# Patient Record
Sex: FEMALE | Race: BLACK OR AFRICAN AMERICAN | NOT HISPANIC OR LATINO | Employment: PART TIME | URBAN - METROPOLITAN AREA
[De-identification: names, ages, dates, MRNs, and addresses within clinical notes are randomized per-mention and may not be internally consistent; named-entity substitution may affect disease eponyms.]

---

## 2017-01-25 ENCOUNTER — HOSPITAL ENCOUNTER (OUTPATIENT)
Dept: CARDIOLOGY | Facility: CLINIC | Age: 77
Discharge: HOME | End: 2017-01-25
Payer: MEDICARE

## 2017-01-25 DIAGNOSIS — R00.0 TACHYCARDIA: ICD-10-CM

## 2017-01-25 DIAGNOSIS — I10 ESSENTIAL HYPERTENSION: ICD-10-CM

## 2017-01-25 LAB
DIASTOLIC DYSFUNCTION: NO
ESTIMATED PA SYSTOLIC PRESSURE: 25
MITRAL VALVE REGURGITATION: NORMAL
RETIRED EF AND QEF - SEE NOTES: 60 (ref 55–65)
TRICUSPID VALVE REGURGITATION: NORMAL

## 2017-01-25 PROCEDURE — 93306 TTE W/DOPPLER COMPLETE: CPT | Mod: S$GLB,,, | Performed by: INTERNAL MEDICINE

## 2017-01-26 RX ORDER — ALLOPURINOL 300 MG/1
TABLET ORAL
Qty: 90 TABLET | Refills: 0 | Status: SHIPPED | OUTPATIENT
Start: 2017-01-26 | End: 2017-02-01 | Stop reason: SDUPTHER

## 2017-01-26 RX ORDER — AMLODIPINE AND BENAZEPRIL HYDROCHLORIDE 10; 40 MG/1; MG/1
CAPSULE ORAL
Qty: 90 CAPSULE | Refills: 0 | Status: SHIPPED | OUTPATIENT
Start: 2017-01-26 | End: 2017-02-01 | Stop reason: SDUPTHER

## 2017-02-01 ENCOUNTER — OFFICE VISIT (OUTPATIENT)
Dept: INTERNAL MEDICINE | Facility: CLINIC | Age: 77
End: 2017-02-01
Payer: MEDICARE

## 2017-02-01 VITALS
DIASTOLIC BLOOD PRESSURE: 68 MMHG | WEIGHT: 209.69 LBS | HEART RATE: 60 BPM | SYSTOLIC BLOOD PRESSURE: 129 MMHG | BODY MASS INDEX: 33.7 KG/M2 | HEIGHT: 66 IN

## 2017-02-01 DIAGNOSIS — Z23 NEEDS FLU SHOT: ICD-10-CM

## 2017-02-01 DIAGNOSIS — Z86.73 H/O: STROKE: ICD-10-CM

## 2017-02-01 DIAGNOSIS — E66.01 MORBID OBESITY, UNSPECIFIED OBESITY TYPE: ICD-10-CM

## 2017-02-01 DIAGNOSIS — I10 ESSENTIAL HYPERTENSION: Primary | ICD-10-CM

## 2017-02-01 DIAGNOSIS — M10.9 INTERVAL GOUT: ICD-10-CM

## 2017-02-01 DIAGNOSIS — Z96.652 STATUS POST TOTAL LEFT KNEE REPLACEMENT: ICD-10-CM

## 2017-02-01 PROCEDURE — 1159F MED LIST DOCD IN RCRD: CPT | Mod: S$GLB,,, | Performed by: INTERNAL MEDICINE

## 2017-02-01 PROCEDURE — 99499 UNLISTED E&M SERVICE: CPT | Mod: S$GLB,,, | Performed by: INTERNAL MEDICINE

## 2017-02-01 PROCEDURE — 3078F DIAST BP <80 MM HG: CPT | Mod: S$GLB,,, | Performed by: INTERNAL MEDICINE

## 2017-02-01 PROCEDURE — 3074F SYST BP LT 130 MM HG: CPT | Mod: S$GLB,,, | Performed by: INTERNAL MEDICINE

## 2017-02-01 PROCEDURE — 99999 PR PBB SHADOW E&M-EST. PATIENT-LVL III: CPT | Mod: PBBFAC,,, | Performed by: INTERNAL MEDICINE

## 2017-02-01 PROCEDURE — 1160F RVW MEDS BY RX/DR IN RCRD: CPT | Mod: S$GLB,,, | Performed by: INTERNAL MEDICINE

## 2017-02-01 PROCEDURE — 99214 OFFICE O/P EST MOD 30 MIN: CPT | Mod: S$GLB,,, | Performed by: INTERNAL MEDICINE

## 2017-02-01 PROCEDURE — 1157F ADVNC CARE PLAN IN RCRD: CPT | Mod: S$GLB,,, | Performed by: INTERNAL MEDICINE

## 2017-02-01 PROCEDURE — 1126F AMNT PAIN NOTED NONE PRSNT: CPT | Mod: S$GLB,,, | Performed by: INTERNAL MEDICINE

## 2017-02-01 RX ORDER — ATORVASTATIN CALCIUM 80 MG/1
80 TABLET, FILM COATED ORAL DAILY
Qty: 90 TABLET | Refills: 3 | Status: SHIPPED | OUTPATIENT
Start: 2017-02-01 | End: 2017-10-26 | Stop reason: SDUPTHER

## 2017-02-01 RX ORDER — METOPROLOL TARTRATE 50 MG/1
50 TABLET ORAL 2 TIMES DAILY
Qty: 180 TABLET | Refills: 3 | Status: SHIPPED | OUTPATIENT
Start: 2017-02-01 | End: 2017-10-26 | Stop reason: SDUPTHER

## 2017-02-01 RX ORDER — ALLOPURINOL 300 MG/1
300 TABLET ORAL DAILY
Qty: 90 TABLET | Refills: 3 | Status: SHIPPED | OUTPATIENT
Start: 2017-02-01 | End: 2017-10-26 | Stop reason: SDUPTHER

## 2017-02-01 RX ORDER — AMLODIPINE AND BENAZEPRIL HYDROCHLORIDE 10; 40 MG/1; MG/1
1 CAPSULE ORAL DAILY
Qty: 90 CAPSULE | Refills: 3 | Status: SHIPPED | OUTPATIENT
Start: 2017-02-01 | End: 2017-10-26 | Stop reason: SDUPTHER

## 2017-02-01 RX ORDER — SPIRONOLACTONE 25 MG/1
25 TABLET ORAL DAILY
Qty: 90 TABLET | Refills: 3 | Status: SHIPPED | OUTPATIENT
Start: 2017-02-01 | End: 2017-10-26 | Stop reason: SDUPTHER

## 2017-02-01 NOTE — MR AVS SNAPSHOT
[facility]  [address]  [place] [address]  Phone: [phone]  Fax: [phone]                  Nelia Littlejohn   2017 3:00 PM   Office Visit    Description:  Female : 1940   Provider:  Arminda Gutiérrez MD   Department:  [facility]           Reason for Visit     Follow-up           Diagnoses this Visit        Comments    Essential hypertension    -  Primary     H/O: stroke                To Do List           Future Appointments        Provider Department Dept Phone    2017 8:00 AM Arminda Gutéirrez MD [facility] Internal [facility] [phone]      Goals (5 Years of Data)     None      Follow-Up and Disposition     Return in about 3 months (around 2017) for for PE in next 3-6 months.       These Medications        Disp Refills Start End    amlodipine-benazepril (LOTREL) 10-40 mg per capsule 90 capsule 3 2017     Take 1 capsule by mouth once daily. - Oral    Pharmacy: [address] [facility] Ph #: [phone]       allopurinol (ZYLOPRIM) 300 MG tablet 90 tablet 3 2017     Take 1 tablet (300 mg total) by mouth once daily. - Oral    Pharmacy: [address] AT SEC of [facility] Ph #: [phone]       atorvastatin (LIPITOR) 80 MG tablet 90 tablet 3 2017     Take 1 tablet (80 mg total) by mouth once daily. - Oral    Pharmacy: [address] AT SEC of [employer] [facility] Ph #: [phone]       spironolactone (ALDACTONE) 25 MG tablet 90 tablet 3 2017     Take 1 tablet (25 mg total) by mouth once daily. - Oral    Pharmacy: [address] AT SEC of [facility] Ph #: [phone]       metoprolol tartrate (LOPRESSOR) 50 MG tablet 180 tablet 3 2017  2/1/2018    Take 1 tablet (50 mg total) by mouth 2 (two) times daily. - Oral    Pharmacy: [facility] [address] [address] [address] [facility] AT [facility] Emely Levine  #: [phone]         [facility] On Call     [facility] On Call Nurse Care Line - 24/7 Assistance  Registered nurses in the [facility] On Call Center provide clinical advisement, health education, appointment booking, and other advisory services.  Call for this free service at [phone].             Medications           Message regarding Medications     Verify the changes and/or additions to your medication regime listed below are the same as discussed with your clinician today.  If any of these changes or additions are incorrect, please notify your healthcare provider.        START taking these NEW medications        Refills    metoprolol tartrate (LOPRESSOR) 50 MG tablet 3    Sig: Take 1 tablet (50 mg total) by mouth 2 (two) times daily.    Class: Normal    Route: Oral      CHANGE how you are taking these medications     Start Taking Instead of    amlodipine-benazepril (LOTREL) 10-40 mg per capsule amlodipine-benazepril (LOTREL) 10-40 mg per capsule    Dosage:  Take 1 capsule by mouth once daily. Dosage:  TAKE 1 CAPSULE BY MOUTH EVERY DAY    Reason for Change:  Reorder     allopurinol (ZYLOPRIM) 300 MG tablet allopurinol (ZYLOPRIM) 300 MG tablet    Dosage:  Take 1 tablet (300 mg total) by mouth once daily. Dosage:  TAKE 1 TABLET BY MOUTH EVERY DAY    Reason for Change:  Reorder       STOP taking these medications     oxycodone-acetaminophen (PERCOCET) 7.5-325 mg per tablet     promethazine (PHENERGAN) 25 MG tablet TK ONE T PO TID PRF NAUSEA    metoprolol succinate (TOPROL-XL) 100 MG 24 hr tablet Take 2 tablets (200 mg total) by mouth once daily.           Verify that the below list of medications is an accurate representation of the medications you are currently taking.  If none reported, the list may be blank. If  "incorrect, please contact your healthcare provider. Carry this list with you in case of emergency.           Current Medications     allopurinol (ZYLOPRIM) 300 MG tablet Take 1 tablet (300 mg total) by mouth once daily.    amlodipine-benazepril (LOTREL) 10-40 mg per capsule Take 1 capsule by mouth once daily.    aspirin (ECOTRIN) 325 MG EC tablet TAKE 1 TABLET BY MOUTH DAILY    atorvastatin (LIPITOR) 80 MG tablet Take 1 tablet (80 mg total) by mouth once daily.    metoprolol tartrate (LOPRESSOR) 50 MG tablet Take 1 tablet (50 mg total) by mouth 2 (two) times daily.    multivit-iron-min-folic acid (MULTIVITAMIN-IRON-MINERALS-FOLIC ACID) 3,500-18-0.4 unit-mg-mg Chew Take by mouth once daily.     peg-electrolyte soln 420 gram SolR     spironolactone (ALDACTONE) 25 MG tablet Take 1 tablet (25 mg total) by mouth once daily.           Clinical Reference Information           Vital Signs - Last Recorded  Most recent update: 2/1/2017  3:37 PM by Arminda Gutiérrez MD    BP Pulse Ht Wt BMI    129/68 60 5' 6" (1.676 m) 95.1 kg (209 lb 10.5 oz) 33.84 kg/m2      Blood Pressure          Most Recent Value    BP  129/68      Allergies as of 2/1/2017     No Known Allergies      Immunizations Administered on Date of Encounter - 2/1/2017     None      [facility] Sign-Up     Activating your [facility] account is as easy as 1-2-3!     1) Visit [email or web address], select Sign Up Now, enter this activation code and your date of birth, then select Next.  [email or web address]  Expires: 3/18/2017  3:51 PM      2) Create a username and password to use when you visit [facility] in the future and select a security question in case you lose your password and select Next.    3) Enter your e-mail address and click Sign Up!    Additional Information  If you have questions, please e-mail [email or web address] or call [phone] to talk to our [facility] staff. Remember, [facility] is NOT to be used for urgent needs. For medical emergencies, dial 911.       "

## 2017-02-02 NOTE — PROGRESS NOTES
Subjective:       Patient ID: Nelia Littlejohn is a 77 y.o. female.    Chief Complaint: Follow-up  She is recovering from her left total knee replacement October 11, 2017  She is doing her physical therapy at home.  She's ready to return to work on February 13.  She is very happy that she went through with this surgery.  The recovery was more difficult than she anticipated.  HPI  Review of Systems   Constitutional: Negative for activity change, appetite change, chills, fatigue, fever and unexpected weight change.   HENT: Negative for hearing loss.    Eyes: Negative for visual disturbance.   Respiratory: Negative for cough, chest tightness, shortness of breath and wheezing.    Cardiovascular: Negative for chest pain, palpitations and leg swelling.   Gastrointestinal: Negative for abdominal pain, constipation, nausea and vomiting.   Genitourinary: Negative for dysuria, frequency and urgency.   Musculoskeletal: Negative for arthralgias, back pain, gait problem, joint swelling and myalgias.   Skin: Negative for rash.   Neurological: Negative for light-headedness and headaches.   Psychiatric/Behavioral: Negative for dysphoric mood and sleep disturbance. The patient is not nervous/anxious.        Objective:      Physical Exam   Constitutional: She appears well-nourished.   HENT:   Head: Atraumatic.   Eyes: Conjunctivae are normal. No scleral icterus.   Neck: Neck supple.   Cardiovascular: Normal rate and regular rhythm.    Pulmonary/Chest: Effort normal and breath sounds normal.   Abdominal: Soft. There is no tenderness.   Musculoskeletal: She exhibits no edema.   Lymphadenopathy:     She has no cervical adenopathy.   Neurological: She is alert.   Skin: Skin is warm and dry.   Psychiatric: She has a normal mood and affect. Her behavior is normal.       Assessment:       1. Essential hypertension    2. 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 Status post total left knee replacement    4.  Interval gout    5. Needs flu shot    6. Morbid obesity, unspecified obesity type        Plan:   Nelia was seen today for follow-up.    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  -     allopurinol (ZYLOPRIM) 300 MG tablet; Take 1 tablet (300 mg total) by mouth once daily.  -     atorvastatin (LIPITOR) 80 MG tablet; Take 1 tablet (80 mg total) by mouth once daily.  -     spironolactone (ALDACTONE) 25 MG tablet; Take 1 tablet (25 mg total) by mouth once daily.  -     metoprolol tartrate (LOPRESSOR) 50 MG tablet; Take 1 tablet (50 mg total) by mouth 2 (two) times daily.      Health Maintenance       Date Due Completion Date    TETANUS VACCINE 1/2/1958 ---    Pneumococcal (65+) (2 of 2 - PCV13) 10/16/2015 10/16/2014    Influenza Vaccine 8/1/2016 10/16/2014    DEXA SCAN 9/7/2019 9/7/2016 (Not Clinical)    Override on 9/7/2016: Not Clinically Appropriate    Lipid Panel 1/25/2022 1/25/2017

## 2017-03-16 RX ORDER — ASPIRIN 325 MG
TABLET, DELAYED RELEASE (ENTERIC COATED) ORAL
Qty: 90 TABLET | Refills: 0 | Status: SHIPPED | OUTPATIENT
Start: 2017-03-16 | End: 2017-11-14 | Stop reason: SDUPTHER

## 2017-03-16 RX ORDER — SPIRONOLACTONE 25 MG/1
TABLET ORAL
Qty: 90 TABLET | Refills: 0 | Status: SHIPPED | OUTPATIENT
Start: 2017-03-16 | End: 2017-05-24 | Stop reason: SDUPTHER

## 2017-04-24 RX ORDER — METOPROLOL SUCCINATE 200 MG/1
TABLET, EXTENDED RELEASE ORAL
Qty: 90 TABLET | Refills: 3 | Status: SHIPPED | OUTPATIENT
Start: 2017-04-24 | End: 2017-05-24

## 2017-05-18 ENCOUNTER — TELEPHONE (OUTPATIENT)
Dept: FAMILY MEDICINE | Facility: CLINIC | Age: 77
End: 2017-05-18

## 2017-05-18 NOTE — TELEPHONE ENCOUNTER
----- Message from Alexander Bustos LPN sent at 5/18/2017 12:55 PM CDT -----  Contact: daughter/gay/[phone]      ----- Message -----     From: Dhara Glaser     Sent: 5/18/2017  12:47 PM       To: Brock ARROYO Staff    Pt daughter called in regards to pt going on a cruise and would like to get the sea sick patches.      [facility]  Please advise

## 2017-05-18 NOTE — TELEPHONE ENCOUNTER
----- Message from Rosemarie Herr sent at 5/18/2017  1:04 PM CDT -----  Contact: Svitlana Gutierrez/[phone]  Svitlana said she is returning your call. Please advise

## 2017-05-21 RX ORDER — MECLIZINE HYDROCHLORIDE 25 MG/1
25 TABLET ORAL 3 TIMES DAILY PRN
Qty: 20 TABLET | Refills: 0 | Status: SHIPPED | OUTPATIENT
Start: 2017-05-21 | End: 2019-01-09 | Stop reason: ALTCHOICE

## 2017-05-24 ENCOUNTER — HOSPITAL ENCOUNTER (OUTPATIENT)
Dept: RADIOLOGY | Facility: HOSPITAL | Age: 77
Discharge: HOME | End: 2017-05-24
Attending: INTERNAL MEDICINE
Payer: MEDICARE

## 2017-05-24 ENCOUNTER — OFFICE VISIT (OUTPATIENT)
Dept: INTERNAL MEDICINE | Facility: CLINIC | Age: 77
End: 2017-05-24
Payer: MEDICARE

## 2017-05-24 VITALS
OXYGEN SATURATION: 97 % | BODY MASS INDEX: 34.82 KG/M2 | TEMPERATURE: 101 F | DIASTOLIC BLOOD PRESSURE: 78 MMHG | SYSTOLIC BLOOD PRESSURE: 178 MMHG | HEART RATE: 67 BPM | HEIGHT: 66 IN | WEIGHT: 216.69 LBS

## 2017-05-24 DIAGNOSIS — Z96.652 STATUS POST TOTAL LEFT KNEE REPLACEMENT: ICD-10-CM

## 2017-05-24 DIAGNOSIS — Z86.73 H/O: STROKE: ICD-10-CM

## 2017-05-24 DIAGNOSIS — I10 ESSENTIAL HYPERTENSION: ICD-10-CM

## 2017-05-24 DIAGNOSIS — E78.5 HYPERLIPIDEMIA, UNSPECIFIED HYPERLIPIDEMIA TYPE: ICD-10-CM

## 2017-05-24 DIAGNOSIS — M17.0 PRIMARY OSTEOARTHRITIS OF BOTH KNEES: ICD-10-CM

## 2017-05-24 DIAGNOSIS — M25.561 ACUTE PAIN OF RIGHT KNEE: ICD-10-CM

## 2017-05-24 DIAGNOSIS — M25.561 ACUTE PAIN OF RIGHT KNEE: Primary | ICD-10-CM

## 2017-05-24 PROCEDURE — 99214 OFFICE O/P EST MOD 30 MIN: CPT | Mod: S$GLB,,, | Performed by: INTERNAL MEDICINE

## 2017-05-24 PROCEDURE — 73560 X-RAY EXAM OF KNEE 1 OR 2: CPT | Mod: 26,59,LT, | Performed by: RADIOLOGY

## 2017-05-24 PROCEDURE — 73562 X-RAY EXAM OF KNEE 3: CPT | Mod: 26,RT,, | Performed by: RADIOLOGY

## 2017-05-24 PROCEDURE — 73560 X-RAY EXAM OF KNEE 1 OR 2: CPT | Mod: TC,LT

## 2017-05-24 PROCEDURE — 99499 UNLISTED E&M SERVICE: CPT | Mod: S$GLB,,, | Performed by: INTERNAL MEDICINE

## 2017-05-24 PROCEDURE — 99999 PR PBB SHADOW E&M-EST. PATIENT-LVL IV: CPT | Mod: PBBFAC,,, | Performed by: INTERNAL MEDICINE

## 2017-05-24 PROCEDURE — 1125F AMNT PAIN NOTED PAIN PRSNT: CPT | Mod: S$GLB,,, | Performed by: INTERNAL MEDICINE

## 2017-05-24 PROCEDURE — 1159F MED LIST DOCD IN RCRD: CPT | Mod: S$GLB,,, | Performed by: INTERNAL MEDICINE

## 2017-05-24 RX ORDER — DICLOFENAC SODIUM 10 MG/G
2 GEL TOPICAL 4 TIMES DAILY PRN
Qty: 100 G | Refills: 3 | Status: SHIPPED | OUTPATIENT
Start: 2017-05-24 | End: 2020-12-07 | Stop reason: SDUPTHER

## 2017-05-24 RX ORDER — HYDRALAZINE HYDROCHLORIDE 50 MG/1
50 TABLET, FILM COATED ORAL 2 TIMES DAILY
Qty: 180 TABLET | Refills: 3 | Status: SHIPPED | OUTPATIENT
Start: 2017-05-24 | End: 2017-10-26 | Stop reason: SDUPTHER

## 2017-05-29 NOTE — PROGRESS NOTES
Subjective:       Patient ID: Nelia Littlejohn is a 77 y.o. female.    Chief Complaint: Knee Pain; Cough; and Hypertension   is always a pleasure to see this very bashir woman.  Today her knee is hurting very badly and her blood pressure is elevated.  She has nocturnal awakening because of pain in her right knee.  She has to use a cane to walk.  She has pain in her knee all the time.  Her right knee feels unstable specially walking upstairs.  She has not experienced any giving out.  HPI  Review of Systems   Constitutional: Negative for activity change, appetite change, chills, fatigue, fever and unexpected weight change.   HENT: Negative for hearing loss.    Eyes: Negative for visual disturbance.   Respiratory: Negative for cough, chest tightness, shortness of breath and wheezing.    Cardiovascular: Negative for chest pain, palpitations and leg swelling.   Gastrointestinal: Negative for abdominal pain, constipation, nausea and vomiting.   Genitourinary: Negative for dysuria, frequency and urgency.   Musculoskeletal: Positive for arthralgias, gait problem and joint swelling. Negative for back pain and myalgias.   Skin: Negative for rash.   Neurological: Negative for light-headedness and headaches.   Psychiatric/Behavioral: Negative for dysphoric mood and sleep disturbance. The patient is not nervous/anxious.        Objective:      Physical Exam   Constitutional: She is oriented to person, place, and time. She appears well-developed and well-nourished. No distress.   HENT:   Head: Normocephalic and atraumatic.   Right Ear: External ear normal.   Left Ear: External ear normal.   Nose: Nose normal.   Mouth/Throat: Oropharynx is clear and moist. No oropharyngeal exudate.   Eyes: Conjunctivae and EOM are normal. Pupils are equal, round, and reactive to light. Right eye exhibits no discharge. No scleral icterus.   Neck: Normal range of motion and full passive range of motion without pain. Neck supple. No spinous process  tenderness and no muscular tenderness present. Carotid bruit is not present. No thyromegaly present.   Cardiovascular: Normal rate, regular rhythm, S1 normal, S2 normal, normal heart sounds and intact distal pulses.  Exam reveals no gallop and no friction rub.    No murmur heard.  Pulmonary/Chest: Effort normal and breath sounds normal. No respiratory distress. She has no wheezes. She has no rales. She exhibits no tenderness.   Abdominal: Soft. Bowel sounds are normal. She exhibits no distension and no mass. There is no tenderness. There is no rebound and no guarding.   Genitourinary: Pelvic exam was performed with patient supine. Uterus is not deviated, not enlarged, not fixed and not tender. Cervix exhibits no motion tenderness, no discharge and no friability. Right adnexum displays no mass, no tenderness and no fullness. Left adnexum displays no mass, no tenderness and no fullness.   Musculoskeletal: Normal range of motion. She exhibits no edema or tenderness.   Lymphadenopathy:        Head (right side): No submental and no submandibular adenopathy present.        Head (left side): No submental and no submandibular adenopathy present.     She has no cervical adenopathy.        Right cervical: No superficial cervical, no deep cervical and no posterior cervical adenopathy present.       Left cervical: No superficial cervical, no deep cervical and no posterior cervical adenopathy present.        Right axillary: No pectoral and no lateral adenopathy present.        Left axillary: No pectoral and no lateral adenopathy present.       Right: No supraclavicular adenopathy present.        Left: No supraclavicular adenopathy present.   Neurological: She is alert and oriented to person, place, and time. She has normal reflexes. No cranial nerve deficit. She exhibits normal muscle tone. Coordination normal.   Skin: Skin is warm and dry. No rash noted.   Psychiatric: She has a normal mood and affect. Her behavior is normal.  Her mood appears not anxious. Her speech is not rapid and/or pressured. She is not agitated. She does not exhibit a depressed mood.   Normal behavior, thought content, insight and judgement.       Assessment:       1. Acute pain of right knee    2. Status post total left knee replacement, 10-.    3. Primary osteoarthritis of both knees    4. Hyperlipidemia, unspecified hyperlipidemia type    5. Essential hypertension    6. 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    Diagnoses and all orders for this visit:    Acute pain of right knee  -     X-ray Knee Ortho Right; Future  -     Ambulatory Referral to Orthopedics    Status post total left knee replacement, 10-.  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 Right knee  -     hydrALAZINE (APRESOLINE) 50 MG tablet; Take 1 tablet (50 mg total) by mouth 2 (two) times daily.      Medication List with Changes/Refills   New Medications    DICLOFENAC SODIUM 1 % GEL    Apply 2 g topically 4 (four) times daily as needed. Right knee    HYDRALAZINE (APRESOLINE) 50 MG TABLET    Take 1 tablet (50 mg total) by mouth 2 (two) times daily.   Current Medications    ALLOPURINOL (ZYLOPRIM) 300 MG TABLET    Take 1 tablet (300 mg total) by mouth once daily.    AMLODIPINE-BENAZEPRIL (LOTREL) 10-40 MG PER CAPSULE    Take 1 capsule by mouth once daily.    ASPIRIN (ECOTRIN) 325 MG EC TABLET    TAKE 1 TABLET BY MOUTH DAILY    ATORVASTATIN (LIPITOR) 80 MG TABLET    Take 1 tablet (80 mg  total) by mouth once daily.    MECLIZINE (ANTIVERT) 25 MG TABLET    Take 1 tablet (25 mg total) by mouth 3 (three) times daily as needed (motion sickness on cruise).    METOPROLOL TARTRATE (LOPRESSOR) 50 MG TABLET    Take 1 tablet (50 mg total) by mouth 2 (two) times daily.    MULTIVIT-IRON-MIN-FOLIC ACID (MULTIVITAMIN-IRON-MINERALS-FOLIC ACID) 3,500-18-0.4 UNIT-MG-MG CHEW    Take by mouth once daily.     PEG-ELECTROLYTE SOLN 420 GRAM SOLR        SPIRONOLACTONE (ALDACTONE) 25 MG TABLET    Take 1 tablet (25 mg total) by mouth once daily.   Discontinued Medications    METOPROLOL SUCCINATE (TOPROL-XL) 200 MG 24 HR TABLET    TAKE 1 TABLET BY MOUTH DAILY    SPIRONOLACTONE (ALDACTONE) 25 MG TABLET    TAKE 1 TABLET BY MOUTH EVERY DAY.     Return in about 4 weeks (around 6/21/2017) for for me for Mag[facility] Teran.

## 2017-07-27 ENCOUNTER — TELEPHONE (OUTPATIENT)
Dept: INTERNAL MEDICINE | Facility: CLINIC | Age: 77
End: 2017-07-27

## 2017-07-27 NOTE — TELEPHONE ENCOUNTER
----- Message from Ronald Soliz sent at 7/27/2017 12:22 PM CDT -----  Contact: self [phone]  Type: Sooner appointment than  is able to schedule    When is the first available appointment? 09/21/17    What is the nature of the appointment? Ep     What appointment type: follow up      Comments: please advise , Thanks !

## 2017-08-04 RX ORDER — ASPIRIN 325 MG
TABLET, DELAYED RELEASE (ENTERIC COATED) ORAL
Qty: 90 TABLET | Refills: 0 | OUTPATIENT
Start: 2017-08-04

## 2017-10-17 ENCOUNTER — TELEPHONE (OUTPATIENT)
Dept: INTERNAL MEDICINE | Facility: CLINIC | Age: 77
End: 2017-10-17

## 2017-10-17 NOTE — TELEPHONE ENCOUNTER
----- Message from Sagrario Nowak sent at 10/17/2017  9:43 AM CDT -----  Contact: Leydi/Daughter [phone]  Requesting an earlier appt date or time    Next available appt: 01/23/2018    Nature of the appt: Annual Physical    Does patient have appt scheduled?: No    Please contact patient to schedule earlier appt if available but notify patient either way. Please call [phone]    Thank You

## 2017-10-17 NOTE — TELEPHONE ENCOUNTER
Pt is requesting earlier appt time for epp. Is there a spot that she could be fit in or should she wait for January?  Please advise.

## 2017-10-18 NOTE — TELEPHONE ENCOUNTER
----- Message from Silvina Anne sent at 10/18/2017  8:20 AM CDT -----  Contact: Leydi/ Daughter/ [phone]   Type: Sooner appointment than  is able to schedule    When is the first available appointment? 01/23/2018     What is the nature of the appointment? Annual     What appointment type:      Comments: pt daughter is calling to have a sooner appt. Please call and advise     Thank you

## 2017-10-26 ENCOUNTER — LAB VISIT (OUTPATIENT)
Dept: LAB | Facility: HOSPITAL | Age: 77
End: 2017-10-26
Attending: INTERNAL MEDICINE
Payer: MEDICARE

## 2017-10-26 ENCOUNTER — OFFICE VISIT (OUTPATIENT)
Dept: INTERNAL MEDICINE | Facility: CLINIC | Age: 77
End: 2017-10-26
Payer: MEDICARE

## 2017-10-26 VITALS
BODY MASS INDEX: 35.52 KG/M2 | HEART RATE: 68 BPM | WEIGHT: 221 LBS | SYSTOLIC BLOOD PRESSURE: 142 MMHG | HEIGHT: 66 IN | DIASTOLIC BLOOD PRESSURE: 66 MMHG

## 2017-10-26 DIAGNOSIS — I10 ESSENTIAL HYPERTENSION: ICD-10-CM

## 2017-10-26 DIAGNOSIS — M10.9 INTERVAL GOUT: ICD-10-CM

## 2017-10-26 DIAGNOSIS — Z87.898 HISTORY OF PREDIABETES: ICD-10-CM

## 2017-10-26 DIAGNOSIS — E03.8 SUBCLINICAL HYPOTHYROIDISM: ICD-10-CM

## 2017-10-26 DIAGNOSIS — E78.2 MIXED HYPERLIPIDEMIA: ICD-10-CM

## 2017-10-26 DIAGNOSIS — Z86.73 H/O: STROKE: ICD-10-CM

## 2017-10-26 DIAGNOSIS — E55.9 VITAMIN D DEFICIENCY: ICD-10-CM

## 2017-10-26 DIAGNOSIS — Z23 NEEDS FLU SHOT: ICD-10-CM

## 2017-10-26 DIAGNOSIS — Z00.00 ANNUAL PHYSICAL EXAM: Primary | ICD-10-CM

## 2017-10-26 DIAGNOSIS — R73.03 PREDIABETES: ICD-10-CM

## 2017-10-26 DIAGNOSIS — Z23 NEED FOR VACCINATION WITH 13-POLYVALENT PNEUMOCOCCAL CONJUGATE VACCINE: ICD-10-CM

## 2017-10-26 DIAGNOSIS — M25.551 RIGHT HIP PAIN: ICD-10-CM

## 2017-10-26 DIAGNOSIS — E66.01 MORBID OBESITY: ICD-10-CM

## 2017-10-26 LAB
25(OH)D3+25(OH)D2 SERPL-MCNC: 40 NG/ML
ALBUMIN SERPL BCP-MCNC: 3.8 G/DL
ALP SERPL-CCNC: 104 U/L
ALT SERPL W/O P-5'-P-CCNC: 19 U/L
ANION GAP SERPL CALC-SCNC: 12 MMOL/L
AST SERPL-CCNC: 24 U/L
BASOPHILS # BLD AUTO: 0.03 K/UL
BASOPHILS NFR BLD: 0.4 %
BILIRUB SERPL-MCNC: 1.3 MG/DL
BUN SERPL-MCNC: 13 MG/DL
CALCIUM SERPL-MCNC: 9.8 MG/DL
CHLORIDE SERPL-SCNC: 103 MMOL/L
CHOLEST SERPL-MCNC: 162 MG/DL
CHOLEST/HDLC SERPL: 2.9 {RATIO}
CO2 SERPL-SCNC: 24 MMOL/L
CREAT SERPL-MCNC: 0.9 MG/DL
DIFFERENTIAL METHOD: ABNORMAL
EOSINOPHIL # BLD AUTO: 0.1 K/UL
EOSINOPHIL NFR BLD: 1.7 %
ERYTHROCYTE [DISTWIDTH] IN BLOOD BY AUTOMATED COUNT: 14.6 %
EST. GFR  (AFRICAN AMERICAN): >60 ML/MIN/1.73 M^2
EST. GFR  (NON AFRICAN AMERICAN): >60 ML/MIN/1.73 M^2
ESTIMATED AVG GLUCOSE: 105 MG/DL
GLUCOSE SERPL-MCNC: 101 MG/DL
HBA1C MFR BLD HPLC: 5.3 %
HCT VFR BLD AUTO: 38.8 %
HDLC SERPL-MCNC: 56 MG/DL
HDLC SERPL: 34.6 %
HGB BLD-MCNC: 12.6 G/DL
LDLC SERPL CALC-MCNC: 82.2 MG/DL
LYMPHOCYTES # BLD AUTO: 1.8 K/UL
LYMPHOCYTES NFR BLD: 26.1 %
MCH RBC QN AUTO: 28.4 PG
MCHC RBC AUTO-ENTMCNC: 32.5 G/DL
MCV RBC AUTO: 88 FL
MONOCYTES # BLD AUTO: 0.4 K/UL
MONOCYTES NFR BLD: 5.9 %
NEUTROPHILS # BLD AUTO: 4.6 K/UL
NEUTROPHILS NFR BLD: 65.8 %
NONHDLC SERPL-MCNC: 106 MG/DL
NRBC BLD-RTO: 0 /100 WBC
PLATELET # BLD AUTO: 283 K/UL
PMV BLD AUTO: 10.6 FL
POTASSIUM SERPL-SCNC: 4 MMOL/L
PROT SERPL-MCNC: 8 G/DL
RBC # BLD AUTO: 4.43 M/UL
SODIUM SERPL-SCNC: 139 MMOL/L
TRIGL SERPL-MCNC: 119 MG/DL
TSH SERPL DL<=0.005 MIU/L-ACNC: 0.71 UIU/ML
URATE SERPL-MCNC: 4.8 MG/DL
WBC # BLD AUTO: 6.93 K/UL

## 2017-10-26 PROCEDURE — 84550 ASSAY OF BLOOD/URIC ACID: CPT

## 2017-10-26 PROCEDURE — 99999 PR PBB SHADOW E&M-EST. PATIENT-LVL III: CPT | Mod: PBBFAC,,, | Performed by: INTERNAL MEDICINE

## 2017-10-26 PROCEDURE — 36415 COLL VENOUS BLD VENIPUNCTURE: CPT

## 2017-10-26 PROCEDURE — 99499 UNLISTED E&M SERVICE: CPT | Mod: S$GLB,,, | Performed by: INTERNAL MEDICINE

## 2017-10-26 PROCEDURE — 80053 COMPREHEN METABOLIC PANEL: CPT

## 2017-10-26 PROCEDURE — 99397 PER PM REEVAL EST PAT 65+ YR: CPT | Mod: S$GLB,,, | Performed by: INTERNAL MEDICINE

## 2017-10-26 PROCEDURE — 80061 LIPID PANEL: CPT

## 2017-10-26 PROCEDURE — 82306 VITAMIN D 25 HYDROXY: CPT

## 2017-10-26 PROCEDURE — 83036 HEMOGLOBIN GLYCOSYLATED A1C: CPT

## 2017-10-26 PROCEDURE — 84443 ASSAY THYROID STIM HORMONE: CPT

## 2017-10-26 PROCEDURE — 85025 COMPLETE CBC W/AUTO DIFF WBC: CPT

## 2017-10-26 RX ORDER — SPIRONOLACTONE 25 MG/1
25 TABLET ORAL DAILY
Qty: 90 TABLET | Refills: 3 | Status: SHIPPED | OUTPATIENT
Start: 2017-10-26 | End: 2018-11-18 | Stop reason: SDUPTHER

## 2017-10-26 RX ORDER — ALLOPURINOL 300 MG/1
300 TABLET ORAL DAILY
Qty: 90 TABLET | Refills: 3 | Status: SHIPPED | OUTPATIENT
Start: 2017-10-26 | End: 2018-11-18 | Stop reason: SDUPTHER

## 2017-10-26 RX ORDER — ATORVASTATIN CALCIUM 80 MG/1
80 TABLET, FILM COATED ORAL DAILY
Qty: 90 TABLET | Refills: 3 | Status: SHIPPED | OUTPATIENT
Start: 2017-10-26 | End: 2018-11-18 | Stop reason: SDUPTHER

## 2017-10-26 RX ORDER — AMLODIPINE AND BENAZEPRIL HYDROCHLORIDE 10; 40 MG/1; MG/1
1 CAPSULE ORAL DAILY
Qty: 90 CAPSULE | Refills: 3 | Status: SHIPPED | OUTPATIENT
Start: 2017-10-26 | End: 2018-11-18 | Stop reason: SDUPTHER

## 2017-10-26 RX ORDER — HYDRALAZINE HYDROCHLORIDE 50 MG/1
50 TABLET, FILM COATED ORAL 2 TIMES DAILY
Qty: 180 TABLET | Refills: 3 | Status: SHIPPED | OUTPATIENT
Start: 2017-10-26 | End: 2019-01-09 | Stop reason: ALTCHOICE

## 2017-10-26 RX ORDER — METOPROLOL TARTRATE 50 MG/1
50 TABLET ORAL 2 TIMES DAILY
Qty: 180 TABLET | Refills: 3 | Status: SHIPPED | OUTPATIENT
Start: 2017-10-26 | End: 2018-11-18 | Stop reason: SDUPTHER

## 2017-10-26 RX ORDER — ASPIRIN 325 MG
TABLET, DELAYED RELEASE (ENTERIC COATED) ORAL
Qty: 90 TABLET | Refills: 0 | OUTPATIENT
Start: 2017-10-26

## 2017-10-26 NOTE — PROGRESS NOTES
Subjective:       Patient ID: Nelia Littlejohn is a 77 y.o. female.    Chief Complaint: Annual Exam  wellness  Entire chart reviewed, PMx, FHx, and Social History updated and reviewed.  This note was created with the use of Dragon dictation    HPI  Review of Systems   Constitutional: Negative for activity change, appetite change, chills, fatigue, fever and unexpected weight change.   HENT: Negative for dental problem, hearing loss, tinnitus, trouble swallowing and voice change.    Eyes: Negative for visual disturbance.   Respiratory: Negative for cough, chest tightness, shortness of breath and wheezing.    Cardiovascular: Negative for chest pain, palpitations and leg swelling.   Gastrointestinal: Negative for abdominal pain, blood in stool, constipation, diarrhea and nausea.   Genitourinary: Negative for difficulty urinating, dysuria, frequency and urgency.   Musculoskeletal: Negative for arthralgias, back pain, gait problem, joint swelling, myalgias, neck pain and neck stiffness.   Skin: Negative for rash.   Neurological: Negative for dizziness, tremors, speech difficulty, weakness, light-headedness and headaches.   Psychiatric/Behavioral: Negative for dysphoric mood and sleep disturbance. The patient is not nervous/anxious.        Objective:      Physical Exam   Constitutional: She is oriented to person, place, and time. She appears well-developed and well-nourished. No distress.   HENT:   Head: Normocephalic and atraumatic.   Right Ear: External ear normal.   Left Ear: External ear normal.   Nose: Nose normal.   Mouth/Throat: Oropharynx is clear and moist. No oropharyngeal exudate.   Eyes: Conjunctivae and EOM are normal. Pupils are equal, round, and reactive to light. Right eye exhibits no discharge. No scleral icterus.   Neck: Normal range of motion and full passive range of motion without pain. Neck supple. No spinous process tenderness and no muscular tenderness present. Carotid bruit is not present. No  thyromegaly present.   Cardiovascular: Normal rate, regular rhythm, S1 normal, S2 normal, normal heart sounds and intact distal pulses.  Exam reveals no gallop and no friction rub.    No murmur heard.  Pulmonary/Chest: Effort normal and breath sounds normal. No respiratory distress. She has no wheezes. She has no rales. She exhibits no tenderness.   Abdominal: Soft. Bowel sounds are normal. She exhibits no distension and no mass. There is no tenderness. There is no rebound and no guarding.   Genitourinary: Pelvic exam was performed with patient supine. Uterus is not deviated, not enlarged, not fixed and not tender. Cervix exhibits no motion tenderness, no discharge and no friability. Right adnexum displays no mass, no tenderness and no fullness. Left adnexum displays no mass, no tenderness and no fullness.   Musculoskeletal: Normal range of motion. She exhibits no edema or tenderness.   Lymphadenopathy:        Head (right side): No submental and no submandibular adenopathy present.        Head (left side): No submental and no submandibular adenopathy present.     She has no cervical adenopathy.        Right cervical: No superficial cervical, no deep cervical and no posterior cervical adenopathy present.       Left cervical: No superficial cervical, no deep cervical and no posterior cervical adenopathy present.        Right axillary: No pectoral and no lateral adenopathy present.        Left axillary: No pectoral and no lateral adenopathy present.       Right: No supraclavicular adenopathy present.        Left: No supraclavicular adenopathy present.   Neurological: She is alert and oriented to person, place, and time. She has normal reflexes. No cranial nerve deficit. She exhibits normal muscle tone. Coordination normal.   Skin: Skin is warm and dry. No rash noted.   Psychiatric: She has a normal mood and affect. Her behavior is normal. Her mood appears not anxious. Her speech is not rapid and/or pressured. She is  not agitated. She does not exhibit a depressed mood.   Normal behavior, thought content, insight and judgement.       Assessment:       1. Annual physical exam    2. History of prediabetes    3. Prediabetes,     4. Interval gout    5. Essential hypertension    6. 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 Right hip pain    8. Needs flu shot    9. Need for vaccination with 13-polyvalent pneumococcal conjugate vaccine    10. Morbid obesity    11. Mixed hyperlipidemia    12. Subclinical hypothyroidism    13. Vitamin D deficiency        Plan:   Nelia was seen today for annual exam.    Diagnoses and all orders for this visit:    Annual physical exam.  She enjoys good health, good energy, practices good health habitsand enjoys working. 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    History of prediabetes.  Asymptomatic.  Monitor.  Prediabetes,   -     Comprehensive metabolic panel; Future  -     Hemoglobin A1c; Future    Interval gout.  On allopurinol and has not had a recent attack.  -     CBC auto differential; Future  -     Comprehensive metabolic panel; Future  -     Uric acid; Future    Essential hypertension.  She takes a lot of medication but is under good control.  She has a horrible family history of essential hypertension and all of her children also have hypertension.  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  She has no residual stroke symptoms.  -     CBC auto differential; Future  -     Hemoglobin A1c; Future    Right hip pain.  This is only intermittent    Needs flu shot, Today.    Need for vaccination with 13-polyvalent pneumococcal conjugate vaccine, Today.    Morbid obesity.  She is not terribly motivated to lose weight but she does watch her diet.  She particularly is restricting processed foods  and salt.    Mixed hyperlipidemia.  Statin compliant.  -     Lipid panel; Future  -     TSH; Future    Subclinical hypothyroidism.  Monitor.  -     TSH; Future    Vitamin D deficiency.  She is taking a daily over-the-counter supplement and we will monitor.  -     Vitamin D; Future    Other orders  -     spironolactone (ALDACTONE) 25 MG tablet; Take 1 tablet (25 mg total) by mouth once daily.  -     amlodipine-benazepril (LOTREL) 10-40 mg per capsule; Take 1 capsule by mouth once daily.  -     atorvastatin (LIPITOR) 80 MG tablet; Take 1 tablet (80 mg total) by mouth once daily.  -     metoprolol tartrate (LOPRESSOR) 50 MG tablet; Take 1 tablet (50 mg total) by mouth 2 (two) times daily.  -     hydrALAZINE (APRESOLINE) 50 MG tablet; Take 1 tablet (50 mg total) by mouth 2 (two) times daily.  -     allopurinol (ZYLOPRIM) 300 MG tablet; Take 1 tablet (300 mg total) by mouth once daily.    Return in about 6 months (around 4/26/2018) for 6 mon me or NP and one year PE.

## 2017-10-26 NOTE — LETTER
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    If you have any questions or concerns, please feel free to call my office.    Sincerely,        Arminda Gutiérrez MD

## 2017-11-14 DIAGNOSIS — Z12.39 SCREENING BREAST EXAMINATION: ICD-10-CM

## 2017-11-14 RX ORDER — ASPIRIN 325 MG
325 TABLET, DELAYED RELEASE (ENTERIC COATED) ORAL DAILY
Qty: 100 TABLET | Refills: 3 | Status: SHIPPED | OUTPATIENT
Start: 2017-11-14 | End: 2018-12-03 | Stop reason: SDUPTHER

## 2017-11-14 NOTE — TELEPHONE ENCOUNTER
Please call patient. Every one of her blood tests 10-26 -2017 were normal.  If she would like a copy of her blood tests, please print and mail.  Her last mammogram was in 2015. Many women choose to stop having routine screening mammograms at age 75. I do not remember discussing her wishes at her last visit. I have ordered a routine screening mammogram if she would like to schedule this.

## 2017-11-14 NOTE — TELEPHONE ENCOUNTER
Spoke with pt and she understood her results. Pt also feels like she should wait until another time to schedule her mammogram after hearing at the age of 75yrs old woman don't usually need them. Pt would like a refill on her Aspirin sent to there pharmacy

## 2017-11-14 NOTE — TELEPHONE ENCOUNTER
----- Message from Kaci Mooney sent at 11/14/2017  2:38 PM CST -----  Contact: Ebony rodriguez- [phone]  Aspirin Rx was not put in at pharmacy.    Patient would like to get test results.  Name of test (lab, mammo, etc.):  lab  Date of test:  10/26  Ordering provider:   Where was the test performed[facility]  [facility]  Comments:

## 2018-01-15 ENCOUNTER — OFFICE VISIT (OUTPATIENT)
Dept: INTERNAL MEDICINE | Facility: CLINIC | Age: 78
End: 2018-01-15
Payer: MEDICARE

## 2018-01-15 VITALS
WEIGHT: 221 LBS | HEIGHT: 66 IN | SYSTOLIC BLOOD PRESSURE: 138 MMHG | BODY MASS INDEX: 35.52 KG/M2 | HEART RATE: 54 BPM | OXYGEN SATURATION: 97 % | TEMPERATURE: 98 F | DIASTOLIC BLOOD PRESSURE: 82 MMHG

## 2018-01-15 DIAGNOSIS — J11.1 INFLUENZA: Primary | ICD-10-CM

## 2018-01-15 PROCEDURE — 99999 PR PBB SHADOW E&M-EST. PATIENT-LVL III: CPT | Mod: PBBFAC,,, | Performed by: INTERNAL MEDICINE

## 2018-01-15 PROCEDURE — 99213 OFFICE O/P EST LOW 20 MIN: CPT | Mod: S$GLB,,, | Performed by: INTERNAL MEDICINE

## 2018-01-15 RX ORDER — BENZONATATE 200 MG/1
200 CAPSULE ORAL 3 TIMES DAILY PRN
Qty: 30 CAPSULE | Refills: 0 | Status: SHIPPED | OUTPATIENT
Start: 2018-01-15 | End: 2018-01-22

## 2018-01-15 RX ORDER — OSELTAMIVIR PHOSPHATE 75 MG/1
75 CAPSULE ORAL 2 TIMES DAILY
Qty: 10 CAPSULE | Refills: 0 | Status: SHIPPED | OUTPATIENT
Start: 2018-01-15 | End: 2018-01-20

## 2018-01-15 RX ORDER — BENZONATATE 200 MG/1
200 CAPSULE ORAL 3 TIMES DAILY PRN
Qty: 30 CAPSULE | Refills: 0 | Status: SHIPPED | OUTPATIENT
Start: 2018-01-15 | End: 2018-01-15 | Stop reason: SDUPTHER

## 2018-01-15 NOTE — PROGRESS NOTES
Subjective:       Patient ID: Nelia Littlejohn is a 78 y.o. female.    Chief Complaint: Cough (possible flu); Fatigue (body aches); Fever; and Sore Throat    HPI   Began 4 days ago with minor cough for several days  Body aches began began about 3 days ago.  She hasn't checked temperature.  No runny nose, wheezing, sob.  Aleve has helped a bit.    Cough keeps her up at night.   Robitussin not helpful.  She works at a nursery.        Review of Systems   Constitutional: Negative for fever and unexpected weight change.   HENT: Negative for congestion and postnasal drip.    Eyes: Negative for pain, discharge and visual disturbance.   Respiratory: Negative for cough, chest tightness, shortness of breath and wheezing.    Cardiovascular: Negative for chest pain and leg swelling.   Gastrointestinal: Negative for abdominal pain, constipation, diarrhea and nausea.   Genitourinary: Negative for difficulty urinating, dysuria and hematuria.   Skin: Negative for rash.   Neurological: Negative for headaches.   Psychiatric/Behavioral: Negative for dysphoric mood and sleep disturbance. The patient is not nervous/anxious.        Objective:      Physical Exam   Constitutional: She is oriented to person, place, and time. She appears well-developed and well-nourished. No distress.   HENT:   Head: Normocephalic and atraumatic.   Mouth/Throat: Oropharynx is clear and moist. No oropharyngeal exudate.   Tm's clear   Neck: Neck supple.   Cardiovascular: Normal rate and regular rhythm.    Pulmonary/Chest: Effort normal and breath sounds normal. No respiratory distress. She has no wheezes. She has no rales.   Lymphadenopathy:     She has no cervical adenopathy.   Neurological: She is alert and oriented to person, place, and time.   Psychiatric: She has a normal mood and affect. Her behavior is normal.       Assessment:       1. Influenza        Plan:       Nelia was seen today for cough, fatigue, fever and sore throat.    Diagnoses and all  orders for this visit:    Influenza    Other orders  -     oseltamivir (TAMIFLU) 75 MG capsule; Take 1 capsule (75 mg total) by mouth 2 (two) times daily.  -     benzonatate (TESSALON) 200 MG capsule; Take 1 capsule (200 mg total) by mouth 3 (three) times daily as needed for Cough.

## 2018-03-16 ENCOUNTER — HOSPITAL ENCOUNTER (OUTPATIENT)
Dept: RADIOLOGY | Facility: HOSPITAL | Age: 78
Discharge: HOME | End: 2018-03-16
Attending: INTERNAL MEDICINE
Payer: MEDICARE

## 2018-03-16 ENCOUNTER — OFFICE VISIT (OUTPATIENT)
Dept: INTERNAL MEDICINE | Facility: CLINIC | Age: 78
End: 2018-03-16
Payer: MEDICARE

## 2018-03-16 VITALS
DIASTOLIC BLOOD PRESSURE: 62 MMHG | BODY MASS INDEX: 35.83 KG/M2 | SYSTOLIC BLOOD PRESSURE: 153 MMHG | WEIGHT: 222 LBS | HEART RATE: 61 BPM | OXYGEN SATURATION: 95 %

## 2018-03-16 DIAGNOSIS — R05.9 COUGH: Primary | ICD-10-CM

## 2018-03-16 DIAGNOSIS — R06.2 WHEEZES: ICD-10-CM

## 2018-03-16 DIAGNOSIS — I10 ESSENTIAL HYPERTENSION: ICD-10-CM

## 2018-03-16 DIAGNOSIS — R05.9 COUGH: ICD-10-CM

## 2018-03-16 PROBLEM — E66.01 MORBID OBESITY: Status: RESOLVED | Noted: 2017-02-01 | Resolved: 2018-03-16

## 2018-03-16 PROCEDURE — 99999 PR PBB SHADOW E&M-EST. PATIENT-LVL IV: CPT | Mod: PBBFAC,,, | Performed by: INTERNAL MEDICINE

## 2018-03-16 PROCEDURE — 71046 X-RAY EXAM CHEST 2 VIEWS: CPT | Mod: 26,,, | Performed by: RADIOLOGY

## 2018-03-16 PROCEDURE — 3078F DIAST BP <80 MM HG: CPT | Mod: CPTII,S$GLB,, | Performed by: INTERNAL MEDICINE

## 2018-03-16 PROCEDURE — 71046 X-RAY EXAM CHEST 2 VIEWS: CPT | Mod: TC

## 2018-03-16 PROCEDURE — 3077F SYST BP >= 140 MM HG: CPT | Mod: CPTII,S$GLB,, | Performed by: INTERNAL MEDICINE

## 2018-03-16 PROCEDURE — 99214 OFFICE O/P EST MOD 30 MIN: CPT | Mod: S$GLB,,, | Performed by: INTERNAL MEDICINE

## 2018-03-16 RX ORDER — PREDNISONE 20 MG/1
20 TABLET ORAL SEE ADMIN INSTRUCTIONS
Qty: 12 TABLET | Refills: 0 | Status: SHIPPED | OUTPATIENT
Start: 2018-03-16 | End: 2018-03-16 | Stop reason: SDUPTHER

## 2018-03-16 RX ORDER — PREDNISONE 20 MG/1
20 TABLET ORAL SEE ADMIN INSTRUCTIONS
Qty: 12 TABLET | Refills: 0 | Status: SHIPPED | OUTPATIENT
Start: 2018-03-16 | End: 2018-03-22

## 2018-03-16 RX ORDER — DOXYCYCLINE 100 MG/1
100 CAPSULE ORAL 2 TIMES DAILY
Qty: 20 CAPSULE | Refills: 0 | Status: SHIPPED | OUTPATIENT
Start: 2018-03-16 | End: 2018-03-26

## 2018-03-16 RX ORDER — ALBUTEROL SULFATE 90 UG/1
2 AEROSOL, METERED RESPIRATORY (INHALATION) EVERY 6 HOURS PRN
Qty: 18 G | Refills: 1 | Status: SHIPPED | OUTPATIENT
Start: 2018-03-16 | End: 2018-03-16 | Stop reason: SDUPTHER

## 2018-03-16 RX ORDER — DOXYCYCLINE 100 MG/1
100 CAPSULE ORAL 2 TIMES DAILY
Qty: 20 CAPSULE | Refills: 0 | Status: SHIPPED | OUTPATIENT
Start: 2018-03-16 | End: 2018-03-16 | Stop reason: SDUPTHER

## 2018-03-16 RX ORDER — ALBUTEROL SULFATE 90 UG/1
2 AEROSOL, METERED RESPIRATORY (INHALATION) EVERY 6 HOURS PRN
Qty: 18 G | Refills: 1 | Status: SHIPPED | OUTPATIENT
Start: 2018-03-16 | End: 2019-01-09 | Stop reason: ALTCHOICE

## 2018-03-16 NOTE — PROGRESS NOTES
Subjective:       Patient ID: Nelia Littlejohn is a 78 y.o. female.    Chief Complaint: Cough and Nasal Congestion      HPI:  Patient here urgent care, usually sees [facility].  Complains of about a week cough and congestion with some wheezing has tried Tylenol cold and flu no known temperature.  No purulent mucus daughter says no one else has been ill.  She also tried some Mucinex sometimes she coughs up some purulent sputum.  No blood  In sputum or night sweats.       Cough   Associated symptoms include wheezing. Pertinent negatives include no chest pain, chills, fever, headaches, rash, sore throat or shortness of breath.     Review of Systems   Constitutional: Negative for appetite change, chills and fever.   HENT: Positive for congestion. Negative for nosebleeds, sinus pain and sore throat.    Eyes: Negative for visual disturbance.   Respiratory: Positive for cough and wheezing. Negative for shortness of breath.    Cardiovascular: Negative for chest pain and leg swelling.   Gastrointestinal: Negative for abdominal pain, constipation and diarrhea.   Genitourinary: Negative for difficulty urinating and hematuria.   Musculoskeletal: Negative for neck pain and neck stiffness.   Skin: Negative for pallor and rash.   Neurological: Negative for headaches.   Psychiatric/Behavioral: Negative for dysphoric mood and suicidal ideas. The patient is not nervous/anxious.        Objective:      Physical Exam   Constitutional: She is oriented to person, place, and time. She appears well-developed and well-nourished. No distress.   HENT:   Head: Normocephalic and atraumatic.   Right Ear: External ear normal.   Left Ear: External ear normal.   Mouth/Throat: Oropharynx is clear and moist. No oropharyngeal exudate.   Eyes: Conjunctivae are normal. Pupils are equal, round, and reactive to light. No scleral icterus.   Neck: Normal range of motion. Neck supple. No thyromegaly present.   Cardiovascular: Normal rate and regular rhythm.     No murmur heard.  Pulmonary/Chest: Effort normal. She has wheezes. She has no rales. She exhibits no tenderness.   Coughing occasionally   Abdominal: Soft. Bowel sounds are normal. She exhibits no distension. There is no tenderness.   Musculoskeletal: She exhibits no tenderness.   Lymphadenopathy:     She has no cervical adenopathy.   Neurological: She is alert and oriented to person, place, and time.   Skin: No rash noted.   Psychiatric: She has a normal mood and affect.       Assessment:       1. Cough    2. Wheezes    3. Essential hypertension        Plan:       Nelia was seen today for cough and nasal congestion.    Diagnoses and all orders for this visit:    Cough  -     X-Ray Chest PA And Lateral; Future    Wheezes  -     X-Ray Chest PA And Lateral; Future    Essential hypertension    Other orders  -     doxycycline (VIBRAMYCIN) 100 MG Cap; Take 1 capsule (100 mg total) by mouth 2 (two) times daily.  -     predniSONE (DELTASONE) 20 MG tablet; Take 1 tablet (20 mg total) by mouth As instructed.  -     albuterol 90 mcg/actuation inhaler; Inhale 2 puffs into the lungs every 6 (six) hours as needed for Wheezing.         Return for recheck after chest x-ray    CXR is clear.   Meds as above. Side effects discussed. Pt and daughter understand.

## 2018-03-16 NOTE — LETTER
March 16, 2018      [facility]  [address]  [facility] [address]  Phone: [phone]  Fax: [phone]       Patient: Nelia Littlejohn   YOB: 1940  Date of Visit: 03/16/2018    To Whom It May Concern:    Estela Littlejohn  was at [facility] on 03/16/2018. She may return to work/school on 3/20/18 with no restrictions. If you have any questions or concerns, or if I can be of further assistance, please do not hesitate to contact me.    Sincerely,       Thee Sawant MD

## 2018-08-31 ENCOUNTER — TELEPHONE (OUTPATIENT)
Dept: INTERNAL MEDICINE | Facility: CLINIC | Age: 78
End: 2018-08-31

## 2018-08-31 NOTE — TELEPHONE ENCOUNTER
----- Message from Lyla Houser sent at 8/31/2018  3:23 PM CDT -----  Contact: pts cosme Martinez would like to be called back regarding scheduling an appt    can be reached at [phone]

## 2018-11-18 RX ORDER — METOPROLOL TARTRATE 50 MG/1
TABLET ORAL
Qty: 180 TABLET | Refills: 0 | Status: SHIPPED | OUTPATIENT
Start: 2018-11-18 | End: 2019-01-09 | Stop reason: ALTCHOICE

## 2018-11-18 RX ORDER — SPIRONOLACTONE 25 MG/1
TABLET ORAL
Qty: 90 TABLET | Refills: 0 | Status: SHIPPED | OUTPATIENT
Start: 2018-11-18 | End: 2019-01-09 | Stop reason: SDUPTHER

## 2018-11-18 RX ORDER — ATORVASTATIN CALCIUM 80 MG/1
TABLET, FILM COATED ORAL
Qty: 90 TABLET | Refills: 0 | Status: SHIPPED | OUTPATIENT
Start: 2018-11-18 | End: 2019-01-09 | Stop reason: SDUPTHER

## 2018-11-18 RX ORDER — AMLODIPINE AND BENAZEPRIL HYDROCHLORIDE 10; 40 MG/1; MG/1
CAPSULE ORAL
Qty: 90 CAPSULE | Refills: 0 | Status: SHIPPED | OUTPATIENT
Start: 2018-11-18 | End: 2019-01-09 | Stop reason: ALTCHOICE

## 2018-11-18 RX ORDER — ALLOPURINOL 300 MG/1
TABLET ORAL
Qty: 90 TABLET | Refills: 0 | Status: SHIPPED | OUTPATIENT
Start: 2018-11-18 | End: 2019-01-09 | Stop reason: SDUPTHER

## 2018-12-03 ENCOUNTER — TELEPHONE (OUTPATIENT)
Dept: INTERNAL MEDICINE | Facility: CLINIC | Age: 78
End: 2018-12-03

## 2018-12-04 RX ORDER — ASPIRIN 325 MG
TABLET, DELAYED RELEASE (ENTERIC COATED) ORAL
Qty: 100 TABLET | Refills: 0 | Status: SHIPPED | OUTPATIENT
Start: 2018-12-04 | End: 2019-07-29 | Stop reason: SDUPTHER

## 2019-01-09 ENCOUNTER — TELEPHONE (OUTPATIENT)
Dept: INTERNAL MEDICINE | Facility: CLINIC | Age: 79
End: 2019-01-09

## 2019-01-09 ENCOUNTER — OFFICE VISIT (OUTPATIENT)
Dept: INTERNAL MEDICINE | Facility: CLINIC | Age: 79
End: 2019-01-09
Payer: MEDICARE

## 2019-01-09 ENCOUNTER — CLINICAL SUPPORT (OUTPATIENT)
Dept: INTERNAL MEDICINE | Facility: CLINIC | Age: 79
End: 2019-01-09
Payer: MEDICARE

## 2019-01-09 ENCOUNTER — LAB VISIT (OUTPATIENT)
Dept: LAB | Facility: HOSPITAL | Age: 79
End: 2019-01-09
Attending: INTERNAL MEDICINE
Payer: MEDICARE

## 2019-01-09 ENCOUNTER — IMMUNIZATION (OUTPATIENT)
Dept: INTERNAL MEDICINE | Facility: CLINIC | Age: 79
End: 2019-01-09
Payer: MEDICARE

## 2019-01-09 VITALS
HEIGHT: 66 IN | SYSTOLIC BLOOD PRESSURE: 140 MMHG | OXYGEN SATURATION: 98 % | HEART RATE: 64 BPM | WEIGHT: 220.25 LBS | BODY MASS INDEX: 35.4 KG/M2 | DIASTOLIC BLOOD PRESSURE: 64 MMHG

## 2019-01-09 DIAGNOSIS — Z00.00 ANNUAL PHYSICAL EXAM: Primary | ICD-10-CM

## 2019-01-09 DIAGNOSIS — G47.30 SLEEP APNEA, UNSPECIFIED TYPE: ICD-10-CM

## 2019-01-09 DIAGNOSIS — R89.9 ABNORMAL LABORATORY TEST RESULT: Primary | ICD-10-CM

## 2019-01-09 DIAGNOSIS — Z86.73 H/O: STROKE: ICD-10-CM

## 2019-01-09 DIAGNOSIS — E78.5 HYPERLIPIDEMIA, UNSPECIFIED HYPERLIPIDEMIA TYPE: ICD-10-CM

## 2019-01-09 DIAGNOSIS — I10 ESSENTIAL HYPERTENSION: ICD-10-CM

## 2019-01-09 DIAGNOSIS — R74.8 ELEVATED ALKALINE PHOSPHATASE LEVEL: ICD-10-CM

## 2019-01-09 DIAGNOSIS — E04.2 MULTINODULAR GOITER: ICD-10-CM

## 2019-01-09 DIAGNOSIS — Z23 NEED FOR VACCINATION WITH 13-POLYVALENT PNEUMOCOCCAL CONJUGATE VACCINE: ICD-10-CM

## 2019-01-09 DIAGNOSIS — M10.9 INTERVAL GOUT: ICD-10-CM

## 2019-01-09 DIAGNOSIS — M17.0 PRIMARY OSTEOARTHRITIS OF BOTH KNEES: ICD-10-CM

## 2019-01-09 DIAGNOSIS — Z23 NEEDS FLU SHOT: ICD-10-CM

## 2019-01-09 DIAGNOSIS — N18.30 STAGE 3 CHRONIC KIDNEY DISEASE: ICD-10-CM

## 2019-01-09 DIAGNOSIS — Z87.898 HISTORY OF PREDIABETES: ICD-10-CM

## 2019-01-09 DIAGNOSIS — Z96.652 STATUS POST TOTAL LEFT KNEE REPLACEMENT: ICD-10-CM

## 2019-01-09 DIAGNOSIS — R73.03 PREDIABETES: ICD-10-CM

## 2019-01-09 PROBLEM — Z12.39 SCREENING BREAST EXAMINATION: Status: RESOLVED | Noted: 2017-11-14 | Resolved: 2019-01-09

## 2019-01-09 LAB
ALBUMIN SERPL BCP-MCNC: 4.3 G/DL
ALP SERPL-CCNC: 106 U/L
ALT SERPL W/O P-5'-P-CCNC: 27 U/L
ANION GAP SERPL CALC-SCNC: 9 MMOL/L
AST SERPL-CCNC: 31 U/L
BASOPHILS # BLD AUTO: 0.03 K/UL
BASOPHILS NFR BLD: 0.5 %
BILIRUB SERPL-MCNC: 0.8 MG/DL
BUN SERPL-MCNC: 19 MG/DL
CALCIUM SERPL-MCNC: 10.6 MG/DL
CHLORIDE SERPL-SCNC: 106 MMOL/L
CHOLEST SERPL-MCNC: 175 MG/DL
CHOLEST/HDLC SERPL: 4 {RATIO}
CO2 SERPL-SCNC: 26 MMOL/L
CREAT SERPL-MCNC: 1.2 MG/DL
DIFFERENTIAL METHOD: ABNORMAL
EOSINOPHIL # BLD AUTO: 0.1 K/UL
EOSINOPHIL NFR BLD: 1.4 %
ERYTHROCYTE [DISTWIDTH] IN BLOOD BY AUTOMATED COUNT: 14.7 %
EST. GFR  (AFRICAN AMERICAN): 50 ML/MIN/1.73 M^2
EST. GFR  (NON AFRICAN AMERICAN): 43 ML/MIN/1.73 M^2
ESTIMATED AVG GLUCOSE: 117 MG/DL
GLUCOSE SERPL-MCNC: 104 MG/DL
HBA1C MFR BLD HPLC: 5.7 %
HCT VFR BLD AUTO: 40.7 %
HDLC SERPL-MCNC: 44 MG/DL
HDLC SERPL: 25.1 %
HGB BLD-MCNC: 12.8 G/DL
LDLC SERPL CALC-MCNC: 108.6 MG/DL
LYMPHOCYTES # BLD AUTO: 2.2 K/UL
LYMPHOCYTES NFR BLD: 39.9 %
MCH RBC QN AUTO: 27.6 PG
MCHC RBC AUTO-ENTMCNC: 31.4 G/DL
MCV RBC AUTO: 88 FL
MONOCYTES # BLD AUTO: 0.6 K/UL
MONOCYTES NFR BLD: 10.1 %
NEUTROPHILS # BLD AUTO: 2.7 K/UL
NEUTROPHILS NFR BLD: 47.9 %
NONHDLC SERPL-MCNC: 131 MG/DL
PLATELET # BLD AUTO: 241 K/UL
PMV BLD AUTO: 10.2 FL
POTASSIUM SERPL-SCNC: 6 MMOL/L
PROT SERPL-MCNC: 8.1 G/DL
RBC # BLD AUTO: 4.64 M/UL
SODIUM SERPL-SCNC: 141 MMOL/L
T4 FREE SERPL-MCNC: 1.18 NG/DL
TRIGL SERPL-MCNC: 112 MG/DL
TSH SERPL DL<=0.005 MIU/L-ACNC: 0.33 UIU/ML
URATE SERPL-MCNC: 5.4 MG/DL
WBC # BLD AUTO: 5.62 K/UL

## 2019-01-09 PROCEDURE — 3078F DIAST BP <80 MM HG: CPT | Mod: CPTII,S$GLB,, | Performed by: INTERNAL MEDICINE

## 2019-01-09 PROCEDURE — 99499 RISK ADDL DX/OHS AUDIT: ICD-10-PCS | Mod: S$GLB,,, | Performed by: INTERNAL MEDICINE

## 2019-01-09 PROCEDURE — 93010 ELECTROCARDIOGRAM REPORT: CPT | Mod: S$GLB,,, | Performed by: INTERNAL MEDICINE

## 2019-01-09 PROCEDURE — 84550 ASSAY OF BLOOD/URIC ACID: CPT

## 2019-01-09 PROCEDURE — 90670 PNEUMOCOCCAL CONJUGATE VACCINE 13-VALENT LESS THAN 5YO & GREATER THAN: ICD-10-PCS | Mod: S$GLB,,, | Performed by: INTERNAL MEDICINE

## 2019-01-09 PROCEDURE — 85025 COMPLETE CBC W/AUTO DIFF WBC: CPT

## 2019-01-09 PROCEDURE — 3077F SYST BP >= 140 MM HG: CPT | Mod: CPTII,S$GLB,, | Performed by: INTERNAL MEDICINE

## 2019-01-09 PROCEDURE — 80061 LIPID PANEL: CPT

## 2019-01-09 PROCEDURE — 93005 ELECTROCARDIOGRAM TRACING: CPT | Mod: S$GLB,,, | Performed by: INTERNAL MEDICINE

## 2019-01-09 PROCEDURE — G0009 PNEUMOCOCCAL CONJUGATE VACCINE 13-VALENT LESS THAN 5YO & GREATER THAN: ICD-10-PCS | Mod: S$GLB,,, | Performed by: INTERNAL MEDICINE

## 2019-01-09 PROCEDURE — G0008 FLU VACCINE - HIGH DOSE (65+) PRESERVATIVE FREE IM: ICD-10-PCS | Mod: S$GLB,,, | Performed by: INTERNAL MEDICINE

## 2019-01-09 PROCEDURE — 84439 ASSAY OF FREE THYROXINE: CPT

## 2019-01-09 PROCEDURE — 99999 PR PBB SHADOW E&M-EST. PATIENT-LVL III: ICD-10-PCS | Mod: PBBFAC,,, | Performed by: INTERNAL MEDICINE

## 2019-01-09 PROCEDURE — 93010 EKG 12-LEAD: ICD-10-PCS | Mod: S$GLB,,, | Performed by: INTERNAL MEDICINE

## 2019-01-09 PROCEDURE — 99397 PR PREVENTIVE VISIT,EST,65 & OVER: ICD-10-PCS | Mod: 25,S$GLB,, | Performed by: INTERNAL MEDICINE

## 2019-01-09 PROCEDURE — 99999 PR PBB SHADOW E&M-EST. PATIENT-LVL III: CPT | Mod: PBBFAC,,, | Performed by: INTERNAL MEDICINE

## 2019-01-09 PROCEDURE — 80053 COMPREHEN METABOLIC PANEL: CPT

## 2019-01-09 PROCEDURE — 36415 COLL VENOUS BLD VENIPUNCTURE: CPT

## 2019-01-09 PROCEDURE — 93005 EKG 12-LEAD: ICD-10-PCS | Mod: S$GLB,,, | Performed by: INTERNAL MEDICINE

## 2019-01-09 PROCEDURE — G0008 ADMIN INFLUENZA VIRUS VAC: HCPCS | Mod: S$GLB,,, | Performed by: INTERNAL MEDICINE

## 2019-01-09 PROCEDURE — 3078F PR MOST RECENT DIASTOLIC BLOOD PRESSURE < 80 MM HG: ICD-10-PCS | Mod: CPTII,S$GLB,, | Performed by: INTERNAL MEDICINE

## 2019-01-09 PROCEDURE — 90670 PCV13 VACCINE IM: CPT | Mod: S$GLB,,, | Performed by: INTERNAL MEDICINE

## 2019-01-09 PROCEDURE — 99397 PER PM REEVAL EST PAT 65+ YR: CPT | Mod: 25,S$GLB,, | Performed by: INTERNAL MEDICINE

## 2019-01-09 PROCEDURE — 3077F PR MOST RECENT SYSTOLIC BLOOD PRESSURE >= 140 MM HG: ICD-10-PCS | Mod: CPTII,S$GLB,, | Performed by: INTERNAL MEDICINE

## 2019-01-09 PROCEDURE — 90662 FLU VACCINE - HIGH DOSE (65+) PRESERVATIVE FREE IM: ICD-10-PCS | Mod: S$GLB,,, | Performed by: INTERNAL MEDICINE

## 2019-01-09 PROCEDURE — 84443 ASSAY THYROID STIM HORMONE: CPT

## 2019-01-09 PROCEDURE — 83036 HEMOGLOBIN GLYCOSYLATED A1C: CPT

## 2019-01-09 PROCEDURE — 90662 IIV NO PRSV INCREASED AG IM: CPT | Mod: S$GLB,,, | Performed by: INTERNAL MEDICINE

## 2019-01-09 PROCEDURE — 99499 UNLISTED E&M SERVICE: CPT | Mod: S$GLB,,, | Performed by: INTERNAL MEDICINE

## 2019-01-09 PROCEDURE — G0009 ADMIN PNEUMOCOCCAL VACCINE: HCPCS | Mod: S$GLB,,, | Performed by: INTERNAL MEDICINE

## 2019-01-09 RX ORDER — CANDESARTAN 32 MG/1
32 TABLET ORAL DAILY
Qty: 90 TABLET | Refills: 3 | Status: SHIPPED | OUTPATIENT
Start: 2019-01-09 | End: 2019-02-25 | Stop reason: SDUPTHER

## 2019-01-09 RX ORDER — ATORVASTATIN CALCIUM 80 MG/1
TABLET, FILM COATED ORAL
Qty: 90 TABLET | Refills: 3 | Status: SHIPPED | OUTPATIENT
Start: 2019-01-09 | End: 2020-03-05

## 2019-01-09 RX ORDER — AMLODIPINE BESYLATE 10 MG/1
10 TABLET ORAL DAILY
Qty: 90 TABLET | Refills: 3 | Status: SHIPPED | OUTPATIENT
Start: 2019-01-09 | End: 2019-02-25 | Stop reason: SDUPTHER

## 2019-01-09 RX ORDER — ALLOPURINOL 300 MG/1
TABLET ORAL
Qty: 90 TABLET | Refills: 3 | Status: SHIPPED | OUTPATIENT
Start: 2019-01-09 | End: 2020-03-05

## 2019-01-09 RX ORDER — METOPROLOL TARTRATE 50 MG/1
TABLET ORAL
Qty: 180 TABLET | Refills: 3 | Status: SHIPPED | OUTPATIENT
Start: 2019-01-09 | End: 2019-12-12 | Stop reason: SDUPTHER

## 2019-01-09 RX ORDER — SPIRONOLACTONE 25 MG/1
TABLET ORAL
Qty: 90 TABLET | Refills: 3 | Status: SHIPPED | OUTPATIENT
Start: 2019-01-09 | End: 2019-01-11 | Stop reason: ALTCHOICE

## 2019-01-09 NOTE — TELEPHONE ENCOUNTER
Dear Carolin,   Please call patient.  She had abnormal lab 01- and needs to come in as soon as possible to have a blood test repeated.   BMP  She can have it where ever it is most convenient for her.  It is probably lab error, but must be rechecked.  Sincerely, Dr. Arminda Gutiérrez

## 2019-01-11 ENCOUNTER — LAB VISIT (OUTPATIENT)
Dept: LAB | Facility: HOSPITAL | Age: 79
End: 2019-01-11
Attending: INTERNAL MEDICINE
Payer: MEDICARE

## 2019-01-11 ENCOUNTER — TELEPHONE (OUTPATIENT)
Dept: INTERNAL MEDICINE | Facility: CLINIC | Age: 79
End: 2019-01-11

## 2019-01-11 DIAGNOSIS — R89.9 ABNORMAL LABORATORY TEST RESULT: ICD-10-CM

## 2019-01-11 DIAGNOSIS — E87.5 HYPERKALEMIA: ICD-10-CM

## 2019-01-11 LAB
ANION GAP SERPL CALC-SCNC: 11 MMOL/L
BUN SERPL-MCNC: 15 MG/DL
CALCIUM SERPL-MCNC: 10.2 MG/DL
CHLORIDE SERPL-SCNC: 106 MMOL/L
CO2 SERPL-SCNC: 25 MMOL/L
CREAT SERPL-MCNC: 1.2 MG/DL
EST. GFR  (AFRICAN AMERICAN): 50 ML/MIN/1.73 M^2
EST. GFR  (NON AFRICAN AMERICAN): 43 ML/MIN/1.73 M^2
GLUCOSE SERPL-MCNC: 109 MG/DL
POTASSIUM SERPL-SCNC: 5.4 MMOL/L
SODIUM SERPL-SCNC: 142 MMOL/L

## 2019-01-11 PROCEDURE — 36415 COLL VENOUS BLD VENIPUNCTURE: CPT

## 2019-01-11 PROCEDURE — 80048 BASIC METABOLIC PNL TOTAL CA: CPT

## 2019-01-11 NOTE — TELEPHONE ENCOUNTER
telephone call  I called her pharmacist to discontinue spironolactone and she removed it from her pharmacy profile.  Called the patient but got her son.   Then I got the patient at her home phone number to ask her to stop taking spironolactone 25 mg tablet. It is also called ALDACTONE.

## 2019-01-12 PROBLEM — N18.30 STAGE 3 CHRONIC KIDNEY DISEASE: Status: ACTIVE | Noted: 2019-01-12

## 2019-01-12 NOTE — PROGRESS NOTES
Subjective:       Patient ID: Nelia Littlejohn is a 79 y.o. female.    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  HPI  Review of Systems   Constitutional: Negative for activity change, appetite change, chills, fatigue, fever and unexpected weight change.   HENT: Negative for dental problem, hearing loss, tinnitus, trouble swallowing and voice change.    Eyes: Negative for visual disturbance.   Respiratory: Negative for cough, chest tightness, shortness of breath and wheezing.    Cardiovascular: Negative for chest pain, palpitations and leg swelling.   Gastrointestinal: Negative for abdominal pain, blood in stool, constipation, diarrhea and nausea.   Genitourinary: Negative for difficulty urinating, dysuria, frequency and urgency.   Musculoskeletal: Negative for arthralgias, back pain, gait problem, joint swelling, myalgias, neck pain and neck stiffness.   Skin: Negative for rash.   Neurological: Negative for dizziness, tremors, speech difficulty, weakness, light-headedness and headaches.   Psychiatric/Behavioral: Negative for dysphoric mood and sleep disturbance. The patient is not nervous/anxious.        Objective:      Physical Exam   Constitutional: She is oriented to person, place, and time. She appears well-developed and well-nourished. No distress.   HENT:   Head: Normocephalic and atraumatic.   Right Ear: External ear normal.   Left Ear: External ear normal.   Nose: Nose normal.    Mouth/Throat: Oropharynx is clear and moist. No oropharyngeal exudate.   Eyes: Conjunctivae and EOM are normal. Pupils are equal, round, and reactive to light. Right eye exhibits no discharge. No scleral icterus.   Neck: Normal range of motion and full passive range of motion without pain. Neck supple. No spinous process tenderness and no muscular tenderness present. Carotid bruit is not present. No thyromegaly present.   Cardiovascular: Normal rate, regular rhythm, S1 normal, S2 normal, normal heart sounds and intact distal pulses. Exam reveals no gallop and no friction rub.   No murmur heard.  Pulmonary/Chest: Effort normal and breath sounds normal. No respiratory distress. She has no wheezes. She has no rales. She exhibits no tenderness.   Abdominal: Soft. Bowel sounds are normal. She exhibits no distension and no mass. There is no tenderness. There is no rebound and no guarding.   Genitourinary: Pelvic exam was performed with patient supine. Uterus is not deviated, not enlarged, not fixed and not tender. Cervix exhibits no motion tenderness, no discharge and no friability. Right adnexum displays no mass, no tenderness and no fullness. Left adnexum displays no mass, no tenderness and no fullness.   Musculoskeletal: Normal range of motion. She exhibits no edema or tenderness.   Lymphadenopathy:        Head (right side): No submental and no submandibular adenopathy present.        Head (left side): No submental and no submandibular adenopathy present.     She has no cervical adenopathy.        Right cervical: No superficial cervical, no deep cervical and no posterior cervical adenopathy present.       Left cervical: No superficial cervical, no deep cervical and no posterior cervical adenopathy present.        Right axillary: No pectoral and no lateral adenopathy present.        Left axillary: No pectoral and no lateral adenopathy present.       Right: No supraclavicular adenopathy present.        Left: No  supraclavicular adenopathy present.   Neurological: She is alert and oriented to person, place, and time. She has normal reflexes. No cranial nerve deficit. She exhibits normal muscle tone. Coordination normal.   Skin: Skin is warm and dry. No rash noted.   Psychiatric: She has a normal mood and affect. Her behavior is normal. Her mood appears not anxious. Her speech is not rapid and/or pressured. She is not agitated. She does not exhibit a depressed mood.   Normal behavior, thought content, insight and judgement.   Nursing note and vitals reviewed.      Assessment:       1. Annual physical exam    2. Prediabetes,     3. Primary osteoarthritis of both knees    4. Sleep apnea, unspecified type    5. Multinodular goiter    6. Elevated alkaline phosphatase level    7. 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 History of prediabetes    9. Essential hypertension    10. Hyperlipidemia, unspecified hyperlipidemia type    11. Interval gout    12. Needs flu shot    13. Status post total left knee replacement, 10-.    14. Need for vaccination with 13-polyvalent pneumococcal conjugate vaccine    15. Stage 3 chronic kidney disease        Plan:   Nelia was seen today for follow-up.    Diagnoses and all orders for this visit:    Annual physical exam    Prediabetes,   -     Comprehensive metabolic panel; Future  -     Hemoglobin A1c; Future    Primary osteoarthritis of both knees    Sleep apnea, unspecified type  -     CBC auto differential; Future    Multinodular goiter  -     TSH; Future  -     US Soft Tissue Head Neck Thyroid; Future    Elevated alkaline phosphatase level    H/O: stroke, left eye, transient blindness, no residual,   -     EKG 12-lead; Future  -     CBC auto differential; Future  -     Hemoglobin A1c; Future  -     EKG 12-lead    History of prediabetes    Essential hypertension  -     EKG 12-lead; Future  -     Hemoglobin A1c; Future  -     EKG  12-lead    Hyperlipidemia, unspecified hyperlipidemia type  -     Lipid panel; Future  -     Hemoglobin A1c; Future    Interval gout  -     Comprehensive metabolic panel; Future  -     Uric acid; Future  -     Hemoglobin A1c; Future    Needs flu shot    Status post total left knee replacement, 10-.    Need for vaccination with 13-polyvalent pneumococcal conjugate vaccine  -     (In Office Administered) Pneumococcal Conjugate Vaccine (13 Valent) (IM)    Stage 3 chronic kidney disease    Other orders  -     amLODIPine (NORVASC) 10 MG tablet; Take 1 tablet (10 mg total) by mouth once daily. Blood pressure control  -     candesartan (ATACAND) 32 MG tablet; Take 1 tablet (32 mg total) by mouth once daily. Replaces benazepril which is discontinued  -     Discontinue: spironolactone (ALDACTONE) 25 MG tablet; TAKE 1 TABLET(25 MG) BY MOUTH EVERY DAY blood pressure control  -     atorvastatin (LIPITOR) 80 MG tablet; TAKE 1 TABLET(80 MG) BY MOUTH EVERY DAY stroke prevention  -     metoprolol tartrate (LOPRESSOR) 50 MG tablet; TAKE 1 TABLET(50 MG) BY MOUTH TWICE DAILY  -     allopurinol (ZYLOPRIM) 300 MG tablet; TAKE 1 TABLET(300 MG) BY MOUTH EVERY DAY to prevent gout         Medication List           Accurate as of 1/9/19 11:59 PM. If you have any questions, ask your nurse or doctor.               START taking these medications    amLODIPine 10 MG tablet  Commonly known as:  NORVASC  Take 1 tablet (10 mg total) by mouth once daily. Blood pressure control  Started by:  Arminda Anderson MD     candesartan 32 MG tablet  Commonly known as:  ATACAND  Take 1 tablet (32 mg total) by mouth once daily. Replaces benazepril which is discontinued  Started by:  Arminda Anderson MD        CHANGE how you take these medications    allopurinol 300 MG tablet  Commonly known as:  ZYLOPRIM  TAKE 1 TABLET(300 MG) BY MOUTH EVERY DAY to prevent gout  What changed:  See the new instructions.  Changed by:  Arminda Anderson MD      atorvastatin 80 MG tablet  Commonly known as:  LIPITOR  TAKE 1 TABLET(80 MG) BY MOUTH EVERY DAY stroke prevention  What changed:  See the new instructions.  Changed by:  Arminda Anderson MD     spironolactone 25 MG tablet  Commonly known as:  ALDACTONE  TAKE 1 TABLET(25 MG) BY MOUTH EVERY DAY blood pressure control  What changed:  See the new instructions.  Changed by:  Arminda Anderson MD        CONTINUE taking these medications    aspirin 325 MG EC tablet  Commonly known as:  ECOTRIN  TAKE 1 TABLET(325 MG) BY MOUTH EVERY DAY     CENTRUM 3,500-18-0.4 unit-mg-mg Chew  Generic drug:  multivit-iron-min-folic acid     diclofenac sodium 1 % Gel  Commonly known as:  VOLTAREN  Apply 2 g topically 4 (four) times daily as needed. Right knee     metoprolol tartrate 50 MG tablet  Commonly known as:  LOPRESSOR  TAKE 1 TABLET(50 MG) BY MOUTH TWICE DAILY        STOP taking these medications    albuterol 90 mcg/actuation inhaler  Commonly known as:  PROVENTIL/VENTOLIN HFA  Stopped by:  Arminda Anderson MD     amlodipine-benazepril 10-40 mg per capsule  Commonly known as:  LOTREL  Stopped by:  Arminda Anderson MD     hydrALAZINE 50 MG tablet  Commonly known as:  APRESOLINE  Stopped by:  Arminda Anderson MD     meclizine 25 mg tablet  Commonly known as:  ANTIVERT  Stopped by:  Arminda Anderson MD           Where to Get Your Medications      [facility] [address] [place] [address] AT [facility] [address] [address]    Phone:  [phone]   · allopurinol 300 MG tablet  · amLODIPine 10 MG tablet  · atorvastatin 80 MG tablet  · candesartan 32 MG tablet  · metoprolol tartrate 50 MG tablet  · spironolactone 25 MG tablet         Follow-up in about 6 months (around 7/9/2019) for also one year physical.

## 2019-01-21 ENCOUNTER — HOSPITAL ENCOUNTER (OUTPATIENT)
Dept: RADIOLOGY | Facility: HOSPITAL | Age: 79
Discharge: HOME | End: 2019-01-21
Attending: INTERNAL MEDICINE
Payer: MEDICARE

## 2019-01-21 DIAGNOSIS — E04.2 MULTINODULAR GOITER: ICD-10-CM

## 2019-01-21 PROCEDURE — 76536 US EXAM OF HEAD AND NECK: CPT | Mod: TC

## 2019-01-21 PROCEDURE — 76536 US SOFT TISSUE HEAD NECK THYROID: ICD-10-PCS | Mod: 26,,, | Performed by: RADIOLOGY

## 2019-01-21 PROCEDURE — 76536 US EXAM OF HEAD AND NECK: CPT | Mod: 26,,, | Performed by: RADIOLOGY

## 2019-02-25 ENCOUNTER — TELEPHONE (OUTPATIENT)
Dept: INTERNAL MEDICINE | Facility: CLINIC | Age: 79
End: 2019-02-25

## 2019-02-25 RX ORDER — AMLODIPINE BESYLATE 10 MG/1
10 TABLET ORAL DAILY
Qty: 90 TABLET | Refills: 3 | Status: SHIPPED | OUTPATIENT
Start: 2019-02-25 | End: 2019-10-08 | Stop reason: SDUPTHER

## 2019-02-25 RX ORDER — CANDESARTAN 32 MG/1
32 TABLET ORAL DAILY
Qty: 90 TABLET | Refills: 3 | Status: SHIPPED | OUTPATIENT
Start: 2019-02-25 | End: 2019-10-08 | Stop reason: SDUPTHER

## 2019-02-25 RX ORDER — AMLODIPINE AND BENAZEPRIL HYDROCHLORIDE 10; 40 MG/1; MG/1
CAPSULE ORAL
Qty: 1 CAPSULE | Refills: 0 | Status: SHIPPED | OUTPATIENT
Start: 2019-02-25 | End: 2019-02-25 | Stop reason: ALTCHOICE

## 2019-02-25 NOTE — TELEPHONE ENCOUNTER
Please call her pharmacy  Amlodipine benazepril 10-40 mg tablet was discontinued on January 9, 2019.  Her current her blood pressure medications are  Candesartan 32 mg tablet, amlodipine 10 mg tab once daily and metoprolol 50 mg twice daily

## 2019-02-26 RX ORDER — AMLODIPINE AND BENAZEPRIL HYDROCHLORIDE 10; 40 MG/1; MG/1
CAPSULE ORAL
Qty: 90 CAPSULE | Refills: 0 | OUTPATIENT
Start: 2019-02-26

## 2019-03-13 ENCOUNTER — LAB VISIT (OUTPATIENT)
Dept: LAB | Facility: HOSPITAL | Age: 79
End: 2019-03-13
Attending: INTERNAL MEDICINE
Payer: MEDICARE

## 2019-03-13 ENCOUNTER — TELEPHONE (OUTPATIENT)
Dept: INTERNAL MEDICINE | Facility: CLINIC | Age: 79
End: 2019-03-13

## 2019-03-13 DIAGNOSIS — E87.5 HYPERKALEMIA: ICD-10-CM

## 2019-03-13 LAB
ANION GAP SERPL CALC-SCNC: 10 MMOL/L
BUN SERPL-MCNC: 10 MG/DL
CALCIUM SERPL-MCNC: 9.7 MG/DL
CHLORIDE SERPL-SCNC: 108 MMOL/L
CO2 SERPL-SCNC: 26 MMOL/L
CREAT SERPL-MCNC: 0.9 MG/DL
EST. GFR  (AFRICAN AMERICAN): >60 ML/MIN/1.73 M^2
EST. GFR  (NON AFRICAN AMERICAN): >60 ML/MIN/1.73 M^2
GLUCOSE SERPL-MCNC: 97 MG/DL
POTASSIUM SERPL-SCNC: 5.1 MMOL/L
SODIUM SERPL-SCNC: 144 MMOL/L

## 2019-03-13 PROCEDURE — 80048 BASIC METABOLIC PNL TOTAL CA: CPT

## 2019-03-13 PROCEDURE — 36415 COLL VENOUS BLD VENIPUNCTURE: CPT

## 2019-03-13 NOTE — TELEPHONE ENCOUNTER
Please call patient to let her know   All of her labs are excellent.  Normal kidney function and normal potassium.  I do not recommend any medication changes.

## 2019-04-03 ENCOUNTER — PES CALL (OUTPATIENT)
Dept: ADMINISTRATIVE | Facility: CLINIC | Age: 79
End: 2019-04-03

## 2019-04-09 ENCOUNTER — OFFICE VISIT (OUTPATIENT)
Dept: INTERNAL MEDICINE | Facility: CLINIC | Age: 79
End: 2019-04-09
Payer: MEDICARE

## 2019-04-09 VITALS
OXYGEN SATURATION: 97 % | HEIGHT: 65 IN | BODY MASS INDEX: 37.18 KG/M2 | DIASTOLIC BLOOD PRESSURE: 78 MMHG | SYSTOLIC BLOOD PRESSURE: 150 MMHG | WEIGHT: 223.13 LBS | HEART RATE: 62 BPM

## 2019-04-09 DIAGNOSIS — G47.30 SLEEP APNEA, UNSPECIFIED TYPE: ICD-10-CM

## 2019-04-09 DIAGNOSIS — Z86.73 H/O: STROKE: ICD-10-CM

## 2019-04-09 DIAGNOSIS — Z87.898 HISTORY OF PREDIABETES: ICD-10-CM

## 2019-04-09 DIAGNOSIS — E78.5 HYPERLIPIDEMIA, UNSPECIFIED HYPERLIPIDEMIA TYPE: ICD-10-CM

## 2019-04-09 DIAGNOSIS — R73.03 PREDIABETES: ICD-10-CM

## 2019-04-09 DIAGNOSIS — M17.0 PRIMARY OSTEOARTHRITIS OF BOTH KNEES: ICD-10-CM

## 2019-04-09 DIAGNOSIS — Z96.652 STATUS POST TOTAL LEFT KNEE REPLACEMENT: ICD-10-CM

## 2019-04-09 DIAGNOSIS — R74.8 ELEVATED ALKALINE PHOSPHATASE LEVEL: ICD-10-CM

## 2019-04-09 DIAGNOSIS — Z96.1 STATUS POST CATARACT EXTRACTION AND INSERTION OF INTRAOCULAR LENS, UNSPECIFIED LATERALITY: ICD-10-CM

## 2019-04-09 DIAGNOSIS — M10.9 INTERVAL GOUT: ICD-10-CM

## 2019-04-09 DIAGNOSIS — H53.9 TRANSIENT VISION DISTURBANCE: ICD-10-CM

## 2019-04-09 DIAGNOSIS — E87.5 HYPERKALEMIA: ICD-10-CM

## 2019-04-09 DIAGNOSIS — I10 ESSENTIAL HYPERTENSION: ICD-10-CM

## 2019-04-09 DIAGNOSIS — H43.819 POSTERIOR VITREOUS DETACHMENT, UNSPECIFIED LATERALITY: ICD-10-CM

## 2019-04-09 DIAGNOSIS — Z98.49 STATUS POST CATARACT EXTRACTION AND INSERTION OF INTRAOCULAR LENS, UNSPECIFIED LATERALITY: ICD-10-CM

## 2019-04-09 DIAGNOSIS — N18.30 STAGE 3 CHRONIC KIDNEY DISEASE: ICD-10-CM

## 2019-04-09 DIAGNOSIS — M17.12 PRIMARY OSTEOARTHRITIS OF LEFT KNEE: ICD-10-CM

## 2019-04-09 DIAGNOSIS — E04.2 MULTINODULAR GOITER: ICD-10-CM

## 2019-04-09 DIAGNOSIS — Z00.00 ENCOUNTER FOR PREVENTIVE HEALTH EXAMINATION: Primary | ICD-10-CM

## 2019-04-09 PROCEDURE — 3078F PR MOST RECENT DIASTOLIC BLOOD PRESSURE < 80 MM HG: ICD-10-PCS | Mod: CPTII,S$GLB,, | Performed by: NURSE PRACTITIONER

## 2019-04-09 PROCEDURE — 3077F SYST BP >= 140 MM HG: CPT | Mod: CPTII,S$GLB,, | Performed by: NURSE PRACTITIONER

## 2019-04-09 PROCEDURE — 3077F PR MOST RECENT SYSTOLIC BLOOD PRESSURE >= 140 MM HG: ICD-10-PCS | Mod: CPTII,S$GLB,, | Performed by: NURSE PRACTITIONER

## 2019-04-09 PROCEDURE — 3078F DIAST BP <80 MM HG: CPT | Mod: CPTII,S$GLB,, | Performed by: NURSE PRACTITIONER

## 2019-04-09 PROCEDURE — 99999 PR PBB SHADOW E&M-EST. PATIENT-LVL III: ICD-10-PCS | Mod: PBBFAC,,, | Performed by: NURSE PRACTITIONER

## 2019-04-09 PROCEDURE — 99499 UNLISTED E&M SERVICE: CPT | Mod: S$GLB,,, | Performed by: NURSE PRACTITIONER

## 2019-04-09 PROCEDURE — G0439 PPPS, SUBSEQ VISIT: HCPCS | Mod: S$GLB,,, | Performed by: NURSE PRACTITIONER

## 2019-04-09 PROCEDURE — 99999 PR PBB SHADOW E&M-EST. PATIENT-LVL III: CPT | Mod: PBBFAC,,, | Performed by: NURSE PRACTITIONER

## 2019-04-09 PROCEDURE — 99499 RISK ADDL DX/OHS AUDIT: ICD-10-PCS | Mod: S$GLB,,, | Performed by: NURSE PRACTITIONER

## 2019-04-09 PROCEDURE — G0439 PR MEDICARE ANNUAL WELLNESS SUBSEQUENT VISIT: ICD-10-PCS | Mod: S$GLB,,, | Performed by: NURSE PRACTITIONER

## 2019-04-09 NOTE — PROGRESS NOTES
"Nelia Littlejohn presented for a  Medicare AWV and comprehensive Health Risk Assessment today. The following components were reviewed and updated:    · Medical history  · Family History  · Social history  · Allergies and Current Medications  · Health Risk Assessment  · Health Maintenance  · Care Team     ** See Completed Assessments for Annual Wellness Visit within the encounter summary.**       The following assessments were completed:  · Living Situation  · CAGE  · Depression Screening  · Timed Get Up and Go  · Whisper Test  · Cognitive Function Screening  · Nutrition Screening  · ADL Screening  · PAQ Screening          Vitals:    04/09/19 1416   BP: (!) 150/78   BP Location: Left arm   Patient Position: Sitting   Pulse: 62   SpO2: 97%   Weight: 101.2 kg (223 lb 1.7 oz)   Height: 5' 5" (1.651 m)     Body mass index is 37.13 kg/m².     Physical Exam   Constitutional: She is oriented to person, place, and time. She appears well-developed and well-nourished.   HENT:   Head: Normocephalic and atraumatic. Not macrocephalic and not microcephalic. Head is without raccoon's eyes, without Polk's sign, without abrasion, without contusion, without laceration, without right periorbital erythema and without left periorbital erythema. Hair is normal.   Right Ear: No lacerations. No drainage, swelling or tenderness. No foreign bodies. No mastoid tenderness. Tympanic membrane is not injected, not scarred, not perforated, not erythematous, not retracted and not bulging. Tympanic membrane mobility is normal. No middle ear effusion. No hemotympanum. No decreased hearing is noted.   Left Ear: No lacerations. No drainage, swelling or tenderness. No foreign bodies. No mastoid tenderness. Tympanic membrane is not injected, not scarred, not perforated, not erythematous, not retracted and not bulging. Tympanic membrane mobility is normal.  No middle ear effusion. No hemotympanum. No decreased hearing is noted.   Nose: Nose normal. No " mucosal edema, rhinorrhea, nose lacerations, sinus tenderness or nasal deformity.   Mouth/Throat: Uvula is midline.   Eyes: Conjunctivae and lids are normal. No scleral icterus.   Neck: Trachea normal. Neck supple. No spinous process tenderness and no muscular tenderness present. No neck rigidity. No edema, no erythema and normal range of motion present. No thyroid mass and no thyromegaly present.   Cardiovascular: Normal rate, regular rhythm, normal heart sounds and intact distal pulses. Exam reveals no gallop and no friction rub.   No murmur heard.  Pulmonary/Chest: Effort normal and breath sounds normal. No stridor. No respiratory distress. She has no wheezes. She has no rales. She exhibits no tenderness.   Abdominal: Soft. Bowel sounds are normal. She exhibits no distension.   Musculoskeletal: Normal range of motion.   Lymphadenopathy:        Head (right side): No submental, no submandibular, no tonsillar, no preauricular and no posterior auricular adenopathy present.        Head (left side): No submental, no submandibular, no tonsillar, no preauricular, no posterior auricular and no occipital adenopathy present.   Neurological: She is alert and oriented to person, place, and time.   Skin: Skin is warm and dry.   Psychiatric: She has a normal mood and affect. Her behavior is normal. Judgment and thought content normal.   Vitals reviewed.      Diagnoses and health risks identified today and associated recommendations/orders:    1. Encounter for preventive health examination  Annual Health Risk Assessment (HRA) visit today.  Counseling and referral of health maintenance and preventative health measures performed.  Patient given annual wellness paperwork to take home.  Encouraged to return in 1 year for subsequent HRA visit.     2. Stage 3 chronic kidney disease  Chronic. Stable. Continue current treatment plan as previously prescribed by PCP.    3. Prediabetes,   Chronic. Stable. Continue current treatment  plan as previously prescribed by PCP.    4. Primary osteoarthritis of both knees  Chronic. Stable. Continue current treatment plan as previously prescribed by PCP.    5. Primary osteoarthritis of left knee, end stage  Chronic. Stable. Continue current treatment plan as previously prescribed by PCP.    6. Status post total left knee replacement, 10-.  Chronic. Stable. Continue current treatment plan as previously prescribed by PCP.    7. Interval gout  Chronic. Stable. Continue current treatment plan as previously prescribed by PCP.    8. Hyperkalemia  Chronic. Stable. Continue current treatment plan as previously prescribed by PCP.    9. Essential hypertension  Chronic. Stable. Uncontrolled. Encouraged to increase exercise as tolerated (moderate-intensity aerobic activity and muscle-strengthening activities) improve diet to heart healthy, low sodium diet. Continue current treatment plan as previously prescribed by PCP.    10. Hyperlipidemia, unspecified hyperlipidemia type  Chronic. Stable. Continue current treatment plan as previously prescribed by PCP.    11. Multinodular goiter  Chronic. Stable. Continue current treatment plan as previously prescribed by PCP.    12. History of prediabetes  Chronic. Stable. Last Hgb A1c=5.7 from 1/9/19. Continue current treatment plan as previously prescribed by PCP.    13. Posterior vitreous detachment, unspecified laterality  Chronic. Stable. Continue current treatment plan as previously prescribed by Ophthalomology.    14. Transient vision disturbance  Chronic. Stable. Continue current treatment plan as previously prescribed by PCP.    15. Status post cataract extraction and insertion of intraocular lens, unspecified laterality  Chronic. Stable. Continue current treatment plan as previously prescribed by PCP.    16. 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 Stable. Continue current treatment plan as previously prescribed by PCP.    17.  Sleep apnea, unspecified type  Chronic. Stable. Continue current treatment plan as previously prescribed by Sleep Medicine.    18. Elevated alkaline phosphatase level  Chronic. Stable. Continue current treatment plan as previously prescribed by PCP.        Provided Nelia with a 5-10 year written screening schedule and personal prevention plan. Recommendations were developed using the USPSTF age appropriate recommendations. Education, counseling, and referrals were provided as needed. After Visit Summary printed and given to patient which includes a list of additional screenings\tests needed.    Follow up in about 1 year (around 4/9/2020).    Jeevan Dueñas NP  I offered to discuss end of life issues, including information on how to make advance directives that the patient could use to name someone who would make medical decisions on their behalf if they became too ill to make themselves.    ___Patient declined  _X_Patient is interested, I provided paper work and offered to discuss.

## 2019-04-09 NOTE — PATIENT INSTRUCTIONS
Your feedback is important to us.  If you should receive a survey in the next few days, please share your experience with us.      Counseling and Referral of Other Preventative  (Italic type indicates deductible and co-insurance are waived)    Patient Name: Nelia Littlejohn  Today's Date: 4/9/2019    Health Maintenance       Date Due Completion Date    TETANUS VACCINE 01/02/1958 ---    DEXA SCAN 09/07/2019 9/7/2016 (ClinicallyNA)    Override on 9/7/2016: Not Clinically Appropriate    Lipid Panel 01/09/2024 1/9/2019        No orders of the defined types were placed in this encounter.    The following information is provided to all patients.  This information is to help you find resources for any of the problems found today that may be affecting your health:                Living healthy guide: www.[facility].[place].gov      Understanding Diabetes: www.diabetes.org      Eating healthy: www.cdc.gov/healthyweight      [facility] home safety checklist: www.cdc.gov/steadi/patient.html      Agency on Aging: www.goea.[place].gov      Alcoholics anonymous (AA): www.aa.org      Physical Activity: www.tiffany.nih.gov/ga8hwmg      Tobacco use: www.quitwithusla.org

## 2019-04-10 ENCOUNTER — IMMUNIZATION (OUTPATIENT)
Dept: PHARMACY | Facility: CLINIC | Age: 79
End: 2019-04-10
Payer: MEDICARE

## 2019-04-10 NOTE — PROGRESS NOTES
Patient, Nelia Littlejohn (MRN #[id number]), presented with a recorded BMI of 37.13 kg/m^2 and a documented comorbidity(s):  - Hypertension  - Hyperlipidemia  to which the severe obesity is a contributing factor. This is consistent with the definition of severe obesity (BMI 35.0-39.9) with comorbidity (ICD-10 E66.01, Z68.35). The patient's severe obesity was monitored, evaluated, addressed and/or treated. This addendum to the medical record is made on 04/10/2019.

## 2019-05-29 ENCOUNTER — TELEPHONE (OUTPATIENT)
Dept: INTERNAL MEDICINE | Facility: CLINIC | Age: 79
End: 2019-05-29

## 2019-05-29 NOTE — TELEPHONE ENCOUNTER
"----- Message from Pam Santillan sent at 5/29/2019  3:20 PM CDT -----  Contact: Patient [phone]  Patient just notice on the after visit summary that it states " stage 3 chronic disease" Patient was unaware that she had a kidney disease , Patient would like a call back to discuss.    Please call and advise  Thank you    "

## 2019-05-30 ENCOUNTER — TELEPHONE (OUTPATIENT)
Dept: INTERNAL MEDICINE | Facility: CLINIC | Age: 79
End: 2019-05-30

## 2019-05-30 NOTE — TELEPHONE ENCOUNTER
Dear Rajni  Please call patient.  Must have been some typo.  See normal labs 03-.  Sincerely, Dr. Arminda Gutiérrez

## 2019-05-31 ENCOUNTER — TELEPHONE (OUTPATIENT)
Dept: INTERNAL MEDICINE | Facility: CLINIC | Age: 79
End: 2019-05-31

## 2019-05-31 NOTE — TELEPHONE ENCOUNTER
Dear Rajni  Please call patient.      Per PCP request notified pt...Must have been some typo.  See normal labs 03-.  Sincerely, Dr. Arminda Gutiérrez

## 2019-07-30 RX ORDER — ASPIRIN 325 MG
TABLET, DELAYED RELEASE (ENTERIC COATED) ORAL
Qty: 100 TABLET | Refills: 0 | Status: SHIPPED | OUTPATIENT
Start: 2019-07-30 | End: 2019-10-08 | Stop reason: SDUPTHER

## 2019-10-08 RX ORDER — CANDESARTAN 32 MG/1
32 TABLET ORAL DAILY
Qty: 90 TABLET | Refills: 3 | Status: SHIPPED | OUTPATIENT
Start: 2019-10-08 | End: 2019-12-12 | Stop reason: ALTCHOICE

## 2019-10-08 RX ORDER — ASPIRIN 325 MG
325 TABLET, DELAYED RELEASE (ENTERIC COATED) ORAL DAILY
Qty: 90 TABLET | Refills: 2 | Status: SHIPPED | OUTPATIENT
Start: 2019-10-08 | End: 2020-07-02

## 2019-10-08 RX ORDER — AMLODIPINE BESYLATE 10 MG/1
10 TABLET ORAL DAILY
Qty: 90 TABLET | Refills: 3 | Status: SHIPPED | OUTPATIENT
Start: 2019-10-08 | End: 2019-12-12 | Stop reason: ALTCHOICE

## 2019-10-08 NOTE — TELEPHONE ENCOUNTER
----- Message from Viji Holliday sent at 10/8/2019 10:12 AM CDT -----  Contact: [place] [phone]  Caller is requesting a sooner appointment. Caller declined first available appointment listed below. Caller will not accept being placed on the wait list and is requesting a message be sent to the provider.    When is the next available appointment:  01/30/2018  Did you offer to schedule the next available appt and put the patient on the wait list?:     What visit type: Annual Physical  Symptoms:    Patient preference of timeframe to be scheduled:  This Month  What is the reason the patient is requesting a sooner appointment? (insurance terminating, changing jobs):    Would the patient rather a call back or a response via [facility]?:  Call back  Comments:  Patient is out of refills on her medication.

## 2019-11-06 ENCOUNTER — OFFICE VISIT (OUTPATIENT)
Dept: INTERNAL MEDICINE | Facility: CLINIC | Age: 79
End: 2019-11-06
Payer: MEDICARE

## 2019-11-06 VITALS
SYSTOLIC BLOOD PRESSURE: 160 MMHG | OXYGEN SATURATION: 99 % | HEART RATE: 51 BPM | DIASTOLIC BLOOD PRESSURE: 80 MMHG | WEIGHT: 232.81 LBS | BODY MASS INDEX: 38.74 KG/M2

## 2019-11-06 DIAGNOSIS — I10 ESSENTIAL HYPERTENSION: Primary | ICD-10-CM

## 2019-11-06 PROCEDURE — 3077F PR MOST RECENT SYSTOLIC BLOOD PRESSURE >= 140 MM HG: ICD-10-PCS | Mod: CPTII,S$GLB,, | Performed by: PHYSICIAN ASSISTANT

## 2019-11-06 PROCEDURE — 99213 OFFICE O/P EST LOW 20 MIN: CPT | Mod: S$GLB,,, | Performed by: PHYSICIAN ASSISTANT

## 2019-11-06 PROCEDURE — 3079F PR MOST RECENT DIASTOLIC BLOOD PRESSURE 80-89 MM HG: ICD-10-PCS | Mod: CPTII,S$GLB,, | Performed by: PHYSICIAN ASSISTANT

## 2019-11-06 PROCEDURE — 99999 PR PBB SHADOW E&M-EST. PATIENT-LVL III: ICD-10-PCS | Mod: PBBFAC,,, | Performed by: PHYSICIAN ASSISTANT

## 2019-11-06 PROCEDURE — 3079F DIAST BP 80-89 MM HG: CPT | Mod: CPTII,S$GLB,, | Performed by: PHYSICIAN ASSISTANT

## 2019-11-06 PROCEDURE — 1101F PR PT FALLS ASSESS DOC 0-1 FALLS W/OUT INJ PAST YR: ICD-10-PCS | Mod: CPTII,S$GLB,, | Performed by: PHYSICIAN ASSISTANT

## 2019-11-06 PROCEDURE — 1101F PT FALLS ASSESS-DOCD LE1/YR: CPT | Mod: CPTII,S$GLB,, | Performed by: PHYSICIAN ASSISTANT

## 2019-11-06 PROCEDURE — 99999 PR PBB SHADOW E&M-EST. PATIENT-LVL III: CPT | Mod: PBBFAC,,, | Performed by: PHYSICIAN ASSISTANT

## 2019-11-06 PROCEDURE — 99213 PR OFFICE/OUTPT VISIT, EST, LEVL III, 20-29 MIN: ICD-10-PCS | Mod: S$GLB,,, | Performed by: PHYSICIAN ASSISTANT

## 2019-11-06 PROCEDURE — 3077F SYST BP >= 140 MM HG: CPT | Mod: CPTII,S$GLB,, | Performed by: PHYSICIAN ASSISTANT

## 2019-11-06 NOTE — PROGRESS NOTES
Subjective:       Patient ID: Nelia Littlejohn is a 79 y.o. female.    Chief Complaint: Hypertension (follow up)    HPI     Established pt of Arminda Anderson MD (new to me)    Here for BP check.     Notes some variable readings. Currently on amlodipine 10mg, metoprolol 50mg BID, candesartan 32mg.  Sprinolactone 25mg (2/2 hyperkalemia) and hydralazine 50mg was discontinued several months ago Denies cp, sob, ha, vision changes or edema.       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 Vision is much better, can read.     High cholesterol     History of prediabetes 10/26/2017    HTN (hypertension) 9/25/2012    Hyperlipemia 9/11/2012    Hypertension     Interval gout 9/11/2012     Social History     Tobacco Use    Smoking status: Never Smoker    Smokeless tobacco: Never Used   Substance Use Topics    Alcohol use: No    Drug use: No     Review of patient's allergies indicates:  No Known Allergies      Review of Systems   Constitutional: Negative for chills, fever and unexpected weight change.   Respiratory: Negative for cough and shortness of breath.    Cardiovascular: Negative for chest pain and leg swelling.   Gastrointestinal: Negative for abdominal pain, nausea and vomiting.   Skin: Negative for rash.   Neurological: Negative for weakness, light-headedness and headaches.       Objective: BP (!) 160/80   Pulse (!) 51   Wt 105.6 kg (232 lb 12.9 oz)   SpO2 99%   BMI 38.74 kg/m²         Physical Exam   Constitutional: She is oriented to person, place, and time. She appears well-developed and well-nourished. No distress.   HENT:   Head: Normocephalic and atraumatic.   Mouth/Throat: Oropharynx is clear and moist.    Eyes: Pupils are equal, round, and reactive to light.   Cardiovascular: Normal rate and regular rhythm. Exam reveals no friction rub.   No murmur heard.  Pulmonary/Chest: Effort normal and breath sounds normal. She has no wheezes. She has no rales.   Abdominal: Soft. Bowel sounds are normal. There is no tenderness.   Musculoskeletal: Normal range of motion.   Neurological: She is alert and oriented to person, place, and time.   Skin: Skin is warm and dry. Capillary refill takes less than 2 seconds. No rash noted.   Psychiatric: She has a normal mood and affect.   Vitals reviewed.      Assessment:       1. Essential hypertension        Plan:         Nelia was seen today for hypertension.    Diagnoses and all orders for this visit:    Essential hypertension  Pt took meds just prior to arrival to clinic  Notes 140s at home  Will have patient keep log and RTC in approx 3 weeks for repeat BP check  If persistent elevation with likely restart her hydralazine    Carole Tyalor PA-C

## 2019-11-06 NOTE — PATIENT INSTRUCTIONS
BRING BLOOD PRESSURE LOG ON RETURN TO CLINIC IN ABOUT 3 WEEKS    WORK ON DIET AND EXERCISE    WE WILL REVIEW WHEN YOU RETURN .

## 2019-12-05 ENCOUNTER — OFFICE VISIT (OUTPATIENT)
Dept: INTERNAL MEDICINE | Facility: CLINIC | Age: 79
End: 2019-12-05
Payer: MEDICARE

## 2019-12-05 VITALS
WEIGHT: 231.69 LBS | DIASTOLIC BLOOD PRESSURE: 70 MMHG | HEART RATE: 76 BPM | BODY MASS INDEX: 38.56 KG/M2 | SYSTOLIC BLOOD PRESSURE: 170 MMHG | OXYGEN SATURATION: 98 %

## 2019-12-05 DIAGNOSIS — I10 ESSENTIAL HYPERTENSION: Primary | ICD-10-CM

## 2019-12-05 DIAGNOSIS — G47.00 INSOMNIA, UNSPECIFIED TYPE: ICD-10-CM

## 2019-12-05 PROCEDURE — 3078F DIAST BP <80 MM HG: CPT | Mod: CPTII,S$GLB,, | Performed by: PHYSICIAN ASSISTANT

## 2019-12-05 PROCEDURE — 1126F AMNT PAIN NOTED NONE PRSNT: CPT | Mod: S$GLB,,, | Performed by: PHYSICIAN ASSISTANT

## 2019-12-05 PROCEDURE — 99213 OFFICE O/P EST LOW 20 MIN: CPT | Mod: S$GLB,,, | Performed by: PHYSICIAN ASSISTANT

## 2019-12-05 PROCEDURE — 3077F PR MOST RECENT SYSTOLIC BLOOD PRESSURE >= 140 MM HG: ICD-10-PCS | Mod: CPTII,S$GLB,, | Performed by: PHYSICIAN ASSISTANT

## 2019-12-05 PROCEDURE — 1101F PR PT FALLS ASSESS DOC 0-1 FALLS W/OUT INJ PAST YR: ICD-10-PCS | Mod: CPTII,S$GLB,, | Performed by: PHYSICIAN ASSISTANT

## 2019-12-05 PROCEDURE — 1159F MED LIST DOCD IN RCRD: CPT | Mod: S$GLB,,, | Performed by: PHYSICIAN ASSISTANT

## 2019-12-05 PROCEDURE — 3078F PR MOST RECENT DIASTOLIC BLOOD PRESSURE < 80 MM HG: ICD-10-PCS | Mod: CPTII,S$GLB,, | Performed by: PHYSICIAN ASSISTANT

## 2019-12-05 PROCEDURE — 1159F PR MEDICATION LIST DOCUMENTED IN MEDICAL RECORD: ICD-10-PCS | Mod: S$GLB,,, | Performed by: PHYSICIAN ASSISTANT

## 2019-12-05 PROCEDURE — 99999 PR PBB SHADOW E&M-EST. PATIENT-LVL IV: CPT | Mod: PBBFAC,,, | Performed by: PHYSICIAN ASSISTANT

## 2019-12-05 PROCEDURE — 1101F PT FALLS ASSESS-DOCD LE1/YR: CPT | Mod: CPTII,S$GLB,, | Performed by: PHYSICIAN ASSISTANT

## 2019-12-05 PROCEDURE — 99213 PR OFFICE/OUTPT VISIT, EST, LEVL III, 20-29 MIN: ICD-10-PCS | Mod: S$GLB,,, | Performed by: PHYSICIAN ASSISTANT

## 2019-12-05 PROCEDURE — 3077F SYST BP >= 140 MM HG: CPT | Mod: CPTII,S$GLB,, | Performed by: PHYSICIAN ASSISTANT

## 2019-12-05 PROCEDURE — 99999 PR PBB SHADOW E&M-EST. PATIENT-LVL IV: ICD-10-PCS | Mod: PBBFAC,,, | Performed by: PHYSICIAN ASSISTANT

## 2019-12-05 PROCEDURE — 1126F PR PAIN SEVERITY QUANTIFIED, NO PAIN PRESENT: ICD-10-PCS | Mod: S$GLB,,, | Performed by: PHYSICIAN ASSISTANT

## 2019-12-05 RX ORDER — HYDRALAZINE HYDROCHLORIDE 50 MG/1
50 TABLET, FILM COATED ORAL 2 TIMES DAILY
Qty: 60 TABLET | Refills: 3 | Status: SHIPPED | OUTPATIENT
Start: 2019-12-05 | End: 2019-12-12 | Stop reason: SDUPTHER

## 2019-12-05 NOTE — PATIENT INSTRUCTIONS
Insomnia  Insomnia is repeated difficulty going to sleep or staying asleep, or both. Whether you have insomnia is not defined by a specific amount of sleep. Different people need different amounts of sleep, and you may need more or less sleep at different times of your life.  There are 3 major types of insomnia:  short-term, chronic, and other.  Short-term, or acute insomnia lasts less than 3 months.  The symptoms are temporary and can be linked directly to a stressor, such as the death of a loved one, financial problems, or a new physical problem.  Short-term insomnia stops when the stressor resolves or the person adapts to its presence.  Chronic insomnia occurs at least 3 times a week and lasts longer than 3 months.  Chronic insomnia can occur when either the cause of the sleeping problem is not clear, or the insomnia does not get better when the stressor is resolved. A number of other criteria are also used to make the diagnosis of chronic insomnia.   Other insomnia is the third type of insomnia-related sleep disorders.  This description applies to people who have problems getting to sleep or staying asleep, but do not meet all of the factors that describe either short-term or chronic insomnia.    Many things cause insomnia. Different people may have different causes. It can be from an underlying medical or psychological condition, or lifestyle. It can also be primary insomnia, which means no cause can be found.  Causes of insomnia include:  · Chronic medical problems- heart disease, gastrointestinal problems, hormonal changes, breathing problems  · Anxiety  · Stress  · Depression  · Pain  · Work schedule  · Sleep apnea  · Illegal drugs  · Certain medicines  Many different medidcines can affect your sleep, such as stimulants, caffeine, alcohol, some decongestants, and diet pills. Other medicines may include some types of blood pressure pills, steroids, asthma medicines, antihistamines, antidepressants,  seizure medicines and statins. Not all of these will affect your sleep, and they shouldnt be stopped without talking to your doctor.  Symptoms of insomnia can include:  · Lying awake for long periods at night before falling asleep  · Waking up several times during the night  · Waking up early in the morning and not being able to get back to sleep  · Feeling tired and not refreshed by sleep  · Not being able to function properly during the day and finding it hard to concentrate  · Irritability  · Tiredness and fatigue during the day  Home care  1. Review your medicines with your doctor or pharmacist to find out if they can cause insomnia. Not all medicines will affect your sleep, but they shouldn't be stopped without reviewing them with your doctor. There may be serious side effects and consequences from suddenly stopping your medicines. Not taking them may cause strokes, heart attacks, and many other problems.  2. Caffeine, smoking and alcohol also affect sleep. Limit your daily use and do not use these before bedtime. Alcohol may make you sleepy at first, but as its effects wear off, you may awaken a few hours later and have trouble returning to sleep.  3. Do not exercise, eat or drink large amounts of liquid within 2 hours of your bedtime.  4. Improve your sleep habits. Have a fixed bed and wake-up time. Try to keep noise, light and heat in your bedroom at a comfortable level. Try using earplugs or eyeshades if needed.   5. Avoid watching TV in bed.  6. If you do not fall asleep within 30 minutes, try to relax by reading or listening to soft music.  7. Limit daytime napping to one 30 minute period, early in the day.  8. Get regular exercise. Find other ways to lessen your stress level.  9. If a medicine was prescribed to help reset your sleep patterns, take it as directed. Sleeping pills are intended for short-term use, only. If taken for too long, the effect wears off while the risk of physical addiction and  psychological dependence increases.  Sleep diary  If the cause isnt obvious and it is not improving, try keeping a sleep diary for a couple of weeks. Include in it:  · The time you go to bed  · How long it takes to fall asleep  · How many times you wake up  · What time you wake up  · Your meal times and what you eat  · What time you drink alcohol  · Your exercise habits and times  Follow-up care  Follow up with your healthcare provider, or as advised. If X-rays or CT scans were done, you will be notified if there is a change in the reading, especially if it affects treatment.  Call 911  Call 911 if any of these occur:  · Trouble breathing  · Confusion or trouble waking  · Fainting or loss of consciousness  · Rapid heart rate  · New chest, arm, shoulder, neck or upper back pain  · Trouble with speech or vision, weakness of an arm or leg  · Trouble walking or talking, loss of balance, numbness or weakness in one side of your body, facial droop  When to seek medical advice  Call your healthcare provider right away if any of these occur:  · Extreme restlessness or irritability  · Confusion or hallucinations (seeing or hearing things that are not there)  · Anxiety, depression  · Several days without sleeping  Date Last Reviewed: 11/19/2015  © 9869-6935 [employer]. [address] [place] [address], [place] [address]. All rights reserved. This information is not intended as a substitute for professional medical care. Always follow your healthcare professional's instructions.

## 2019-12-12 ENCOUNTER — OFFICE VISIT (OUTPATIENT)
Dept: INTERNAL MEDICINE | Facility: CLINIC | Age: 79
End: 2019-12-12
Payer: MEDICARE

## 2019-12-12 VITALS
DIASTOLIC BLOOD PRESSURE: 72 MMHG | HEIGHT: 66 IN | SYSTOLIC BLOOD PRESSURE: 180 MMHG | WEIGHT: 229.94 LBS | HEART RATE: 59 BPM | OXYGEN SATURATION: 97 % | BODY MASS INDEX: 36.95 KG/M2

## 2019-12-12 DIAGNOSIS — I10 ESSENTIAL HYPERTENSION: Primary | ICD-10-CM

## 2019-12-12 PROCEDURE — 1159F PR MEDICATION LIST DOCUMENTED IN MEDICAL RECORD: ICD-10-PCS | Mod: S$GLB,,, | Performed by: INTERNAL MEDICINE

## 2019-12-12 PROCEDURE — 1101F PT FALLS ASSESS-DOCD LE1/YR: CPT | Mod: CPTII,S$GLB,, | Performed by: INTERNAL MEDICINE

## 2019-12-12 PROCEDURE — 1159F MED LIST DOCD IN RCRD: CPT | Mod: S$GLB,,, | Performed by: INTERNAL MEDICINE

## 2019-12-12 PROCEDURE — 99999 PR PBB SHADOW E&M-EST. PATIENT-LVL IV: ICD-10-PCS | Mod: PBBFAC,,, | Performed by: INTERNAL MEDICINE

## 2019-12-12 PROCEDURE — 3077F PR MOST RECENT SYSTOLIC BLOOD PRESSURE >= 140 MM HG: ICD-10-PCS | Mod: CPTII,S$GLB,, | Performed by: INTERNAL MEDICINE

## 2019-12-12 PROCEDURE — 99499 UNLISTED E&M SERVICE: CPT | Mod: S$GLB,,, | Performed by: INTERNAL MEDICINE

## 2019-12-12 PROCEDURE — 1101F PR PT FALLS ASSESS DOC 0-1 FALLS W/OUT INJ PAST YR: ICD-10-PCS | Mod: CPTII,S$GLB,, | Performed by: INTERNAL MEDICINE

## 2019-12-12 PROCEDURE — 1126F PR PAIN SEVERITY QUANTIFIED, NO PAIN PRESENT: ICD-10-PCS | Mod: S$GLB,,, | Performed by: INTERNAL MEDICINE

## 2019-12-12 PROCEDURE — 99499 RISK ADDL DX/OHS AUDIT: ICD-10-PCS | Mod: S$GLB,,, | Performed by: INTERNAL MEDICINE

## 2019-12-12 PROCEDURE — 3078F PR MOST RECENT DIASTOLIC BLOOD PRESSURE < 80 MM HG: ICD-10-PCS | Mod: CPTII,S$GLB,, | Performed by: INTERNAL MEDICINE

## 2019-12-12 PROCEDURE — 1126F AMNT PAIN NOTED NONE PRSNT: CPT | Mod: S$GLB,,, | Performed by: INTERNAL MEDICINE

## 2019-12-12 PROCEDURE — 99999 PR PBB SHADOW E&M-EST. PATIENT-LVL IV: CPT | Mod: PBBFAC,,, | Performed by: INTERNAL MEDICINE

## 2019-12-12 PROCEDURE — 3077F SYST BP >= 140 MM HG: CPT | Mod: CPTII,S$GLB,, | Performed by: INTERNAL MEDICINE

## 2019-12-12 PROCEDURE — 99215 PR OFFICE/OUTPT VISIT, EST, LEVL V, 40-54 MIN: ICD-10-PCS | Mod: S$GLB,,, | Performed by: INTERNAL MEDICINE

## 2019-12-12 PROCEDURE — 99215 OFFICE O/P EST HI 40 MIN: CPT | Mod: S$GLB,,, | Performed by: INTERNAL MEDICINE

## 2019-12-12 PROCEDURE — 3078F DIAST BP <80 MM HG: CPT | Mod: CPTII,S$GLB,, | Performed by: INTERNAL MEDICINE

## 2019-12-12 RX ORDER — AMLODIPINE AND BENAZEPRIL HYDROCHLORIDE 10; 40 MG/1; MG/1
1 CAPSULE ORAL EVERY MORNING
Qty: 90 CAPSULE | Refills: 3 | Status: SHIPPED | OUTPATIENT
Start: 2019-12-12 | End: 2020-06-19 | Stop reason: SDUPTHER

## 2019-12-12 RX ORDER — METOPROLOL TARTRATE AND HYDROCHLOROTHIAZIDE 100; 25 MG/1; MG/1
1 TABLET ORAL EVERY MORNING
Qty: 90 TABLET | Refills: 3 | Status: SHIPPED | OUTPATIENT
Start: 2019-12-12 | End: 2020-10-12

## 2019-12-12 RX ORDER — METOPROLOL TARTRATE 50 MG/1
TABLET ORAL
Qty: 270 TABLET | Refills: 3 | Status: SHIPPED | OUTPATIENT
Start: 2019-12-12 | End: 2019-12-12 | Stop reason: ALTCHOICE

## 2019-12-12 RX ORDER — VALSARTAN AND HYDROCHLOROTHIAZIDE 320; 25 MG/1; MG/1
1 TABLET, FILM COATED ORAL DAILY
Qty: 90 TABLET | Refills: 3 | Status: SHIPPED | OUTPATIENT
Start: 2019-12-12 | End: 2019-12-12 | Stop reason: ALTCHOICE

## 2019-12-12 RX ORDER — HYDRALAZINE HYDROCHLORIDE 50 MG/1
TABLET, FILM COATED ORAL
Qty: 360 TABLET | Refills: 3 | Status: SHIPPED | OUTPATIENT
Start: 2019-12-12 | End: 2020-01-15 | Stop reason: SDUPTHER

## 2019-12-12 NOTE — PATIENT INSTRUCTIONS
Medication List with Changes/Refills   New Medications    AMLODIPINE-BENAZEPRIL (LOTREL) 10-40 MG PER CAPSULE    Take 1 capsule by mouth every morning.    METOPROLOL TA-HYDROCHLOROTHIAZ (LOPRESSOR HCT) 100-25 MG PER TABLET    Take 1 tablet by mouth every morning.   Current Medications    ALLOPURINOL (ZYLOPRIM) 300 MG TABLET    TAKE 1 TABLET(300 MG) BY MOUTH EVERY DAY to prevent gout    ASPIRIN (ECOTRIN) 325 MG EC TABLET    Take 1 tablet (325 mg total) by mouth once daily.    ATORVASTATIN (LIPITOR) 80 MG TABLET    TAKE 1 TABLET(80 MG) BY MOUTH EVERY DAY stroke prevention    DICLOFENAC SODIUM 1 % GEL    Apply 2 g topically 4 (four) times daily as needed. Right knee    FLUAD 3063-8360, 65 YR UP,,PF, 45 MCG (15 MCG X 3)/0.5 ML SYRG    ADM 0.5ML IM UTD    MULTIVIT-IRON-MIN-FOLIC ACID (MULTIVITAMIN-IRON-MINERALS-FOLIC ACID) 3,500-18-0.4 UNIT-MG-MG CHEW    Take by mouth once daily.    Changed and/or Refilled Medications    Modified Medication Previous Medication    HYDRALAZINE (APRESOLINE) 50 MG TABLET hydrALAZINE (APRESOLINE) 50 MG tablet       Take two tablets twice day reduce to one tablet twice if light headed on standing    Take 1 tablet (50 mg total) by mouth 2 (two) times daily.   Discontinued Medications    AMLODIPINE (NORVASC) 10 MG TABLET    Take 1 tablet (10 mg total) by mouth once daily. Blood pressure control    CANDESARTAN (ATACAND) 32 MG TABLET    Take 1 tablet (32 mg total) by mouth once daily. Replaces benazepril which is discontinued    METOPROLOL TARTRATE (LOPRESSOR) 50 MG TABLET    TAKE 1 TABLET(50 MG) BY MOUTH TWICE DAILY

## 2019-12-14 ENCOUNTER — OFFICE VISIT (OUTPATIENT)
Dept: OPTOMETRY | Facility: CLINIC | Age: 79
End: 2019-12-14
Payer: MEDICARE

## 2019-12-14 DIAGNOSIS — Z13.5 GLAUCOMA SCREENING: ICD-10-CM

## 2019-12-14 DIAGNOSIS — I10 ESSENTIAL HYPERTENSION: Primary | ICD-10-CM

## 2019-12-14 DIAGNOSIS — Z96.1 PSEUDOPHAKIA OF BOTH EYES: ICD-10-CM

## 2019-12-14 DIAGNOSIS — H52.4 REGULAR ASTIGMATISM OF BOTH EYES WITH PRESBYOPIA: ICD-10-CM

## 2019-12-14 DIAGNOSIS — H52.223 REGULAR ASTIGMATISM OF BOTH EYES WITH PRESBYOPIA: ICD-10-CM

## 2019-12-14 PROCEDURE — 92015 DETERMINE REFRACTIVE STATE: CPT | Mod: S$GLB,,, | Performed by: OPTOMETRIST

## 2019-12-14 PROCEDURE — 92004 PR EYE EXAM, NEW PATIENT,COMPREHESV: ICD-10-PCS | Mod: S$GLB,,, | Performed by: OPTOMETRIST

## 2019-12-14 PROCEDURE — 92004 COMPRE OPH EXAM NEW PT 1/>: CPT | Mod: S$GLB,,, | Performed by: OPTOMETRIST

## 2019-12-14 PROCEDURE — 92015 PR REFRACTION: ICD-10-PCS | Mod: S$GLB,,, | Performed by: OPTOMETRIST

## 2019-12-14 PROCEDURE — 99999 PR PBB SHADOW E&M-EST. PATIENT-LVL III: ICD-10-PCS | Mod: PBBFAC,,, | Performed by: OPTOMETRIST

## 2019-12-14 PROCEDURE — 99999 PR PBB SHADOW E&M-EST. PATIENT-LVL III: CPT | Mod: PBBFAC,,, | Performed by: OPTOMETRIST

## 2019-12-14 NOTE — LETTER
December 14, 2019      Arminda Gutiérrez MD  [address]  [facility] [address]           [facility]  [address]  [facility] [address]  Phone: [phone]  Fax: [phone]          Patient: Nelia Littlejohn   MR Number: [id number]   YOB: 1940   Date of Visit: 12/14/2019       Dear Dr. Arminda Gutiérrez:    Thank you for referring Nelia Littlejohn to me for evaluation. Attached you will find relevant portions of my assessment and plan of care.    If you have questions, please do not hesitate to call me. I look forward to following Nelia Littlejohn along with you.    Sincerely,    Raj Rebolledo, OD    Enclosure  CC:  No Recipients    If you would like to receive this communication electronically, please contact [email or web address] or [phone] to request more information on i[employer]ged [employer] access.    For providers and/or their staff who would like to refer a patient to [facility], please contact us through our one-stop-shop provider referral line, [facility], at [phone].    If you feel you have received this communication in error or would no longer like to receive these types of communications, please e-mail [email or web address]

## 2019-12-14 NOTE — PROGRESS NOTES
Subjective:       Patient ID: Nelia Littlejohn is a 79 y.o. female.    Chief Complaint: Follow-up (4 week f/u, BP f/u ); Dizziness; Insomnia; and Hypertension  This dictation was performed using using [employer].    The patient presents to the office today with her daughter.  She is not sleeping at night.  Sometimes she is up all night and then sleeps until 4:00 p.m. in the afternoon.  Two years ago, she moved in to live with her daughter and her daughter's family because she was doing this at her own house.  She stopped working at the  because she wanted to it was too hard on her legs, she has osteoarthritis in both of her knees and has had a left total knee replacement in 2016.    The main reason why she is here today is because her blood pressure has been uncontrolled.  As high as 180 systolic.  Many times her diastolic has been higher than 100.    Her daughter thinks that the onset of her loss of blood pressure control had to do with discontinuing Lotrel.    Many concerns were explored during this visit and this visit took longer than 1 hr.  I can't get a sense of where the patient is regarding her living situation.  Clearly, her daughter is very concerned about her day night reversal.  There have not been any concerns regarding dementia.    With a lot of back and forth, we essentially decided to change all of her medication.  The patient and her daughter understand that she has essential hypertension with hypertensive heart disease and that medication is required to keep her blood pressure under control and she agrees to take medication twice daily.    A lot of energy an effort will be put into trying to get on a better schedule    HPI  Review of Systems   Constitutional: Negative for activity change, appetite change, chills, fatigue, fever and unexpected weight change.   HENT: Negative for hearing loss.    Eyes: Negative for visual disturbance.   Respiratory: Negative for cough, chest tightness, shortness of  breath and wheezing.    Cardiovascular: Negative for chest pain, palpitations and leg swelling.   Gastrointestinal: Negative for abdominal pain, constipation, nausea and vomiting.   Genitourinary: Negative for dysuria, frequency and urgency.   Musculoskeletal: Negative for arthralgias, back pain, gait problem, joint swelling and myalgias.   Skin: Negative for rash.   Neurological: Negative for light-headedness and headaches.   Psychiatric/Behavioral: Negative for dysphoric mood and sleep disturbance. The patient is not nervous/anxious.        Objective:      Physical Exam   Constitutional: She is oriented to person, place, and time. She appears well-developed and well-nourished. No distress.   HENT:   Head: Normocephalic and atraumatic.   Right Ear: External ear normal.   Left Ear: External ear normal.   Nose: Nose normal.   Mouth/Throat: Oropharynx is clear and moist. No oropharyngeal exudate.   Eyes: Pupils are equal, round, and reactive to light. Conjunctivae and EOM are normal. Right eye exhibits no discharge. No scleral icterus.   Neck: Normal range of motion and full passive range of motion without pain. Neck supple. No spinous process tenderness and no muscular tenderness present. Carotid bruit is not present. No thyromegaly present.   Cardiovascular: Normal rate, regular rhythm, S1 normal, S2 normal, normal heart sounds and intact distal pulses. Exam reveals no gallop and no friction rub.   No murmur heard.  Pulmonary/Chest: Effort normal and breath sounds normal. No respiratory distress. She has no wheezes. She has no rales. She exhibits no tenderness.   Abdominal: Soft. Bowel sounds are normal. She exhibits no distension and no mass. There is no tenderness. There is no rebound and no guarding.   Genitourinary: Pelvic exam was performed with patient supine. Uterus is not deviated, not enlarged, not fixed and not tender. Cervix exhibits no motion tenderness, no discharge and no friability. Right adnexum  displays no mass, no tenderness and no fullness. Left adnexum displays no mass, no tenderness and no fullness.   Musculoskeletal: Normal range of motion. She exhibits no edema or tenderness.   Lymphadenopathy:        Head (right side): No submental and no submandibular adenopathy present.        Head (left side): No submental and no submandibular adenopathy present.     She has no cervical adenopathy.        Right cervical: No superficial cervical, no deep cervical and no posterior cervical adenopathy present.       Left cervical: No superficial cervical, no deep cervical and no posterior cervical adenopathy present.        Right axillary: No pectoral and no lateral adenopathy present.        Left axillary: No pectoral and no lateral adenopathy present.       Right: No supraclavicular adenopathy present.        Left: No supraclavicular adenopathy present.   Neurological: She is alert and oriented to person, place, and time. She has normal reflexes. No cranial nerve deficit. She exhibits normal muscle tone. Coordination normal.   Skin: Skin is warm and dry. No rash noted.   Psychiatric: She has a normal mood and affect. Her behavior is normal. Her mood appears not anxious. Her speech is not rapid and/or pressured. She is not agitated. She does not exhibit a depressed mood.   Normal behavior, thought content, insight and judgement.   Nursing note and vitals reviewed.      Assessment:       1. Essential hypertension        Plan:   Nelia was seen today for follow-up, dizziness, insomnia and hypertension.    Diagnoses and all orders for this visit:    Essential hypertension  -     hydrALAZINE (APRESOLINE) 50 MG tablet; Take two tablets twice day reduce to one tablet twice if light headed on standing  -     Comprehensive metabolic panel; Future  -     Ambulatory referral to Optometry    Other orders  -     Discontinue: valsartan-hydrochlorothiazide (DIOVAN-HCT) 320-25 mg per tablet; Take 1 tablet by mouth once daily.  -      Discontinue: metoprolol tartrate (LOPRESSOR) 50 MG tablet; One in the morning and two at night  -     amlodipine-benazepril (LOTREL) 10-40 mg per capsule; Take 1 capsule by mouth every morning.  -     metoprolol ta-hydrochlorothiaz (LOPRESSOR HCT) 100-25 mg per tablet; Take 1 tablet by mouth every morning.      Results for orders placed or performed in visit on 03/13/19   Basic metabolic panel   Result Value Ref Range    Sodium 144 136 - 145 mmol/L    Potassium 5.1 3.5 - 5.1 mmol/L    Chloride 108 95 - 110 mmol/L    CO2 26 23 - 29 mmol/L    Glucose 97 70 - 110 mg/dL    BUN, Bld 10 8 - 23 mg/dL    Creatinine 0.9 0.5 - 1.4 mg/dL    Calcium 9.7 8.7 - 10.5 mg/dL    Anion Gap 10 8 - 16 mmol/L    eGFR if African American >60 >60 mL/min/1.73 m^2    eGFR if non African American >60 >60 mL/min/1.73 m^2     Medication List with Changes/Refills   New Medications    AMLODIPINE-BENAZEPRIL (LOTREL) 10-40 MG PER CAPSULE    Take 1 capsule by mouth every morning.    METOPROLOL TA-HYDROCHLOROTHIAZ (LOPRESSOR HCT) 100-25 MG PER TABLET    Take 1 tablet by mouth every morning.   Current Medications    ALLOPURINOL (ZYLOPRIM) 300 MG TABLET    TAKE 1 TABLET(300 MG) BY MOUTH EVERY DAY to prevent gout    ASPIRIN (ECOTRIN) 325 MG EC TABLET    Take 1 tablet (325 mg total) by mouth once daily.    ATORVASTATIN (LIPITOR) 80 MG TABLET    TAKE 1 TABLET(80 MG) BY MOUTH EVERY DAY stroke prevention    DICLOFENAC SODIUM 1 % GEL    Apply 2 g topically 4 (four) times daily as needed. Right knee    FLUAD [id number], 65 YR UP,,PF, 45 MCG (15 MCG X 3)/0.5 ML SYRG    ADM 0.5ML IM UTD    MULTIVIT-IRON-MIN-FOLIC ACID (MULTIVITAMIN-IRON-MINERALS-FOLIC ACID) 3,500-18-0.4 UNIT-MG-MG CHEW    Take by mouth once daily.    Changed and/or Refilled Medications    Modified Medication Previous Medication    HYDRALAZINE (APRESOLINE) 50 MG TABLET hydrALAZINE (APRESOLINE) 50 MG tablet       Take two tablets twice day reduce to one tablet twice if light headed on standing     Take 1 tablet (50 mg total) by mouth 2 (two) times daily.   Discontinued Medications    AMLODIPINE (NORVASC) 10 MG TABLET    Take 1 tablet (10 mg total) by mouth once daily. Blood pressure control    CANDESARTAN (ATACAND) 32 MG TABLET    Take 1 tablet (32 mg total) by mouth once daily. Replaces benazepril which is discontinued    METOPROLOL TARTRATE (LOPRESSOR) 50 MG TABLET    TAKE 1 TABLET(50 MG) BY MOUTH TWICE DAILY

## 2019-12-14 NOTE — PROGRESS NOTES
HPI     Pt is coming in for blurry near va   Denies flashes/floaters/pain   Cat sx OU   No drops       Last edited by Manisha Garnett on 12/14/2019  8:58 AM. (History)            Assessment /Plan     For exam results, see Encounter Report.    Essential hypertension  -No retinopathy noted today.  Continued control with primary care physician and annual comprehensive eye exam.    Glaucoma screening  -Monitor with annual eye exam and IOP check    Pseudophakia of both eyes  -clear, centered    Regular astigmatism of both eyes with presbyopia  Eyeglass Final Rx     Eyeglass Final Rx       Sphere Cylinder Axis Dist VA Add    Right -0.25 +0.75 015 20/25 +2.50    Left -0.75 +0.75 175 20/25 +2.50    Type:  Bifocal    Expiration Date:  12/14/2020                  RTC 1 yr

## 2020-01-13 ENCOUNTER — LAB VISIT (OUTPATIENT)
Dept: LAB | Facility: HOSPITAL | Age: 80
End: 2020-01-13
Attending: INTERNAL MEDICINE
Payer: MEDICARE

## 2020-01-13 DIAGNOSIS — I10 ESSENTIAL HYPERTENSION: ICD-10-CM

## 2020-01-13 LAB
ALBUMIN SERPL BCP-MCNC: 3.7 G/DL (ref 3.5–5.2)
ALP SERPL-CCNC: 86 U/L (ref 55–135)
ALT SERPL W/O P-5'-P-CCNC: 25 U/L (ref 10–44)
ANION GAP SERPL CALC-SCNC: 10 MMOL/L (ref 8–16)
AST SERPL-CCNC: 24 U/L (ref 10–40)
BILIRUB SERPL-MCNC: 0.8 MG/DL (ref 0.1–1)
BUN SERPL-MCNC: 19 MG/DL (ref 8–23)
CALCIUM SERPL-MCNC: 9 MG/DL (ref 8.7–10.5)
CHLORIDE SERPL-SCNC: 106 MMOL/L (ref 95–110)
CO2 SERPL-SCNC: 26 MMOL/L (ref 23–29)
CREAT SERPL-MCNC: 1.2 MG/DL (ref 0.5–1.4)
EST. GFR  (AFRICAN AMERICAN): 49 ML/MIN/1.73 M^2
EST. GFR  (NON AFRICAN AMERICAN): 43 ML/MIN/1.73 M^2
GLUCOSE SERPL-MCNC: 109 MG/DL (ref 70–110)
POTASSIUM SERPL-SCNC: 4.3 MMOL/L (ref 3.5–5.1)
PROT SERPL-MCNC: 7.5 G/DL (ref 6–8.4)
SODIUM SERPL-SCNC: 142 MMOL/L (ref 136–145)

## 2020-01-13 PROCEDURE — 80053 COMPREHEN METABOLIC PANEL: CPT

## 2020-01-13 PROCEDURE — 36415 COLL VENOUS BLD VENIPUNCTURE: CPT

## 2020-01-15 ENCOUNTER — OFFICE VISIT (OUTPATIENT)
Dept: INTERNAL MEDICINE | Facility: CLINIC | Age: 80
End: 2020-01-15
Payer: MEDICARE

## 2020-01-15 VITALS
SYSTOLIC BLOOD PRESSURE: 138 MMHG | WEIGHT: 231.5 LBS | DIASTOLIC BLOOD PRESSURE: 60 MMHG | HEART RATE: 61 BPM | HEIGHT: 66 IN | BODY MASS INDEX: 37.21 KG/M2 | OXYGEN SATURATION: 98 %

## 2020-01-15 DIAGNOSIS — G47.00 INSOMNIA, UNSPECIFIED TYPE: ICD-10-CM

## 2020-01-15 DIAGNOSIS — Z96.652 STATUS POST TOTAL LEFT KNEE REPLACEMENT: ICD-10-CM

## 2020-01-15 DIAGNOSIS — Z86.73 H/O: STROKE: ICD-10-CM

## 2020-01-15 DIAGNOSIS — N18.30 STAGE 3 CHRONIC KIDNEY DISEASE: ICD-10-CM

## 2020-01-15 DIAGNOSIS — I10 ESSENTIAL HYPERTENSION: ICD-10-CM

## 2020-01-15 DIAGNOSIS — E66.01 MORBID (SEVERE) OBESITY DUE TO EXCESS CALORIES: ICD-10-CM

## 2020-01-15 DIAGNOSIS — Z87.898 HISTORY OF PREDIABETES: ICD-10-CM

## 2020-01-15 DIAGNOSIS — Z00.00 ANNUAL PHYSICAL EXAM: Primary | ICD-10-CM

## 2020-01-15 PROCEDURE — 99214 PR OFFICE/OUTPT VISIT, EST, LEVL IV, 30-39 MIN: ICD-10-PCS | Mod: S$GLB,,, | Performed by: INTERNAL MEDICINE

## 2020-01-15 PROCEDURE — 3078F DIAST BP <80 MM HG: CPT | Mod: CPTII,S$GLB,, | Performed by: INTERNAL MEDICINE

## 2020-01-15 PROCEDURE — 3075F PR MOST RECENT SYSTOLIC BLOOD PRESS GE 130-139MM HG: ICD-10-PCS | Mod: CPTII,S$GLB,, | Performed by: INTERNAL MEDICINE

## 2020-01-15 PROCEDURE — 99999 PR PBB SHADOW E&M-EST. PATIENT-LVL III: CPT | Mod: PBBFAC,,, | Performed by: INTERNAL MEDICINE

## 2020-01-15 PROCEDURE — 99214 OFFICE O/P EST MOD 30 MIN: CPT | Mod: S$GLB,,, | Performed by: INTERNAL MEDICINE

## 2020-01-15 PROCEDURE — 3078F PR MOST RECENT DIASTOLIC BLOOD PRESSURE < 80 MM HG: ICD-10-PCS | Mod: CPTII,S$GLB,, | Performed by: INTERNAL MEDICINE

## 2020-01-15 PROCEDURE — 99499 UNLISTED E&M SERVICE: CPT | Mod: S$GLB,,, | Performed by: INTERNAL MEDICINE

## 2020-01-15 PROCEDURE — 99499 RISK ADDL DX/OHS AUDIT: ICD-10-PCS | Mod: S$GLB,,, | Performed by: INTERNAL MEDICINE

## 2020-01-15 PROCEDURE — 99999 PR PBB SHADOW E&M-EST. PATIENT-LVL III: ICD-10-PCS | Mod: PBBFAC,,, | Performed by: INTERNAL MEDICINE

## 2020-01-15 PROCEDURE — 3075F SYST BP GE 130 - 139MM HG: CPT | Mod: CPTII,S$GLB,, | Performed by: INTERNAL MEDICINE

## 2020-01-15 RX ORDER — HYDRALAZINE HYDROCHLORIDE 50 MG/1
50 TABLET, FILM COATED ORAL EVERY 12 HOURS
Qty: 360 TABLET | Refills: 3
Start: 2020-01-15 | End: 2020-03-13

## 2020-01-15 NOTE — PATIENT INSTRUCTIONS
Try to consume 5-7 servings of fruit every day.  1/4 cup of any dried fruit is 1 serving of fruit.  This could include raisins, prunes, apricots, banana chips, tart dried cheeries without added sugar.  Always read labels to avoid added sugar or other additives.  NO fruit juices.  One standard sized banana is 2 servings of fruit.  Even though bananas are not very sweet tasting, they pack a high glycemic index.  Half of a standard size apple is also 2 servings of fruit.  One cup of strawberries or any other berry is 1 serving of fruit.  One cup of any melon is 1 serving of fruit.      Results for orders placed or performed in visit on 01/13/20   Comprehensive metabolic panel   Result Value Ref Range    Sodium 142 136 - 145 mmol/L    Potassium 4.3 3.5 - 5.1 mmol/L    Chloride 106 95 - 110 mmol/L    CO2 26 23 - 29 mmol/L    Glucose 109 70 - 110 mg/dL    BUN, Bld 19 8 - 23 mg/dL    Creatinine 1.2 0.5 - 1.4 mg/dL    Calcium 9.0 8.7 - 10.5 mg/dL    Total Protein 7.5 6.0 - 8.4 g/dL    Albumin 3.7 3.5 - 5.2 g/dL    Total Bilirubin 0.8 0.1 - 1.0 mg/dL    Alkaline Phosphatase 86 55 - 135 U/L    AST 24 10 - 40 U/L    ALT 25 10 - 44 U/L    Anion Gap 10 8 - 16 mmol/L    eGFR if African American 49 (A) >60 mL/min/1.73 m^2    eGFR if non African American 43 (A) >60 mL/min/1.73 m^2       Chronic Kidney Disease (CKD)     The role of the kidneys is to remove waste products and extra water from the blood.  When the kidneys do not work as they should, waste products begin to build up in the blood. This is called chronic kidney disease (CKD). CKD means that you have kidney damage or a decrease in kidney function lasting at least 3 months. CKD allows extra water, waste, and toxins to build up in the body. This can eventually become life-threatening. You might need dialysis or a kidney transplant to stay alive. This most severe form is called end stage renal disease.  Diabetes is the leading causes of chronic renal failure. Other causes  include high blood pressure, hardening of the arteries (atherosclerosis), lupus, inflammation of the blood vessels (vasculitis), and past viral or bacterial infections. Certain over-the-counter pain medicines can cause renal failure when taken often over a long period of time. These include aspirin, ibuprofen, and related anti-inflammatory medicines called NSAIDs (nonsteroidal anti-inflammatory drugs).  Home care  The following guidelines will help you care for yourself at home:  · If you have diabetes, talk with your healthcare provider about keeping your blood sugar under control. Ask if you need to make and changes to your diet, lifestyle, or medicines.  · If you have high blood pressure:  ¨ Take prescribed medicine to lower your blood pressure to the recommended goal of less than 130/80.  ¨ Start a regular exercise program that you enjoy. Check with your healthcare provider to be sure your planned exercise program is right for you.  ¨ Eat less salt (sodium). Your healthcare provider can tell you how much salt per day is safe for you.  · If you are overweight, talk with your healthcare provider about a weight loss plan.  · If you smoke, you must quit. Smoking makes kidney disease worse. Talk with your healthcare provider about ways to help you quit.  For more information, visit the following links:  ¨ www.smokefree.gov/sites/default/files/pdf/clearing-the-air-accessible.pdf  ¨ www.smokefree.gov  ¨ www.cancer.org/healthy/stayawayfromtobacco/guidetoquittingsmoking/  · Most people with CKD need to follow a special diet.  Be sure you understand yours. In general, you will need to limit protein, salt, potassium, and phosphorus. You also need to limit how much fluid you drink.   · CKD is a risk factor for heart disease. Talk with your healthcare provider about any other risk factors you might have and what you can do to lessen them.  · Talk with your healthcare provider about any medicines you are taking to find out if  "they need to be reduced or stopped.  · Don't use the following over-the-counter medicines, or consult your healthcare provider before using:  ¨ Aspirin and NSAIDs such as ibuprofen or naproxen. Using acetaminophen for fever or pain is OK.  ¨ Laxatives and antacids containing magnesium or aluminum  ¨ Fleet or phospho soda enemas containing phosphorus  ¨ Certain stomach acid-blocking medicine such as cimetidine or ranitidine   ¨ Decongestants containing pseudoephedrine   ¨ Herbal supplements  Follow-up care  Follow up with your healthcare provider, or as advised. Contact one of the following for more information:  · American Association of Kidney Patients 020-187-3045 www.aakp.org  · National Kidney Foundation 905-765-0844 www.kidney.org  · American Kidney Fund 783-734-8112 www.kidneyfund.org  · National Kidney Disease Education Program 866-4KIDNEY www.nkdep.nih.gov  If an X-ray, ECG (cardiogram), or other diagnostic test was taken, you will be told of any new findings that may affect your care.  Call 911  Call 911 if you have any of the following:  · Severe weakness, dizziness, fainting, drowsiness, or confusion  · Chest pain or shortness of breath  · Heart beating fast, slow, or irregularly  When to seek medical advice  Call your healthcare provider right away if any of these occur:  · Nausea or vomiting  · Fever of 100.4°F (38°C) or higher, or as directed by your healthcare provider  · Unexpected weight gain or swelling in the legs, ankles, or around the eyes  · Decrease or absent urine output  Date Last Reviewed: 9/1/2016 © 2000-2017 [employer]. [address] [place] [id number]. All rights reserved. This information is not intended as a substitute for professional medical care. Always follow your healthcare professional's instructions.      "How not to Die"  "Reverse and Prevent Cardiovascular Disease"  "

## 2020-01-17 NOTE — PROGRESS NOTES
Subjective:       Patient ID: Nelia Littlejohn is a 80 y.o. female.    Chief Complaint: Annual Exam  This dictation was performed using using MModal.  She presents to the office today with her daughter for routine annual physical.  Since last seen she is doing much better.  She is going to bed sometime between 11:00 p.m. or 11:30 p.m.  She is getting up to start her day at 10:30 a.m. to noon.  She is not depressed.  She started back on Lotrel an everything seems to be better with her blood pressure.  Her daughter accompanies her to this visit.  Entire chart reviewed and updated.  HPI  Review of Systems   Constitutional: Negative for activity change, appetite change, chills, fatigue, fever and unexpected weight change.   HENT: Negative for dental problem, hearing loss, tinnitus, trouble swallowing and voice change.    Eyes: Negative for visual disturbance.   Respiratory: Negative for cough, chest tightness, shortness of breath and wheezing.    Cardiovascular: Negative for chest pain, palpitations and leg swelling.   Gastrointestinal: Negative for abdominal pain, blood in stool, constipation, diarrhea and nausea.   Genitourinary: Negative for difficulty urinating, dysuria, frequency and urgency.   Musculoskeletal: Negative for arthralgias, back pain, gait problem, joint swelling, myalgias, neck pain and neck stiffness.   Skin: Negative for rash.   Neurological: Negative for dizziness, tremors, speech difficulty, weakness, light-headedness and headaches.   Psychiatric/Behavioral: Negative for dysphoric mood and sleep disturbance. The patient is not nervous/anxious.        Objective:      Physical Exam   Constitutional: She is oriented to person, place, and time. She appears well-developed and well-nourished. No distress.   HENT:   Head: Normocephalic and atraumatic.   Right Ear: External ear normal.   Left Ear: External ear normal.   Nose: Nose normal.   Mouth/Throat: Oropharynx is clear and moist. No oropharyngeal  exudate.   Eyes: Pupils are equal, round, and reactive to light. Conjunctivae and EOM are normal. Right eye exhibits no discharge. No scleral icterus.   Neck: Normal range of motion and full passive range of motion without pain. Neck supple. No spinous process tenderness and no muscular tenderness present. Carotid bruit is not present. No thyromegaly present.   Cardiovascular: Normal rate, regular rhythm, S1 normal, S2 normal, normal heart sounds and intact distal pulses. Exam reveals no gallop and no friction rub.   No murmur heard.  Pulmonary/Chest: Effort normal and breath sounds normal. No respiratory distress. She has no wheezes. She has no rales. She exhibits no tenderness.   Abdominal: Soft. Bowel sounds are normal. She exhibits no distension and no mass. There is no tenderness. There is no rebound and no guarding.   Genitourinary: Pelvic exam was performed with patient supine. Uterus is not deviated, not enlarged, not fixed and not tender. Cervix exhibits no motion tenderness, no discharge and no friability. Right adnexum displays no mass, no tenderness and no fullness. Left adnexum displays no mass, no tenderness and no fullness.   Musculoskeletal: Normal range of motion. She exhibits no edema or tenderness.   Lymphadenopathy:        Head (right side): No submental and no submandibular adenopathy present.        Head (left side): No submental and no submandibular adenopathy present.     She has no cervical adenopathy.        Right cervical: No superficial cervical, no deep cervical and no posterior cervical adenopathy present.       Left cervical: No superficial cervical, no deep cervical and no posterior cervical adenopathy present.        Right axillary: No pectoral and no lateral adenopathy present.        Left axillary: No pectoral and no lateral adenopathy present.       Right: No supraclavicular adenopathy present.        Left: No supraclavicular adenopathy present.   Neurological: She is alert and  oriented to person, place, and time. She has normal reflexes. No cranial nerve deficit. She exhibits normal muscle tone. Coordination normal.   Skin: Skin is warm and dry. No rash noted.   Psychiatric: She has a normal mood and affect. Her behavior is normal. Her mood appears not anxious. Her speech is not rapid and/or pressured. She is not agitated. She does not exhibit a depressed mood.   Normal behavior, thought content, insight and judgement.   Nursing note and vitals reviewed.      Assessment:       1. Annual physical exam    2. Insomnia, unspecified type    3. Essential hypertension    4. 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 Stage 3 chronic kidney disease    6. History of prediabetes    7. Status post total left knee replacement, 10-.    8. Morbid (severe) obesity due to excess calories        Plan:   Nelia was seen today for annual exam.    Diagnoses and all orders for this visit:    Annual physical exam    Insomnia, unspecified type    Essential hypertension  -     hydrALAZINE (APRESOLINE) 50 MG tablet; Take 1 tablet (50 mg total) by mouth every 12 (twelve) hours.    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    Morbid (severe) obesity due to excess calories    Follow up in about 3 months (around 4/15/2020).  Medication List with Changes/Refills   Current Medications    ALLOPURINOL (ZYLOPRIM) 300 MG TABLET    TAKE 1 TABLET(300 MG) BY MOUTH EVERY DAY to prevent gout    AMLODIPINE-BENAZEPRIL (LOTREL) 10-40 MG PER CAPSULE    Take 1 capsule by mouth every morning.    ASPIRIN (ECOTRIN) 325 MG EC TABLET    Take 1 tablet (325 mg total) by mouth once daily.    ATORVASTATIN (LIPITOR) 80 MG TABLET    TAKE 1 TABLET(80 MG) BY MOUTH EVERY DAY stroke prevention    DICLOFENAC SODIUM 1 % GEL    Apply 2 g topically 4 (four) times daily as needed.  Right knee    FLUAD [id number], 65 YR UP,,PF, 45 MCG (15 MCG X 3)/0.5 ML SYRG    ADM 0.5ML IM UTD    METOPROLOL TA-HYDROCHLOROTHIAZ (LOPRESSOR HCT) 100-25 MG PER TABLET    Take 1 tablet by mouth every morning.    MULTIVIT-IRON-MIN-FOLIC ACID (MULTIVITAMIN-IRON-MINERALS-FOLIC ACID) 3,500-18-0.4 UNIT-MG-MG CHEW    Take by mouth once daily.    Changed and/or Refilled Medications    Modified Medication Previous Medication    HYDRALAZINE (APRESOLINE) 50 MG TABLET hydrALAZINE (APRESOLINE) 50 MG tablet       Take 1 tablet (50 mg total) by mouth every 12 (twelve) hours.    Take two tablets twice day reduce to one tablet twice if light headed on standing

## 2020-03-04 DIAGNOSIS — E78.5 HYPERLIPIDEMIA, UNSPECIFIED HYPERLIPIDEMIA TYPE: ICD-10-CM

## 2020-03-04 DIAGNOSIS — M10.9 INTERVAL GOUT: Primary | ICD-10-CM

## 2020-03-05 NOTE — PROGRESS NOTES
Refill Authorization Note     is requesting a refill authorization.    Brief assessment and rationale for refill: REVIEW: metoprolol tartrate - last commented as no longer taking // APPROVE: allopurinol and atorvastatin -needs labs     Medication-related problems identified: Requires labs    Medication Therapy Plan: dc'd metoprolol tartrate and replaced with metoprolol tartrate-hctz(12/19) by PCP; NTBO(LIPID/CBC/URIC ACID); FOVS                              Comments:   Requested Prescriptions   Pending Prescriptions Disp Refills    allopurinoL (ZYLOPRIM) 300 MG tablet [Pharmacy Med Name: ALLOPURINOL 300MG TABLETS] 90 tablet 0     Sig: TAKE 1 TABLET(300 MG) BY MOUTH EVERY DAY FOR GOUT PREVENTION       Endocrinology:  Gout Agents - allopurinol Failed - 3/4/2020  5:04 AM        Failed - Uric Acid is 5.9 or below and within 360 days     Uric Acid   Date Value Ref Range Status   01/09/2019 5.4 2.4 - 5.7 mg/dL Final   10/26/2017 4.8 2.4 - 5.7 mg/dL Final   09/06/2016 4.7 2.4 - 5.7 mg/dL Final              Failed - WBC in normal range and within 360 days     WBC   Date Value Ref Range Status   01/09/2019 5.62 3.90 - 12.70 K/uL Final   10/26/2017 6.93 3.90 - 12.70 K/uL Final   01/25/2017 6.35 3.90 - 12.70 K/uL Final              Failed - RBC in normal range and within 360 days     RBC   Date Value Ref Range Status   01/09/2019 4.64 4.00 - 5.40 M/uL Final   10/26/2017 4.43 4.00 - 5.40 M/uL Final   01/25/2017 4.59 4.00 - 5.40 M/uL Final              Failed - HGB in normal range and within 360 days     Hemoglobin   Date Value Ref Range Status   01/09/2019 12.8 12.0 - 16.0 g/dL Final   10/26/2017 12.6 12.0 - 16.0 g/dL Final   01/25/2017 12.6 12.0 - 16.0 g/dL Final              Failed - HCT in normal range and within 360 days     Hematocrit   Date Value Ref Range Status   01/09/2019 40.7 37.0 - 48.5 % Final   10/26/2017 38.8 37.0 - 48.5 % Final   01/25/2017 38.0 37.0 - 48.5 % Final              Failed - PLT in normal  range and within 360 days     Platelets   Date Value Ref Range Status   01/09/2019 241 150 - 350 K/uL Final   10/26/2017 283 150 - 350 K/uL Final   01/25/2017 263 150 - 350 K/uL Final              Failed - eGFR is 60 or above and within 360 days     eGFR if non    Date Value Ref Range Status   01/13/2020 43 (A) >60 mL/min/1.73 m^2 Final     Comment:     Calculation used to obtain the estimated glomerular filtration  rate (eGFR) is the CKD-EPI equation.      03/13/2019 >60 >60 mL/min/1.73 m^2 Final     Comment:     Calculation used to obtain the estimated glomerular filtration  rate (eGFR) is the CKD-EPI equation.      01/11/2019 43 (A) >60 mL/min/1.73 m^2 Final     Comment:     Calculation used to obtain the estimated glomerular filtration  rate (eGFR) is the CKD-EPI equation.        eGFR if    Date Value Ref Range Status   01/13/2020 49 (A) >60 mL/min/1.73 m^2 Final   03/13/2019 >60 >60 mL/min/1.73 m^2 Final   01/11/2019 50 (A) >60 mL/min/1.73 m^2 Final              Passed - Patient is at least 18 years old        Passed - Office visit in past 12 months or future 90 days.     Recent Outpatient Visits            1 month ago Annual physical exam    [facility] Arminda Gutiérrez MD    2 months ago Essential hypertension    [facility] Raj Rebolledo, OD    2 months ago Essential hypertension    [facility] Arminda Gutiérrez MD    3 months ago Essential hypertension    [facility] Carole Taylor PA-C    4 months ago Essential hypertension    [facility] Carole Taylor PA-C          Future Appointments              In 1 month MD [facility]    In 4 months [facility]  [facility]                Passed - Cr is 1.3 or below and within 360 days     Creatinine   Date Value Ref Range Status   01/13/2020 1.2 0.5 - 1.4 mg/dL  Final   03/13/2019 0.9 0.5 - 1.4 mg/dL Final   01/11/2019 1.2 0.5 - 1.4 mg/dL Final              Passed - ALT is 94 or below and within 360 days     ALT   Date Value Ref Range Status   01/13/2020 25 10 - 44 U/L Final   01/09/2019 27 10 - 44 U/L Final   10/26/2017 19 10 - 44 U/L Final              Passed - AST is 54 or below and within 360 days       AST   Date Value Ref Range Status   01/13/2020 24 10 - 40 U/L Final   01/09/2019 31 10 - 40 U/L Final   10/26/2017 24 10 - 40 U/L Final               atorvastatin (LIPITOR) 80 MG tablet [Pharmacy Med Name: ATORVASTATIN 80MG TABLETS] 90 tablet 0     Sig: TAKE 1 TABLET BY MOUTH EVERY DAY FOR STROKE PREVENTION       Cardiovascular:  Antilipid - Statins Failed - 3/4/2020  5:04 AM        Failed - Lipid Panel completed in last 360 days     Lab Results   Component Value Date    CHOL 175 01/09/2019    HDL 44 01/09/2019    LDLCALC 108.6 01/09/2019    TRIG 112 01/09/2019             Passed - Patient is at least 18 years old        Passed - Office visit in past 12 months or future 90 days.     Recent Outpatient Visits            1 month ago Annual physical exam    [facility] Arminda Gutiérrez MD    2 months ago Essential hypertension    [facility] Raj Rebolledo, OD    2 months ago Essential hypertension    [facility] Arminda Gutiérrez MD    3 months ago Essential hypertension    [facility] Carole Taylor PA-C    4 months ago Essential hypertension    [facility] Carole Taylor PA-C          Future Appointments              In 1 month MD [facility]  [facility]    In 4 months [facility]                Passed - ALT is 94 or below and within 360 days     ALT   Date Value Ref Range Status   01/13/2020 25 10 - 44 U/L Final   01/09/2019 27 10 - 44 U/L Final   10/26/2017 19 10 - 44 U/L Final              Passed  - AST is 54 or below and within 360 days     AST   Date Value Ref Range Status   01/13/2020 24 10 - 40 U/L Final   01/09/2019 31 10 - 40 U/L Final   10/26/2017 24 10 - 40 U/L Final             metoprolol tartrate (LOPRESSOR) 50 MG tablet [Pharmacy Med Name: METOPROLOL TARTRATE 50MG TABLETS] 180 tablet 3     Sig: TAKE 1 TABLET(50 MG) BY MOUTH TWICE DAILY       Cardiovascular:  Beta Blockers Passed - 3/4/2020  5:04 AM        Passed - Patient is at least 18 years old        Passed - Last BP in normal range within 360 days.     BP Readings from Last 3 Encounters:   01/15/20 138/60   12/12/19 (!) 180/72   12/05/19 (!) 170/70              Passed - Last Heart Rate in normal range within 360 days.     Pulse Readings from Last 3 Encounters:   01/15/20 61   12/12/19 59   12/05/19 76             Passed - Office visit in past 12 months or future 90 days.     Recent Outpatient Visits            1 month ago Annual physical exam    [facility] Arminda Gutiérrez MD    2 months ago Essential hypertension    [facility] Raj Rebolledo OD    2 months ago Essential hypertension    [facility] Arminda Gutiérrez MD    3 months ago Essential hypertension    [facility] Carole Taylor PA-C    4 months ago Essential hypertension    [facility] Carole Taylor PA-C          Future Appointments              In 1 month [facility]  [facility]    In 4 months [facility]                 Appointments  past 12m or future 3m with PCP    Date Provider   Last Visit   1/15/2020 Arminda Gutiérrez MD   Next Visit   4/15/2020 Arminda Gutiérrez MD   .  ED visits in past 90 days: 0       Note composed:3:37 PM 03/05/2020

## 2020-03-09 RX ORDER — ATORVASTATIN CALCIUM 80 MG/1
TABLET, FILM COATED ORAL
Qty: 90 TABLET | Refills: 0 | Status: SHIPPED | OUTPATIENT
Start: 2020-03-09 | End: 2020-06-03

## 2020-03-09 RX ORDER — ALLOPURINOL 300 MG/1
TABLET ORAL
Qty: 90 TABLET | Refills: 0 | Status: SHIPPED | OUTPATIENT
Start: 2020-03-09 | End: 2020-06-03

## 2020-03-09 RX ORDER — METOPROLOL TARTRATE 50 MG/1
TABLET ORAL
Qty: 180 TABLET | Refills: 3 | OUTPATIENT
Start: 2020-03-09

## 2020-03-09 NOTE — TELEPHONE ENCOUNTER
Please schedule patient for Labs (LIPID/CBC/URIC ACID)    Please also check with your provider if any further labs need to be added and scheduled together.    Thanks !

## 2020-03-13 DIAGNOSIS — I10 ESSENTIAL HYPERTENSION: ICD-10-CM

## 2020-03-13 RX ORDER — HYDRALAZINE HYDROCHLORIDE 50 MG/1
50 TABLET, FILM COATED ORAL 2 TIMES DAILY
Qty: 60 TABLET | Refills: 5 | Status: SHIPPED | OUTPATIENT
Start: 2020-03-13 | End: 2021-01-29 | Stop reason: SDUPTHER

## 2020-06-08 ENCOUNTER — LAB VISIT (OUTPATIENT)
Dept: LAB | Facility: HOSPITAL | Age: 80
End: 2020-06-08
Attending: INTERNAL MEDICINE
Payer: MEDICARE

## 2020-06-08 DIAGNOSIS — E78.5 HYPERLIPIDEMIA, UNSPECIFIED HYPERLIPIDEMIA TYPE: ICD-10-CM

## 2020-06-08 DIAGNOSIS — M10.9 INTERVAL GOUT: ICD-10-CM

## 2020-06-08 LAB
CHOLEST SERPL-MCNC: 179 MG/DL (ref 120–199)
CHOLEST/HDLC SERPL: 4 {RATIO} (ref 2–5)
ERYTHROCYTE [DISTWIDTH] IN BLOOD BY AUTOMATED COUNT: 14.9 % (ref 11.5–14.5)
HCT VFR BLD AUTO: 40.5 % (ref 37–48.5)
HDLC SERPL-MCNC: 45 MG/DL (ref 40–75)
HDLC SERPL: 25.1 % (ref 20–50)
HGB BLD-MCNC: 12.8 G/DL (ref 12–16)
LDLC SERPL CALC-MCNC: 100.2 MG/DL (ref 63–159)
MCH RBC QN AUTO: 28.4 PG (ref 27–31)
MCHC RBC AUTO-ENTMCNC: 31.6 G/DL (ref 32–36)
MCV RBC AUTO: 90 FL (ref 82–98)
NONHDLC SERPL-MCNC: 134 MG/DL
PLATELET # BLD AUTO: 274 K/UL (ref 150–350)
PMV BLD AUTO: 10.1 FL (ref 9.2–12.9)
RBC # BLD AUTO: 4.51 M/UL (ref 4–5.4)
TRIGL SERPL-MCNC: 169 MG/DL (ref 30–150)
URATE SERPL-MCNC: 5.7 MG/DL (ref 2.4–5.7)
WBC # BLD AUTO: 9.07 K/UL (ref 3.9–12.7)

## 2020-06-08 PROCEDURE — 36415 COLL VENOUS BLD VENIPUNCTURE: CPT

## 2020-06-08 PROCEDURE — 80061 LIPID PANEL: CPT

## 2020-06-08 PROCEDURE — 85027 COMPLETE CBC AUTOMATED: CPT

## 2020-06-08 PROCEDURE — 84550 ASSAY OF BLOOD/URIC ACID: CPT

## 2020-06-18 NOTE — TELEPHONE ENCOUNTER
Plain amlodipine is not active on med list; combo amlodipine/benazepril is active; please advise; defer

## 2020-06-18 NOTE — PROGRESS NOTES
Refill Routing Note    Medication(s) are not appropriate for processing by [facility]:       Medication not active on medication list        Medication Therapy Plan: plain amlodipine is not active on med list; combo amlodipine/benazepril is active; please advise; defer   Medication reconciliation completed: No      Automatic Epic Protocol Generated Data:    Requested Prescriptions   Pending Prescriptions Disp Refills    amLODIPine (NORVASC) 10 MG tablet [Pharmacy Med Name: AMLODIPINE BESYLATE 10MG TABLETS] 90 tablet 3     Sig: TAKE 1 TABLET(10 MG) BY MOUTH EVERY DAY FOR BLOOD PRESSURE       Cardiovascular:  Calcium Channel Blockers Passed - 6/18/2020  8:04 AM        Passed - Patient is at least 18 years old        Passed - Last BP in normal range within 360 days.     BP Readings from Last 3 Encounters:   01/15/20 138/60   12/12/19 (!) 180/72   12/05/19 (!) 170/70              Passed - Office visit in past 12 months or future 90 days.     Recent Outpatient Visits            5 months ago Annual physical exam    [facility] Arminda Gutiérrez MD    6 months ago Essential hypertension    [facility] Raj Rebolledo OD    6 months ago Essential hypertension    [facility] Arminda Gutiérrez MD    6 months ago Essential hypertension    [facility] Carole Taylor PA-C    7 months ago Essential hypertension    [facility] Stacy Taylor PA-C          Future Appointments              In 3 weeks [facility]                      Appointments  past 12m or future 3m with PCP    Date Provider   Last Visit   1/15/2020 Arminda Gutiérrez MD   Next Visit   7/15/2020 Arminda Gutiérrez MD   ED visits in past 90 days: 0     Note composed:8:07 AM 06/18/2020

## 2020-06-19 RX ORDER — AMLODIPINE AND BENAZEPRIL HYDROCHLORIDE 10; 40 MG/1; MG/1
1 CAPSULE ORAL EVERY MORNING
Qty: 90 CAPSULE | Refills: 3 | Status: SHIPPED | OUTPATIENT
Start: 2020-06-19 | End: 2021-01-29 | Stop reason: SDUPTHER

## 2020-06-19 RX ORDER — AMLODIPINE BESYLATE 10 MG/1
TABLET ORAL
Qty: 90 TABLET | Refills: 3 | OUTPATIENT
Start: 2020-06-19

## 2020-06-19 NOTE — TELEPHONE ENCOUNTER
Please call her pharmacy she is suppose to take amlodipine benazepril 10-40 mg one tablet by mouth daily

## 2020-07-02 RX ORDER — ASPIRIN 325 MG
TABLET, DELAYED RELEASE (ENTERIC COATED) ORAL
Qty: 90 TABLET | Refills: 2 | Status: SHIPPED | OUTPATIENT
Start: 2020-07-02 | End: 2021-07-29 | Stop reason: SDUPTHER

## 2020-07-02 NOTE — PROGRESS NOTES
Refill Routing Note   Medication(s) are not appropriate for processing by [facility]:       - Outside of protocol           Medication reconciliation completed: No      Automatic Epic Generated Protocol Data:    Requested Prescriptions   Pending Prescriptions Disp Refills    aspirin (ECOTRIN) 325 MG EC tablet [Pharmacy Med Name: ASPIRIN 325MG EC TABLETS] 90 tablet 2     Sig: TAKE 1 TABLET(325 MG) BY MOUTH EVERY DAY       There is no refill protocol information for this order           Appointments  past 12m or future 3m with PCP    Date Provider   Last Visit   1/15/2020 Arminda Gutiérrez MD   Next Visit   7/15/2020 Arminda Gutiérrez MD   ED visits in past 90 days: 0     Note composed:6:08 AM 07/02/2020

## 2020-07-15 ENCOUNTER — OFFICE VISIT (OUTPATIENT)
Dept: INTERNAL MEDICINE | Facility: CLINIC | Age: 80
End: 2020-07-15
Payer: MEDICARE

## 2020-07-15 ENCOUNTER — IMMUNIZATION (OUTPATIENT)
Dept: PHARMACY | Facility: CLINIC | Age: 80
End: 2020-07-15
Payer: MEDICARE

## 2020-07-15 VITALS
HEIGHT: 66 IN | HEART RATE: 67 BPM | WEIGHT: 229.25 LBS | BODY MASS INDEX: 36.84 KG/M2 | SYSTOLIC BLOOD PRESSURE: 138 MMHG | DIASTOLIC BLOOD PRESSURE: 64 MMHG | OXYGEN SATURATION: 96 %

## 2020-07-15 DIAGNOSIS — N18.30 STAGE 3 CHRONIC KIDNEY DISEASE: ICD-10-CM

## 2020-07-15 DIAGNOSIS — M17.0 PRIMARY OSTEOARTHRITIS OF BOTH KNEES: ICD-10-CM

## 2020-07-15 DIAGNOSIS — R73.9 HYPERGLYCEMIA: ICD-10-CM

## 2020-07-15 DIAGNOSIS — Z87.898 HISTORY OF PREDIABETES: ICD-10-CM

## 2020-07-15 DIAGNOSIS — M10.9 INTERVAL GOUT: ICD-10-CM

## 2020-07-15 DIAGNOSIS — Z86.73 H/O: STROKE: ICD-10-CM

## 2020-07-15 DIAGNOSIS — E04.2 MULTINODULAR GOITER: ICD-10-CM

## 2020-07-15 DIAGNOSIS — G47.00 INSOMNIA, UNSPECIFIED TYPE: ICD-10-CM

## 2020-07-15 DIAGNOSIS — Z23 NEED FOR SHINGLES VACCINE: ICD-10-CM

## 2020-07-15 DIAGNOSIS — Z96.652 STATUS POST TOTAL LEFT KNEE REPLACEMENT: ICD-10-CM

## 2020-07-15 DIAGNOSIS — G47.30 SLEEP APNEA, UNSPECIFIED TYPE: ICD-10-CM

## 2020-07-15 DIAGNOSIS — E66.01 MORBID (SEVERE) OBESITY DUE TO EXCESS CALORIES: ICD-10-CM

## 2020-07-15 DIAGNOSIS — Z78.0 POSTMENOPAUSAL STATUS: ICD-10-CM

## 2020-07-15 DIAGNOSIS — I10 ESSENTIAL HYPERTENSION: Primary | ICD-10-CM

## 2020-07-15 DIAGNOSIS — E78.5 HYPERLIPIDEMIA, UNSPECIFIED HYPERLIPIDEMIA TYPE: ICD-10-CM

## 2020-07-15 PROCEDURE — 3075F PR MOST RECENT SYSTOLIC BLOOD PRESS GE 130-139MM HG: ICD-10-PCS | Mod: CPTII,S$GLB,, | Performed by: INTERNAL MEDICINE

## 2020-07-15 PROCEDURE — 1126F PR PAIN SEVERITY QUANTIFIED, NO PAIN PRESENT: ICD-10-PCS | Mod: S$GLB,,, | Performed by: INTERNAL MEDICINE

## 2020-07-15 PROCEDURE — 99214 PR OFFICE/OUTPT VISIT, EST, LEVL IV, 30-39 MIN: ICD-10-PCS | Mod: S$GLB,,, | Performed by: INTERNAL MEDICINE

## 2020-07-15 PROCEDURE — 1101F PR PT FALLS ASSESS DOC 0-1 FALLS W/OUT INJ PAST YR: ICD-10-PCS | Mod: CPTII,S$GLB,, | Performed by: INTERNAL MEDICINE

## 2020-07-15 PROCEDURE — 3075F SYST BP GE 130 - 139MM HG: CPT | Mod: CPTII,S$GLB,, | Performed by: INTERNAL MEDICINE

## 2020-07-15 PROCEDURE — 99499 UNLISTED E&M SERVICE: CPT | Mod: S$GLB,,, | Performed by: INTERNAL MEDICINE

## 2020-07-15 PROCEDURE — 99214 OFFICE O/P EST MOD 30 MIN: CPT | Mod: S$GLB,,, | Performed by: INTERNAL MEDICINE

## 2020-07-15 PROCEDURE — 3078F DIAST BP <80 MM HG: CPT | Mod: CPTII,S$GLB,, | Performed by: INTERNAL MEDICINE

## 2020-07-15 PROCEDURE — 1126F AMNT PAIN NOTED NONE PRSNT: CPT | Mod: S$GLB,,, | Performed by: INTERNAL MEDICINE

## 2020-07-15 PROCEDURE — 3078F PR MOST RECENT DIASTOLIC BLOOD PRESSURE < 80 MM HG: ICD-10-PCS | Mod: CPTII,S$GLB,, | Performed by: INTERNAL MEDICINE

## 2020-07-15 PROCEDURE — 1159F MED LIST DOCD IN RCRD: CPT | Mod: S$GLB,,, | Performed by: INTERNAL MEDICINE

## 2020-07-15 PROCEDURE — 1159F PR MEDICATION LIST DOCUMENTED IN MEDICAL RECORD: ICD-10-PCS | Mod: S$GLB,,, | Performed by: INTERNAL MEDICINE

## 2020-07-15 PROCEDURE — 99499 RISK ADDL DX/OHS AUDIT: ICD-10-PCS | Mod: S$GLB,,, | Performed by: INTERNAL MEDICINE

## 2020-07-15 PROCEDURE — 99999 PR PBB SHADOW E&M-EST. PATIENT-LVL III: ICD-10-PCS | Mod: PBBFAC,,, | Performed by: INTERNAL MEDICINE

## 2020-07-15 PROCEDURE — 1101F PT FALLS ASSESS-DOCD LE1/YR: CPT | Mod: CPTII,S$GLB,, | Performed by: INTERNAL MEDICINE

## 2020-07-15 PROCEDURE — 99999 PR PBB SHADOW E&M-EST. PATIENT-LVL III: CPT | Mod: PBBFAC,,, | Performed by: INTERNAL MEDICINE

## 2020-07-15 RX ORDER — TRAZODONE HYDROCHLORIDE 50 MG/1
50 TABLET ORAL NIGHTLY PRN
Qty: 90 TABLET | Refills: 3 | Status: SHIPPED | OUTPATIENT
Start: 2020-07-15 | End: 2021-07-07

## 2020-07-15 NOTE — PROGRESS NOTES
Subjective:       Patient ID: Nelia Littlejohn is a 80 y.o. female.    Chief Complaint: Follow-up  She presents to the office today with her daughter for routine annual physical.  Since last seen she is doing much better.  She is going to bed sometime between 11:00 p.m. or 11:30 p.m.  She is getting up to start her day at 10:30 a.m. to noon.  She is not depressed.  She started back on Lotrel an everything seems to be better with her blood pressure.  Her daughter accompanies her to this visit.  Above from Jan 2020  F/u visit  No covid weight gain  Medication List with Changes/Refills   Current Medications    ALLOPURINOL (ZYLOPRIM) 300 MG TABLET    TAKE 1 TABLET(300 MG) BY MOUTH EVERY DAY FOR GOUT PREVENTION    AMLODIPINE-BENAZEPRIL (LOTREL) 10-40 MG PER CAPSULE    Take 1 capsule by mouth every morning.    ASPIRIN (ECOTRIN) 325 MG EC TABLET    TAKE 1 TABLET(325 MG) BY MOUTH EVERY DAY    ATORVASTATIN (LIPITOR) 80 MG TABLET    TAKE 1 TABLET BY MOUTH EVERY DAY FOR STROKE PREVENTION    DICLOFENAC SODIUM 1 % GEL    Apply 2 g topically 4 (four) times daily as needed. Right knee    FLUAD 8629-1410, 65 YR UP,,PF, 45 MCG (15 MCG X 3)/0.5 ML SYRG    ADM 0.5ML IM UTD    HYDRALAZINE (APRESOLINE) 50 MG TABLET    Take 1 tablet (50 mg total) by mouth 2 (two) times daily. Blood pressure    METOPROLOL TA-HYDROCHLOROTHIAZ (LOPRESSOR HCT) 100-25 MG PER TABLET    Take 1 tablet by mouth every morning.    MULTIVIT-IRON-MIN-FOLIC ACID (MULTIVITAMIN-IRON-MINERALS-FOLIC ACID) 3,500-18-0.4 UNIT-MG-MG CHEW    Take by mouth once daily.        Results for orders placed or performed in visit on 06/08/20   Uric acid   Result Value Ref Range    Uric Acid 5.7 2.4 - 5.7 mg/dL   CBC Without Differential   Result Value Ref Range    WBC 9.07 3.90 - 12.70 K/uL    RBC 4.51 4.00 - 5.40 M/uL    Hemoglobin 12.8 12.0 - 16.0 g/dL    Hematocrit 40.5 37.0 - 48.5 %    Mean Corpuscular Volume 90 82 - 98 fL    Mean Corpuscular Hemoglobin 28.4 27.0 - 31.0 pg    Mean  Corpuscular Hemoglobin Conc 31.6 (L) 32.0 - 36.0 g/dL    RDW 14.9 (H) 11.5 - 14.5 %    Platelets 274 150 - 350 K/uL    MPV 10.1 9.2 - 12.9 fL   Lipid panel   Result Value Ref Range    Cholesterol 179 120 - 199 mg/dL    Triglycerides 169 (H) 30 - 150 mg/dL    HDL 45 40 - 75 mg/dL    LDL Cholesterol 100.2 63.0 - 159.0 mg/dL    Hdl/Cholesterol Ratio 25.1 20.0 - 50.0 %    Total Cholesterol/HDL Ratio 4.0 2.0 - 5.0    Non-HDL Cholesterol 134 mg/dL     More vegetarian diet  More fruit    Struggling with insomnia but better try trazodone    15 yo gr son to start Fr yr St Aug]  138/64  HPI  Review of Systems   Constitutional: Negative for activity change, appetite change, chills, fatigue, fever and unexpected weight change.   HENT: Negative for hearing loss.    Eyes: Negative for visual disturbance.   Respiratory: Negative for cough, chest tightness, shortness of breath and wheezing.    Cardiovascular: Negative for chest pain, palpitations and leg swelling.   Gastrointestinal: Negative for abdominal pain, constipation, nausea and vomiting.   Genitourinary: Negative for dysuria, frequency and urgency.   Musculoskeletal: Negative for arthralgias, back pain, gait problem, joint swelling and myalgias.   Skin: Negative for rash.   Neurological: Negative for light-headedness and headaches.   Psychiatric/Behavioral: Negative for dysphoric mood and sleep disturbance. The patient is not nervous/anxious.        Objective:      Physical Exam  Constitutional:       General: She is not in acute distress.     Appearance: She is well-developed.   HENT:      Head: Normocephalic and atraumatic.      Right Ear: External ear normal.      Left Ear: External ear normal.      Nose: Nose normal.      Mouth/Throat:      Pharynx: No oropharyngeal exudate.   Eyes:      General: No scleral icterus.        Right eye: No discharge.      Conjunctiva/sclera: Conjunctivae normal.      Pupils: Pupils are equal, round, and reactive to light.   Neck:       Musculoskeletal: Full passive range of motion without pain, normal range of motion and neck supple. No spinous process tenderness or muscular tenderness.      Thyroid: No thyromegaly.      Vascular: No carotid bruit.   Cardiovascular:      Rate and Rhythm: Normal rate and regular rhythm.      Heart sounds: Normal heart sounds, S1 normal and S2 normal. No murmur. No friction rub. No gallop.    Pulmonary:      Effort: Pulmonary effort is normal. No respiratory distress.      Breath sounds: Normal breath sounds. No wheezing or rales.   Chest:      Chest wall: No tenderness.   Abdominal:      General: Bowel sounds are normal. There is no distension.      Palpations: Abdomen is soft. There is no mass.      Tenderness: There is no abdominal tenderness. There is no guarding or rebound.   Genitourinary:     Exam position: Supine.      Cervix: No cervical motion tenderness, discharge or friability.      Uterus: Not deviated, not enlarged, not fixed and not tender.       Adnexa:         Right: No mass, tenderness or fullness.          Left: No mass, tenderness or fullness.     Musculoskeletal: Normal range of motion.         General: No tenderness.   Lymphadenopathy:      Head:      Right side of head: No submental or submandibular adenopathy.      Left side of head: No submental or submandibular adenopathy.      Cervical: No cervical adenopathy.      Right cervical: No superficial, deep or posterior cervical adenopathy.     Left cervical: No superficial, deep or posterior cervical adenopathy.      Upper Body:      Right upper body: No supraclavicular or pectoral adenopathy.      Left upper body: No supraclavicular or pectoral adenopathy.   Skin:     General: Skin is warm and dry.      Findings: No rash.   Neurological:      Mental Status: She is alert and oriented to person, place, and time.      Cranial Nerves: No cranial nerve deficit.      Motor: No abnormal muscle tone.      Coordination: Coordination normal.      Deep  Tendon Reflexes: Reflexes are normal and symmetric.   Psychiatric:         Mood and Affect: Mood is not anxious or depressed.         Speech: Speech is not rapid and pressured.         Behavior: Behavior normal. Behavior is not agitated.      Comments: Normal behavior, thought content, insight and judgement.         Assessment:       1. Essential hypertension    2. 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 History of prediabetes    4. Hyperlipidemia, unspecified hyperlipidemia type    5. Interval gout    6. Morbid (severe) obesity due to excess calories    7. Multinodular goiter    8. Primary osteoarthritis of both knees    9. Status post total left knee replacement, 10-.    10. Stage 3 chronic kidney disease    11. Sleep apnea, unspecified type        Plan:   Nelia was seen today for follow-up.    Diagnoses and all orders for this visit:    Essential hypertension    H/O: stroke, left eye, transient blindness, no residual,   -     Comprehensive metabolic panel; Future  -     Uric acid; Future  -     Lipid Panel; Future  -     Hemoglobin A1C; Future    History of prediabetes    Hyperlipidemia, unspecified hyperlipidemia type    Interval gout  -     Uric acid; Future    Morbid (severe) obesity due to excess calories  -     Comprehensive metabolic panel; Future  -     Uric acid; Future  -     Lipid Panel; Future  -     Hemoglobin A1C; Future    Multinodular goiter    Primary osteoarthritis of both knees    Status post total left knee replacement, 10-.    Stage 3 chronic kidney disease    Sleep apnea, unspecified type    Need for shingles vaccine    Postmenopausal status  -     DXA Bone Density Spine And Hip; Future    Hyperglycemia  -     Hemoglobin A1C; Future    Insomnia, unspecified type  -     traZODone (DESYREL) 50 MG tablet; Take 1 tablet (50 mg total) by mouth nightly as needed for Insomnia.      Follow up in about 6 months (around 1/15/2021) for for physical.

## 2020-07-31 ENCOUNTER — HOSPITAL ENCOUNTER (OUTPATIENT)
Dept: RADIOLOGY | Facility: CLINIC | Age: 80
Discharge: HOME | End: 2020-07-31
Attending: INTERNAL MEDICINE
Payer: MEDICARE

## 2020-07-31 DIAGNOSIS — Z78.0 POSTMENOPAUSAL STATUS: ICD-10-CM

## 2020-07-31 PROCEDURE — 77080 DXA BONE DENSITY AXIAL: CPT | Mod: TC

## 2020-07-31 PROCEDURE — 77080 DXA BONE DENSITY AXIAL: CPT | Mod: 26,,, | Performed by: INTERNAL MEDICINE

## 2020-07-31 PROCEDURE — 77080 DEXA BONE DENSITY SPINE HIP: ICD-10-PCS | Mod: 26,,, | Performed by: INTERNAL MEDICINE

## 2020-09-17 RX ORDER — ALLOPURINOL 300 MG/1
TABLET ORAL
Qty: 90 TABLET | Refills: 1 | Status: SHIPPED | OUTPATIENT
Start: 2020-09-17 | End: 2021-01-29 | Stop reason: SDUPTHER

## 2020-09-17 RX ORDER — ATORVASTATIN CALCIUM 80 MG/1
TABLET, FILM COATED ORAL
Qty: 90 TABLET | Refills: 1 | Status: SHIPPED | OUTPATIENT
Start: 2020-09-17 | End: 2021-01-29 | Stop reason: SDUPTHER

## 2020-09-17 NOTE — TELEPHONE ENCOUNTER
Care Due:                  Date            Visit Type   Department     Provider  --------------------------------------------------------------------------------                                ESTABLISHED   [facility] INTERNAL  Last Visit: 07-      PATIENT      MEDICINE       ESTIVEN STONE                              ESTABLISHED   [facility] INTERNAL  Next Visit: 01-      PATIENT      MEDICINE       ESTIVEN STONE                                                            Last  Test          Frequency    Reason                     Performed    Due Date  --------------------------------------------------------------------------------    Cr..........  6 months...  metoprolol...............  01- 07-    K...........  6 months...  metoprolol...............  01- 07-    Na..........  6 months...  metoprolol...............  01- 07-    Powered by [employer]. Reference number: [id number]. 9/17/2020 3:47:40 AM CDT

## 2020-09-29 ENCOUNTER — IMMUNIZATION (OUTPATIENT)
Dept: PHARMACY | Facility: CLINIC | Age: 80
End: 2020-09-29
Payer: MEDICARE

## 2020-10-25 ENCOUNTER — OFFICE VISIT (OUTPATIENT)
Dept: URGENT CARE | Facility: CLINIC | Age: 80
End: 2020-10-25
Payer: MEDICARE

## 2020-10-25 VITALS
HEIGHT: 66 IN | DIASTOLIC BLOOD PRESSURE: 72 MMHG | BODY MASS INDEX: 34.23 KG/M2 | TEMPERATURE: 99 F | OXYGEN SATURATION: 100 % | SYSTOLIC BLOOD PRESSURE: 165 MMHG | HEART RATE: 61 BPM | RESPIRATION RATE: 19 BRPM | WEIGHT: 213 LBS

## 2020-10-25 DIAGNOSIS — R30.0 DYSURIA: Primary | ICD-10-CM

## 2020-10-25 LAB
BILIRUB UR QL STRIP: NEGATIVE
GLUCOSE UR QL STRIP: NEGATIVE
KETONES UR QL STRIP: NEGATIVE
LEUKOCYTE ESTERASE UR QL STRIP: POSITIVE
PH, POC UA: 5 (ref 5–8)
POC BLOOD, URINE: POSITIVE
POC NITRATES, URINE: NEGATIVE
PROT UR QL STRIP: POSITIVE
SP GR UR STRIP: 1.02 (ref 1–1.03)
UROBILINOGEN UR STRIP-ACNC: ABNORMAL (ref 0.1–1.1)

## 2020-10-25 PROCEDURE — 81003 POCT URINALYSIS, DIPSTICK, AUTOMATED, W/O SCOPE: ICD-10-PCS | Mod: QW,S$GLB,, | Performed by: FAMILY MEDICINE

## 2020-10-25 PROCEDURE — 81003 URINALYSIS AUTO W/O SCOPE: CPT | Mod: QW,S$GLB,, | Performed by: FAMILY MEDICINE

## 2020-10-25 PROCEDURE — 99213 PR OFFICE/OUTPT VISIT, EST, LEVL III, 20-29 MIN: ICD-10-PCS | Mod: S$GLB,,, | Performed by: FAMILY MEDICINE

## 2020-10-25 PROCEDURE — 99213 OFFICE O/P EST LOW 20 MIN: CPT | Mod: S$GLB,,, | Performed by: FAMILY MEDICINE

## 2020-10-25 PROCEDURE — 87086 URINE CULTURE/COLONY COUNT: CPT

## 2020-10-25 RX ORDER — CIPROFLOXACIN 250 MG/1
250 TABLET, FILM COATED ORAL EVERY 12 HOURS
Qty: 10 TABLET | Refills: 0 | Status: SHIPPED | OUTPATIENT
Start: 2020-10-25 | End: 2020-10-30

## 2020-10-25 NOTE — PROGRESS NOTES
"Subjective:       Patient ID: Nelia Littlejohn is a 80 y.o. female.    Vitals:  height is 5' 6" (1.676 m) and weight is 96.6 kg (213 lb). Her temperature is 98.5 °F (36.9 °C). Her blood pressure is 165/72 (abnormal) and her pulse is 61. Her respiration is 19 and oxygen saturation is 100%.     Chief Complaint: Urinary Tract Infection    3 days of pain with urination. She denies fevers, chills, low back pain, nausea, or vomiting. She does hold her urine at times and thinks this may have triggered the onset of the UTI. She denies any recent abx or any previous UTIs this year. She denies any constipation or diarrhea. She reports her bp at home is well controlled. She is not sexually active. She denies any skin breakdown or irritation near the urethra.     Urinary Tract Infection   This is a new problem. The current episode started in the past 7 days (3 days ). The problem occurs every urination. The problem has been unchanged. The quality of the pain is described as burning. The pain is at a severity of 6/10. The pain is moderate. There has been no fever. Associated symptoms include frequency. Pertinent negatives include no behavior changes, chills, discharge, flank pain, hematuria, hesitancy, nausea, possible pregnancy, sweats, urgency, vomiting, weight loss, bubble bath use, constipation, rash or withholding. She has tried nothing for the symptoms. The treatment provided no relief. There is no history of catheterization, diabetes insipidus, diabetes mellitus, genitourinary reflux, hypertension, kidney stones, recurrent UTIs, a single kidney, STD, urinary stasis or a urological procedure.       Constitution: Negative for chills and fever.   Neck: Negative for painful lymph nodes.   Gastrointestinal: Negative for abdominal pain, nausea, vomiting and constipation.   Genitourinary: Positive for frequency. Negative for dysuria, urgency, urine decreased, flank pain, hematuria, history of kidney stones, painful menstruation, " irregular menstruation, missed menses, heavy menstrual bleeding, ovarian cysts, genital trauma, vaginal pain, vaginal discharge, vaginal bleeding, vaginal odor, painful intercourse, genital sore, painful ejaculation and pelvic pain.   Musculoskeletal: Negative for back pain.   Skin: Negative for rash and lesion.   Hematologic/Lymphatic: Negative for swollen lymph nodes.       Objective:      Physical Exam   Constitutional: She is oriented to person, place, and time. She appears well-developed. She is cooperative.  Non-toxic appearance. She does not appear ill. No distress.   HENT:   Head: Normocephalic and atraumatic.   Ears:   Right Ear: Hearing, tympanic membrane, external ear and ear canal normal.   Left Ear: Hearing, tympanic membrane, external ear and ear canal normal.   Nose: Nose normal. No mucosal edema, rhinorrhea or nasal deformity. No epistaxis. Right sinus exhibits no maxillary sinus tenderness and no frontal sinus tenderness. Left sinus exhibits no maxillary sinus tenderness and no frontal sinus tenderness.   Mouth/Throat: Uvula is midline, oropharynx is clear and moist and mucous membranes are normal. No trismus in the jaw. Normal dentition. No uvula swelling. No oropharyngeal exudate, posterior oropharyngeal edema or posterior oropharyngeal erythema.   Eyes: Conjunctivae and lids are normal. No scleral icterus.   Neck: Trachea normal, normal range of motion, full passive range of motion without pain and phonation normal. Neck supple. No neck rigidity. No edema and no erythema present.   Cardiovascular: Normal rate, regular rhythm, normal heart sounds and normal pulses.   Pulmonary/Chest: Effort normal and breath sounds normal. No stridor. No respiratory distress. She has no decreased breath sounds. She has no wheezes. She has no rhonchi. She has no rales. She exhibits no tenderness.   Abdominal: Normal appearance. There is no abdominal tenderness. There is no left CVA tenderness and no right CVA  tenderness.   Neurological: She is alert and oriented to person, place, and time. She exhibits normal muscle tone.   Skin: Skin is intact and not diaphoretic. Psychiatric: Her speech is normal.   Nursing note and vitals reviewed.        Assessment:       1. Dysuria        Plan:         Dysuria  -will follow up with UC   -     POCT Urinalysis, Dipstick, Automated, W/O Scope  -     Urine culture    Other orders  -     ciprofloxacin HCl (CIPRO) 250 MG tablet; Take 1 tablet (250 mg total) by mouth every 12 (twelve) hours. for 5 days  Dispense: 10 tablet; Refill: 0

## 2020-10-25 NOTE — PATIENT INSTRUCTIONS
"  Dysuria     Painful urination (dysuria) is often caused by a problem in the urinary tract.   Dysuria is pain felt during urination. It is often described as a burning. Learn more about this problem and how it can be treated.  What causes dysuria?  Possible causes include:  · Infection with a bacteria or virus such as a urinary tract infection (UTI or a sexually transmitted infection (STI)  · Sensitivity or allergy to chemicals such as those found in lotions and other products  · Prostate or bladder problems  · Radiation therapy to the pelvic area  How is dysuria diagnosed?  Your healthcare provider will examine you. He or she will ask about your symptoms and health. After talking with you and doing a physical exam, your healthcare provider may know what is causing your dysuria. He or she will usually request  a sample of your urine. Tests of your urine, or a "urinalysis," are done. A urinalysis may include:  · Looking at the urine sample (visual exam)  · Checking for substances (chemical exam)  · Looking at a small amount under a microscope (microscopic exam)  Some parts of the urinalysis may be done in the provider's office and some in a lab. And, the urine sample may be checked for bacteria and yeast (urine culture). Your healthcare provider will tell you more about these tests if they are needed.  How is dysuria treated?  Treatment depends on the cause. If you have a bacterial infection, you may need antibiotics. You may be given medicines to make it easier for you to urinate and help relieve pain. Your healthcare provider can tell you more about your treatment options. Untreated, symptoms may get worse.  When to call your healthcare provider  Call the healthcare provider right away if you have any of the following:  · Fever of 100.4°F (38°C) or higher   · No improvement after three days of treatment  · Trouble urinating because of pain  · New or increased discharge from the vagina or penis  · Rash or joint " pain  · Increased back or abdominal pain  · Enlarged painful lymph nodes (lumps) in the groin   Date Last Reviewed: 1/1/2017  © 1221-6642 [employer]. [address] [place] [address], [place] [address]. All rights reserved. This information is not intended as a substitute for professional medical care. Always follow your healthcare professional's instructions.

## 2020-10-27 LAB — BACTERIA UR CULT: NO GROWTH

## 2020-10-29 ENCOUNTER — TELEPHONE (OUTPATIENT)
Dept: URGENT CARE | Facility: CLINIC | Age: 80
End: 2020-10-29

## 2020-10-29 NOTE — TELEPHONE ENCOUNTER
Call back - urine culture - Spoke with patient's son-in-law and left message for patient to call back regarding lab results.    ----- Message from Gertrude Burns PA-C sent at 10/27/2020  9:12 AM CDT -----  Please call the patient regarding her result. Normal urine culture. If feeling better on antibiotics please continue. Please follow up with primary care when finished with antibiotics. If anything worsens, return or go to the ED

## 2020-11-02 ENCOUNTER — TELEPHONE (OUTPATIENT)
Dept: INTERNAL MEDICINE | Facility: CLINIC | Age: 80
End: 2020-11-02

## 2020-11-02 DIAGNOSIS — N39.0 URINARY TRACT INFECTION WITHOUT HEMATURIA, SITE UNSPECIFIED: Primary | ICD-10-CM

## 2020-11-02 NOTE — TELEPHONE ENCOUNTER
----- Message from Gertrudis Bolton sent at 11/2/2020  4:01 PM CST -----  Contact: self [phone]  Patient is returning a phone call.  Who left a message for the patient: Kortney Rosales MA  Does patient know what this is regarding:  antibiotic  Comments:

## 2020-11-02 NOTE — TELEPHONE ENCOUNTER
----- Message from Dhara Glaser sent at 11/2/2020 12:04 PM CST -----  Contact: self/[phone]  Pt called in regards to taking the antibiotics for 5 days but still have the infection. Pt would like a call back.     Please advise

## 2020-11-02 NOTE — TELEPHONE ENCOUNTER
Saw UC for uti  10/25 given cipro still has pressure/ slight burning p scale 2/ no temp. Please advise notified you are not here today.

## 2020-11-03 NOTE — TELEPHONE ENCOUNTER
----- Message from Elizabeth Chapman sent at 11/3/2020  2:33 PM CST -----  Contact: Self   Patient is returning a phone call.  Who left a message for the patient: Kortney Rosales MA  Does patient know what this is regarding:    Comments:

## 2020-11-04 NOTE — TELEPHONE ENCOUNTER
Please call patient.  I have ordered a future home collect urinalysis and urine culture  Let me know if the patient would like to perform this and then I can send in an antibiotic if she would like

## 2020-11-05 ENCOUNTER — LAB VISIT (OUTPATIENT)
Dept: LAB | Facility: HOSPITAL | Age: 80
End: 2020-11-05
Attending: INTERNAL MEDICINE
Payer: MEDICARE

## 2020-11-05 DIAGNOSIS — N39.0 URINARY TRACT INFECTION WITHOUT HEMATURIA, SITE UNSPECIFIED: ICD-10-CM

## 2020-11-05 LAB
BACTERIA #/AREA URNS AUTO: ABNORMAL /HPF
BILIRUB UR QL STRIP: NEGATIVE
CLARITY UR REFRACT.AUTO: ABNORMAL
COLOR UR AUTO: YELLOW
GLUCOSE UR QL STRIP: NEGATIVE
HGB UR QL STRIP: NEGATIVE
KETONES UR QL STRIP: NEGATIVE
LEUKOCYTE ESTERASE UR QL STRIP: ABNORMAL
MICROSCOPIC COMMENT: ABNORMAL
NITRITE UR QL STRIP: NEGATIVE
PH UR STRIP: 5 [PH] (ref 5–8)
PROT UR QL STRIP: NEGATIVE
RBC #/AREA URNS AUTO: 22 /HPF (ref 0–4)
SP GR UR STRIP: 1.01 (ref 1–1.03)
SQUAMOUS #/AREA URNS AUTO: 14 /HPF
URN SPEC COLLECT METH UR: ABNORMAL
WBC #/AREA URNS AUTO: 98 /HPF (ref 0–5)

## 2020-11-05 PROCEDURE — 87086 URINE CULTURE/COLONY COUNT: CPT

## 2020-11-05 PROCEDURE — 81001 URINALYSIS AUTO W/SCOPE: CPT

## 2020-11-06 LAB — BACTERIA UR CULT: NO GROWTH

## 2020-12-07 ENCOUNTER — HOSPITAL ENCOUNTER (OUTPATIENT)
Dept: RADIOLOGY | Facility: HOSPITAL | Age: 80
Discharge: HOME | End: 2020-12-07
Attending: STUDENT IN AN ORGANIZED HEALTH CARE EDUCATION/TRAINING PROGRAM
Payer: MEDICARE

## 2020-12-07 ENCOUNTER — OFFICE VISIT (OUTPATIENT)
Dept: INTERNAL MEDICINE | Facility: CLINIC | Age: 80
End: 2020-12-07
Payer: MEDICARE

## 2020-12-07 VITALS
WEIGHT: 235.88 LBS | HEIGHT: 66 IN | DIASTOLIC BLOOD PRESSURE: 65 MMHG | SYSTOLIC BLOOD PRESSURE: 150 MMHG | BODY MASS INDEX: 37.91 KG/M2 | HEART RATE: 63 BPM | OXYGEN SATURATION: 98 %

## 2020-12-07 DIAGNOSIS — N18.31 STAGE 3A CHRONIC KIDNEY DISEASE: ICD-10-CM

## 2020-12-07 DIAGNOSIS — M54.6 RIGHT-SIDED THORACIC BACK PAIN, UNSPECIFIED CHRONICITY: ICD-10-CM

## 2020-12-07 DIAGNOSIS — M54.6 RIGHT-SIDED THORACIC BACK PAIN, UNSPECIFIED CHRONICITY: Primary | ICD-10-CM

## 2020-12-07 PROCEDURE — 71100 XR RIBS 2 VIEW RIGHT: ICD-10-PCS | Mod: 26,RT,, | Performed by: RADIOLOGY

## 2020-12-07 PROCEDURE — 71100 X-RAY EXAM RIBS UNI 2 VIEWS: CPT | Mod: 26,RT,, | Performed by: RADIOLOGY

## 2020-12-07 PROCEDURE — 1159F PR MEDICATION LIST DOCUMENTED IN MEDICAL RECORD: ICD-10-PCS | Mod: GC,S$GLB,, | Performed by: STUDENT IN AN ORGANIZED HEALTH CARE EDUCATION/TRAINING PROGRAM

## 2020-12-07 PROCEDURE — 3078F PR MOST RECENT DIASTOLIC BLOOD PRESSURE < 80 MM HG: ICD-10-PCS | Mod: CPTII,GC,S$GLB, | Performed by: STUDENT IN AN ORGANIZED HEALTH CARE EDUCATION/TRAINING PROGRAM

## 2020-12-07 PROCEDURE — 99999 PR PBB SHADOW E&M-EST. PATIENT-LVL V: CPT | Mod: PBBFAC,GC,, | Performed by: STUDENT IN AN ORGANIZED HEALTH CARE EDUCATION/TRAINING PROGRAM

## 2020-12-07 PROCEDURE — 3077F PR MOST RECENT SYSTOLIC BLOOD PRESSURE >= 140 MM HG: ICD-10-PCS | Mod: CPTII,GC,S$GLB, | Performed by: STUDENT IN AN ORGANIZED HEALTH CARE EDUCATION/TRAINING PROGRAM

## 2020-12-07 PROCEDURE — 71046 X-RAY EXAM CHEST 2 VIEWS: CPT | Mod: TC

## 2020-12-07 PROCEDURE — 3078F DIAST BP <80 MM HG: CPT | Mod: CPTII,GC,S$GLB, | Performed by: STUDENT IN AN ORGANIZED HEALTH CARE EDUCATION/TRAINING PROGRAM

## 2020-12-07 PROCEDURE — 1159F MED LIST DOCD IN RCRD: CPT | Mod: GC,S$GLB,, | Performed by: STUDENT IN AN ORGANIZED HEALTH CARE EDUCATION/TRAINING PROGRAM

## 2020-12-07 PROCEDURE — 82570 ASSAY OF URINE CREATININE: CPT

## 2020-12-07 PROCEDURE — 99999 PR PBB SHADOW E&M-EST. PATIENT-LVL V: ICD-10-PCS | Mod: PBBFAC,GC,, | Performed by: STUDENT IN AN ORGANIZED HEALTH CARE EDUCATION/TRAINING PROGRAM

## 2020-12-07 PROCEDURE — 99214 OFFICE O/P EST MOD 30 MIN: CPT | Mod: GC,S$GLB,, | Performed by: STUDENT IN AN ORGANIZED HEALTH CARE EDUCATION/TRAINING PROGRAM

## 2020-12-07 PROCEDURE — 71046 X-RAY EXAM CHEST 2 VIEWS: CPT | Mod: 26,,, | Performed by: RADIOLOGY

## 2020-12-07 PROCEDURE — 3077F SYST BP >= 140 MM HG: CPT | Mod: CPTII,GC,S$GLB, | Performed by: STUDENT IN AN ORGANIZED HEALTH CARE EDUCATION/TRAINING PROGRAM

## 2020-12-07 PROCEDURE — 71100 X-RAY EXAM RIBS UNI 2 VIEWS: CPT | Mod: TC,RT

## 2020-12-07 PROCEDURE — 71046 XR CHEST PA AND LATERAL: ICD-10-PCS | Mod: 26,,, | Performed by: RADIOLOGY

## 2020-12-07 PROCEDURE — 99214 PR OFFICE/OUTPT VISIT, EST, LEVL IV, 30-39 MIN: ICD-10-PCS | Mod: GC,S$GLB,, | Performed by: STUDENT IN AN ORGANIZED HEALTH CARE EDUCATION/TRAINING PROGRAM

## 2020-12-07 RX ORDER — LIDOCAINE 50 MG/G
1 PATCH TOPICAL DAILY
Qty: 30 PATCH | Refills: 3 | Status: SHIPPED | OUTPATIENT
Start: 2020-12-07 | End: 2021-07-07

## 2020-12-07 RX ORDER — DICLOFENAC SODIUM 10 MG/G
2 GEL TOPICAL 4 TIMES DAILY PRN
Qty: 100 G | Refills: 3 | Status: SHIPPED | OUTPATIENT
Start: 2020-12-07 | End: 2021-07-29

## 2020-12-07 NOTE — PROGRESS NOTES
I have reviewed the chart and the house staff's history and physical, assessment and plan. I have personally discussed the case with house staff and agree with the findings, assessment and plan.

## 2020-12-07 NOTE — PATIENT INSTRUCTIONS
For your pain, you will use tylenol 1000 mg up to 3 times per day. You can place the lidocaine patch (5% prescription or 4% OTC) on your right upper back at night. During the day, you can use the Voltaren gel (prescription or OTC) up to 4 times a day. You can use heat for up to 10 minutes 3-4 times per day. Stop taking Aleve or any NSAID products such as ibuprofen (Advil), BC or Goody powder.

## 2020-12-07 NOTE — PROGRESS NOTES
"Internal Medicine Clinic Note    Subjective     Chief Complaint: "back and stomach pain"    History of Present Illness:  Ms. Nelia Littlejohn is a 80 y.o. female with HTN, HLD, pre-DM, CKD3, CVA in 2012, ANUM, obesity, OA (s/p L knee replacement) and gout who presents to clinic for an urgent care visit for "back and stomach pain". For the past 6 weeks has been experiencing pain with urination and frequency. UA showed some leukocytes but otherwise negative and culture negative. Initially treated with a course of ciprofloxacin which partially relieved her symptoms. This sensation recurred again 4 weeks ago, UA and culture again unremarkable.    Back pain is in the R lateral thoracic spine and radiates anteriorly towards her chest. Started 1 week ago. Usually 5/10 but has been 10/10 before. Describes it as sharp and intermittent, no aching. Occurs more with rotational movement of her thoracic spine. Recently started having this pain when taking a deep breath. Had a mechanical fall 1 month ago. Does take Aleve for her knee OA occasionally, Aleve did help alleviate this pain early last week but no longer working. Also has been taking Aleve-Duo (Aleve with Tylenol) which also didn't help. Denies recent fatigue, weight loss, fevers, chills, SOB, chest pain, muscle spasms or weakness. Does have chronic leg swelling, at baseline.       Review of Systems   Constitutional: Negative for chills, fever and weight loss.   HENT: Negative for congestion and sore throat.    Eyes: Negative for blurred vision and double vision.   Respiratory: Negative for cough and shortness of breath.    Cardiovascular: Negative for chest pain, palpitations and leg swelling.   Gastrointestinal: Negative for abdominal pain, constipation, diarrhea, nausea and vomiting.   Genitourinary: Negative for dysuria.   Musculoskeletal: Negative for back pain, joint pain and neck pain.   Skin: Negative for rash.   Neurological: Negative for dizziness, loss of " consciousness, weakness and headaches.   Psychiatric/Behavioral: Negative for depression. The patient is not nervous/anxious.        PAST HISTORY:     Past Medical History:   Diagnosis Date    Cataract     Elevated alkaline phosphatase level 1/29/2013    Gout, joint     H/O: stroke, left eye, transient blindness, no residual,  9/11/2012 09- Vision has returned. Vision is much better, can read.     High cholesterol     History of prediabetes 10/26/2017    HTN (hypertension) 9/25/2012    Hyperlipemia 9/11/2012    Hypertension     Interval gout 9/11/2012       Past Surgical History:   Procedure Laterality Date    CATARACT EXTRACTION Bilateral     EYE SURGERY      HYSTERECTOMY      Yag capsulotomy left eye  10-       Family History   Problem Relation Age of Onset    Diabetes Father     Stroke Father     Cataracts Father     Diabetes Mother     Cancer Sister         breast cancer    Diabetes Brother     Heart disease Brother     No Known Problems Son     No Known Problems Daughter     No Known Problems Maternal Grandmother     No Known Problems Maternal Grandfather     No Known Problems Paternal Grandmother     No Known Problems Paternal Grandfather     No Known Problems Brother     No Known Problems Maternal Aunt     No Known Problems Maternal Uncle     No Known Problems Paternal Aunt     No Known Problems Paternal Uncle     Hypertension Brother     Diabetes Brother     Hypertension Son     Hypertension Son     No Known Problems Son     No Known Problems Son     Amblyopia Neg Hx     Blindness Neg Hx     Glaucoma Neg Hx     Macular degeneration Neg Hx     Retinal detachment Neg Hx     Strabismus Neg Hx     Thyroid disease Neg Hx        Social History     Socioeconomic History    Marital status:      Spouse name: Not on file    Number of children: Not on file    Years of education: Not on file    Highest education level: Not on file    Occupational History    Not on file   Social Needs    Financial resource strain: Not on file    Food insecurity     Worry: Not on file     Inability: Not on file    Transportation needs     Medical: Not on file     Non-medical: Not on file   Tobacco Use    Smoking status: Never Smoker    Smokeless tobacco: Never Used   Substance and Sexual Activity    Alcohol use: No    Drug use: No    Sexual activity: Not Currently   Lifestyle    Physical activity     Days per week: Not on file     Minutes per session: Not on file    Stress: Not on file   Relationships    Social connections     Talks on phone: Not on file     Gets together: Not on file     Attends [employer]ism service: Not on file     Active member of club or organization: Not on file     Attends meetings of clubs or organizations: Not on file     Relationship status: Not on file   Other Topics Concern    Not on file   Social History Narrative    2015    She is working at [employer].  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        She has 5 sons.  3 live in [place] and one son is here in [place].  She has a daughter who lives here in [place].    2019 2 years ago, she moved in with her daughter because she was not sleeping in her home at night.    2020 she is happy living with her daughter, son-in-law and grandchildren.  Her 15-year-old grandson is a rising freshman at [employer] and she particularly enjoys conversations with him in the evening.       MEDICATIONS & ALLERGIES:     Current Outpatient Medications on File Prior to Visit   Medication Sig    allopurinoL (ZYLOPRIM) 300 MG tablet TAKE 1 TABLET(300 MG) BY MOUTH EVERY DAY FOR GOUT PREVENTION    amLODIPine-benazepriL (LOTREL) 10-40 mg per capsule Take 1 capsule by mouth every morning.    aspirin (ECOTRIN) 325 MG EC tablet TAKE 1 TABLET(325 MG) BY MOUTH EVERY DAY    atorvastatin (LIPITOR) 80 MG tablet TAKE 1 TABLET BY MOUTH EVERY DAY    diclofenac  "sodium 1 % Gel Apply 2 g topically 4 (four) times daily as needed. Right knee    FLUAD 8056-6112, 65 YR UP,,PF, 45 mcg (15 mcg x 3)/0.5 mL Syrg ADM 0.5ML IM UTD    hydrALAZINE (APRESOLINE) 50 MG tablet Take 1 tablet (50 mg total) by mouth 2 (two) times daily. Blood pressure    metoprolol ta-hydrochlorothiaz (LOPRESSOR HCT) 100-25 mg per tablet TAKE 1 TABLET BY MOUTH EVERY MORNING    multivit-iron-min-folic acid (MULTIVITAMIN-IRON-MINERALS-FOLIC ACID) 3,500-18-0.4 unit-mg-mg Chew Take by mouth once daily.     traZODone (DESYREL) 50 MG tablet Take 1 tablet (50 mg total) by mouth nightly as needed for Insomnia.     No current facility-administered medications on file prior to visit.        Review of patient's allergies indicates:  No Known Allergies    OBJECTIVE:     Vital Signs:  Vitals:    12/07/20 1515   BP: (!) 150/65   BP Location: Left arm   Patient Position: Sitting   BP Method: Large (Manual)   Pulse: 63   SpO2: 98%   Weight: 107 kg (235 lb 14.3 oz)   Height: 5' 6" (1.676 m)       Body mass index is 38.07 kg/m².     Physical Exam   Constitutional: She is oriented to person, place, and time and well-developed, well-nourished, and in no distress. No distress.   HENT:   Head: Normocephalic and atraumatic.   Eyes: EOM are normal. Right eye exhibits no discharge. Left eye exhibits no discharge. No scleral icterus.   Neck: Normal range of motion. Neck supple. No JVD present. No tracheal deviation present.   Cardiovascular: Normal rate, regular rhythm, normal heart sounds and intact distal pulses.   No murmur heard.  Pulmonary/Chest: Effort normal and breath sounds normal. No respiratory distress. She has no wheezes. She has no rales. She exhibits tenderness.   Abdominal: Soft. Bowel sounds are normal. She exhibits no distension. There is no abdominal tenderness.   Musculoskeletal: Normal range of motion.         General: Tenderness (Right lateral mid-thoracic tenderness to palpation, tenderness extends along 9th " rib anteriorly) present. No deformity or edema.   Neurological: She is alert and oriented to person, place, and time. No cranial nerve deficit. She exhibits normal muscle tone. Gait normal. Coordination normal.   Skin: Skin is warm and dry. No rash noted. She is not diaphoretic. No erythema.   Psychiatric: Mood and affect normal.   Vitals reviewed.      Laboratory  Lab Results   Component Value Date    WBC 9.07 06/08/2020    HGB 12.8 06/08/2020    HCT 40.5 06/08/2020    MCV 90 06/08/2020     06/08/2020     BMP  Lab Results   Component Value Date     01/13/2020    K 4.3 01/13/2020     01/13/2020    CO2 26 01/13/2020    BUN 19 01/13/2020    CREATININE 1.2 01/13/2020    CALCIUM 9.0 01/13/2020    ANIONGAP 10 01/13/2020    ESTGFRAFRICA 49 (A) 01/13/2020    EGFRNONAA 43 (A) 01/13/2020     Lab Results   Component Value Date    INR 0.9 09/09/2012    INR 0.9 03/31/2009    INR 0.9 03/30/2009     Lab Results   Component Value Date    HGBA1C 5.7 (H) 01/09/2019       Diagnostic Results:  Labs: Reviewed    Health Maintenance Due   Topic Date Due    Influenza Vaccine (1) 08/01/2020         ASSESSMENT & PLAN:   Ms. Nelia Littlejohn is a 80 y.o. female who presents for back and abdominal pain.    Right-sided thoracic back pain, unspecified chronicity  -     diclofenac sodium (VOLTAREN) 1 % Gel; Apply 2 g topically 4 (four) times daily as needed. Right upper back  Dispense: 100 g; Refill: 3  -     lidocaine (LIDODERM) 5 %; Place 1 patch onto the skin once daily. Remove & Discard patch within 12 hours or as directed by MD  Dispense: 30 patch; Refill: 3  -     Ambulatory referral/consult to Physical/Occupational Therapy; Future; Expected date: 12/14/2020  -     X-Ray Ribs 2 View Right; Future; Expected date: 12/07/2020  -     X-Ray Chest PA And Lateral; Future; Expected date: 12/07/2020    Stage 3a chronic kidney disease  -     Comprehensive Metabolic Panel; Future; Expected date: 12/07/2020  -     Protein /  creatinine ratio, urine        Discussed with Dr. Royal - staff attestation to follow        Adolfo Briseno DO  Internal Medicine, PGY-II  [facility]  [address] [place] [address] [phone]

## 2020-12-08 ENCOUNTER — TELEPHONE (OUTPATIENT)
Dept: INTERNAL MEDICINE | Facility: CLINIC | Age: 80
End: 2020-12-08

## 2020-12-08 LAB
CREAT UR-MCNC: 64 MG/DL (ref 15–325)
PROT UR-MCNC: <7 MG/DL (ref 0–15)
PROT/CREAT UR: NORMAL MG/G{CREAT} (ref 0–0.2)

## 2020-12-08 NOTE — TELEPHONE ENCOUNTER
Spoke to the patient on the phone regarding her normal X-rays and urine tests. Counseled on a low potassium diet and gave resources for where to find lists of high- and low-potassium foods. Back pain already feeling better with supportive treatment. Told the patient if she continues to feel better in the next 1-2 weeks then she won't need to go to PT/OT.

## 2020-12-11 ENCOUNTER — PATIENT MESSAGE (OUTPATIENT)
Dept: OTHER | Facility: OTHER | Age: 80
End: 2020-12-11

## 2021-01-22 ENCOUNTER — LAB VISIT (OUTPATIENT)
Dept: LAB | Facility: HOSPITAL | Age: 81
End: 2021-01-22
Attending: INTERNAL MEDICINE
Payer: MEDICARE

## 2021-01-22 ENCOUNTER — PATIENT MESSAGE (OUTPATIENT)
Dept: ADMINISTRATIVE | Facility: OTHER | Age: 81
End: 2021-01-22

## 2021-01-22 DIAGNOSIS — E66.01 MORBID (SEVERE) OBESITY DUE TO EXCESS CALORIES: ICD-10-CM

## 2021-01-22 DIAGNOSIS — R73.9 HYPERGLYCEMIA: ICD-10-CM

## 2021-01-22 DIAGNOSIS — M10.9 INTERVAL GOUT: ICD-10-CM

## 2021-01-22 DIAGNOSIS — Z86.73 H/O: STROKE: ICD-10-CM

## 2021-01-22 LAB
ALBUMIN SERPL BCP-MCNC: 3.7 G/DL (ref 3.5–5.2)
ALP SERPL-CCNC: 99 U/L (ref 55–135)
ALT SERPL W/O P-5'-P-CCNC: 16 U/L (ref 10–44)
ANION GAP SERPL CALC-SCNC: 8 MMOL/L (ref 8–16)
AST SERPL-CCNC: 19 U/L (ref 10–40)
BILIRUB SERPL-MCNC: 0.9 MG/DL (ref 0.1–1)
BUN SERPL-MCNC: 14 MG/DL (ref 8–23)
CALCIUM SERPL-MCNC: 9.1 MG/DL (ref 8.7–10.5)
CHLORIDE SERPL-SCNC: 105 MMOL/L (ref 95–110)
CHOLEST SERPL-MCNC: 172 MG/DL (ref 120–199)
CHOLEST/HDLC SERPL: 4.3 {RATIO} (ref 2–5)
CO2 SERPL-SCNC: 29 MMOL/L (ref 23–29)
CREAT SERPL-MCNC: 1 MG/DL (ref 0.5–1.4)
EST. GFR  (AFRICAN AMERICAN): >60 ML/MIN/1.73 M^2
EST. GFR  (NON AFRICAN AMERICAN): 53 ML/MIN/1.73 M^2
ESTIMATED AVG GLUCOSE: 111 MG/DL (ref 68–131)
GLUCOSE SERPL-MCNC: 102 MG/DL (ref 70–110)
HBA1C MFR BLD HPLC: 5.5 % (ref 4–5.6)
HDLC SERPL-MCNC: 40 MG/DL (ref 40–75)
HDLC SERPL: 23.3 % (ref 20–50)
LDLC SERPL CALC-MCNC: 84.2 MG/DL (ref 63–159)
NONHDLC SERPL-MCNC: 132 MG/DL
POTASSIUM SERPL-SCNC: 4.4 MMOL/L (ref 3.5–5.1)
PROT SERPL-MCNC: 7.3 G/DL (ref 6–8.4)
SODIUM SERPL-SCNC: 142 MMOL/L (ref 136–145)
TRIGL SERPL-MCNC: 239 MG/DL (ref 30–150)
URATE SERPL-MCNC: 5.8 MG/DL (ref 2.4–5.7)

## 2021-01-22 PROCEDURE — 80061 LIPID PANEL: CPT

## 2021-01-22 PROCEDURE — 36415 COLL VENOUS BLD VENIPUNCTURE: CPT

## 2021-01-22 PROCEDURE — 84550 ASSAY OF BLOOD/URIC ACID: CPT

## 2021-01-22 PROCEDURE — 80053 COMPREHEN METABOLIC PANEL: CPT

## 2021-01-22 PROCEDURE — 83036 HEMOGLOBIN GLYCOSYLATED A1C: CPT

## 2021-01-29 ENCOUNTER — OFFICE VISIT (OUTPATIENT)
Dept: INTERNAL MEDICINE | Facility: CLINIC | Age: 81
End: 2021-01-29
Payer: MEDICARE

## 2021-01-29 VITALS
DIASTOLIC BLOOD PRESSURE: 68 MMHG | WEIGHT: 234.81 LBS | SYSTOLIC BLOOD PRESSURE: 130 MMHG | HEIGHT: 66 IN | OXYGEN SATURATION: 96 % | HEART RATE: 65 BPM | BODY MASS INDEX: 37.74 KG/M2

## 2021-01-29 DIAGNOSIS — N18.32 STAGE 3B CHRONIC KIDNEY DISEASE: ICD-10-CM

## 2021-01-29 DIAGNOSIS — Z00.00 ANNUAL PHYSICAL EXAM: Primary | ICD-10-CM

## 2021-01-29 DIAGNOSIS — Z86.73 H/O: STROKE: ICD-10-CM

## 2021-01-29 DIAGNOSIS — E78.5 HYPERLIPIDEMIA, UNSPECIFIED HYPERLIPIDEMIA TYPE: ICD-10-CM

## 2021-01-29 DIAGNOSIS — I10 ESSENTIAL HYPERTENSION: ICD-10-CM

## 2021-01-29 PROCEDURE — 1126F PR PAIN SEVERITY QUANTIFIED, NO PAIN PRESENT: ICD-10-PCS | Mod: S$GLB,,, | Performed by: INTERNAL MEDICINE

## 2021-01-29 PROCEDURE — 99214 PR OFFICE/OUTPT VISIT, EST, LEVL IV, 30-39 MIN: ICD-10-PCS | Mod: S$GLB,,, | Performed by: INTERNAL MEDICINE

## 2021-01-29 PROCEDURE — 3075F PR MOST RECENT SYSTOLIC BLOOD PRESS GE 130-139MM HG: ICD-10-PCS | Mod: CPTII,S$GLB,, | Performed by: INTERNAL MEDICINE

## 2021-01-29 PROCEDURE — 99499 RISK ADDL DX/OHS AUDIT: ICD-10-PCS | Mod: S$GLB,,, | Performed by: INTERNAL MEDICINE

## 2021-01-29 PROCEDURE — 99999 PR PBB SHADOW E&M-EST. PATIENT-LVL IV: CPT | Mod: PBBFAC,,, | Performed by: INTERNAL MEDICINE

## 2021-01-29 PROCEDURE — 99214 OFFICE O/P EST MOD 30 MIN: CPT | Mod: S$GLB,,, | Performed by: INTERNAL MEDICINE

## 2021-01-29 PROCEDURE — 3078F DIAST BP <80 MM HG: CPT | Mod: CPTII,S$GLB,, | Performed by: INTERNAL MEDICINE

## 2021-01-29 PROCEDURE — 99999 PR PBB SHADOW E&M-EST. PATIENT-LVL IV: ICD-10-PCS | Mod: PBBFAC,,, | Performed by: INTERNAL MEDICINE

## 2021-01-29 PROCEDURE — 3075F SYST BP GE 130 - 139MM HG: CPT | Mod: CPTII,S$GLB,, | Performed by: INTERNAL MEDICINE

## 2021-01-29 PROCEDURE — 1126F AMNT PAIN NOTED NONE PRSNT: CPT | Mod: S$GLB,,, | Performed by: INTERNAL MEDICINE

## 2021-01-29 PROCEDURE — 3078F PR MOST RECENT DIASTOLIC BLOOD PRESSURE < 80 MM HG: ICD-10-PCS | Mod: CPTII,S$GLB,, | Performed by: INTERNAL MEDICINE

## 2021-01-29 PROCEDURE — 3288F PR FALLS RISK ASSESSMENT DOCUMENTED: ICD-10-PCS | Mod: CPTII,S$GLB,, | Performed by: INTERNAL MEDICINE

## 2021-01-29 PROCEDURE — 99499 UNLISTED E&M SERVICE: CPT | Mod: S$GLB,,, | Performed by: INTERNAL MEDICINE

## 2021-01-29 PROCEDURE — 1101F PR PT FALLS ASSESS DOC 0-1 FALLS W/OUT INJ PAST YR: ICD-10-PCS | Mod: CPTII,S$GLB,, | Performed by: INTERNAL MEDICINE

## 2021-01-29 PROCEDURE — 3288F FALL RISK ASSESSMENT DOCD: CPT | Mod: CPTII,S$GLB,, | Performed by: INTERNAL MEDICINE

## 2021-01-29 PROCEDURE — 1101F PT FALLS ASSESS-DOCD LE1/YR: CPT | Mod: CPTII,S$GLB,, | Performed by: INTERNAL MEDICINE

## 2021-01-29 RX ORDER — ALLOPURINOL 300 MG/1
300 TABLET ORAL DAILY
Qty: 90 TABLET | Refills: 3 | Status: SHIPPED | OUTPATIENT
Start: 2021-01-29 | End: 2022-04-18

## 2021-01-29 RX ORDER — METOPROLOL TARTRATE AND HYDROCHLOROTHIAZIDE 100; 25 MG/1; MG/1
1 TABLET ORAL EVERY MORNING
Qty: 90 TABLET | Refills: 3 | Status: SHIPPED | OUTPATIENT
Start: 2021-01-29 | End: 2022-01-31

## 2021-01-29 RX ORDER — ATORVASTATIN CALCIUM 80 MG/1
TABLET, FILM COATED ORAL
Qty: 90 TABLET | Refills: 3 | Status: SHIPPED | OUTPATIENT
Start: 2021-01-29 | End: 2022-04-18

## 2021-01-29 RX ORDER — HYDRALAZINE HYDROCHLORIDE 50 MG/1
50 TABLET, FILM COATED ORAL 2 TIMES DAILY
Qty: 180 TABLET | Refills: 3 | Status: SHIPPED | OUTPATIENT
Start: 2021-01-29 | End: 2021-07-29 | Stop reason: SDUPTHER

## 2021-01-29 RX ORDER — AMLODIPINE AND BENAZEPRIL HYDROCHLORIDE 10; 40 MG/1; MG/1
1 CAPSULE ORAL EVERY MORNING
Qty: 90 CAPSULE | Refills: 3 | Status: SHIPPED | OUTPATIENT
Start: 2021-01-29 | End: 2022-04-18

## 2021-04-20 ENCOUNTER — OFFICE VISIT (OUTPATIENT)
Dept: INTERNAL MEDICINE | Facility: CLINIC | Age: 81
End: 2021-04-20
Payer: MEDICARE

## 2021-04-20 ENCOUNTER — HOSPITAL ENCOUNTER (OUTPATIENT)
Dept: RADIOLOGY | Facility: HOSPITAL | Age: 81
Discharge: HOME | End: 2021-04-20
Attending: INTERNAL MEDICINE
Payer: MEDICARE

## 2021-04-20 ENCOUNTER — TELEPHONE (OUTPATIENT)
Dept: INTERNAL MEDICINE | Facility: CLINIC | Age: 81
End: 2021-04-20

## 2021-04-20 VITALS
SYSTOLIC BLOOD PRESSURE: 134 MMHG | DIASTOLIC BLOOD PRESSURE: 62 MMHG | HEART RATE: 72 BPM | WEIGHT: 242.75 LBS | OXYGEN SATURATION: 99 % | HEIGHT: 66 IN | BODY MASS INDEX: 39.01 KG/M2

## 2021-04-20 DIAGNOSIS — M25.551 ACUTE RIGHT HIP PAIN: Primary | ICD-10-CM

## 2021-04-20 DIAGNOSIS — M25.551 ACUTE RIGHT HIP PAIN: ICD-10-CM

## 2021-04-20 DIAGNOSIS — M25.559 PAIN IN UNSPECIFIED HIP: ICD-10-CM

## 2021-04-20 PROCEDURE — 1101F PT FALLS ASSESS-DOCD LE1/YR: CPT | Mod: CPTII,S$GLB,, | Performed by: INTERNAL MEDICINE

## 2021-04-20 PROCEDURE — 99214 OFFICE O/P EST MOD 30 MIN: CPT | Mod: S$GLB,,, | Performed by: INTERNAL MEDICINE

## 2021-04-20 PROCEDURE — 73552 X-RAY EXAM OF FEMUR 2/>: CPT | Mod: TC,RT

## 2021-04-20 PROCEDURE — 3288F PR FALLS RISK ASSESSMENT DOCUMENTED: ICD-10-PCS | Mod: CPTII,S$GLB,, | Performed by: INTERNAL MEDICINE

## 2021-04-20 PROCEDURE — 73552 XR FEMUR 2 VIEW RIGHT: ICD-10-PCS | Mod: 26,RT,, | Performed by: RADIOLOGY

## 2021-04-20 PROCEDURE — 73502 XR HIP 2 VIEW RIGHT: ICD-10-PCS | Mod: 26,RT,, | Performed by: RADIOLOGY

## 2021-04-20 PROCEDURE — 1125F AMNT PAIN NOTED PAIN PRSNT: CPT | Mod: S$GLB,,, | Performed by: INTERNAL MEDICINE

## 2021-04-20 PROCEDURE — 99214 PR OFFICE/OUTPT VISIT, EST, LEVL IV, 30-39 MIN: ICD-10-PCS | Mod: S$GLB,,, | Performed by: INTERNAL MEDICINE

## 2021-04-20 PROCEDURE — 1125F PR PAIN SEVERITY QUANTIFIED, PAIN PRESENT: ICD-10-PCS | Mod: S$GLB,,, | Performed by: INTERNAL MEDICINE

## 2021-04-20 PROCEDURE — 1159F PR MEDICATION LIST DOCUMENTED IN MEDICAL RECORD: ICD-10-PCS | Mod: S$GLB,,, | Performed by: INTERNAL MEDICINE

## 2021-04-20 PROCEDURE — 1101F PR PT FALLS ASSESS DOC 0-1 FALLS W/OUT INJ PAST YR: ICD-10-PCS | Mod: CPTII,S$GLB,, | Performed by: INTERNAL MEDICINE

## 2021-04-20 PROCEDURE — 73552 X-RAY EXAM OF FEMUR 2/>: CPT | Mod: 26,RT,, | Performed by: RADIOLOGY

## 2021-04-20 PROCEDURE — 99999 PR PBB SHADOW E&M-EST. PATIENT-LVL IV: ICD-10-PCS | Mod: PBBFAC,,, | Performed by: INTERNAL MEDICINE

## 2021-04-20 PROCEDURE — 1159F MED LIST DOCD IN RCRD: CPT | Mod: S$GLB,,, | Performed by: INTERNAL MEDICINE

## 2021-04-20 PROCEDURE — 73502 X-RAY EXAM HIP UNI 2-3 VIEWS: CPT | Mod: TC,RT

## 2021-04-20 PROCEDURE — 3288F FALL RISK ASSESSMENT DOCD: CPT | Mod: CPTII,S$GLB,, | Performed by: INTERNAL MEDICINE

## 2021-04-20 PROCEDURE — 99999 PR PBB SHADOW E&M-EST. PATIENT-LVL IV: CPT | Mod: PBBFAC,,, | Performed by: INTERNAL MEDICINE

## 2021-04-20 PROCEDURE — 73502 X-RAY EXAM HIP UNI 2-3 VIEWS: CPT | Mod: 26,RT,, | Performed by: RADIOLOGY

## 2021-04-20 RX ORDER — TRAMADOL HYDROCHLORIDE 50 MG/1
50 TABLET ORAL EVERY 8 HOURS PRN
Qty: 30 TABLET | Refills: 0 | Status: SHIPPED | OUTPATIENT
Start: 2021-04-20 | End: 2021-07-29

## 2021-04-21 ENCOUNTER — HOSPITAL ENCOUNTER (OUTPATIENT)
Dept: RADIOLOGY | Facility: HOSPITAL | Age: 81
Discharge: HOME | End: 2021-04-21
Attending: INTERNAL MEDICINE
Payer: MEDICARE

## 2021-04-21 ENCOUNTER — PATIENT MESSAGE (OUTPATIENT)
Dept: INTERNAL MEDICINE | Facility: CLINIC | Age: 81
End: 2021-04-21

## 2021-04-21 DIAGNOSIS — M25.559 PAIN IN UNSPECIFIED HIP: ICD-10-CM

## 2021-04-21 DIAGNOSIS — M25.551 ACUTE RIGHT HIP PAIN: Primary | ICD-10-CM

## 2021-04-21 DIAGNOSIS — M25.551 ACUTE RIGHT HIP PAIN: ICD-10-CM

## 2021-04-21 PROCEDURE — 73700 CT LOWER EXTREMITY W/O DYE: CPT | Mod: TC,RT

## 2021-04-21 PROCEDURE — 73700 CT LOWER EXTREMITY W/O DYE: CPT | Mod: 26,RT,, | Performed by: RADIOLOGY

## 2021-04-21 PROCEDURE — 73700 CT HIP WITHOUT CONTRAST RIGHT: ICD-10-PCS | Mod: 26,RT,, | Performed by: RADIOLOGY

## 2021-05-06 ENCOUNTER — CLINICAL SUPPORT (OUTPATIENT)
Dept: REHABILITATION | Facility: HOSPITAL | Age: 81
End: 2021-05-06
Attending: INTERNAL MEDICINE
Payer: MEDICARE

## 2021-05-06 DIAGNOSIS — M25.661 DECREASED RANGE OF MOTION OF RIGHT KNEE: ICD-10-CM

## 2021-05-06 DIAGNOSIS — M25.551 ACUTE RIGHT HIP PAIN: ICD-10-CM

## 2021-05-06 DIAGNOSIS — Z74.09 IMPAIRED FUNCTIONAL MOBILITY, BALANCE, GAIT, AND ENDURANCE: ICD-10-CM

## 2021-05-06 DIAGNOSIS — R29.898 WEAKNESS OF RIGHT HIP: ICD-10-CM

## 2021-05-06 DIAGNOSIS — M62.81 QUADRICEPS WEAKNESS: ICD-10-CM

## 2021-05-06 DIAGNOSIS — M25.651 DECREASED RANGE OF RIGHT HIP MOVEMENT: ICD-10-CM

## 2021-05-06 PROCEDURE — 97161 PT EVAL LOW COMPLEX 20 MIN: CPT | Mod: PO | Performed by: PHYSICAL THERAPIST

## 2021-05-07 PROBLEM — R29.898 WEAKNESS OF RIGHT HIP: Status: ACTIVE | Noted: 2021-05-07

## 2021-05-07 PROBLEM — Z74.09 IMPAIRED FUNCTIONAL MOBILITY, BALANCE, GAIT, AND ENDURANCE: Status: ACTIVE | Noted: 2021-05-07

## 2021-05-07 PROBLEM — M25.661 DECREASED RANGE OF MOTION OF RIGHT KNEE: Status: ACTIVE | Noted: 2021-05-07

## 2021-05-07 PROBLEM — M25.651 DECREASED RANGE OF RIGHT HIP MOVEMENT: Status: ACTIVE | Noted: 2021-05-07

## 2021-05-07 PROBLEM — M62.81 QUADRICEPS WEAKNESS: Status: ACTIVE | Noted: 2021-05-07

## 2021-06-10 ENCOUNTER — CLINICAL SUPPORT (OUTPATIENT)
Dept: REHABILITATION | Facility: HOSPITAL | Age: 81
End: 2021-06-10
Payer: MEDICARE

## 2021-06-10 DIAGNOSIS — M25.661 DECREASED RANGE OF MOTION OF RIGHT KNEE: ICD-10-CM

## 2021-06-10 DIAGNOSIS — Z74.09 IMPAIRED FUNCTIONAL MOBILITY, BALANCE, GAIT, AND ENDURANCE: ICD-10-CM

## 2021-06-10 DIAGNOSIS — M25.651 DECREASED RANGE OF RIGHT HIP MOVEMENT: ICD-10-CM

## 2021-06-10 DIAGNOSIS — R29.898 WEAKNESS OF RIGHT HIP: ICD-10-CM

## 2021-06-10 DIAGNOSIS — M62.81 QUADRICEPS WEAKNESS: ICD-10-CM

## 2021-06-10 PROCEDURE — 97140 MANUAL THERAPY 1/> REGIONS: CPT | Mod: PO

## 2021-06-10 PROCEDURE — 97110 THERAPEUTIC EXERCISES: CPT | Mod: PO

## 2021-06-23 ENCOUNTER — CLINICAL SUPPORT (OUTPATIENT)
Dept: REHABILITATION | Facility: HOSPITAL | Age: 81
End: 2021-06-23
Payer: MEDICARE

## 2021-06-23 DIAGNOSIS — M62.81 QUADRICEPS WEAKNESS: ICD-10-CM

## 2021-06-23 DIAGNOSIS — M25.661 DECREASED RANGE OF MOTION OF RIGHT KNEE: ICD-10-CM

## 2021-06-23 DIAGNOSIS — Z74.09 IMPAIRED FUNCTIONAL MOBILITY, BALANCE, GAIT, AND ENDURANCE: ICD-10-CM

## 2021-06-23 DIAGNOSIS — M25.651 DECREASED RANGE OF RIGHT HIP MOVEMENT: ICD-10-CM

## 2021-06-23 DIAGNOSIS — R29.898 WEAKNESS OF RIGHT HIP: ICD-10-CM

## 2021-06-23 PROCEDURE — 97110 THERAPEUTIC EXERCISES: CPT | Mod: PO,CQ

## 2021-06-28 ENCOUNTER — CLINICAL SUPPORT (OUTPATIENT)
Dept: REHABILITATION | Facility: HOSPITAL | Age: 81
End: 2021-06-28
Payer: MEDICARE

## 2021-06-28 DIAGNOSIS — Z74.09 IMPAIRED FUNCTIONAL MOBILITY, BALANCE, GAIT, AND ENDURANCE: ICD-10-CM

## 2021-06-28 DIAGNOSIS — M25.661 DECREASED RANGE OF MOTION OF RIGHT KNEE: ICD-10-CM

## 2021-06-28 DIAGNOSIS — M62.81 QUADRICEPS WEAKNESS: ICD-10-CM

## 2021-06-28 DIAGNOSIS — R29.898 WEAKNESS OF RIGHT HIP: ICD-10-CM

## 2021-06-28 DIAGNOSIS — M25.651 DECREASED RANGE OF RIGHT HIP MOVEMENT: ICD-10-CM

## 2021-06-28 PROCEDURE — 97110 THERAPEUTIC EXERCISES: CPT | Mod: PO | Performed by: PHYSICAL THERAPIST

## 2021-07-06 ENCOUNTER — TELEPHONE (OUTPATIENT)
Dept: INTERNAL MEDICINE | Facility: CLINIC | Age: 81
End: 2021-07-06

## 2021-07-07 ENCOUNTER — OFFICE VISIT (OUTPATIENT)
Dept: INTERNAL MEDICINE | Facility: CLINIC | Age: 81
End: 2021-07-07
Payer: MEDICARE

## 2021-07-07 VITALS
BODY MASS INDEX: 37.77 KG/M2 | SYSTOLIC BLOOD PRESSURE: 130 MMHG | HEART RATE: 71 BPM | HEIGHT: 66 IN | WEIGHT: 235 LBS | OXYGEN SATURATION: 99 % | DIASTOLIC BLOOD PRESSURE: 60 MMHG

## 2021-07-07 DIAGNOSIS — R09.89 SYNCOPE WITHOUT OTHER CARDIOVASCULAR SYMPTOMS: Primary | ICD-10-CM

## 2021-07-07 DIAGNOSIS — R55 SYNCOPE WITHOUT OTHER CARDIOVASCULAR SYMPTOMS: Primary | ICD-10-CM

## 2021-07-07 PROCEDURE — 99999 PR PBB SHADOW E&M-EST. PATIENT-LVL III: CPT | Mod: PBBFAC,,, | Performed by: INTERNAL MEDICINE

## 2021-07-07 PROCEDURE — 1126F AMNT PAIN NOTED NONE PRSNT: CPT | Mod: S$GLB,,, | Performed by: INTERNAL MEDICINE

## 2021-07-07 PROCEDURE — 1159F MED LIST DOCD IN RCRD: CPT | Mod: S$GLB,,, | Performed by: INTERNAL MEDICINE

## 2021-07-07 PROCEDURE — 3288F PR FALLS RISK ASSESSMENT DOCUMENTED: ICD-10-PCS | Mod: CPTII,S$GLB,, | Performed by: INTERNAL MEDICINE

## 2021-07-07 PROCEDURE — 1101F PT FALLS ASSESS-DOCD LE1/YR: CPT | Mod: CPTII,S$GLB,, | Performed by: INTERNAL MEDICINE

## 2021-07-07 PROCEDURE — 1159F PR MEDICATION LIST DOCUMENTED IN MEDICAL RECORD: ICD-10-PCS | Mod: S$GLB,,, | Performed by: INTERNAL MEDICINE

## 2021-07-07 PROCEDURE — 99214 PR OFFICE/OUTPT VISIT, EST, LEVL IV, 30-39 MIN: ICD-10-PCS | Mod: S$GLB,,, | Performed by: INTERNAL MEDICINE

## 2021-07-07 PROCEDURE — 1101F PR PT FALLS ASSESS DOC 0-1 FALLS W/OUT INJ PAST YR: ICD-10-PCS | Mod: CPTII,S$GLB,, | Performed by: INTERNAL MEDICINE

## 2021-07-07 PROCEDURE — 99214 OFFICE O/P EST MOD 30 MIN: CPT | Mod: S$GLB,,, | Performed by: INTERNAL MEDICINE

## 2021-07-07 PROCEDURE — 99999 PR PBB SHADOW E&M-EST. PATIENT-LVL III: ICD-10-PCS | Mod: PBBFAC,,, | Performed by: INTERNAL MEDICINE

## 2021-07-07 PROCEDURE — 3288F FALL RISK ASSESSMENT DOCD: CPT | Mod: CPTII,S$GLB,, | Performed by: INTERNAL MEDICINE

## 2021-07-07 PROCEDURE — 1126F PR PAIN SEVERITY QUANTIFIED, NO PAIN PRESENT: ICD-10-PCS | Mod: S$GLB,,, | Performed by: INTERNAL MEDICINE

## 2021-07-08 ENCOUNTER — TELEPHONE (OUTPATIENT)
Dept: INTERNAL MEDICINE | Facility: CLINIC | Age: 81
End: 2021-07-08

## 2021-07-14 ENCOUNTER — CLINICAL SUPPORT (OUTPATIENT)
Dept: REHABILITATION | Facility: HOSPITAL | Age: 81
End: 2021-07-14
Payer: MEDICARE

## 2021-07-14 ENCOUNTER — HOSPITAL ENCOUNTER (OUTPATIENT)
Dept: CARDIOLOGY | Facility: CLINIC | Age: 81
Discharge: HOME | End: 2021-07-14
Payer: MEDICARE

## 2021-07-14 DIAGNOSIS — R09.89 SYNCOPE WITHOUT OTHER CARDIOVASCULAR SYMPTOMS: ICD-10-CM

## 2021-07-14 DIAGNOSIS — Z74.09 IMPAIRED FUNCTIONAL MOBILITY, BALANCE, GAIT, AND ENDURANCE: ICD-10-CM

## 2021-07-14 DIAGNOSIS — M25.661 DECREASED RANGE OF MOTION OF RIGHT KNEE: ICD-10-CM

## 2021-07-14 DIAGNOSIS — R29.898 WEAKNESS OF RIGHT HIP: ICD-10-CM

## 2021-07-14 DIAGNOSIS — M62.81 QUADRICEPS WEAKNESS: ICD-10-CM

## 2021-07-14 DIAGNOSIS — R55 SYNCOPE WITHOUT OTHER CARDIOVASCULAR SYMPTOMS: ICD-10-CM

## 2021-07-14 DIAGNOSIS — M25.651 DECREASED RANGE OF RIGHT HIP MOVEMENT: ICD-10-CM

## 2021-07-14 PROCEDURE — 93005 ELECTROCARDIOGRAM TRACING: CPT | Mod: S$GLB,,, | Performed by: INTERNAL MEDICINE

## 2021-07-14 PROCEDURE — 93010 ELECTROCARDIOGRAM REPORT: CPT | Mod: S$GLB,,, | Performed by: INTERNAL MEDICINE

## 2021-07-14 PROCEDURE — 93010 EKG 12-LEAD: ICD-10-PCS | Mod: S$GLB,,, | Performed by: INTERNAL MEDICINE

## 2021-07-14 PROCEDURE — 93005 EKG 12-LEAD: ICD-10-PCS | Mod: S$GLB,,, | Performed by: INTERNAL MEDICINE

## 2021-07-14 PROCEDURE — 97110 THERAPEUTIC EXERCISES: CPT | Mod: PO

## 2021-07-16 ENCOUNTER — TELEPHONE (OUTPATIENT)
Dept: INTERNAL MEDICINE | Facility: CLINIC | Age: 81
End: 2021-07-16

## 2021-07-16 ENCOUNTER — CLINICAL SUPPORT (OUTPATIENT)
Dept: CARDIOLOGY | Facility: HOSPITAL | Age: 81
End: 2021-07-16
Attending: INTERNAL MEDICINE
Payer: MEDICARE

## 2021-07-16 DIAGNOSIS — R09.89 SYNCOPE WITHOUT OTHER CARDIOVASCULAR SYMPTOMS: ICD-10-CM

## 2021-07-16 DIAGNOSIS — R55 SYNCOPE WITHOUT OTHER CARDIOVASCULAR SYMPTOMS: ICD-10-CM

## 2021-07-16 PROCEDURE — 93227 XTRNL ECG REC<48 HR R&I: CPT | Mod: ,,, | Performed by: INTERNAL MEDICINE

## 2021-07-16 PROCEDURE — 93227 HOLTER MONITOR - 48 HOUR (CUPID ONLY): ICD-10-PCS | Mod: ,,, | Performed by: INTERNAL MEDICINE

## 2021-07-16 PROCEDURE — 93226 XTRNL ECG REC<48 HR SCAN A/R: CPT

## 2021-07-19 ENCOUNTER — CLINICAL SUPPORT (OUTPATIENT)
Dept: REHABILITATION | Facility: HOSPITAL | Age: 81
End: 2021-07-19
Payer: MEDICARE

## 2021-07-19 DIAGNOSIS — Z74.09 IMPAIRED FUNCTIONAL MOBILITY, BALANCE, GAIT, AND ENDURANCE: ICD-10-CM

## 2021-07-19 DIAGNOSIS — R29.898 WEAKNESS OF RIGHT HIP: ICD-10-CM

## 2021-07-19 DIAGNOSIS — M25.661 DECREASED RANGE OF MOTION OF RIGHT KNEE: ICD-10-CM

## 2021-07-19 DIAGNOSIS — M25.651 DECREASED RANGE OF RIGHT HIP MOVEMENT: ICD-10-CM

## 2021-07-19 DIAGNOSIS — M62.81 QUADRICEPS WEAKNESS: ICD-10-CM

## 2021-07-19 PROCEDURE — 97110 THERAPEUTIC EXERCISES: CPT | Mod: PO

## 2021-07-20 LAB
OHS CV EVENT MONITOR DAY: 0
OHS CV HOLTER LENGTH DECIMAL HOURS: 48
OHS CV HOLTER LENGTH HOURS: 48
OHS CV HOLTER LENGTH MINUTES: 0

## 2021-07-21 ENCOUNTER — CLINICAL SUPPORT (OUTPATIENT)
Dept: REHABILITATION | Facility: HOSPITAL | Age: 81
End: 2021-07-21
Payer: MEDICARE

## 2021-07-21 DIAGNOSIS — M62.81 QUADRICEPS WEAKNESS: ICD-10-CM

## 2021-07-21 DIAGNOSIS — M25.651 DECREASED RANGE OF RIGHT HIP MOVEMENT: ICD-10-CM

## 2021-07-21 DIAGNOSIS — M25.661 DECREASED RANGE OF MOTION OF RIGHT KNEE: ICD-10-CM

## 2021-07-21 DIAGNOSIS — Z74.09 IMPAIRED FUNCTIONAL MOBILITY, BALANCE, GAIT, AND ENDURANCE: ICD-10-CM

## 2021-07-21 DIAGNOSIS — R29.898 WEAKNESS OF RIGHT HIP: ICD-10-CM

## 2021-07-21 PROCEDURE — 97110 THERAPEUTIC EXERCISES: CPT | Mod: PO

## 2021-07-28 ENCOUNTER — TELEPHONE (OUTPATIENT)
Dept: INTERNAL MEDICINE | Facility: CLINIC | Age: 81
End: 2021-07-28

## 2021-07-28 DIAGNOSIS — G45.9 TIA (TRANSIENT ISCHEMIC ATTACK): ICD-10-CM

## 2021-07-28 PROBLEM — Z86.73 HISTORY OF TIA (TRANSIENT ISCHEMIC ATTACK) AND STROKE: Status: ACTIVE | Noted: 2021-07-28

## 2021-07-28 PROBLEM — Z86.73 HISTORY OF TIA (TRANSIENT ISCHEMIC ATTACK) AND STROKE: Status: RESOLVED | Noted: 2021-07-28 | Resolved: 2021-07-28

## 2021-07-29 ENCOUNTER — OFFICE VISIT (OUTPATIENT)
Dept: INTERNAL MEDICINE | Facility: CLINIC | Age: 81
End: 2021-07-29
Payer: MEDICARE

## 2021-07-29 VITALS
DIASTOLIC BLOOD PRESSURE: 68 MMHG | BODY MASS INDEX: 38.12 KG/M2 | WEIGHT: 237.19 LBS | HEART RATE: 71 BPM | OXYGEN SATURATION: 99 % | HEIGHT: 66 IN | SYSTOLIC BLOOD PRESSURE: 130 MMHG

## 2021-07-29 DIAGNOSIS — E78.5 HYPERLIPIDEMIA, UNSPECIFIED HYPERLIPIDEMIA TYPE: ICD-10-CM

## 2021-07-29 DIAGNOSIS — I10 ESSENTIAL HYPERTENSION: ICD-10-CM

## 2021-07-29 DIAGNOSIS — N18.31 STAGE 3A CHRONIC KIDNEY DISEASE: ICD-10-CM

## 2021-07-29 DIAGNOSIS — G45.9 TIA (TRANSIENT ISCHEMIC ATTACK): Primary | ICD-10-CM

## 2021-07-29 DIAGNOSIS — M10.9 INTERVAL GOUT: ICD-10-CM

## 2021-07-29 PROCEDURE — 1101F PT FALLS ASSESS-DOCD LE1/YR: CPT | Mod: CPTII,S$GLB,, | Performed by: INTERNAL MEDICINE

## 2021-07-29 PROCEDURE — 1159F PR MEDICATION LIST DOCUMENTED IN MEDICAL RECORD: ICD-10-PCS | Mod: CPTII,S$GLB,, | Performed by: INTERNAL MEDICINE

## 2021-07-29 PROCEDURE — 1159F MED LIST DOCD IN RCRD: CPT | Mod: CPTII,S$GLB,, | Performed by: INTERNAL MEDICINE

## 2021-07-29 PROCEDURE — 99215 OFFICE O/P EST HI 40 MIN: CPT | Mod: S$GLB,,, | Performed by: INTERNAL MEDICINE

## 2021-07-29 PROCEDURE — 1101F PR PT FALLS ASSESS DOC 0-1 FALLS W/OUT INJ PAST YR: ICD-10-PCS | Mod: CPTII,S$GLB,, | Performed by: INTERNAL MEDICINE

## 2021-07-29 PROCEDURE — 99499 RISK ADDL DX/OHS AUDIT: ICD-10-PCS | Mod: S$GLB,,, | Performed by: INTERNAL MEDICINE

## 2021-07-29 PROCEDURE — 3078F DIAST BP <80 MM HG: CPT | Mod: CPTII,S$GLB,, | Performed by: INTERNAL MEDICINE

## 2021-07-29 PROCEDURE — 3075F PR MOST RECENT SYSTOLIC BLOOD PRESS GE 130-139MM HG: ICD-10-PCS | Mod: CPTII,S$GLB,, | Performed by: INTERNAL MEDICINE

## 2021-07-29 PROCEDURE — 3288F PR FALLS RISK ASSESSMENT DOCUMENTED: ICD-10-PCS | Mod: CPTII,S$GLB,, | Performed by: INTERNAL MEDICINE

## 2021-07-29 PROCEDURE — 99999 PR PBB SHADOW E&M-EST. PATIENT-LVL III: CPT | Mod: PBBFAC,,, | Performed by: INTERNAL MEDICINE

## 2021-07-29 PROCEDURE — 99999 PR PBB SHADOW E&M-EST. PATIENT-LVL III: ICD-10-PCS | Mod: PBBFAC,,, | Performed by: INTERNAL MEDICINE

## 2021-07-29 PROCEDURE — 1126F AMNT PAIN NOTED NONE PRSNT: CPT | Mod: CPTII,S$GLB,, | Performed by: INTERNAL MEDICINE

## 2021-07-29 PROCEDURE — 99215 PR OFFICE/OUTPT VISIT, EST, LEVL V, 40-54 MIN: ICD-10-PCS | Mod: S$GLB,,, | Performed by: INTERNAL MEDICINE

## 2021-07-29 PROCEDURE — 99499 UNLISTED E&M SERVICE: CPT | Mod: S$GLB,,, | Performed by: INTERNAL MEDICINE

## 2021-07-29 PROCEDURE — 3288F FALL RISK ASSESSMENT DOCD: CPT | Mod: CPTII,S$GLB,, | Performed by: INTERNAL MEDICINE

## 2021-07-29 PROCEDURE — 3078F PR MOST RECENT DIASTOLIC BLOOD PRESSURE < 80 MM HG: ICD-10-PCS | Mod: CPTII,S$GLB,, | Performed by: INTERNAL MEDICINE

## 2021-07-29 PROCEDURE — 1126F PR PAIN SEVERITY QUANTIFIED, NO PAIN PRESENT: ICD-10-PCS | Mod: CPTII,S$GLB,, | Performed by: INTERNAL MEDICINE

## 2021-07-29 PROCEDURE — 3075F SYST BP GE 130 - 139MM HG: CPT | Mod: CPTII,S$GLB,, | Performed by: INTERNAL MEDICINE

## 2021-07-29 RX ORDER — HYDRALAZINE HYDROCHLORIDE 100 MG/1
100 TABLET, FILM COATED ORAL 2 TIMES DAILY
Qty: 180 TABLET | Refills: 3 | Status: SHIPPED | OUTPATIENT
Start: 2021-07-29 | End: 2022-08-17 | Stop reason: SDUPTHER

## 2021-07-29 RX ORDER — ASPIRIN 325 MG
TABLET, DELAYED RELEASE (ENTERIC COATED) ORAL
Qty: 90 TABLET | Refills: 3 | Status: SHIPPED | OUTPATIENT
Start: 2021-07-29 | End: 2022-08-15

## 2021-08-01 ENCOUNTER — TELEPHONE (OUTPATIENT)
Dept: INTERNAL MEDICINE | Facility: CLINIC | Age: 81
End: 2021-08-01

## 2021-08-03 ENCOUNTER — CLINICAL SUPPORT (OUTPATIENT)
Dept: CARDIOLOGY | Facility: HOSPITAL | Age: 81
End: 2021-08-03
Attending: INTERNAL MEDICINE
Payer: MEDICARE

## 2021-08-03 ENCOUNTER — TELEPHONE (OUTPATIENT)
Dept: INTERNAL MEDICINE | Facility: CLINIC | Age: 81
End: 2021-08-03

## 2021-08-03 DIAGNOSIS — G45.9 TIA (TRANSIENT ISCHEMIC ATTACK): ICD-10-CM

## 2021-08-03 PROCEDURE — 93272 ECG/REVIEW INTERPRET ONLY: CPT | Mod: ,,, | Performed by: INTERNAL MEDICINE

## 2021-08-03 PROCEDURE — 93271 ECG/MONITORING AND ANALYSIS: CPT

## 2021-08-03 PROCEDURE — 93272 CARDIAC EVENT MONITOR (CUPID ONLY): ICD-10-PCS | Mod: ,,, | Performed by: INTERNAL MEDICINE

## 2021-09-17 ENCOUNTER — PATIENT MESSAGE (OUTPATIENT)
Dept: INTERNAL MEDICINE | Facility: CLINIC | Age: 81
End: 2021-09-17

## 2021-09-24 ENCOUNTER — TELEPHONE (OUTPATIENT)
Dept: INTERNAL MEDICINE | Facility: CLINIC | Age: 81
End: 2021-09-24

## 2021-09-24 DIAGNOSIS — G47.30 SLEEP APNEA, UNSPECIFIED TYPE: Primary | ICD-10-CM

## 2021-10-05 ENCOUNTER — IMMUNIZATION (OUTPATIENT)
Dept: INTERNAL MEDICINE | Facility: CLINIC | Age: 81
End: 2021-10-05
Payer: MEDICARE

## 2021-10-05 DIAGNOSIS — Z23 NEED FOR VACCINATION: Primary | ICD-10-CM

## 2021-10-05 PROCEDURE — 91300 COVID-19, MRNA, LNP-S, PF, 30 MCG/0.3 ML DOSE VACCINE: CPT | Mod: PBBFAC | Performed by: INTERNAL MEDICINE

## 2021-10-05 PROCEDURE — 0003A COVID-19, MRNA, LNP-S, PF, 30 MCG/0.3 ML DOSE VACCINE: CPT | Mod: CV19,PBBFAC | Performed by: INTERNAL MEDICINE

## 2021-10-26 ENCOUNTER — OFFICE VISIT (OUTPATIENT)
Dept: INTERNAL MEDICINE | Facility: CLINIC | Age: 81
End: 2021-10-26
Payer: MEDICARE

## 2021-10-26 ENCOUNTER — OFFICE VISIT (OUTPATIENT)
Dept: SLEEP MEDICINE | Facility: CLINIC | Age: 81
End: 2021-10-26
Payer: MEDICARE

## 2021-10-26 VITALS
DIASTOLIC BLOOD PRESSURE: 70 MMHG | OXYGEN SATURATION: 95 % | BODY MASS INDEX: 38.09 KG/M2 | HEART RATE: 66 BPM | RESPIRATION RATE: 16 BRPM | SYSTOLIC BLOOD PRESSURE: 148 MMHG | HEIGHT: 66 IN | WEIGHT: 237 LBS

## 2021-10-26 VITALS
WEIGHT: 237.44 LBS | HEART RATE: 70 BPM | SYSTOLIC BLOOD PRESSURE: 175 MMHG | BODY MASS INDEX: 38.16 KG/M2 | HEIGHT: 66 IN | DIASTOLIC BLOOD PRESSURE: 78 MMHG

## 2021-10-26 DIAGNOSIS — G47.33 OSA (OBSTRUCTIVE SLEEP APNEA): Primary | ICD-10-CM

## 2021-10-26 DIAGNOSIS — G47.10 PERSISTENT DISORDER OF INITIATING OR MAINTAINING WAKEFULNESS: ICD-10-CM

## 2021-10-26 DIAGNOSIS — G47.30 SLEEP APNEA, UNSPECIFIED TYPE: ICD-10-CM

## 2021-10-26 DIAGNOSIS — G47.10 HYPERSOMNOLENCE: ICD-10-CM

## 2021-10-26 DIAGNOSIS — I10 PRIMARY HYPERTENSION: Primary | ICD-10-CM

## 2021-10-26 PROCEDURE — 3078F DIAST BP <80 MM HG: CPT | Mod: CPTII,S$GLB,, | Performed by: PHYSICIAN ASSISTANT

## 2021-10-26 PROCEDURE — 99213 PR OFFICE/OUTPT VISIT, EST, LEVL III, 20-29 MIN: ICD-10-PCS | Mod: S$GLB,,, | Performed by: PHYSICIAN ASSISTANT

## 2021-10-26 PROCEDURE — 3078F PR MOST RECENT DIASTOLIC BLOOD PRESSURE < 80 MM HG: ICD-10-PCS | Mod: CPTII,S$GLB,, | Performed by: INTERNAL MEDICINE

## 2021-10-26 PROCEDURE — 1101F PR PT FALLS ASSESS DOC 0-1 FALLS W/OUT INJ PAST YR: ICD-10-PCS | Mod: CPTII,S$GLB,, | Performed by: INTERNAL MEDICINE

## 2021-10-26 PROCEDURE — 99213 OFFICE O/P EST LOW 20 MIN: CPT | Mod: S$GLB,,, | Performed by: PHYSICIAN ASSISTANT

## 2021-10-26 PROCEDURE — 3288F PR FALLS RISK ASSESSMENT DOCUMENTED: ICD-10-PCS | Mod: CPTII,S$GLB,, | Performed by: INTERNAL MEDICINE

## 2021-10-26 PROCEDURE — 99999 PR PBB SHADOW E&M-EST. PATIENT-LVL III: CPT | Mod: PBBFAC,,, | Performed by: INTERNAL MEDICINE

## 2021-10-26 PROCEDURE — 1159F PR MEDICATION LIST DOCUMENTED IN MEDICAL RECORD: ICD-10-PCS | Mod: CPTII,S$GLB,, | Performed by: PHYSICIAN ASSISTANT

## 2021-10-26 PROCEDURE — 3288F FALL RISK ASSESSMENT DOCD: CPT | Mod: CPTII,S$GLB,, | Performed by: INTERNAL MEDICINE

## 2021-10-26 PROCEDURE — 99999 PR PBB SHADOW E&M-EST. PATIENT-LVL IV: ICD-10-PCS | Mod: PBBFAC,,, | Performed by: PHYSICIAN ASSISTANT

## 2021-10-26 PROCEDURE — 3078F PR MOST RECENT DIASTOLIC BLOOD PRESSURE < 80 MM HG: ICD-10-PCS | Mod: CPTII,S$GLB,, | Performed by: PHYSICIAN ASSISTANT

## 2021-10-26 PROCEDURE — 99999 PR PBB SHADOW E&M-EST. PATIENT-LVL III: ICD-10-PCS | Mod: PBBFAC,,, | Performed by: INTERNAL MEDICINE

## 2021-10-26 PROCEDURE — 1159F PR MEDICATION LIST DOCUMENTED IN MEDICAL RECORD: ICD-10-PCS | Mod: CPTII,S$GLB,, | Performed by: INTERNAL MEDICINE

## 2021-10-26 PROCEDURE — 99204 OFFICE O/P NEW MOD 45 MIN: CPT | Mod: S$GLB,,, | Performed by: INTERNAL MEDICINE

## 2021-10-26 PROCEDURE — 1159F MED LIST DOCD IN RCRD: CPT | Mod: CPTII,S$GLB,, | Performed by: PHYSICIAN ASSISTANT

## 2021-10-26 PROCEDURE — 3077F SYST BP >= 140 MM HG: CPT | Mod: CPTII,S$GLB,, | Performed by: PHYSICIAN ASSISTANT

## 2021-10-26 PROCEDURE — 1101F PT FALLS ASSESS-DOCD LE1/YR: CPT | Mod: CPTII,S$GLB,, | Performed by: PHYSICIAN ASSISTANT

## 2021-10-26 PROCEDURE — 3077F SYST BP >= 140 MM HG: CPT | Mod: CPTII,S$GLB,, | Performed by: INTERNAL MEDICINE

## 2021-10-26 PROCEDURE — 3288F FALL RISK ASSESSMENT DOCD: CPT | Mod: CPTII,S$GLB,, | Performed by: PHYSICIAN ASSISTANT

## 2021-10-26 PROCEDURE — 3078F DIAST BP <80 MM HG: CPT | Mod: CPTII,S$GLB,, | Performed by: INTERNAL MEDICINE

## 2021-10-26 PROCEDURE — 99204 PR OFFICE/OUTPT VISIT, NEW, LEVL IV, 45-59 MIN: ICD-10-PCS | Mod: S$GLB,,, | Performed by: INTERNAL MEDICINE

## 2021-10-26 PROCEDURE — 1160F PR REVIEW ALL MEDS BY PRESCRIBER/CLIN PHARMACIST DOCUMENTED: ICD-10-PCS | Mod: CPTII,S$GLB,, | Performed by: PHYSICIAN ASSISTANT

## 2021-10-26 PROCEDURE — 1126F AMNT PAIN NOTED NONE PRSNT: CPT | Mod: CPTII,S$GLB,, | Performed by: PHYSICIAN ASSISTANT

## 2021-10-26 PROCEDURE — 1126F AMNT PAIN NOTED NONE PRSNT: CPT | Mod: CPTII,S$GLB,, | Performed by: INTERNAL MEDICINE

## 2021-10-26 PROCEDURE — 1126F PR PAIN SEVERITY QUANTIFIED, NO PAIN PRESENT: ICD-10-PCS | Mod: CPTII,S$GLB,, | Performed by: PHYSICIAN ASSISTANT

## 2021-10-26 PROCEDURE — 99999 PR PBB SHADOW E&M-EST. PATIENT-LVL IV: CPT | Mod: PBBFAC,,, | Performed by: PHYSICIAN ASSISTANT

## 2021-10-26 PROCEDURE — 1126F PR PAIN SEVERITY QUANTIFIED, NO PAIN PRESENT: ICD-10-PCS | Mod: CPTII,S$GLB,, | Performed by: INTERNAL MEDICINE

## 2021-10-26 PROCEDURE — 3288F PR FALLS RISK ASSESSMENT DOCUMENTED: ICD-10-PCS | Mod: CPTII,S$GLB,, | Performed by: PHYSICIAN ASSISTANT

## 2021-10-26 PROCEDURE — 1160F RVW MEDS BY RX/DR IN RCRD: CPT | Mod: CPTII,S$GLB,, | Performed by: PHYSICIAN ASSISTANT

## 2021-10-26 PROCEDURE — 1159F MED LIST DOCD IN RCRD: CPT | Mod: CPTII,S$GLB,, | Performed by: INTERNAL MEDICINE

## 2021-10-26 PROCEDURE — 3077F PR MOST RECENT SYSTOLIC BLOOD PRESSURE >= 140 MM HG: ICD-10-PCS | Mod: CPTII,S$GLB,, | Performed by: INTERNAL MEDICINE

## 2021-10-26 PROCEDURE — 1101F PT FALLS ASSESS-DOCD LE1/YR: CPT | Mod: CPTII,S$GLB,, | Performed by: INTERNAL MEDICINE

## 2021-10-26 PROCEDURE — 3077F PR MOST RECENT SYSTOLIC BLOOD PRESSURE >= 140 MM HG: ICD-10-PCS | Mod: CPTII,S$GLB,, | Performed by: PHYSICIAN ASSISTANT

## 2021-10-26 PROCEDURE — 1101F PR PT FALLS ASSESS DOC 0-1 FALLS W/OUT INJ PAST YR: ICD-10-PCS | Mod: CPTII,S$GLB,, | Performed by: PHYSICIAN ASSISTANT

## 2021-11-01 ENCOUNTER — PES CALL (OUTPATIENT)
Dept: ADMINISTRATIVE | Facility: CLINIC | Age: 81
End: 2021-11-01
Payer: MEDICARE

## 2021-11-03 ENCOUNTER — TELEPHONE (OUTPATIENT)
Dept: SLEEP MEDICINE | Facility: OTHER | Age: 81
End: 2021-11-03
Payer: MEDICARE

## 2021-11-09 ENCOUNTER — TELEPHONE (OUTPATIENT)
Dept: SLEEP MEDICINE | Facility: OTHER | Age: 81
End: 2021-11-09
Payer: MEDICARE

## 2021-11-09 ENCOUNTER — OFFICE VISIT (OUTPATIENT)
Dept: INTERNAL MEDICINE | Facility: CLINIC | Age: 81
End: 2021-11-09
Payer: MEDICARE

## 2021-11-09 VITALS
HEART RATE: 72 BPM | RESPIRATION RATE: 16 BRPM | OXYGEN SATURATION: 97 % | DIASTOLIC BLOOD PRESSURE: 60 MMHG | WEIGHT: 238.56 LBS | SYSTOLIC BLOOD PRESSURE: 123 MMHG | BODY MASS INDEX: 38.34 KG/M2 | HEIGHT: 66 IN

## 2021-11-09 DIAGNOSIS — E78.5 HYPERLIPIDEMIA, UNSPECIFIED HYPERLIPIDEMIA TYPE: ICD-10-CM

## 2021-11-09 DIAGNOSIS — I70.0 AORTIC ATHEROSCLEROSIS: ICD-10-CM

## 2021-11-09 DIAGNOSIS — M10.9 INTERVAL GOUT: ICD-10-CM

## 2021-11-09 DIAGNOSIS — I10 PRIMARY HYPERTENSION: ICD-10-CM

## 2021-11-09 DIAGNOSIS — G47.30 SLEEP APNEA, UNSPECIFIED TYPE: ICD-10-CM

## 2021-11-09 DIAGNOSIS — N18.32 STAGE 3B CHRONIC KIDNEY DISEASE: ICD-10-CM

## 2021-11-09 DIAGNOSIS — E04.2 MULTINODULAR GOITER: ICD-10-CM

## 2021-11-09 DIAGNOSIS — Z00.00 ENCOUNTER FOR PREVENTIVE HEALTH EXAMINATION: Primary | ICD-10-CM

## 2021-11-09 DIAGNOSIS — M17.0 PRIMARY OSTEOARTHRITIS OF BOTH KNEES: ICD-10-CM

## 2021-11-09 DIAGNOSIS — Z99.89 DEPENDENCE ON OTHER ENABLING MACHINES AND DEVICES: ICD-10-CM

## 2021-11-09 DIAGNOSIS — E66.01 MORBID (SEVERE) OBESITY DUE TO EXCESS CALORIES: ICD-10-CM

## 2021-11-09 PROCEDURE — G0439 PR MEDICARE ANNUAL WELLNESS SUBSEQUENT VISIT: ICD-10-PCS | Mod: S$GLB,,, | Performed by: PHYSICIAN ASSISTANT

## 2021-11-09 PROCEDURE — 99499 UNLISTED E&M SERVICE: CPT | Mod: S$GLB,,, | Performed by: PHYSICIAN ASSISTANT

## 2021-11-09 PROCEDURE — G0439 PPPS, SUBSEQ VISIT: HCPCS | Mod: S$GLB,,, | Performed by: PHYSICIAN ASSISTANT

## 2021-11-09 PROCEDURE — 1101F PT FALLS ASSESS-DOCD LE1/YR: CPT | Mod: CPTII,S$GLB,, | Performed by: PHYSICIAN ASSISTANT

## 2021-11-09 PROCEDURE — 1159F MED LIST DOCD IN RCRD: CPT | Mod: CPTII,S$GLB,, | Performed by: PHYSICIAN ASSISTANT

## 2021-11-09 PROCEDURE — 3074F SYST BP LT 130 MM HG: CPT | Mod: CPTII,S$GLB,, | Performed by: PHYSICIAN ASSISTANT

## 2021-11-09 PROCEDURE — 3288F FALL RISK ASSESSMENT DOCD: CPT | Mod: CPTII,S$GLB,, | Performed by: PHYSICIAN ASSISTANT

## 2021-11-09 PROCEDURE — 1160F PR REVIEW ALL MEDS BY PRESCRIBER/CLIN PHARMACIST DOCUMENTED: ICD-10-PCS | Mod: CPTII,S$GLB,, | Performed by: PHYSICIAN ASSISTANT

## 2021-11-09 PROCEDURE — 99499 RISK ADDL DX/OHS AUDIT: ICD-10-PCS | Mod: S$GLB,,, | Performed by: PHYSICIAN ASSISTANT

## 2021-11-09 PROCEDURE — 99999 PR PBB SHADOW E&M-EST. PATIENT-LVL III: CPT | Mod: PBBFAC,,, | Performed by: PHYSICIAN ASSISTANT

## 2021-11-09 PROCEDURE — 3078F DIAST BP <80 MM HG: CPT | Mod: CPTII,S$GLB,, | Performed by: PHYSICIAN ASSISTANT

## 2021-11-09 PROCEDURE — 1126F AMNT PAIN NOTED NONE PRSNT: CPT | Mod: CPTII,S$GLB,, | Performed by: PHYSICIAN ASSISTANT

## 2021-11-09 PROCEDURE — 1159F PR MEDICATION LIST DOCUMENTED IN MEDICAL RECORD: ICD-10-PCS | Mod: CPTII,S$GLB,, | Performed by: PHYSICIAN ASSISTANT

## 2021-11-09 PROCEDURE — 3078F PR MOST RECENT DIASTOLIC BLOOD PRESSURE < 80 MM HG: ICD-10-PCS | Mod: CPTII,S$GLB,, | Performed by: PHYSICIAN ASSISTANT

## 2021-11-09 PROCEDURE — 3074F PR MOST RECENT SYSTOLIC BLOOD PRESSURE < 130 MM HG: ICD-10-PCS | Mod: CPTII,S$GLB,, | Performed by: PHYSICIAN ASSISTANT

## 2021-11-09 PROCEDURE — 1160F RVW MEDS BY RX/DR IN RCRD: CPT | Mod: CPTII,S$GLB,, | Performed by: PHYSICIAN ASSISTANT

## 2021-11-09 PROCEDURE — 1126F PR PAIN SEVERITY QUANTIFIED, NO PAIN PRESENT: ICD-10-PCS | Mod: CPTII,S$GLB,, | Performed by: PHYSICIAN ASSISTANT

## 2021-11-09 PROCEDURE — 1101F PR PT FALLS ASSESS DOC 0-1 FALLS W/OUT INJ PAST YR: ICD-10-PCS | Mod: CPTII,S$GLB,, | Performed by: PHYSICIAN ASSISTANT

## 2021-11-09 PROCEDURE — 3288F PR FALLS RISK ASSESSMENT DOCUMENTED: ICD-10-PCS | Mod: CPTII,S$GLB,, | Performed by: PHYSICIAN ASSISTANT

## 2021-11-09 PROCEDURE — 1170F FXNL STATUS ASSESSED: CPT | Mod: CPTII,S$GLB,, | Performed by: PHYSICIAN ASSISTANT

## 2021-11-09 PROCEDURE — 99999 PR PBB SHADOW E&M-EST. PATIENT-LVL III: ICD-10-PCS | Mod: PBBFAC,,, | Performed by: PHYSICIAN ASSISTANT

## 2021-11-09 PROCEDURE — 1170F PR FUNCTIONAL STATUS ASSESSED: ICD-10-PCS | Mod: CPTII,S$GLB,, | Performed by: PHYSICIAN ASSISTANT

## 2021-11-10 ENCOUNTER — HOSPITAL ENCOUNTER (OUTPATIENT)
Dept: SLEEP MEDICINE | Facility: OTHER | Age: 81
Discharge: HOME | End: 2021-11-10
Attending: INTERNAL MEDICINE
Payer: MEDICARE

## 2021-11-10 DIAGNOSIS — G47.30 SLEEP APNEA, UNSPECIFIED TYPE: ICD-10-CM

## 2021-11-10 DIAGNOSIS — G47.33 OSA (OBSTRUCTIVE SLEEP APNEA): ICD-10-CM

## 2021-11-10 DIAGNOSIS — G47.10 HYPERSOMNOLENCE: ICD-10-CM

## 2021-11-10 DIAGNOSIS — G47.10 PERSISTENT DISORDER OF INITIATING OR MAINTAINING WAKEFULNESS: ICD-10-CM

## 2021-11-10 PROBLEM — Z87.898 HISTORY OF PREDIABETES: Status: RESOLVED | Noted: 2017-10-26 | Resolved: 2021-11-10

## 2021-11-10 PROBLEM — E87.5 HYPERKALEMIA: Status: RESOLVED | Noted: 2019-01-11 | Resolved: 2021-11-10

## 2021-11-10 PROBLEM — Z23 NEEDS FLU SHOT: Status: RESOLVED | Noted: 2017-02-01 | Resolved: 2021-11-10

## 2021-11-10 PROBLEM — I70.0 AORTIC ATHEROSCLEROSIS: Status: ACTIVE | Noted: 2021-11-10

## 2021-11-10 PROCEDURE — 95810 PR POLYSOMNOGRAPHY, 4 OR MORE: ICD-10-PCS | Mod: 26,,, | Performed by: INTERNAL MEDICINE

## 2021-11-10 PROCEDURE — 95810 POLYSOM 6/> YRS 4/> PARAM: CPT

## 2021-11-10 PROCEDURE — 95810 POLYSOM 6/> YRS 4/> PARAM: CPT | Mod: 26,,, | Performed by: INTERNAL MEDICINE

## 2021-11-22 ENCOUNTER — PATIENT MESSAGE (OUTPATIENT)
Dept: SLEEP MEDICINE | Facility: CLINIC | Age: 81
End: 2021-11-22
Payer: MEDICARE

## 2021-11-22 DIAGNOSIS — G47.33 OSA (OBSTRUCTIVE SLEEP APNEA): Primary | ICD-10-CM

## 2021-11-24 ENCOUNTER — PATIENT MESSAGE (OUTPATIENT)
Dept: INTERNAL MEDICINE | Facility: CLINIC | Age: 81
End: 2021-11-24
Payer: MEDICARE

## 2021-11-24 ENCOUNTER — OFFICE VISIT (OUTPATIENT)
Dept: INTERNAL MEDICINE | Facility: CLINIC | Age: 81
End: 2021-11-24
Payer: MEDICARE

## 2021-11-24 VITALS
OXYGEN SATURATION: 100 % | DIASTOLIC BLOOD PRESSURE: 70 MMHG | BODY MASS INDEX: 37.84 KG/M2 | HEART RATE: 71 BPM | HEIGHT: 66 IN | WEIGHT: 235.44 LBS | SYSTOLIC BLOOD PRESSURE: 142 MMHG

## 2021-11-24 DIAGNOSIS — M25.511 ACUTE PAIN OF RIGHT SHOULDER: Primary | ICD-10-CM

## 2021-11-24 PROBLEM — R07.9 CHEST PAIN: Status: ACTIVE | Noted: 2021-11-24

## 2021-11-24 PROCEDURE — 99213 PR OFFICE/OUTPT VISIT, EST, LEVL III, 20-29 MIN: ICD-10-PCS | Mod: GC,S$GLB,, | Performed by: STUDENT IN AN ORGANIZED HEALTH CARE EDUCATION/TRAINING PROGRAM

## 2021-11-24 PROCEDURE — 99999 PR PBB SHADOW E&M-EST. PATIENT-LVL III: ICD-10-PCS | Mod: PBBFAC,GC,, | Performed by: STUDENT IN AN ORGANIZED HEALTH CARE EDUCATION/TRAINING PROGRAM

## 2021-11-24 PROCEDURE — 99999 PR PBB SHADOW E&M-EST. PATIENT-LVL III: CPT | Mod: PBBFAC,GC,, | Performed by: STUDENT IN AN ORGANIZED HEALTH CARE EDUCATION/TRAINING PROGRAM

## 2021-11-24 PROCEDURE — 99213 OFFICE O/P EST LOW 20 MIN: CPT | Mod: GC,S$GLB,, | Performed by: STUDENT IN AN ORGANIZED HEALTH CARE EDUCATION/TRAINING PROGRAM

## 2021-12-05 ENCOUNTER — PATIENT MESSAGE (OUTPATIENT)
Dept: INTERNAL MEDICINE | Facility: CLINIC | Age: 81
End: 2021-12-05
Payer: MEDICARE

## 2021-12-10 ENCOUNTER — HOSPITAL ENCOUNTER (OUTPATIENT)
Dept: RADIOLOGY | Facility: HOSPITAL | Age: 81
Discharge: HOME | End: 2021-12-10
Attending: PHYSICIAN ASSISTANT
Payer: MEDICARE

## 2021-12-10 ENCOUNTER — OFFICE VISIT (OUTPATIENT)
Dept: ORTHOPEDICS | Facility: CLINIC | Age: 81
End: 2021-12-10
Payer: MEDICARE

## 2021-12-10 VITALS
SYSTOLIC BLOOD PRESSURE: 159 MMHG | DIASTOLIC BLOOD PRESSURE: 72 MMHG | WEIGHT: 227.88 LBS | HEART RATE: 69 BPM | HEIGHT: 66 IN | BODY MASS INDEX: 36.62 KG/M2

## 2021-12-10 DIAGNOSIS — M79.601 RIGHT ARM PAIN: ICD-10-CM

## 2021-12-10 DIAGNOSIS — M25.511 ACUTE PAIN OF RIGHT SHOULDER: ICD-10-CM

## 2021-12-10 DIAGNOSIS — M79.621 PAIN OF RIGHT UPPER ARM: ICD-10-CM

## 2021-12-10 DIAGNOSIS — M25.511 ACUTE PAIN OF RIGHT SHOULDER: Primary | ICD-10-CM

## 2021-12-10 PROCEDURE — 73030 X-RAY EXAM OF SHOULDER: CPT | Mod: 26,RT,, | Performed by: RADIOLOGY

## 2021-12-10 PROCEDURE — 99999 PR PBB SHADOW E&M-EST. PATIENT-LVL III: CPT | Mod: PBBFAC,,, | Performed by: PHYSICIAN ASSISTANT

## 2021-12-10 PROCEDURE — 73030 XR SHOULDER COMPLETE 2 OR MORE VIEWS RIGHT: ICD-10-PCS | Mod: 26,RT,, | Performed by: RADIOLOGY

## 2021-12-10 PROCEDURE — 73030 X-RAY EXAM OF SHOULDER: CPT | Mod: TC,RT

## 2021-12-10 PROCEDURE — 99999 PR PBB SHADOW E&M-EST. PATIENT-LVL III: ICD-10-PCS | Mod: PBBFAC,,, | Performed by: PHYSICIAN ASSISTANT

## 2021-12-10 PROCEDURE — 99203 PR OFFICE/OUTPT VISIT, NEW, LEVL III, 30-44 MIN: ICD-10-PCS | Mod: S$GLB,,, | Performed by: PHYSICIAN ASSISTANT

## 2021-12-10 PROCEDURE — 99203 OFFICE O/P NEW LOW 30 MIN: CPT | Mod: S$GLB,,, | Performed by: PHYSICIAN ASSISTANT

## 2021-12-10 RX ORDER — MELOXICAM 15 MG/1
15 TABLET ORAL DAILY
Qty: 30 TABLET | Refills: 1 | Status: SHIPPED | OUTPATIENT
Start: 2021-12-10 | End: 2022-01-09

## 2021-12-13 ENCOUNTER — TELEPHONE (OUTPATIENT)
Dept: ORTHOPEDICS | Facility: CLINIC | Age: 81
End: 2021-12-13
Payer: MEDICARE

## 2021-12-29 ENCOUNTER — HOSPITAL ENCOUNTER (OUTPATIENT)
Dept: RADIOLOGY | Facility: HOSPITAL | Age: 81
Discharge: HOME | End: 2021-12-29
Attending: PHYSICIAN ASSISTANT
Payer: MEDICARE

## 2021-12-29 DIAGNOSIS — M79.621 PAIN OF RIGHT UPPER ARM: ICD-10-CM

## 2021-12-29 PROCEDURE — 73218 MRI UPPER EXTREMITY W/O DYE: CPT | Mod: 26,RT,, | Performed by: RADIOLOGY

## 2021-12-29 PROCEDURE — 73218 MRI HUMERUS WITHOUT CONTRAST RIGHT: ICD-10-PCS | Mod: 26,RT,, | Performed by: RADIOLOGY

## 2021-12-29 PROCEDURE — 73218 MRI UPPER EXTREMITY W/O DYE: CPT | Mod: TC,RT

## 2022-01-18 ENCOUNTER — TELEPHONE (OUTPATIENT)
Dept: PODIATRY | Facility: CLINIC | Age: 82
End: 2022-01-18
Payer: MEDICARE

## 2022-01-18 ENCOUNTER — LAB VISIT (OUTPATIENT)
Dept: LAB | Facility: HOSPITAL | Age: 82
End: 2022-01-18
Payer: MEDICARE

## 2022-01-18 ENCOUNTER — OFFICE VISIT (OUTPATIENT)
Dept: INTERNAL MEDICINE | Facility: CLINIC | Age: 82
End: 2022-01-18
Payer: MEDICARE

## 2022-01-18 VITALS
DIASTOLIC BLOOD PRESSURE: 78 MMHG | BODY MASS INDEX: 36.84 KG/M2 | HEART RATE: 77 BPM | OXYGEN SATURATION: 98 % | HEIGHT: 66 IN | SYSTOLIC BLOOD PRESSURE: 132 MMHG | WEIGHT: 229.25 LBS

## 2022-01-18 DIAGNOSIS — L60.0 INGROWN NAIL: ICD-10-CM

## 2022-01-18 DIAGNOSIS — M79.674 TOE PAIN, RIGHT: Primary | ICD-10-CM

## 2022-01-18 DIAGNOSIS — E78.5 HYPERLIPIDEMIA, UNSPECIFIED HYPERLIPIDEMIA TYPE: ICD-10-CM

## 2022-01-18 DIAGNOSIS — N18.32 STAGE 3B CHRONIC KIDNEY DISEASE: ICD-10-CM

## 2022-01-18 DIAGNOSIS — L53.8 PALMAR ERYTHEMA: ICD-10-CM

## 2022-01-18 DIAGNOSIS — L03.039 PARONYCHIA OF GREAT TOE: ICD-10-CM

## 2022-01-18 DIAGNOSIS — M79.674 TOE PAIN, RIGHT: ICD-10-CM

## 2022-01-18 LAB
ALBUMIN SERPL BCP-MCNC: 3.9 G/DL (ref 3.5–5.2)
ALP SERPL-CCNC: 100 U/L (ref 55–135)
ALT SERPL W/O P-5'-P-CCNC: 20 U/L (ref 10–44)
ANION GAP SERPL CALC-SCNC: 11 MMOL/L (ref 8–16)
AST SERPL-CCNC: 23 U/L (ref 10–40)
BASOPHILS # BLD AUTO: 0.04 K/UL (ref 0–0.2)
BASOPHILS NFR BLD: 0.4 % (ref 0–1.9)
BILIRUB SERPL-MCNC: 1.1 MG/DL (ref 0.1–1)
BUN SERPL-MCNC: 31 MG/DL (ref 8–23)
CALCIUM SERPL-MCNC: 10.1 MG/DL (ref 8.7–10.5)
CHLORIDE SERPL-SCNC: 104 MMOL/L (ref 95–110)
CHOLEST SERPL-MCNC: 179 MG/DL (ref 120–199)
CHOLEST/HDLC SERPL: 3.6 {RATIO} (ref 2–5)
CO2 SERPL-SCNC: 24 MMOL/L (ref 23–29)
CREAT SERPL-MCNC: 1.6 MG/DL (ref 0.5–1.4)
CRP SERPL-MCNC: 3.4 MG/L (ref 0–8.2)
DIFFERENTIAL METHOD: ABNORMAL
EOSINOPHIL # BLD AUTO: 0.1 K/UL (ref 0–0.5)
EOSINOPHIL NFR BLD: 1.2 % (ref 0–8)
ERYTHROCYTE [DISTWIDTH] IN BLOOD BY AUTOMATED COUNT: 16.3 % (ref 11.5–14.5)
ERYTHROCYTE [SEDIMENTATION RATE] IN BLOOD BY WESTERGREN METHOD: 25 MM/HR (ref 0–36)
EST. GFR  (AFRICAN AMERICAN): 34.3 ML/MIN/1.73 M^2
EST. GFR  (NON AFRICAN AMERICAN): 29.8 ML/MIN/1.73 M^2
GLUCOSE SERPL-MCNC: 126 MG/DL (ref 70–110)
HCT VFR BLD AUTO: 38.6 % (ref 37–48.5)
HDLC SERPL-MCNC: 50 MG/DL (ref 40–75)
HDLC SERPL: 27.9 % (ref 20–50)
HGB BLD-MCNC: 12.2 G/DL (ref 12–16)
IMM GRANULOCYTES # BLD AUTO: 0.03 K/UL (ref 0–0.04)
IMM GRANULOCYTES NFR BLD AUTO: 0.3 % (ref 0–0.5)
LDLC SERPL CALC-MCNC: 104 MG/DL (ref 63–159)
LYMPHOCYTES # BLD AUTO: 2.8 K/UL (ref 1–4.8)
LYMPHOCYTES NFR BLD: 27.8 % (ref 18–48)
MCH RBC QN AUTO: 28.8 PG (ref 27–31)
MCHC RBC AUTO-ENTMCNC: 31.6 G/DL (ref 32–36)
MCV RBC AUTO: 91 FL (ref 82–98)
MONOCYTES # BLD AUTO: 0.6 K/UL (ref 0.3–1)
MONOCYTES NFR BLD: 5.5 % (ref 4–15)
NEUTROPHILS # BLD AUTO: 6.5 K/UL (ref 1.8–7.7)
NEUTROPHILS NFR BLD: 64.8 % (ref 38–73)
NONHDLC SERPL-MCNC: 129 MG/DL
NRBC BLD-RTO: 0 /100 WBC
PLATELET # BLD AUTO: 316 K/UL (ref 150–450)
PMV BLD AUTO: 10.4 FL (ref 9.2–12.9)
POTASSIUM SERPL-SCNC: 4.9 MMOL/L (ref 3.5–5.1)
PROT SERPL-MCNC: 7.6 G/DL (ref 6–8.4)
RBC # BLD AUTO: 4.24 M/UL (ref 4–5.4)
SODIUM SERPL-SCNC: 139 MMOL/L (ref 136–145)
TRIGL SERPL-MCNC: 125 MG/DL (ref 30–150)
URATE SERPL-MCNC: 4.9 MG/DL (ref 2.4–5.7)
WBC # BLD AUTO: 10.01 K/UL (ref 3.9–12.7)

## 2022-01-18 PROCEDURE — 86140 C-REACTIVE PROTEIN: CPT | Performed by: PHYSICIAN ASSISTANT

## 2022-01-18 PROCEDURE — 99999 PR PBB SHADOW E&M-EST. PATIENT-LVL V: CPT | Mod: PBBFAC,,, | Performed by: PHYSICIAN ASSISTANT

## 2022-01-18 PROCEDURE — 1160F RVW MEDS BY RX/DR IN RCRD: CPT | Mod: CPTII,S$GLB,, | Performed by: PHYSICIAN ASSISTANT

## 2022-01-18 PROCEDURE — 99999 PR PBB SHADOW E&M-EST. PATIENT-LVL V: ICD-10-PCS | Mod: PBBFAC,,, | Performed by: PHYSICIAN ASSISTANT

## 2022-01-18 PROCEDURE — 3288F FALL RISK ASSESSMENT DOCD: CPT | Mod: CPTII,S$GLB,, | Performed by: PHYSICIAN ASSISTANT

## 2022-01-18 PROCEDURE — 85025 COMPLETE CBC W/AUTO DIFF WBC: CPT | Performed by: PHYSICIAN ASSISTANT

## 2022-01-18 PROCEDURE — 80053 COMPREHEN METABOLIC PANEL: CPT | Performed by: PHYSICIAN ASSISTANT

## 2022-01-18 PROCEDURE — 3288F PR FALLS RISK ASSESSMENT DOCUMENTED: ICD-10-PCS | Mod: CPTII,S$GLB,, | Performed by: PHYSICIAN ASSISTANT

## 2022-01-18 PROCEDURE — 80061 LIPID PANEL: CPT | Performed by: INTERNAL MEDICINE

## 2022-01-18 PROCEDURE — 1159F MED LIST DOCD IN RCRD: CPT | Mod: CPTII,S$GLB,, | Performed by: PHYSICIAN ASSISTANT

## 2022-01-18 PROCEDURE — 99214 OFFICE O/P EST MOD 30 MIN: CPT | Mod: S$GLB,,, | Performed by: PHYSICIAN ASSISTANT

## 2022-01-18 PROCEDURE — 1125F PR PAIN SEVERITY QUANTIFIED, PAIN PRESENT: ICD-10-PCS | Mod: CPTII,S$GLB,, | Performed by: PHYSICIAN ASSISTANT

## 2022-01-18 PROCEDURE — 3078F PR MOST RECENT DIASTOLIC BLOOD PRESSURE < 80 MM HG: ICD-10-PCS | Mod: CPTII,S$GLB,, | Performed by: PHYSICIAN ASSISTANT

## 2022-01-18 PROCEDURE — 3075F SYST BP GE 130 - 139MM HG: CPT | Mod: CPTII,S$GLB,, | Performed by: PHYSICIAN ASSISTANT

## 2022-01-18 PROCEDURE — 84550 ASSAY OF BLOOD/URIC ACID: CPT | Performed by: PHYSICIAN ASSISTANT

## 2022-01-18 PROCEDURE — 1125F AMNT PAIN NOTED PAIN PRSNT: CPT | Mod: CPTII,S$GLB,, | Performed by: PHYSICIAN ASSISTANT

## 2022-01-18 PROCEDURE — 36415 COLL VENOUS BLD VENIPUNCTURE: CPT | Performed by: PHYSICIAN ASSISTANT

## 2022-01-18 PROCEDURE — 1101F PR PT FALLS ASSESS DOC 0-1 FALLS W/OUT INJ PAST YR: ICD-10-PCS | Mod: CPTII,S$GLB,, | Performed by: PHYSICIAN ASSISTANT

## 2022-01-18 PROCEDURE — 3075F PR MOST RECENT SYSTOLIC BLOOD PRESS GE 130-139MM HG: ICD-10-PCS | Mod: CPTII,S$GLB,, | Performed by: PHYSICIAN ASSISTANT

## 2022-01-18 PROCEDURE — 1159F PR MEDICATION LIST DOCUMENTED IN MEDICAL RECORD: ICD-10-PCS | Mod: CPTII,S$GLB,, | Performed by: PHYSICIAN ASSISTANT

## 2022-01-18 PROCEDURE — 85652 RBC SED RATE AUTOMATED: CPT | Performed by: PHYSICIAN ASSISTANT

## 2022-01-18 PROCEDURE — 1101F PT FALLS ASSESS-DOCD LE1/YR: CPT | Mod: CPTII,S$GLB,, | Performed by: PHYSICIAN ASSISTANT

## 2022-01-18 PROCEDURE — 99214 PR OFFICE/OUTPT VISIT, EST, LEVL IV, 30-39 MIN: ICD-10-PCS | Mod: S$GLB,,, | Performed by: PHYSICIAN ASSISTANT

## 2022-01-18 PROCEDURE — 1160F PR REVIEW ALL MEDS BY PRESCRIBER/CLIN PHARMACIST DOCUMENTED: ICD-10-PCS | Mod: CPTII,S$GLB,, | Performed by: PHYSICIAN ASSISTANT

## 2022-01-18 PROCEDURE — 3078F DIAST BP <80 MM HG: CPT | Mod: CPTII,S$GLB,, | Performed by: PHYSICIAN ASSISTANT

## 2022-01-18 RX ORDER — MUPIROCIN 20 MG/G
OINTMENT TOPICAL 3 TIMES DAILY
Qty: 30 G | Refills: 0 | Status: SHIPPED | OUTPATIENT
Start: 2022-01-18 | End: 2022-04-26

## 2022-01-18 NOTE — PROGRESS NOTES
"Subjective:       Patient ID: Nelia Littlejohn is a 82 y.o. female.    Chief Complaint: Gout    HPI     Established pt of Arminda Anderson MD     Pt attended by her dtr.     Here with concerns of right great toe pain. Started about 4 to 5 days ago. Painful around the nail, Notes redness and swelling.     She also reports itchiness to her hands with red spots, noticed prior to toenail pain. Comes and goes over the past couple weeks. No hand pain or swelling.     No fevers, cp, sob.         Past Medical History:   Diagnosis Date    Cataract     Elevated alkaline phosphatase level 1/29/2013    Gout, joint     H/O: stroke, left eye, transient blindness, no residual,  9/11/2012 09- Vision has returned. Vision is much better, can read.     High cholesterol     History of prediabetes 10/26/2017    HTN (hypertension) 9/25/2012    Hyperlipemia 9/11/2012    Hypertension     Interval gout 9/11/2012     Social History     Tobacco Use    Smoking status: Never Smoker    Smokeless tobacco: Never Used   Substance Use Topics    Alcohol use: No    Drug use: No     Review of patient's allergies indicates:  No Known Allergies        Review of Systems   Constitutional: Negative for chills, fever and unexpected weight change.   Respiratory: Negative for cough, shortness of breath and wheezing.    Cardiovascular: Negative for chest pain and leg swelling.   Gastrointestinal: Negative for abdominal pain, nausea and vomiting.   Integumentary:  Positive for rash.        As per HPI   Neurological: Negative for weakness and headaches.         Objective: /78   Pulse 77   Ht 5' 6" (1.676 m)   Wt 104 kg (229 lb 4.5 oz)   SpO2 98%   BMI 37.01 kg/m²         Physical Exam  Vitals reviewed.   Constitutional:       General: She is not in acute distress.     Appearance: She is well-developed.   HENT:      Head: Normocephalic and atraumatic.   Cardiovascular:      Rate and Rhythm: Normal rate and regular rhythm. "      Pulses:           Dorsalis pedis pulses are 2+ on the right side and 2+ on the left side.        Posterior tibial pulses are 2+ on the right side and 2+ on the left side.      Heart sounds: No murmur heard.      Pulmonary:      Effort: Pulmonary effort is normal.      Breath sounds: Normal breath sounds. No wheezing or rales.   Abdominal:      General: Bowel sounds are normal.      Palpations: Abdomen is soft.      Tenderness: There is no abdominal tenderness.   Feet:      Right foot:      Skin integrity: Blister and erythema present. No ulcer or warmth.      Toenail Condition: Right toenails are ingrown.   Skin:     General: Skin is warm and dry.      Findings: No rash.      Comments: Patch of palmar erythema to b/l palms. No petechia, warmth, swelling or pain.    Neurological:      Mental Status: She is alert.                                 Assessment:       Problem List Items Addressed This Visit        Renal/    Stage 3 chronic kidney disease      Other Visit Diagnoses     Toe pain, right    -  Primary    Relevant Orders    CBC Auto Differential (Completed)    Uric Acid (Completed)    Ambulatory referral/consult to Podiatry    Paronychia of great toe        Relevant Medications    mupirocin (BACTROBAN) 2 % ointment    Other Relevant Orders    CBC Auto Differential (Completed)    Ambulatory referral/consult to Podiatry    Ingrown nail        Relevant Orders    Ambulatory referral/consult to Podiatry    Palmar erythema        Relevant Orders    Comprehensive Metabolic Panel (Completed)    Sedimentation rate (Completed)    C-reactive protein (Completed)          Plan:           Nelia was seen today for gout.    Diagnoses and all orders for this visit:    Toe pain, right  Ingrown nail  Paronychia of great toe  -     CBC Auto Differential; Future  -     Uric Acid; Future  -     Ambulatory referral/consult to Podiatry; Future  -     mupirocin (BACTROBAN) 2 % ointment; Apply topically 3 (three) times daily.  After warm soak    Palmar erythema  -     Comprehensive Metabolic Panel; Future  -     Sedimentation rate; Future  -     C-reactive protein; Future    CKD:  Stable   Followed by PCP    JENNIFER WestC

## 2022-01-18 NOTE — PATIENT INSTRUCTIONS
"Patient Education       Paronychia   The Basics   Written by the doctors and editors at [facility]   What is paronychia? -- Paronychia is a skin infection that happens around the fingernails or toenails.  You are more likely to get to get this infection if you:  · Push down or trim the skin at the base of the nail (called the "cuticle")  · Bite your nails  · Suck your thumb or finger  People who have jobs that make them keep their hands in water a lot are also more likely to get paronychia.  What are the symptoms of paronychia? -- Symptoms include:  · A painful, swollen area around the nail  · Pus-filled blisters near the nail  Is there a test for paronychia? -- No. There is no test. But your doctor or nurse should be able to tell if you have it by learning about your symptoms and doing an exam.  Is there anything I can do on my own to feel better? -- Yes. Some people feel better if they:  · Soak the affected finger or toe in warm water for 20 minutes, 3 times a day.   · Put triple antibiotic ointment (sample brand names: Neosporin, Triple Antibiotic) on the infected area after soaking it.  How is paronychia treated? -- If the treatments you have tried on your own don't help, your doctor might give you antibiotics to treat the infection.  If you have a pus-filled blister, they might give you a shot to numb your finger or toe and use a needle or sharp tool to open and drain the blister. You will need to soak your finger or toe and take antibiotics after this procedure.  Your doctor might also prescribe other medicines, such as steroids or anti-fungal medicines.  Can paronychia be prevented? -- You can reduce your chances of getting paronychia if you:  · Push your cuticles down gently and do not trim or cut them  · Wear rubber gloves if you need to put your hands in water  All topics are updated as new evidence becomes available and our peer review process is complete.  This topic retrieved from [employer] on: Sep 21, " 2021.  Topic 91195 Version 6.0  Release: 29.4.2 - C29.263  © 2021 UpToDate, Inc. and/or its affiliates. All rights reserved.  picture 1: Paronychia     Paronychia is a skin infection that causes a painful, red, swollen area around a fingernail or toenail. Some people also get pus-filled blisters, as shown in this photo.  Graphic 80090 Version 4.0    Consumer Information Use and Disclaimer   This information is not specific medical advice and does not replace information you receive from your health care provider. This is only a brief summary of general information. It does NOT include all information about conditions, illnesses, injuries, tests, procedures, treatments, therapies, discharge instructions or life-style choices that may apply to you. You must talk with your health care provider for complete information about your health and treatment options. This information should not be used to decide whether or not to accept your health care provider's advice, instructions or recommendations. Only your health care provider has the knowledge and training to provide advice that is right for you. The use of this information is governed by the [employer] End User License Agreement, available at https://www.[employer].Coolture/en/solutions/WP Engine/about/albaro.The use of [employer] content is governed by the [employer] Terms of Use. ©2021 UpToDate, Inc. All rights reserved.  Copyright   © 2021 UpToDate, Inc. and/or its affiliates. All rights reserved.

## 2022-01-18 NOTE — Clinical Note
Ms. Littlejohn saw Podiatry after clinic visit with me with treatment of ingrown toenail/paronychia. I'm unsure of the etiology of the palmar erythema. I placed photos in the chart. Inflammatory lab, cbc, plts are all wnl. As always, I appreciate your feedback.

## 2022-01-20 ENCOUNTER — OFFICE VISIT (OUTPATIENT)
Dept: PODIATRY | Facility: CLINIC | Age: 82
End: 2022-01-20
Payer: MEDICARE

## 2022-01-20 ENCOUNTER — APPOINTMENT (OUTPATIENT)
Dept: RADIOLOGY | Facility: OTHER | Age: 82
End: 2022-01-20
Attending: PODIATRIST
Payer: MEDICARE

## 2022-01-20 VITALS
DIASTOLIC BLOOD PRESSURE: 81 MMHG | BODY MASS INDEX: 36.8 KG/M2 | SYSTOLIC BLOOD PRESSURE: 190 MMHG | HEIGHT: 66 IN | WEIGHT: 229 LBS | HEART RATE: 69 BPM

## 2022-01-20 DIAGNOSIS — M79.674 TOE PAIN, RIGHT: ICD-10-CM

## 2022-01-20 DIAGNOSIS — L02.611 ABSCESS OR CELLULITIS, TOE, RIGHT: Primary | ICD-10-CM

## 2022-01-20 DIAGNOSIS — L03.031 ABSCESS OR CELLULITIS, TOE, RIGHT: ICD-10-CM

## 2022-01-20 DIAGNOSIS — L03.031 ABSCESS OR CELLULITIS, TOE, RIGHT: Primary | ICD-10-CM

## 2022-01-20 DIAGNOSIS — L02.611 ABSCESS OR CELLULITIS, TOE, RIGHT: ICD-10-CM

## 2022-01-20 DIAGNOSIS — L60.0 INGROWN NAIL: ICD-10-CM

## 2022-01-20 DIAGNOSIS — I73.9 PAD (PERIPHERAL ARTERY DISEASE): ICD-10-CM

## 2022-01-20 PROCEDURE — 1159F MED LIST DOCD IN RCRD: CPT | Mod: CPTII,S$GLB,, | Performed by: PODIATRIST

## 2022-01-20 PROCEDURE — 3288F PR FALLS RISK ASSESSMENT DOCUMENTED: ICD-10-PCS | Mod: CPTII,S$GLB,, | Performed by: PODIATRIST

## 2022-01-20 PROCEDURE — 87075 CULTR BACTERIA EXCEPT BLOOD: CPT | Performed by: PODIATRIST

## 2022-01-20 PROCEDURE — 10140 PR DRAINAGE OF HEMATOMA/FLUID: ICD-10-PCS | Mod: S$GLB,,, | Performed by: PODIATRIST

## 2022-01-20 PROCEDURE — 3077F SYST BP >= 140 MM HG: CPT | Mod: CPTII,S$GLB,, | Performed by: PODIATRIST

## 2022-01-20 PROCEDURE — 3077F PR MOST RECENT SYSTOLIC BLOOD PRESSURE >= 140 MM HG: ICD-10-PCS | Mod: CPTII,S$GLB,, | Performed by: PODIATRIST

## 2022-01-20 PROCEDURE — 73630 X-RAY EXAM OF FOOT: CPT | Mod: 26,RT,, | Performed by: RADIOLOGY

## 2022-01-20 PROCEDURE — 1125F PR PAIN SEVERITY QUANTIFIED, PAIN PRESENT: ICD-10-PCS | Mod: CPTII,S$GLB,, | Performed by: PODIATRIST

## 2022-01-20 PROCEDURE — 1159F PR MEDICATION LIST DOCUMENTED IN MEDICAL RECORD: ICD-10-PCS | Mod: CPTII,S$GLB,, | Performed by: PODIATRIST

## 2022-01-20 PROCEDURE — 1125F AMNT PAIN NOTED PAIN PRSNT: CPT | Mod: CPTII,S$GLB,, | Performed by: PODIATRIST

## 2022-01-20 PROCEDURE — 73630 XR FOOT COMPLETE 3 VIEW RIGHT: ICD-10-PCS | Mod: 26,RT,, | Performed by: RADIOLOGY

## 2022-01-20 PROCEDURE — 1160F PR REVIEW ALL MEDS BY PRESCRIBER/CLIN PHARMACIST DOCUMENTED: ICD-10-PCS | Mod: CPTII,S$GLB,, | Performed by: PODIATRIST

## 2022-01-20 PROCEDURE — 1101F PR PT FALLS ASSESS DOC 0-1 FALLS W/OUT INJ PAST YR: ICD-10-PCS | Mod: CPTII,S$GLB,, | Performed by: PODIATRIST

## 2022-01-20 PROCEDURE — 73630 X-RAY EXAM OF FOOT: CPT | Mod: TC,RT

## 2022-01-20 PROCEDURE — 99204 OFFICE O/P NEW MOD 45 MIN: CPT | Mod: 25,S$GLB,, | Performed by: PODIATRIST

## 2022-01-20 PROCEDURE — 1160F RVW MEDS BY RX/DR IN RCRD: CPT | Mod: CPTII,S$GLB,, | Performed by: PODIATRIST

## 2022-01-20 PROCEDURE — 1101F PT FALLS ASSESS-DOCD LE1/YR: CPT | Mod: CPTII,S$GLB,, | Performed by: PODIATRIST

## 2022-01-20 PROCEDURE — 99999 PR PBB SHADOW E&M-EST. PATIENT-LVL III: CPT | Mod: PBBFAC,,, | Performed by: PODIATRIST

## 2022-01-20 PROCEDURE — 3288F FALL RISK ASSESSMENT DOCD: CPT | Mod: CPTII,S$GLB,, | Performed by: PODIATRIST

## 2022-01-20 PROCEDURE — 10140 I&D HMTMA SEROMA/FLUID COLLJ: CPT | Mod: S$GLB,,, | Performed by: PODIATRIST

## 2022-01-20 PROCEDURE — 3079F PR MOST RECENT DIASTOLIC BLOOD PRESSURE 80-89 MM HG: ICD-10-PCS | Mod: CPTII,S$GLB,, | Performed by: PODIATRIST

## 2022-01-20 PROCEDURE — 99999 PR PBB SHADOW E&M-EST. PATIENT-LVL III: ICD-10-PCS | Mod: PBBFAC,,, | Performed by: PODIATRIST

## 2022-01-20 PROCEDURE — 99204 PR OFFICE/OUTPT VISIT, NEW, LEVL IV, 45-59 MIN: ICD-10-PCS | Mod: 25,S$GLB,, | Performed by: PODIATRIST

## 2022-01-20 PROCEDURE — 87070 CULTURE OTHR SPECIMN AEROBIC: CPT | Performed by: PODIATRIST

## 2022-01-20 PROCEDURE — 3079F DIAST BP 80-89 MM HG: CPT | Mod: CPTII,S$GLB,, | Performed by: PODIATRIST

## 2022-01-20 RX ORDER — CLINDAMYCIN HYDROCHLORIDE 300 MG/1
300 CAPSULE ORAL 3 TIMES DAILY
Qty: 30 CAPSULE | Refills: 0 | Status: SHIPPED | OUTPATIENT
Start: 2022-01-20 | End: 2022-01-30

## 2022-01-20 NOTE — PROGRESS NOTES
Subjective:      Patient ID: Nelia Littlejohn is a 82 y.o. female.    Chief Complaint: Ingrown Toenail (Right Great toenail)    Sharp pain redness swelling throbbing right big toe.  Gradual onset, worsening over the past 1 week.  Aggravated by socks, shoes, increased pressure.  Prior treatment in urgent care you will a topical cream and recommendation for daily soaks.  She follows 1 day later here for further workup.  No self-treatment.  Patient denies trauma and surgery of the right foot.    Review of Systems   Constitutional: Negative for chills, diaphoresis, fever, malaise/fatigue and night sweats.   Cardiovascular: Negative for claudication, cyanosis, leg swelling and syncope.   Skin: Positive for nail changes and poor wound healing. Negative for color change, dry skin, rash, suspicious lesions and unusual hair distribution.   Musculoskeletal: Negative for falls, joint pain, joint swelling, muscle cramps, muscle weakness and stiffness.   Gastrointestinal: Negative for constipation, diarrhea, nausea and vomiting.   Neurological: Negative for brief paralysis, disturbances in coordination, focal weakness, numbness, paresthesias, sensory change and tremors.           Objective:      Physical Exam  Constitutional:       General: She is not in acute distress.     Appearance: She is well-developed and well-nourished. She is not diaphoretic.   Cardiovascular:      Pulses:           Popliteal pulses are 2+ on the right side and 2+ on the left side.        Dorsalis pedis pulses are 2+ on the right side and 2+ on the left side.        Posterior tibial pulses are 2+ on the right side and 2+ on the left side.      Comments: Capillary refill 3 seconds all toes/distal feet, all toes/both feet warm to touch.      Negative lymphadenopathy bilateral popliteal fossa and tarsal tunnel.      Negavie lower extremity edema bilateral.    Musculoskeletal:      Right ankle: No swelling, deformity, ecchymosis or lacerations. Normal range  of motion. Normal pulse.      Right Achilles Tendon: Normal. No pain or defects. Hutton's test negative.      Comments: Normal angle, base, station of gait. All ten toes without clubbing, cyanosis, or signs of ischemia.  No pain to palpation bilateral lower extremities.  Range of motion, stability, muscle strength, and muscle tone normal bilateral feet and legs.     Walks with cane for stability.   Lymphadenopathy:      Lower Body: No right inguinal adenopathy. No left inguinal adenopathy.      Comments: Negative lymphadenopathy bilateral popliteal fossa and tarsal tunnel.    Negative lymphangitic streaking bilateral feet/ankles/legs.   Skin:     General: Skin is warm, dry and intact.      Capillary Refill: Capillary refill takes 2 to 3 seconds.      Coloration: Skin is not pale.      Findings: No abrasion, bruising, burn, ecchymosis, erythema, laceration, lesion or rash.      Nails: There is no clubbing or cyanosis.      Comments: Visible and palpable ingrown nail the medial lateral borders of the right hallux with associated redness and swelling of the digit with a bulla on the distal and medial portions of the toe that extends back to the IPJ or just proximal.    There is no vivek pus or malodor tracking or fluctuance deep to the bulla and the tissue on removing the bulla is red and granular with skin lines present in some of the.  Skin is normal age and health appropriate color, turgor, texture, and temperature bilateral lower extremities without ulceration, hyperpigmentation, discoloration, masses nodules or cords palpated.  No ecchymosis, erythema, edema, or cardinal signs of infection bilateral lower extremities.     Neurological:      Mental Status: She is alert and oriented to person, place, and time.      Sensory: No sensory deficit.      Motor: No tremor, atrophy or abnormal muscle tone.      Gait: Gait normal.      Deep Tendon Reflexes: Strength normal.      Reflex Scores:       Patellar reflexes are  2+ on the right side and 2+ on the left side.       Achilles reflexes are 2+ on the right side and 2+ on the left side.     Comments: Negative tinel sign to percussion sural, superficial peroneal, deep peroneal, saphenous, and posterior tibial nerves right and left ankles and feet.    Negative allodynia both feet   Psychiatric:         Mood and Affect: Mood and affect normal.         Behavior: Behavior is cooperative.               Assessment:       Encounter Diagnoses   Name Primary?    Abscess or cellulitis, toe, right Yes    Toe pain, right     Ingrown nail     PAD (peripheral artery disease)          Plan:       Nelia was seen today for ingrown toenail.    Diagnoses and all orders for this visit:    Abscess or cellulitis, toe, right  -     X-Ray Foot Complete Right; Future  -     Aerobic culture  -     Culture, Anaerobic    Toe pain, right  -     X-Ray Foot Complete Right; Future  -     Aerobic culture  -     Culture, Anaerobic    Ingrown nail  -     X-Ray Foot Complete Right; Future  -     Aerobic culture  -     Culture, Anaerobic    PAD (peripheral artery disease)  -     US Lower Extrem Arteries Bilat with EM (xpd); Future    Other orders  -     clindamycin (CLEOCIN) 300 MG capsule; Take 1 capsule (300 mg total) by mouth 3 (three) times daily. for 10 days      I counseled the patient on her conditions, their implications and medical management.    Stop soaking the foot.  Continue top of medication from urgent care.    I trimmed the nail the right hallux removing the offending nail and debris from both borders.  I de roofed the bulla that encompassed the distal and medial portions of the digit as well.  I cultured the deep tissue and medial border which seems to be the place of origin.  Irrigated this well.    Address the right hallux with wound foam Kerlix and wound net.    Dispense surgical shoe right.  Ambulate to tolerance.  Continue to use cane all times for gait assist in to prevent  falls.    X-rays, arterial Dopplers, clindamycin, cultures.    One-week          No follow-ups on file.

## 2022-01-21 ENCOUNTER — TELEPHONE (OUTPATIENT)
Dept: INTERNAL MEDICINE | Facility: CLINIC | Age: 82
End: 2022-01-21
Payer: MEDICARE

## 2022-01-21 DIAGNOSIS — N18.32 STAGE 3B CHRONIC KIDNEY DISEASE: Primary | ICD-10-CM

## 2022-01-21 NOTE — TELEPHONE ENCOUNTER
Called and spoke to pt about lab  Cr bump to 1.6  Advised to increase water intake, AVOID NSAIDS, will repeat BMP on 1/25

## 2022-01-22 LAB — BACTERIA SPEC AEROBE CULT: NORMAL

## 2022-01-24 LAB — BACTERIA SPEC ANAEROBE CULT: NORMAL

## 2022-01-25 ENCOUNTER — HOSPITAL ENCOUNTER (OUTPATIENT)
Dept: RADIOLOGY | Facility: HOSPITAL | Age: 82
Discharge: HOME | End: 2022-01-25
Attending: PODIATRIST
Payer: MEDICARE

## 2022-01-25 DIAGNOSIS — I73.9 PAD (PERIPHERAL ARTERY DISEASE): ICD-10-CM

## 2022-01-25 PROCEDURE — 93925 LOWER EXTREMITY STUDY: CPT | Mod: 26,,, | Performed by: RADIOLOGY

## 2022-01-25 PROCEDURE — 93925 LOWER EXTREMITY STUDY: CPT | Mod: TC

## 2022-01-25 PROCEDURE — 93922 UPR/L XTREMITY ART 2 LEVELS: CPT | Mod: 26,,, | Performed by: RADIOLOGY

## 2022-01-25 PROCEDURE — 93925 US ARTERIAL LOWER EXTREMITY BILAT WITH ABI (XPD): ICD-10-PCS | Mod: 26,,, | Performed by: RADIOLOGY

## 2022-01-25 PROCEDURE — 93922 US ARTERIAL LOWER EXTREMITY BILAT WITH ABI (XPD): ICD-10-PCS | Mod: 26,,, | Performed by: RADIOLOGY

## 2022-01-31 RX ORDER — METOPROLOL TARTRATE AND HYDROCHLOROTHIAZIDE 100; 25 MG/1; MG/1
1 TABLET ORAL EVERY MORNING
Qty: 90 TABLET | Refills: 3 | Status: SHIPPED | OUTPATIENT
Start: 2022-01-31 | End: 2022-10-25

## 2022-01-31 NOTE — TELEPHONE ENCOUNTER
No new care gaps identified.  Powered by [email or web address]. Reference number: [id number].   1/31/2022 2:54:10 PM CST

## 2022-02-01 ENCOUNTER — TELEPHONE (OUTPATIENT)
Dept: ADMINISTRATIVE | Facility: OTHER | Age: 82
End: 2022-02-01
Payer: MEDICARE

## 2022-02-01 NOTE — TELEPHONE ENCOUNTER
Left voice message for patient to return call to schedule appointment from referral to Vascular surgery.  Kayla RODRIGES [phone]

## 2022-02-02 ENCOUNTER — OFFICE VISIT (OUTPATIENT)
Dept: PODIATRY | Facility: CLINIC | Age: 82
End: 2022-02-02
Payer: MEDICARE

## 2022-02-02 VITALS
DIASTOLIC BLOOD PRESSURE: 76 MMHG | HEART RATE: 71 BPM | SYSTOLIC BLOOD PRESSURE: 174 MMHG | BODY MASS INDEX: 36.8 KG/M2 | WEIGHT: 229 LBS | HEIGHT: 66 IN

## 2022-02-02 DIAGNOSIS — L60.0 INGROWN NAIL: ICD-10-CM

## 2022-02-02 DIAGNOSIS — I73.9 PAD (PERIPHERAL ARTERY DISEASE): ICD-10-CM

## 2022-02-02 DIAGNOSIS — L03.031 ABSCESS OR CELLULITIS, TOE, RIGHT: Primary | ICD-10-CM

## 2022-02-02 DIAGNOSIS — L02.611 ABSCESS OR CELLULITIS, TOE, RIGHT: Primary | ICD-10-CM

## 2022-02-02 DIAGNOSIS — M79.674 TOE PAIN, RIGHT: ICD-10-CM

## 2022-02-02 PROCEDURE — 3077F SYST BP >= 140 MM HG: CPT | Mod: CPTII,S$GLB,, | Performed by: PODIATRIST

## 2022-02-02 PROCEDURE — 1101F PT FALLS ASSESS-DOCD LE1/YR: CPT | Mod: CPTII,S$GLB,, | Performed by: PODIATRIST

## 2022-02-02 PROCEDURE — 1126F AMNT PAIN NOTED NONE PRSNT: CPT | Mod: CPTII,S$GLB,, | Performed by: PODIATRIST

## 2022-02-02 PROCEDURE — 99214 OFFICE O/P EST MOD 30 MIN: CPT | Mod: S$GLB,,, | Performed by: PODIATRIST

## 2022-02-02 PROCEDURE — 1159F MED LIST DOCD IN RCRD: CPT | Mod: CPTII,S$GLB,, | Performed by: PODIATRIST

## 2022-02-02 PROCEDURE — 1126F PR PAIN SEVERITY QUANTIFIED, NO PAIN PRESENT: ICD-10-PCS | Mod: CPTII,S$GLB,, | Performed by: PODIATRIST

## 2022-02-02 PROCEDURE — 1159F PR MEDICATION LIST DOCUMENTED IN MEDICAL RECORD: ICD-10-PCS | Mod: CPTII,S$GLB,, | Performed by: PODIATRIST

## 2022-02-02 PROCEDURE — 3288F FALL RISK ASSESSMENT DOCD: CPT | Mod: CPTII,S$GLB,, | Performed by: PODIATRIST

## 2022-02-02 PROCEDURE — 3288F PR FALLS RISK ASSESSMENT DOCUMENTED: ICD-10-PCS | Mod: CPTII,S$GLB,, | Performed by: PODIATRIST

## 2022-02-02 PROCEDURE — 99214 PR OFFICE/OUTPT VISIT, EST, LEVL IV, 30-39 MIN: ICD-10-PCS | Mod: S$GLB,,, | Performed by: PODIATRIST

## 2022-02-02 PROCEDURE — 99999 PR PBB SHADOW E&M-EST. PATIENT-LVL III: ICD-10-PCS | Mod: PBBFAC,,, | Performed by: PODIATRIST

## 2022-02-02 PROCEDURE — 1101F PR PT FALLS ASSESS DOC 0-1 FALLS W/OUT INJ PAST YR: ICD-10-PCS | Mod: CPTII,S$GLB,, | Performed by: PODIATRIST

## 2022-02-02 PROCEDURE — 3078F PR MOST RECENT DIASTOLIC BLOOD PRESSURE < 80 MM HG: ICD-10-PCS | Mod: CPTII,S$GLB,, | Performed by: PODIATRIST

## 2022-02-02 PROCEDURE — 99999 PR PBB SHADOW E&M-EST. PATIENT-LVL III: CPT | Mod: PBBFAC,,, | Performed by: PODIATRIST

## 2022-02-02 PROCEDURE — 3077F PR MOST RECENT SYSTOLIC BLOOD PRESSURE >= 140 MM HG: ICD-10-PCS | Mod: CPTII,S$GLB,, | Performed by: PODIATRIST

## 2022-02-02 PROCEDURE — 3078F DIAST BP <80 MM HG: CPT | Mod: CPTII,S$GLB,, | Performed by: PODIATRIST

## 2022-02-02 NOTE — PROGRESS NOTES
Subjective:      Patient ID: Nelia Littlejohn is a 82 y.o. female.    Chief Complaint: Follow-up (Ingrown toenail)    Cellulitis paronychia and ingrown nail right big toe.  Patient has finished clindamycin still using topical salves applying dressings.  She has ambulates in a surgical shoe today.  Dressing is clean dry and intact.    X-rays last visit negative for osteolysis of the great toe bones right.  Arterial Dopplers did show 50% blockage of bone lower extremity arteries.  We have consulted vascular surgery for this show make her appointment today.    Patient denies trauma and surgery since last visit.    She finished the clindamycin.  Her cultures were covered by clindamycin.    Review of Systems   Constitutional: Negative for chills, diaphoresis, fever, malaise/fatigue and night sweats.   Cardiovascular: Negative for claudication, cyanosis, leg swelling and syncope.   Skin: Positive for nail changes and poor wound healing. Negative for color change, dry skin, rash, suspicious lesions and unusual hair distribution.   Musculoskeletal: Negative for falls, joint pain, joint swelling, muscle cramps, muscle weakness and stiffness.   Gastrointestinal: Negative for constipation, diarrhea, nausea and vomiting.   Neurological: Negative for brief paralysis, disturbances in coordination, focal weakness, numbness, paresthesias, sensory change and tremors.           Objective:      Physical Exam  Constitutional:       General: She is not in acute distress.     Appearance: She is well-developed. She is not diaphoretic.   Cardiovascular:      Pulses:           Popliteal pulses are 2+ on the right side and 2+ on the left side.        Dorsalis pedis pulses are 2+ on the right side and 2+ on the left side.        Posterior tibial pulses are 2+ on the right side and 2+ on the left side.      Comments: Capillary refill 3 seconds all toes/distal feet, all toes/both feet warm to touch.      Negative lymphadenopathy bilateral  popliteal fossa and tarsal tunnel.      Negavie lower extremity edema bilateral.    Musculoskeletal:      Right ankle: No swelling, deformity, ecchymosis or lacerations. Normal range of motion. Normal pulse.      Right Achilles Tendon: Normal. No defects. Hutton's test negative.      Comments: Normal angle, base, station of gait. All ten toes without clubbing, cyanosis, or signs of ischemia.  No pain to palpation bilateral lower extremities.  Range of motion, stability, muscle strength, and muscle tone normal bilateral feet and legs.     Walks with cane for stability.   Lymphadenopathy:      Lower Body: No right inguinal adenopathy. No left inguinal adenopathy.      Comments: Negative lymphadenopathy bilateral popliteal fossa and tarsal tunnel.    Negative lymphangitic streaking bilateral feet/ankles/legs.   Skin:     General: Skin is warm and dry.      Capillary Refill: Capillary refill takes 2 to 3 seconds.      Coloration: Skin is not pale.      Findings: No abrasion, bruising, burn, ecchymosis, erythema, laceration, lesion or rash.      Nails: There is no clubbing.      Comments: Right hallux is completely healed with no open skin drainage pus tracking fluctuance malodor or cardinal signs of infection.  There is incurvation of nail borders medial and laterally without open skin or signs of any growth through the skin.    There is no vivek pus or malodor tracking or fluctuance deep to the bulla and the tissue on removing the bulla is red and granular with skin lines present in some of the.  Skin is normal age and health appropriate color, turgor, texture, and temperature bilateral lower extremities without ulceration, hyperpigmentation, discoloration, masses nodules or cords palpated.  No ecchymosis, erythema, edema, or cardinal signs of infection bilateral lower extremities.     Neurological:      Mental Status: She is alert and oriented to person, place, and time.      Sensory: No sensory deficit.      Motor:  No tremor, atrophy or abnormal muscle tone.      Gait: Gait normal.      Deep Tendon Reflexes:      Reflex Scores:       Patellar reflexes are 2+ on the right side and 2+ on the left side.       Achilles reflexes are 2+ on the right side and 2+ on the left side.     Comments: Negative tinel sign to percussion sural, superficial peroneal, deep peroneal, saphenous, and posterior tibial nerves right and left ankles and feet.    Negative allodynia both feet   Psychiatric:         Behavior: Behavior is cooperative.               Assessment:       No diagnosis found.      Plan:       There are no diagnoses linked to this encounter.  I counseled the patient on her conditions, their implications and medical management.        Patient may discontinue dressings.  Resume activity to tolerance and shoes of choice.    Inspect feet multiple times daily for signs of occurrence/recurrence ulceration.    Consult vascular surgery for recommendation on potential revascularization versus monitoring.          No follow-ups on file.

## 2022-02-03 ENCOUNTER — OFFICE VISIT (OUTPATIENT)
Dept: INTERNAL MEDICINE | Facility: CLINIC | Age: 82
End: 2022-02-03
Payer: MEDICARE

## 2022-02-03 ENCOUNTER — PATIENT MESSAGE (OUTPATIENT)
Dept: INTERNAL MEDICINE | Facility: CLINIC | Age: 82
End: 2022-02-03

## 2022-02-03 ENCOUNTER — TELEPHONE (OUTPATIENT)
Dept: INTERNAL MEDICINE | Facility: CLINIC | Age: 82
End: 2022-02-03
Payer: MEDICARE

## 2022-02-03 VITALS
RESPIRATION RATE: 16 BRPM | HEIGHT: 66 IN | WEIGHT: 229.75 LBS | OXYGEN SATURATION: 99 % | SYSTOLIC BLOOD PRESSURE: 136 MMHG | HEART RATE: 83 BPM | BODY MASS INDEX: 36.92 KG/M2 | DIASTOLIC BLOOD PRESSURE: 63 MMHG | TEMPERATURE: 98 F

## 2022-02-03 DIAGNOSIS — Z86.73 H/O: STROKE: ICD-10-CM

## 2022-02-03 DIAGNOSIS — I73.9 PERIPHERAL VASCULAR DISEASE: ICD-10-CM

## 2022-02-03 DIAGNOSIS — G47.30 SLEEP APNEA, UNSPECIFIED TYPE: Primary | ICD-10-CM

## 2022-02-03 DIAGNOSIS — E66.01 MORBID (SEVERE) OBESITY DUE TO EXCESS CALORIES: ICD-10-CM

## 2022-02-03 DIAGNOSIS — I10 PRIMARY HYPERTENSION: ICD-10-CM

## 2022-02-03 DIAGNOSIS — I70.0 AORTIC ATHEROSCLEROSIS: ICD-10-CM

## 2022-02-03 DIAGNOSIS — N18.31 STAGE 3A CHRONIC KIDNEY DISEASE: ICD-10-CM

## 2022-02-03 PROCEDURE — 99999 PR PBB SHADOW E&M-EST. PATIENT-LVL V: ICD-10-PCS | Mod: PBBFAC,,, | Performed by: INTERNAL MEDICINE

## 2022-02-03 PROCEDURE — 3078F PR MOST RECENT DIASTOLIC BLOOD PRESSURE < 80 MM HG: ICD-10-PCS | Mod: CPTII,S$GLB,, | Performed by: INTERNAL MEDICINE

## 2022-02-03 PROCEDURE — 3288F PR FALLS RISK ASSESSMENT DOCUMENTED: ICD-10-PCS | Mod: CPTII,S$GLB,, | Performed by: INTERNAL MEDICINE

## 2022-02-03 PROCEDURE — 3075F PR MOST RECENT SYSTOLIC BLOOD PRESS GE 130-139MM HG: ICD-10-PCS | Mod: CPTII,S$GLB,, | Performed by: INTERNAL MEDICINE

## 2022-02-03 PROCEDURE — 99999 PR PBB SHADOW E&M-EST. PATIENT-LVL V: CPT | Mod: PBBFAC,,, | Performed by: INTERNAL MEDICINE

## 2022-02-03 PROCEDURE — 3078F DIAST BP <80 MM HG: CPT | Mod: CPTII,S$GLB,, | Performed by: INTERNAL MEDICINE

## 2022-02-03 PROCEDURE — 3288F FALL RISK ASSESSMENT DOCD: CPT | Mod: CPTII,S$GLB,, | Performed by: INTERNAL MEDICINE

## 2022-02-03 PROCEDURE — 1126F PR PAIN SEVERITY QUANTIFIED, NO PAIN PRESENT: ICD-10-PCS | Mod: CPTII,S$GLB,, | Performed by: INTERNAL MEDICINE

## 2022-02-03 PROCEDURE — 1101F PR PT FALLS ASSESS DOC 0-1 FALLS W/OUT INJ PAST YR: ICD-10-PCS | Mod: CPTII,S$GLB,, | Performed by: INTERNAL MEDICINE

## 2022-02-03 PROCEDURE — 1126F AMNT PAIN NOTED NONE PRSNT: CPT | Mod: CPTII,S$GLB,, | Performed by: INTERNAL MEDICINE

## 2022-02-03 PROCEDURE — 99214 OFFICE O/P EST MOD 30 MIN: CPT | Mod: S$GLB,,, | Performed by: INTERNAL MEDICINE

## 2022-02-03 PROCEDURE — 1101F PT FALLS ASSESS-DOCD LE1/YR: CPT | Mod: CPTII,S$GLB,, | Performed by: INTERNAL MEDICINE

## 2022-02-03 PROCEDURE — 3075F SYST BP GE 130 - 139MM HG: CPT | Mod: CPTII,S$GLB,, | Performed by: INTERNAL MEDICINE

## 2022-02-03 PROCEDURE — 99214 PR OFFICE/OUTPT VISIT, EST, LEVL IV, 30-39 MIN: ICD-10-PCS | Mod: S$GLB,,, | Performed by: INTERNAL MEDICINE

## 2022-02-03 NOTE — TELEPHONE ENCOUNTER
----- Message from [employer] sent at 2/3/2022 12:12 PM CST -----  Regarding: Scheduling  Patients daughter would like  to know if Dr. Gongora would take her mother as a new patient. Patient has stated that she has seen provider before.

## 2022-02-03 NOTE — TELEPHONE ENCOUNTER
----- Message from [employer] sent at 2/3/2022 12:11 PM CST -----  Regarding: Scheduling  Patients daughter would like if  would take her mother as a new patient. Patient has stated that she has seen provider before.

## 2022-02-03 NOTE — PATIENT INSTRUCTIONS
From Dr. Walker   an order for a CPAP machine to [facility] (E phone [phone]).  [facility] will check for approval from your insurance then will arrange setting you up with your machine and will guide you through the process of choosing the correct size and type of CPAP mask. Due to a nationwide shortage of CPAP machines due to the recall of [employer] CPAP machines, it may be a month or longer before a machine is available.  You can receive updates on the status of your machine by calling [facility] (phone [phone]).     When getting used to CPAP, for the first 4 weeks, focus on putting the mask on every night for at least 15 minutes to give yourself a chance to fall asleep with it. As you get used to the mask and the pressure, it will grow more comfortable, the quality of your sleep should improve, and you should be able to wear it for longer and longer periods of time. Insurance companies will often require that by the end of 90 days, you are sleeping with the machine for more than 4 hours on 70% nights over a 30 day period.  If you have difficulty with your mask leaking or causing discomfort, please contact the equipment supplier to try a different style of mask.     31 to 90 days after you receive your machine, please schedule an appointment to see me so we can work on any problems you are having. Also, we'll review the information from your machine and  make any adjustments that are needed to help you sleep better. Of course, if you would like a sooner appointment, we will be glad to arrange one.     Please feel free to contact me via [employer] message if you have any questions or concerns.      Washington Walker MD    Last read by Nelia Littlejohn at 6:05 PM on 12/5/2021.

## 2022-02-04 NOTE — TELEPHONE ENCOUNTER
We can take her on.  Please help the daughter set up a visit to establish care.  She needs to bring all pill bottles to that visit, okay?    D

## 2022-02-04 NOTE — TELEPHONE ENCOUNTER
Hi, please call -- I am not accepting new patient, maybe patient can see -- Kahlil Welch, Javid, Jenni  Thank you, Michel Mendes

## 2022-02-04 NOTE — TELEPHONE ENCOUNTER
----- Message from Tessie Feliciano sent at 2/4/2022  1:50 PM CST -----  Contact: PT daughter [phone]--  Pt daughter calling to schedule an NP annual visit and est care  appointment with the doctor.. Per pt states that the doctor approved to see the pt. Please call to advise.

## 2022-02-07 ENCOUNTER — INITIAL CONSULT (OUTPATIENT)
Dept: VASCULAR SURGERY | Facility: CLINIC | Age: 82
End: 2022-02-07
Attending: SURGERY
Payer: MEDICARE

## 2022-02-07 VITALS
HEART RATE: 69 BPM | DIASTOLIC BLOOD PRESSURE: 65 MMHG | BODY MASS INDEX: 37.56 KG/M2 | WEIGHT: 233.69 LBS | TEMPERATURE: 99 F | SYSTOLIC BLOOD PRESSURE: 141 MMHG | HEIGHT: 66 IN

## 2022-02-07 DIAGNOSIS — I73.9 PAD (PERIPHERAL ARTERY DISEASE): ICD-10-CM

## 2022-02-07 PROCEDURE — 99999 PR PBB SHADOW E&M-EST. PATIENT-LVL III: CPT | Mod: PBBFAC,,, | Performed by: SURGERY

## 2022-02-07 PROCEDURE — 99202 PR OFFICE/OUTPT VISIT, NEW, LEVL II, 15-29 MIN: ICD-10-PCS | Mod: S$GLB,,, | Performed by: SURGERY

## 2022-02-07 PROCEDURE — 99202 OFFICE O/P NEW SF 15 MIN: CPT | Mod: S$GLB,,, | Performed by: SURGERY

## 2022-02-07 PROCEDURE — 99999 PR PBB SHADOW E&M-EST. PATIENT-LVL III: ICD-10-PCS | Mod: PBBFAC,,, | Performed by: SURGERY

## 2022-02-07 NOTE — PROGRESS NOTES
Subjective:       Patient ID: Nelia Littlejohn is a 82 y.o. female.    Chief Complaint: Follow-up    This dictation was performed using using [employer].   The patient believes that the problem with her blood pressure being poorly controlled is poor sleep.  She is going to work to get CPAP again and develop better sleep hygiene.  She does not wish to add a new blood pressure medication  HPI  Review of Systems  Review of systems is negative unless noted.  Objective:      Physical Exam  Vitals reviewed.   Constitutional:       Appearance: She is well-nourished.   HENT:      Head: Atraumatic.   Eyes:      General: No scleral icterus.     Conjunctiva/sclera: Conjunctivae normal.   Cardiovascular:      Rate and Rhythm: Normal rate and regular rhythm.   Pulmonary:      Effort: Pulmonary effort is normal.      Breath sounds: Normal breath sounds.   Abdominal:      Palpations: Abdomen is soft.      Tenderness: There is no abdominal tenderness.   Musculoskeletal:         General: No edema.      Cervical back: Neck supple.   Lymphadenopathy:      Cervical: No cervical adenopathy.   Skin:     General: Skin is warm and dry.   Neurological:      Mental Status: She is alert.   Psychiatric:         Mood and Affect: Mood and affect normal.         Behavior: Behavior normal.         Assessment:       1. Sleep apnea, unspecified type    2. Stage 3a chronic kidney disease    3. Morbid (severe) obesity due to excess calories    4. Primary hypertension    5. 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 Aortic atherosclerosis (CT 2014)    7. Peripheral vascular disease        Plan:   Nelia was seen today for follow-up.    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    AMLODIPINE-BENAZEPRIL (LOTREL) 10-40 MG PER CAPSULE    Take 1 capsule by mouth every morning.    ASPIRIN (ECOTRIN) 325 MG EC TABLET    TAKE 1 TABLET(325 MG) BY MOUTH EVERY DAY    ATORVASTATIN (LIPITOR) 80 MG TABLET    TAKE 1 TABLET BY MOUTH EVERY DAY    HYDRALAZINE (APRESOLINE) 100 MG TABLET    Take 1 tablet (100 mg total) by mouth 2 (two) times daily. Blood pressure    METOPROLOL TA-HYDROCHLOROTHIAZ (LOPRESSOR HCT) 100-25 MG PER TABLET    TAKE 1 TABLET BY MOUTH EVERY MORNING    MULTIVIT-IRON-MIN-FOLIC ACID 3,500-18-0.4 UNIT-MG-MG ORAL CHEW    Take by mouth once daily.    MUPIROCIN (BACTROBAN) 2 % OINTMENT    Apply topically 3 (three) times daily. After warm soak     Follow up estab care new PCP.

## 2022-02-07 NOTE — PROGRESS NOTES
VASCULAR SURGERY NOTE    Patient ID: Nelia Littlejohn is a 82 y.o. female.    I. HISTORY     Chief Complaint: PAD    HPI: Nelia Littlejohn is a 82 y.o. female who is here today for new patient initial appointment. She is referred by Dr. Mccurdy for evaluation for PAD. She denies any claudication. She had an u/s which reported SFA stenosis. She saw Dr. Mccurdy for ingrown toenail of right big toe. She is able to ambulate on her own without assistance.     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 Vision is much better, can read.     High cholesterol     History of prediabetes 10/26/2017    HTN (hypertension) 9/25/2012    Hyperlipemia 9/11/2012    Hypertension     Interval gout 9/11/2012        Past Surgical History:   Procedure Laterality Date    CATARACT EXTRACTION Bilateral     EYE SURGERY      HYSTERECTOMY      Yag capsulotomy left eye  10-       Social History     Occupational History    Not on file   Tobacco Use    Smoking status: Never Smoker    Smokeless tobacco: Never Used   Substance and Sexual Activity    Alcohol use: No    Drug use: No    Sexual activity: Not Currently         Review of Systems   Constitutional: Negative for weight loss.   HENT: Negative for ear pain and nosebleeds.    Eyes: Negative for discharge and pain.   Cardiovascular: Negative for chest pain and palpitations.   Respiratory: Negative for cough, shortness of breath and wheezing.    Endocrine: Negative for cold intolerance, heat intolerance and polyphagia.   Hematologic/Lymphatic: Negative for adenopathy. Does not bruise/bleed easily.   Skin: Negative for itching and rash.   Musculoskeletal: Negative for joint swelling and muscle cramps.    Gastrointestinal: Negative for abdominal pain, diarrhea, nausea and vomiting.   Genitourinary: Negative for dysuria and flank pain.   Neurological: Negative for numbness and seizures.         II. PHYSICAL EXAM     Physical Exam  Constitutional:       General: She is not in acute distress.     Appearance: Normal appearance. She is obese. She is not ill-appearing or diaphoretic.   HENT:      Head: Normocephalic and atraumatic.   Eyes:      General: No scleral icterus.        Right eye: No discharge.         Left eye: No discharge.      Extraocular Movements: Extraocular movements intact.      Conjunctiva/sclera: Conjunctivae normal.   Cardiovascular:      Rate and Rhythm: Normal rate and regular rhythm.      Pulses: Normal pulses.   Pulmonary:      Effort: Pulmonary effort is normal. No respiratory distress.   Musculoskeletal:         General: Normal range of motion.      Cervical back: Normal range of motion and neck supple.      Right lower leg: No edema.      Left lower leg: No edema.   Skin:     General: Skin is warm and dry.      Coloration: Skin is not jaundiced or pale.      Findings: No erythema or rash.   Neurological:      General: No focal deficit present.      Mental Status: She is alert and oriented to person, place, and time.   Psychiatric:         Mood and Affect: Mood normal.         Behavior: Behavior normal.       VASC:  RLE: 2+ DP/PT pulse  LLE: 2+ DP/PT pulse    III. ASSESSMENT & PLAN (MEDICAL DECISION MAKING)       Imaging Results: (I have personally reviewed all images and provided interpretation below)  BLE Arterial Duplex: No evidence of significant stenosis. Triphasic waveforms throughout with brisk upstrokes.    EM:  R L   0.9 0.94         Assessment/Diagnosis and Plan:    1. PAD (peripheral artery disease)        82 y.o. female with adequate perfusion for wound healing. Although u/s shows elevated velocities, there is no evidence of hemodynamically significant stenosis. EM's are adequate  for wound healing and pedal pulses are palpable.    -Secondary prevention of atherosclerotic risk factors: Goal BP <140/90, goal LDL <100   -ASA 81mg daily for prevention of MI, stroke, and acute limb ischemia in setting of known PAD risk factors  -High intensity statin (atorvastatin 40mg or rosuvastatin 20mg)  -RTC PRN    JOE Tipton II, MD, [facility]  Vascular Surgery  [facility]

## 2022-03-24 ENCOUNTER — OFFICE VISIT (OUTPATIENT)
Dept: SLEEP MEDICINE | Facility: CLINIC | Age: 82
End: 2022-03-24
Payer: MEDICARE

## 2022-03-24 DIAGNOSIS — G47.33 OSA (OBSTRUCTIVE SLEEP APNEA): Primary | ICD-10-CM

## 2022-03-24 DIAGNOSIS — G47.10 HYPERSOMNOLENCE: ICD-10-CM

## 2022-03-24 DIAGNOSIS — F51.09 OTHER INSOMNIA NOT DUE TO A SUBSTANCE OR KNOWN PHYSIOLOGICAL CONDITION: ICD-10-CM

## 2022-03-24 DIAGNOSIS — R35.1 NOCTURIA: ICD-10-CM

## 2022-03-24 PROCEDURE — 99999 PR PBB SHADOW E&M-EST. PATIENT-LVL II: ICD-10-PCS | Mod: PBBFAC,,, | Performed by: INTERNAL MEDICINE

## 2022-03-24 PROCEDURE — 1126F AMNT PAIN NOTED NONE PRSNT: CPT | Mod: CPTII,S$GLB,, | Performed by: INTERNAL MEDICINE

## 2022-03-24 PROCEDURE — 99214 OFFICE O/P EST MOD 30 MIN: CPT | Mod: S$GLB,,, | Performed by: INTERNAL MEDICINE

## 2022-03-24 PROCEDURE — 3288F FALL RISK ASSESSMENT DOCD: CPT | Mod: CPTII,S$GLB,, | Performed by: INTERNAL MEDICINE

## 2022-03-24 PROCEDURE — 99214 PR OFFICE/OUTPT VISIT, EST, LEVL IV, 30-39 MIN: ICD-10-PCS | Mod: S$GLB,,, | Performed by: INTERNAL MEDICINE

## 2022-03-24 PROCEDURE — 3288F PR FALLS RISK ASSESSMENT DOCUMENTED: ICD-10-PCS | Mod: CPTII,S$GLB,, | Performed by: INTERNAL MEDICINE

## 2022-03-24 PROCEDURE — 1101F PT FALLS ASSESS-DOCD LE1/YR: CPT | Mod: CPTII,S$GLB,, | Performed by: INTERNAL MEDICINE

## 2022-03-24 PROCEDURE — 1101F PR PT FALLS ASSESS DOC 0-1 FALLS W/OUT INJ PAST YR: ICD-10-PCS | Mod: CPTII,S$GLB,, | Performed by: INTERNAL MEDICINE

## 2022-03-24 PROCEDURE — 1126F PR PAIN SEVERITY QUANTIFIED, NO PAIN PRESENT: ICD-10-PCS | Mod: CPTII,S$GLB,, | Performed by: INTERNAL MEDICINE

## 2022-03-24 PROCEDURE — 1159F PR MEDICATION LIST DOCUMENTED IN MEDICAL RECORD: ICD-10-PCS | Mod: CPTII,S$GLB,, | Performed by: INTERNAL MEDICINE

## 2022-03-24 PROCEDURE — 99999 PR PBB SHADOW E&M-EST. PATIENT-LVL II: CPT | Mod: PBBFAC,,, | Performed by: INTERNAL MEDICINE

## 2022-03-24 PROCEDURE — 1159F MED LIST DOCD IN RCRD: CPT | Mod: CPTII,S$GLB,, | Performed by: INTERNAL MEDICINE

## 2022-03-24 NOTE — PROGRESS NOTES
ESTABLISHED PATIENT VISIT    Nelia Littlejohn  is a pleasant 82 y.o. female  with PMH significant for stroke in 2016, evaluated for possible TIA (2021, unconfirmed), HLD, HTN, CKD III, ANUM dx 2004(not on CPAP-intolerance of FFM)  .    Here today for follow-up    PLAN last visit:   -will proceed with sleep testing (will try nasal mask)  -discussed trial of PAP therapy if ANUM present   -driving precautions were discussed with the patient    Since last visit:     PSG Date 11.10.21, AHI = 88.8, delayed SOL (sleep onset RERAs)  ordered auto 5-12    PAP history   Problems Had trouble   Mask FFM (leak)   Nasal (does well with it), occasional dry mouth    Pressure comfortable   Benefit Sleeping better   DME HME   Machine age Early 2022   Download 3/15/22: 27/30 x 9h 15min, 5-12(10.5/11.7/11.9), leak 7/29/75, AHI 1.3       SLEEP SCHEDULE   Bed Time  23:00-23:30   Sleep Latency Not long   Arousals 0-1   Back to sleep 15 min   Wake time 10-11AM   Naps none   Nocturia none   Work Retired       There were no vitals filed for this visit.  Physical Exam:    GEN:   Well-appearing  Psych:  Appropriate affect, demonstrates insight  SKIN:  No rash on the face or bridge of the nose    LABS:   Lab Results   Component Value Date    HGB 12.2 01/18/2022    CO2 23 01/25/2022       RECORDS REVIEWED:        ASSESSMENT    No flowsheet data found.  PROBLEM DESCRIPTION Interval HX Status   Very severe ANUM   + snoring and snoring arousals  HEENT: MP3, + oropharynx narrow in A-P dimension and + narrow pharyngeal opening Good usage and efficacy controlled   CPAP intolerance Had FFM which leaked   Doing well with nasal mask Doing better    sleepiness   + sleepiness when inactive   denies sleepiness when driving (does not drive)  ESS 16/24 on intake Less sleepy  No longer needing naps improved   Insomnia   Trouble falling and staying asleep Sleeping through the night improved   Comorbidities: HTN, hx of CVA    PLAN     Using and benefitting  -will  continue current pressures 5-12 cwp.   -the patient is using and benefiting from PAP therapy      RTC          The patient was given open opportunity to ask questions and/or express concerns about treatment plan.   All questions/concerns were discussed.     Two patient identifiers used prior to evaluation.

## 2022-04-26 ENCOUNTER — IMMUNIZATION (OUTPATIENT)
Dept: INTERNAL MEDICINE | Facility: CLINIC | Age: 82
End: 2022-04-26
Payer: MEDICARE

## 2022-04-26 ENCOUNTER — OFFICE VISIT (OUTPATIENT)
Dept: INTERNAL MEDICINE | Facility: CLINIC | Age: 82
End: 2022-04-26
Payer: MEDICARE

## 2022-04-26 VITALS
HEIGHT: 66 IN | SYSTOLIC BLOOD PRESSURE: 138 MMHG | WEIGHT: 236.31 LBS | DIASTOLIC BLOOD PRESSURE: 66 MMHG | OXYGEN SATURATION: 98 % | BODY MASS INDEX: 37.98 KG/M2 | HEART RATE: 67 BPM

## 2022-04-26 DIAGNOSIS — I10 ESSENTIAL HYPERTENSION: Primary | ICD-10-CM

## 2022-04-26 DIAGNOSIS — G47.33 OBSTRUCTIVE SLEEP APNEA SYNDROME: ICD-10-CM

## 2022-04-26 DIAGNOSIS — Z87.898 HISTORY OF PREDIABETES: ICD-10-CM

## 2022-04-26 DIAGNOSIS — Z86.73 H/O: STROKE: ICD-10-CM

## 2022-04-26 DIAGNOSIS — N18.31 STAGE 3A CHRONIC KIDNEY DISEASE: ICD-10-CM

## 2022-04-26 DIAGNOSIS — M10.9 INTERVAL GOUT: ICD-10-CM

## 2022-04-26 DIAGNOSIS — Z23 NEED FOR VACCINATION: Primary | ICD-10-CM

## 2022-04-26 DIAGNOSIS — E78.2 MIXED HYPERLIPIDEMIA: ICD-10-CM

## 2022-04-26 DIAGNOSIS — M17.0 PRIMARY OSTEOARTHRITIS OF BOTH KNEES: ICD-10-CM

## 2022-04-26 PROBLEM — M62.81 QUADRICEPS WEAKNESS: Status: RESOLVED | Noted: 2021-05-07 | Resolved: 2022-04-26

## 2022-04-26 PROBLEM — M25.661 DECREASED RANGE OF MOTION OF RIGHT KNEE: Status: RESOLVED | Noted: 2021-05-07 | Resolved: 2022-04-26

## 2022-04-26 PROCEDURE — 1126F AMNT PAIN NOTED NONE PRSNT: CPT | Mod: CPTII,S$GLB,, | Performed by: INTERNAL MEDICINE

## 2022-04-26 PROCEDURE — 99999 PR PBB SHADOW E&M-EST. PATIENT-LVL III: CPT | Mod: PBBFAC,,, | Performed by: INTERNAL MEDICINE

## 2022-04-26 PROCEDURE — 99214 OFFICE O/P EST MOD 30 MIN: CPT | Mod: S$GLB,,, | Performed by: INTERNAL MEDICINE

## 2022-04-26 PROCEDURE — 3288F PR FALLS RISK ASSESSMENT DOCUMENTED: ICD-10-PCS | Mod: CPTII,S$GLB,, | Performed by: INTERNAL MEDICINE

## 2022-04-26 PROCEDURE — 1159F PR MEDICATION LIST DOCUMENTED IN MEDICAL RECORD: ICD-10-PCS | Mod: CPTII,S$GLB,, | Performed by: INTERNAL MEDICINE

## 2022-04-26 PROCEDURE — 3075F SYST BP GE 130 - 139MM HG: CPT | Mod: CPTII,S$GLB,, | Performed by: INTERNAL MEDICINE

## 2022-04-26 PROCEDURE — 1160F PR REVIEW ALL MEDS BY PRESCRIBER/CLIN PHARMACIST DOCUMENTED: ICD-10-PCS | Mod: CPTII,S$GLB,, | Performed by: INTERNAL MEDICINE

## 2022-04-26 PROCEDURE — 91300 COVID-19, MRNA, LNP-S, PF, 30 MCG/0.3 ML DOSE VACCINE: CPT | Mod: PBBFAC | Performed by: INTERNAL MEDICINE

## 2022-04-26 PROCEDURE — 99214 PR OFFICE/OUTPT VISIT, EST, LEVL IV, 30-39 MIN: ICD-10-PCS | Mod: S$GLB,,, | Performed by: INTERNAL MEDICINE

## 2022-04-26 PROCEDURE — 3078F DIAST BP <80 MM HG: CPT | Mod: CPTII,S$GLB,, | Performed by: INTERNAL MEDICINE

## 2022-04-26 PROCEDURE — 99999 PR PBB SHADOW E&M-EST. PATIENT-LVL III: ICD-10-PCS | Mod: PBBFAC,,, | Performed by: INTERNAL MEDICINE

## 2022-04-26 PROCEDURE — 1101F PR PT FALLS ASSESS DOC 0-1 FALLS W/OUT INJ PAST YR: ICD-10-PCS | Mod: CPTII,S$GLB,, | Performed by: INTERNAL MEDICINE

## 2022-04-26 PROCEDURE — 3288F FALL RISK ASSESSMENT DOCD: CPT | Mod: CPTII,S$GLB,, | Performed by: INTERNAL MEDICINE

## 2022-04-26 PROCEDURE — 1160F RVW MEDS BY RX/DR IN RCRD: CPT | Mod: CPTII,S$GLB,, | Performed by: INTERNAL MEDICINE

## 2022-04-26 PROCEDURE — 1159F MED LIST DOCD IN RCRD: CPT | Mod: CPTII,S$GLB,, | Performed by: INTERNAL MEDICINE

## 2022-04-26 PROCEDURE — 3075F PR MOST RECENT SYSTOLIC BLOOD PRESS GE 130-139MM HG: ICD-10-PCS | Mod: CPTII,S$GLB,, | Performed by: INTERNAL MEDICINE

## 2022-04-26 PROCEDURE — 1126F PR PAIN SEVERITY QUANTIFIED, NO PAIN PRESENT: ICD-10-PCS | Mod: CPTII,S$GLB,, | Performed by: INTERNAL MEDICINE

## 2022-04-26 PROCEDURE — 3078F PR MOST RECENT DIASTOLIC BLOOD PRESSURE < 80 MM HG: ICD-10-PCS | Mod: CPTII,S$GLB,, | Performed by: INTERNAL MEDICINE

## 2022-04-26 PROCEDURE — 1101F PT FALLS ASSESS-DOCD LE1/YR: CPT | Mod: CPTII,S$GLB,, | Performed by: INTERNAL MEDICINE

## 2022-04-26 NOTE — PROGRESS NOTES
Subjective:       Patient ID: Nelia Littlejohn is a 82 y.o. female.    Chief Complaint:   Annual Exam    HPI - Here for annual/establish care.  She was seeing Dr. Gutiérrez who retired.  Ms Littlejohn is a never-smoker, non-drinker.  She doesn't use illicit substances.  She is retired from a job at the [employer].    PMH:  HTN  HLP  CVA 2021  OA, s/p L TKR  ANUM on cpap  Pre-DM    Meds:  Reviewed and reconciled in EPIC with patient during visit today.    Review of Systems   Constitutional: Negative for fever.   HENT: Negative for congestion.    Respiratory: Positive for shortness of breath (when walking).    Cardiovascular: Negative for chest pain.   Gastrointestinal: Negative for abdominal pain.   Genitourinary: Negative for difficulty urinating.   Musculoskeletal: Positive for arthralgias and gait problem.   Skin: Negative for rash.   Neurological: Negative for headaches.   Psychiatric/Behavioral: Negative for sleep disturbance.       Objective:      Physical Exam  Vitals reviewed.   Constitutional:       Appearance: She is well-developed. She is obese.      Comments: Large, friendly woman accompanied by attentive grand-daughter   HENT:      Head: Normocephalic and atraumatic.   Cardiovascular:      Rate and Rhythm: Normal rate and regular rhythm.      Heart sounds: Normal heart sounds. No murmur heard.    No friction rub. No gallop.   Pulmonary:      Effort: Pulmonary effort is normal. No respiratory distress.      Breath sounds: Normal breath sounds. No wheezing or rales.   Chest:      Chest wall: No tenderness.   Skin:     General: Skin is warm and dry.      Findings: No erythema.   Neurological:      Mental Status: She is alert and oriented to person, place, and time.         Assessment:       1. Essential hypertension    2. Obstructive sleep apnea syndrome    3. Mixed hyperlipidemia    4. 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 Interval gout    6. Primary osteoarthritis of both  knees    7. History of prediabetes 9/2016    8. Stage 3a chronic kidney disease        Plan:       Nelia was seen today for annual exam.    Diagnoses and all orders for this visit:    Essential hypertension - at goal, stay the course    Obstructive sleep apnea syndrome - on cpap    Mixed hyperlipidemia - stable on a statin    H/O: stroke, left eye, transient blindness, no residual,  - stable    Interval gout - on treatment    Primary osteoarthritis of both knees    History of prediabetes 9/2016    Stage 3a chronic kidney disease - stable on labs earlier this year    rtc prn, or in 6 months    G Diana Gongora MD MPH  Staff Internist

## 2022-05-05 ENCOUNTER — PES CALL (OUTPATIENT)
Dept: ADMINISTRATIVE | Facility: CLINIC | Age: 82
End: 2022-05-05
Payer: MEDICARE

## 2022-05-09 ENCOUNTER — TELEPHONE (OUTPATIENT)
Dept: ADMINISTRATIVE | Facility: CLINIC | Age: 82
End: 2022-05-09
Payer: MEDICARE

## 2022-05-10 ENCOUNTER — PES CALL (OUTPATIENT)
Dept: ADMINISTRATIVE | Facility: CLINIC | Age: 82
End: 2022-05-10
Payer: MEDICARE

## 2022-05-16 ENCOUNTER — OFFICE VISIT (OUTPATIENT)
Dept: INTERNAL MEDICINE | Facility: CLINIC | Age: 82
End: 2022-05-16
Payer: MEDICARE

## 2022-05-16 VITALS
HEART RATE: 74 BPM | DIASTOLIC BLOOD PRESSURE: 64 MMHG | HEIGHT: 66 IN | SYSTOLIC BLOOD PRESSURE: 140 MMHG | RESPIRATION RATE: 16 BRPM | OXYGEN SATURATION: 98 % | WEIGHT: 237.19 LBS | BODY MASS INDEX: 38.12 KG/M2

## 2022-05-16 DIAGNOSIS — I73.9 PERIPHERAL VASCULAR DISEASE: ICD-10-CM

## 2022-05-16 DIAGNOSIS — I70.0 AORTIC ATHEROSCLEROSIS: ICD-10-CM

## 2022-05-16 DIAGNOSIS — M17.0 PRIMARY OSTEOARTHRITIS OF BOTH KNEES: ICD-10-CM

## 2022-05-16 DIAGNOSIS — M10.9 INTERVAL GOUT: ICD-10-CM

## 2022-05-16 DIAGNOSIS — E78.2 MIXED HYPERLIPIDEMIA: ICD-10-CM

## 2022-05-16 DIAGNOSIS — Z00.00 ENCOUNTER FOR PREVENTIVE HEALTH EXAMINATION: Primary | ICD-10-CM

## 2022-05-16 DIAGNOSIS — E66.01 MORBID (SEVERE) OBESITY DUE TO EXCESS CALORIES: ICD-10-CM

## 2022-05-16 DIAGNOSIS — N18.31 STAGE 3A CHRONIC KIDNEY DISEASE: ICD-10-CM

## 2022-05-16 DIAGNOSIS — G47.33 OBSTRUCTIVE SLEEP APNEA SYNDROME: ICD-10-CM

## 2022-05-16 DIAGNOSIS — I10 ESSENTIAL HYPERTENSION: ICD-10-CM

## 2022-05-16 PROCEDURE — 1170F PR FUNCTIONAL STATUS ASSESSED: ICD-10-PCS | Mod: CPTII,S$GLB,, | Performed by: PHYSICIAN ASSISTANT

## 2022-05-16 PROCEDURE — 99499 UNLISTED E&M SERVICE: CPT | Mod: S$GLB,,, | Performed by: PHYSICIAN ASSISTANT

## 2022-05-16 PROCEDURE — 3288F FALL RISK ASSESSMENT DOCD: CPT | Mod: CPTII,S$GLB,, | Performed by: PHYSICIAN ASSISTANT

## 2022-05-16 PROCEDURE — 1159F MED LIST DOCD IN RCRD: CPT | Mod: CPTII,S$GLB,, | Performed by: PHYSICIAN ASSISTANT

## 2022-05-16 PROCEDURE — 3078F PR MOST RECENT DIASTOLIC BLOOD PRESSURE < 80 MM HG: ICD-10-PCS | Mod: CPTII,S$GLB,, | Performed by: PHYSICIAN ASSISTANT

## 2022-05-16 PROCEDURE — 99999 PR PBB SHADOW E&M-EST. PATIENT-LVL IV: CPT | Mod: PBBFAC,,, | Performed by: PHYSICIAN ASSISTANT

## 2022-05-16 PROCEDURE — 1160F PR REVIEW ALL MEDS BY PRESCRIBER/CLIN PHARMACIST DOCUMENTED: ICD-10-PCS | Mod: CPTII,S$GLB,, | Performed by: PHYSICIAN ASSISTANT

## 2022-05-16 PROCEDURE — 99214 PR OFFICE/OUTPT VISIT, EST, LEVL IV, 30-39 MIN: ICD-10-PCS | Mod: S$GLB,,, | Performed by: PHYSICIAN ASSISTANT

## 2022-05-16 PROCEDURE — 1159F PR MEDICATION LIST DOCUMENTED IN MEDICAL RECORD: ICD-10-PCS | Mod: CPTII,S$GLB,, | Performed by: PHYSICIAN ASSISTANT

## 2022-05-16 PROCEDURE — 1101F PR PT FALLS ASSESS DOC 0-1 FALLS W/OUT INJ PAST YR: ICD-10-PCS | Mod: CPTII,S$GLB,, | Performed by: PHYSICIAN ASSISTANT

## 2022-05-16 PROCEDURE — 3077F PR MOST RECENT SYSTOLIC BLOOD PRESSURE >= 140 MM HG: ICD-10-PCS | Mod: CPTII,S$GLB,, | Performed by: PHYSICIAN ASSISTANT

## 2022-05-16 PROCEDURE — 99999 PR PBB SHADOW E&M-EST. PATIENT-LVL IV: ICD-10-PCS | Mod: PBBFAC,,, | Performed by: PHYSICIAN ASSISTANT

## 2022-05-16 PROCEDURE — 3078F DIAST BP <80 MM HG: CPT | Mod: CPTII,S$GLB,, | Performed by: PHYSICIAN ASSISTANT

## 2022-05-16 PROCEDURE — 1160F RVW MEDS BY RX/DR IN RCRD: CPT | Mod: CPTII,S$GLB,, | Performed by: PHYSICIAN ASSISTANT

## 2022-05-16 PROCEDURE — 99499 RISK ADDL DX/OHS AUDIT: ICD-10-PCS | Mod: S$GLB,,, | Performed by: PHYSICIAN ASSISTANT

## 2022-05-16 PROCEDURE — 99214 OFFICE O/P EST MOD 30 MIN: CPT | Mod: S$GLB,,, | Performed by: PHYSICIAN ASSISTANT

## 2022-05-16 PROCEDURE — 3077F SYST BP >= 140 MM HG: CPT | Mod: CPTII,S$GLB,, | Performed by: PHYSICIAN ASSISTANT

## 2022-05-16 PROCEDURE — 1126F AMNT PAIN NOTED NONE PRSNT: CPT | Mod: CPTII,S$GLB,, | Performed by: PHYSICIAN ASSISTANT

## 2022-05-16 PROCEDURE — 1101F PT FALLS ASSESS-DOCD LE1/YR: CPT | Mod: CPTII,S$GLB,, | Performed by: PHYSICIAN ASSISTANT

## 2022-05-16 PROCEDURE — 1126F PR PAIN SEVERITY QUANTIFIED, NO PAIN PRESENT: ICD-10-PCS | Mod: CPTII,S$GLB,, | Performed by: PHYSICIAN ASSISTANT

## 2022-05-16 PROCEDURE — 1170F FXNL STATUS ASSESSED: CPT | Mod: CPTII,S$GLB,, | Performed by: PHYSICIAN ASSISTANT

## 2022-05-16 PROCEDURE — 3288F PR FALLS RISK ASSESSMENT DOCUMENTED: ICD-10-PCS | Mod: CPTII,S$GLB,, | Performed by: PHYSICIAN ASSISTANT

## 2022-05-16 NOTE — PROGRESS NOTES
"  Nelia Littlejohn presented for a  Medicare AWV and comprehensive Health Risk Assessment today. The following components were reviewed and updated:    · Medical history  · Family History  · Social history  · Allergies and Current Medications  · Health Risk Assessment  · Health Maintenance  · Care Team         ** See Completed Assessments for Annual Wellness Visit within the encounter summary.**         The following assessments were completed:  · Living Situation  · CAGE  · Depression Screening  · Timed Get Up and Go  · Whisper Test  · Cognitive Function Screening    · Nutrition Screening  · ADL Screening  · PAQ Screening        Vitals:    05/16/22 0906   BP: (!) 140/64   BP Location: Right arm   Patient Position: Sitting   BP Method: Large (Manual)   Pulse: 74   Resp: 16   SpO2: 98%   Weight: 107.6 kg (237 lb 3.4 oz)   Height: 5' 6" (1.676 m)     Body mass index is 38.29 kg/m².  Physical Exam  Vitals reviewed.   Constitutional:       General: She is not in acute distress.     Appearance: She is well-developed.   HENT:      Head: Normocephalic and atraumatic.   Cardiovascular:      Rate and Rhythm: Normal rate and regular rhythm.      Heart sounds: No murmur heard.  Pulmonary:      Effort: Pulmonary effort is normal.      Breath sounds: Normal breath sounds. No wheezing or rales.   Abdominal:      General: Bowel sounds are normal.      Palpations: Abdomen is soft.      Tenderness: There is no abdominal tenderness.   Musculoskeletal:      Right lower leg: No edema.      Left lower leg: No edema.      Comments: Ambulating with cane   Skin:     General: Skin is warm and dry.      Findings: No rash.   Neurological:      Mental Status: She is alert.               Diagnoses and health risks identified today and associated recommendations/orders:    1. Encounter for preventive health examination  Exam and Assessments performed  Chart Review Complete  Health Maintenance Reviewed and updated      2. Essential " hypertension  Slightly elevated today, took meds a few mins prior to arrival  Home readings in 130s  Continue to monitor, call us with BP reading in a few days  Followed by PCP    3. Aortic atherosclerosis (CT 2014)  Noted on prior imaging  Stable   On ASA and statin  Followed by PCP    4. Mixed hyperlipidemia  Stable   On statin therapy  Followed by PCP    5. Stage 3a chronic kidney disease  Lab Results   Component Value Date    CREATININE 1.2 01/25/2022   Stable   Followed by PCP  6. Obstructive sleep apnea syndrome  Stable on CPAP  Followed by sleep clinic    7. Primary osteoarthritis of both knees  Stable  S/p left TKR  Followed by PCP and Ortho    8. Interval gout  Stable on allopurinol  Followed by PCP    9. Morbid (severe) obesity due to excess calories  BMI reviewed  Lifestyle mods encouraged  Followed by PCP    10. Peripheral vascular disease  Eval'd by Vascular, whom states no significant stenois  On ASA and stating  Followed by PCP      Provided Nelia with a 5-10 year written screening schedule and personal prevention plan. Recommendations were developed using the USPSTF age appropriate recommendations. Education, counseling, and referrals were provided as needed. After Visit Summary printed and given to patient which includes a list of additional screenings\tests needed.    Follow up in about 5 months (around 10/25/2022) for PCP follow up and 1 year for next Medicare AWV.    Carole Taylor PA-C

## 2022-05-16 NOTE — PATIENT INSTRUCTIONS
Counseling and Referral of Other Preventative  (Italic type indicates deductible and co-insurance are waived)    Patient Name: Nelia Littlejohn  Today's Date: 5/16/2022    Health Maintenance       Date Due Completion Date    Lipid Panel 01/18/2027 1/18/2022    TETANUS VACCINE 04/09/2029 4/9/2019        No orders of the defined types were placed in this encounter.    The following information is provided to all patients.  This information is to help you find resources for any of the problems found today that may be affecting your health:                Living healthy guide: www.[facility].[place].[facility]      Understanding Diabetes: www.diabetes.org      Eating healthy: www.cdc.gov/healthyweight      CDC home safety checklist: www.cdc.gov/steadi/patient.html      Agency on Aging: www.goea.[place].[facility]      Alcoholics anonymous (AA): www.aa.org      Physical Activity: www.tiffany.nih.gov/jg9eqht      Tobacco use: www.quitwithusla.org

## 2022-08-17 ENCOUNTER — PATIENT MESSAGE (OUTPATIENT)
Dept: INTERNAL MEDICINE | Facility: CLINIC | Age: 82
End: 2022-08-17
Payer: MEDICARE

## 2022-08-17 DIAGNOSIS — I10 ESSENTIAL HYPERTENSION: ICD-10-CM

## 2022-08-17 RX ORDER — HYDRALAZINE HYDROCHLORIDE 100 MG/1
100 TABLET, FILM COATED ORAL 2 TIMES DAILY
Qty: 180 TABLET | Refills: 3 | Status: SHIPPED | OUTPATIENT
Start: 2022-08-17 | End: 2022-10-25 | Stop reason: SDUPTHER

## 2022-08-29 ENCOUNTER — OFFICE VISIT (OUTPATIENT)
Dept: PODIATRY | Facility: CLINIC | Age: 82
End: 2022-08-29
Payer: MEDICARE

## 2022-08-29 VITALS
BODY MASS INDEX: 38.29 KG/M2 | DIASTOLIC BLOOD PRESSURE: 63 MMHG | WEIGHT: 237.19 LBS | HEART RATE: 43 BPM | SYSTOLIC BLOOD PRESSURE: 156 MMHG

## 2022-08-29 DIAGNOSIS — Z74.09 IMPAIRED FUNCTIONAL MOBILITY, BALANCE, GAIT, AND ENDURANCE: ICD-10-CM

## 2022-08-29 DIAGNOSIS — E66.01 MORBID (SEVERE) OBESITY DUE TO EXCESS CALORIES: ICD-10-CM

## 2022-08-29 DIAGNOSIS — G60.9 IDIOPATHIC PERIPHERAL NEUROPATHY: Primary | ICD-10-CM

## 2022-08-29 PROCEDURE — 3078F PR MOST RECENT DIASTOLIC BLOOD PRESSURE < 80 MM HG: ICD-10-PCS | Mod: CPTII,S$GLB,, | Performed by: PODIATRIST

## 2022-08-29 PROCEDURE — 99214 PR OFFICE/OUTPT VISIT, EST, LEVL IV, 30-39 MIN: ICD-10-PCS | Mod: S$GLB,,, | Performed by: PODIATRIST

## 2022-08-29 PROCEDURE — 1159F MED LIST DOCD IN RCRD: CPT | Mod: CPTII,S$GLB,, | Performed by: PODIATRIST

## 2022-08-29 PROCEDURE — 1125F AMNT PAIN NOTED PAIN PRSNT: CPT | Mod: CPTII,S$GLB,, | Performed by: PODIATRIST

## 2022-08-29 PROCEDURE — 3077F SYST BP >= 140 MM HG: CPT | Mod: CPTII,S$GLB,, | Performed by: PODIATRIST

## 2022-08-29 PROCEDURE — 3077F PR MOST RECENT SYSTOLIC BLOOD PRESSURE >= 140 MM HG: ICD-10-PCS | Mod: CPTII,S$GLB,, | Performed by: PODIATRIST

## 2022-08-29 PROCEDURE — 99999 PR PBB SHADOW E&M-EST. PATIENT-LVL III: ICD-10-PCS | Mod: PBBFAC,,, | Performed by: PODIATRIST

## 2022-08-29 PROCEDURE — 1125F PR PAIN SEVERITY QUANTIFIED, PAIN PRESENT: ICD-10-PCS | Mod: CPTII,S$GLB,, | Performed by: PODIATRIST

## 2022-08-29 PROCEDURE — 99999 PR PBB SHADOW E&M-EST. PATIENT-LVL III: CPT | Mod: PBBFAC,,, | Performed by: PODIATRIST

## 2022-08-29 PROCEDURE — 1159F PR MEDICATION LIST DOCUMENTED IN MEDICAL RECORD: ICD-10-PCS | Mod: CPTII,S$GLB,, | Performed by: PODIATRIST

## 2022-08-29 PROCEDURE — 99214 OFFICE O/P EST MOD 30 MIN: CPT | Mod: S$GLB,,, | Performed by: PODIATRIST

## 2022-08-29 PROCEDURE — 3078F DIAST BP <80 MM HG: CPT | Mod: CPTII,S$GLB,, | Performed by: PODIATRIST

## 2022-08-29 RX ORDER — LIDOCAINE HYDROCHLORIDE 20 MG/ML
JELLY TOPICAL
Qty: 30 ML | Refills: 2 | Status: SHIPPED | OUTPATIENT
Start: 2022-08-29 | End: 2022-09-07 | Stop reason: SDUPTHER

## 2022-09-06 NOTE — PROGRESS NOTES
Subjective:      Patient ID: Nelia Littlejohn is a 82 y.o. female.    Chief Complaint: Foot Problem (Toes feel heaving and hot  pain in both big toes... patient is requesting nail clipping ), Foot Swelling, and Nail Care    Patient presents today with increased peripheral neuropathy type pain.  She has burning and tingling to both feet.  She is here to discuss treatment options.    Review of Systems   Constitutional: Negative for chills, diaphoresis, fever, malaise/fatigue and night sweats.   Cardiovascular:  Negative for claudication, cyanosis, leg swelling and syncope.   Skin:  Positive for nail changes and poor wound healing. Negative for color change, dry skin, rash, suspicious lesions and unusual hair distribution.   Musculoskeletal:  Negative for falls, joint pain, joint swelling, muscle cramps, muscle weakness and stiffness.   Gastrointestinal:  Negative for constipation, diarrhea, nausea and vomiting.   Neurological:  Negative for brief paralysis, disturbances in coordination, focal weakness, numbness, paresthesias, sensory change and tremors.         Objective:      Physical Exam  Constitutional:       General: She is not in acute distress.     Appearance: She is well-developed. She is not diaphoretic.   Cardiovascular:      Pulses:           Popliteal pulses are 2+ on the right side and 2+ on the left side.        Dorsalis pedis pulses are 2+ on the right side and 2+ on the left side.        Posterior tibial pulses are 2+ on the right side and 2+ on the left side.      Comments: Capillary refill 3 seconds all toes/distal feet, all toes/both feet warm to touch.      Negative lymphadenopathy bilateral popliteal fossa and tarsal tunnel.      Negavie lower extremity edema bilateral.    Musculoskeletal:      Right ankle: No swelling, deformity, ecchymosis or lacerations. Normal range of motion. Normal pulse.      Right Achilles Tendon: Normal. No defects. Hutton's test negative.      Comments: Normal angle,  base, station of gait. All ten toes without clubbing, cyanosis, or signs of ischemia.  No pain to palpation bilateral lower extremities.  Range of motion, stability, muscle strength, and muscle tone normal bilateral feet and legs.     Walks with cane for stability.   Lymphadenopathy:      Lower Body: No right inguinal adenopathy. No left inguinal adenopathy.      Comments: Negative lymphadenopathy bilateral popliteal fossa and tarsal tunnel.    Negative lymphangitic streaking bilateral feet/ankles/legs.   Skin:     General: Skin is warm and dry.      Capillary Refill: Capillary refill takes 2 to 3 seconds.      Coloration: Skin is not pale.      Findings: No abrasion, bruising, burn, ecchymosis, erythema, laceration, lesion or rash.      Nails: There is no clubbing.      Comments: Right hallux is completely healed with no open skin drainage pus tracking fluctuance malodor or cardinal signs of infection.  There is incurvation of nail borders medial and laterally without open skin or signs of any growth through the skin.    There is no vivek pus or malodor tracking or fluctuance deep to the bulla and the tissue on removing the bulla is red and granular with skin lines present in some of the.  Skin is normal age and health appropriate color, turgor, texture, and temperature bilateral lower extremities without ulceration, hyperpigmentation, discoloration, masses nodules or cords palpated.  No ecchymosis, erythema, edema, or cardinal signs of infection bilateral lower extremities.     Neurological:      Mental Status: She is alert and oriented to person, place, and time.      Sensory: No sensory deficit.      Motor: No tremor, atrophy or abnormal muscle tone.      Gait: Gait normal.      Deep Tendon Reflexes:      Reflex Scores:       Patellar reflexes are 2+ on the right side and 2+ on the left side.       Achilles reflexes are 2+ on the right side and 2+ on the left side.     Comments: Patient reports tingling burning  and numbness.  Decreased light touch and vibratory sensation noted.   Psychiatric:         Behavior: Behavior is cooperative.             Assessment:       Encounter Diagnoses   Name Primary?    Idiopathic peripheral neuropathy Yes    Impaired functional mobility, balance, gait, and endurance     Morbid (severe) obesity due to excess calories          Plan:       Nelia was seen today for foot problem, foot swelling and nail care.    Diagnoses and all orders for this visit:    Idiopathic peripheral neuropathy    Impaired functional mobility, balance, gait, and endurance    Morbid (severe) obesity due to excess calories    Other orders  -     LIDOcaine HCL 2% (XYLOCAINE) 2 % jelly; Apply topically as needed. Apply topically once nightly to affected part of foot/feet.    I counseled the patient on her conditions, their implications and medical management.    The nature of the condition, options for management, as well as potential risks and complications were discussed in detail with patient. Patient was amenable to my recommendations and left my office fully informed and will follow up as instructed or sooner if necessary.      Discussed options for peripheral neuropathy/nerve entrapment syndrome including nerve block therapy, surgical nerve entrapment decompression procedures, and various vitamins and supplementation available shown to improve nerve function.            Follow up in about 3 months (around 11/29/2022).

## 2022-09-08 ENCOUNTER — PATIENT MESSAGE (OUTPATIENT)
Dept: PODIATRY | Facility: CLINIC | Age: 82
End: 2022-09-08
Payer: MEDICARE

## 2022-09-09 RX ORDER — LIDOCAINE HYDROCHLORIDE 20 MG/ML
JELLY TOPICAL
Qty: 30 ML | Refills: 2 | Status: SHIPPED | OUTPATIENT
Start: 2022-09-09 | End: 2023-01-19

## 2022-10-25 ENCOUNTER — LAB VISIT (OUTPATIENT)
Dept: LAB | Facility: HOSPITAL | Age: 82
End: 2022-10-25
Attending: INTERNAL MEDICINE
Payer: MEDICARE

## 2022-10-25 ENCOUNTER — IMMUNIZATION (OUTPATIENT)
Dept: INTERNAL MEDICINE | Facility: CLINIC | Age: 82
End: 2022-10-25
Payer: MEDICARE

## 2022-10-25 ENCOUNTER — OFFICE VISIT (OUTPATIENT)
Dept: INTERNAL MEDICINE | Facility: CLINIC | Age: 82
End: 2022-10-25
Payer: MEDICARE

## 2022-10-25 VITALS
SYSTOLIC BLOOD PRESSURE: 152 MMHG | DIASTOLIC BLOOD PRESSURE: 64 MMHG | OXYGEN SATURATION: 95 % | BODY MASS INDEX: 38.12 KG/M2 | HEART RATE: 79 BPM | WEIGHT: 237.19 LBS | HEIGHT: 66 IN

## 2022-10-25 DIAGNOSIS — I10 ESSENTIAL HYPERTENSION: ICD-10-CM

## 2022-10-25 DIAGNOSIS — R79.9 ABNORMAL FINDING OF BLOOD CHEMISTRY, UNSPECIFIED: ICD-10-CM

## 2022-10-25 DIAGNOSIS — I10 ESSENTIAL HYPERTENSION: Primary | ICD-10-CM

## 2022-10-25 DIAGNOSIS — Z23 NEED FOR VACCINATION: Primary | ICD-10-CM

## 2022-10-25 DIAGNOSIS — N18.31 STAGE 3A CHRONIC KIDNEY DISEASE: ICD-10-CM

## 2022-10-25 DIAGNOSIS — Z86.73 H/O: STROKE: ICD-10-CM

## 2022-10-25 DIAGNOSIS — G47.33 OBSTRUCTIVE SLEEP APNEA SYNDROME: ICD-10-CM

## 2022-10-25 LAB
ALBUMIN SERPL BCP-MCNC: 4 G/DL (ref 3.5–5.2)
ALP SERPL-CCNC: 105 U/L (ref 55–135)
ALT SERPL W/O P-5'-P-CCNC: 16 U/L (ref 10–44)
ANION GAP SERPL CALC-SCNC: 9 MMOL/L (ref 8–16)
AST SERPL-CCNC: 17 U/L (ref 10–40)
BILIRUB SERPL-MCNC: 1 MG/DL (ref 0.1–1)
BUN SERPL-MCNC: 22 MG/DL (ref 8–23)
CALCIUM SERPL-MCNC: 9.7 MG/DL (ref 8.7–10.5)
CHLORIDE SERPL-SCNC: 103 MMOL/L (ref 95–110)
CHOLEST SERPL-MCNC: 168 MG/DL (ref 120–199)
CHOLEST/HDLC SERPL: 3.9 {RATIO} (ref 2–5)
CO2 SERPL-SCNC: 25 MMOL/L (ref 23–29)
CREAT SERPL-MCNC: 1.1 MG/DL (ref 0.5–1.4)
EST. GFR  (NO RACE VARIABLE): 50.2 ML/MIN/1.73 M^2
GLUCOSE SERPL-MCNC: 104 MG/DL (ref 70–110)
HDLC SERPL-MCNC: 43 MG/DL (ref 40–75)
HDLC SERPL: 25.6 % (ref 20–50)
LDLC SERPL CALC-MCNC: 93.2 MG/DL (ref 63–159)
NONHDLC SERPL-MCNC: 125 MG/DL
POTASSIUM SERPL-SCNC: 3.6 MMOL/L (ref 3.5–5.1)
PROT SERPL-MCNC: 7.6 G/DL (ref 6–8.4)
SODIUM SERPL-SCNC: 137 MMOL/L (ref 136–145)
TRIGL SERPL-MCNC: 159 MG/DL (ref 30–150)

## 2022-10-25 PROCEDURE — 1126F PR PAIN SEVERITY QUANTIFIED, NO PAIN PRESENT: ICD-10-PCS | Mod: CPTII,S$GLB,, | Performed by: INTERNAL MEDICINE

## 2022-10-25 PROCEDURE — 90694 FLU VACCINE - QUADRIVALENT - ADJUVANTED: ICD-10-PCS | Mod: S$GLB,,, | Performed by: INTERNAL MEDICINE

## 2022-10-25 PROCEDURE — 3288F FALL RISK ASSESSMENT DOCD: CPT | Mod: CPTII,S$GLB,, | Performed by: INTERNAL MEDICINE

## 2022-10-25 PROCEDURE — 99999 PR PBB SHADOW E&M-EST. PATIENT-LVL III: CPT | Mod: PBBFAC,,, | Performed by: INTERNAL MEDICINE

## 2022-10-25 PROCEDURE — 36415 COLL VENOUS BLD VENIPUNCTURE: CPT | Performed by: INTERNAL MEDICINE

## 2022-10-25 PROCEDURE — 1159F PR MEDICATION LIST DOCUMENTED IN MEDICAL RECORD: ICD-10-PCS | Mod: CPTII,S$GLB,, | Performed by: INTERNAL MEDICINE

## 2022-10-25 PROCEDURE — 99214 PR OFFICE/OUTPT VISIT, EST, LEVL IV, 30-39 MIN: ICD-10-PCS | Mod: S$GLB,,, | Performed by: INTERNAL MEDICINE

## 2022-10-25 PROCEDURE — 91312 COVID-19, MRNA, LNP-S, BIVALENT BOOSTER, PF, 30 MCG/0.3 ML DOSE: CPT | Mod: S$GLB,,, | Performed by: INTERNAL MEDICINE

## 2022-10-25 PROCEDURE — 3078F DIAST BP <80 MM HG: CPT | Mod: CPTII,S$GLB,, | Performed by: INTERNAL MEDICINE

## 2022-10-25 PROCEDURE — 80061 LIPID PANEL: CPT | Performed by: INTERNAL MEDICINE

## 2022-10-25 PROCEDURE — 3288F PR FALLS RISK ASSESSMENT DOCUMENTED: ICD-10-PCS | Mod: CPTII,S$GLB,, | Performed by: INTERNAL MEDICINE

## 2022-10-25 PROCEDURE — G0008 ADMIN INFLUENZA VIRUS VAC: HCPCS | Mod: S$GLB,,, | Performed by: INTERNAL MEDICINE

## 2022-10-25 PROCEDURE — 1101F PT FALLS ASSESS-DOCD LE1/YR: CPT | Mod: CPTII,S$GLB,, | Performed by: INTERNAL MEDICINE

## 2022-10-25 PROCEDURE — 80053 COMPREHEN METABOLIC PANEL: CPT | Performed by: INTERNAL MEDICINE

## 2022-10-25 PROCEDURE — 3077F PR MOST RECENT SYSTOLIC BLOOD PRESSURE >= 140 MM HG: ICD-10-PCS | Mod: CPTII,S$GLB,, | Performed by: INTERNAL MEDICINE

## 2022-10-25 PROCEDURE — 0124A COVID-19, MRNA, LNP-S, BIVALENT BOOSTER, PF, 30 MCG/0.3 ML DOSE: CPT | Mod: CV19,PBBFAC | Performed by: INTERNAL MEDICINE

## 2022-10-25 PROCEDURE — 90694 VACC AIIV4 NO PRSRV 0.5ML IM: CPT | Mod: S$GLB,,, | Performed by: INTERNAL MEDICINE

## 2022-10-25 PROCEDURE — 1159F MED LIST DOCD IN RCRD: CPT | Mod: CPTII,S$GLB,, | Performed by: INTERNAL MEDICINE

## 2022-10-25 PROCEDURE — 3078F PR MOST RECENT DIASTOLIC BLOOD PRESSURE < 80 MM HG: ICD-10-PCS | Mod: CPTII,S$GLB,, | Performed by: INTERNAL MEDICINE

## 2022-10-25 PROCEDURE — 3077F SYST BP >= 140 MM HG: CPT | Mod: CPTII,S$GLB,, | Performed by: INTERNAL MEDICINE

## 2022-10-25 PROCEDURE — 1101F PR PT FALLS ASSESS DOC 0-1 FALLS W/OUT INJ PAST YR: ICD-10-PCS | Mod: CPTII,S$GLB,, | Performed by: INTERNAL MEDICINE

## 2022-10-25 PROCEDURE — G0008 FLU VACCINE - QUADRIVALENT - ADJUVANTED: ICD-10-PCS | Mod: S$GLB,,, | Performed by: INTERNAL MEDICINE

## 2022-10-25 PROCEDURE — 99999 PR PBB SHADOW E&M-EST. PATIENT-LVL III: ICD-10-PCS | Mod: PBBFAC,,, | Performed by: INTERNAL MEDICINE

## 2022-10-25 PROCEDURE — 1126F AMNT PAIN NOTED NONE PRSNT: CPT | Mod: CPTII,S$GLB,, | Performed by: INTERNAL MEDICINE

## 2022-10-25 PROCEDURE — 99214 OFFICE O/P EST MOD 30 MIN: CPT | Mod: S$GLB,,, | Performed by: INTERNAL MEDICINE

## 2022-10-25 PROCEDURE — 1160F RVW MEDS BY RX/DR IN RCRD: CPT | Mod: CPTII,S$GLB,, | Performed by: INTERNAL MEDICINE

## 2022-10-25 PROCEDURE — 1160F PR REVIEW ALL MEDS BY PRESCRIBER/CLIN PHARMACIST DOCUMENTED: ICD-10-PCS | Mod: CPTII,S$GLB,, | Performed by: INTERNAL MEDICINE

## 2022-10-25 PROCEDURE — 91312 COVID-19, MRNA, LNP-S, BIVALENT BOOSTER, PF, 30 MCG/0.3 ML DOSE: ICD-10-PCS | Mod: S$GLB,,, | Performed by: INTERNAL MEDICINE

## 2022-10-25 RX ORDER — CARVEDILOL 12.5 MG/1
12.5 TABLET ORAL 2 TIMES DAILY WITH MEALS
Qty: 180 TABLET | Refills: 3 | Status: SHIPPED | OUTPATIENT
Start: 2022-10-25 | End: 2022-11-29

## 2022-10-25 RX ORDER — HYDRALAZINE HYDROCHLORIDE 100 MG/1
100 TABLET, FILM COATED ORAL 2 TIMES DAILY
Qty: 180 TABLET | Refills: 3 | Status: SHIPPED | OUTPATIENT
Start: 2022-10-25

## 2022-10-25 NOTE — PROGRESS NOTES
Subjective:       Patient ID: Nelia Littlejohn is a 82 y.o. female.    Chief Complaint:   Follow-up    HPI - Ms Littlejohn came with her daughter today.  She feels well.  Taking meds as prescribed, but BP remains elevated.  Using cpap every night for her eligio; says she sleeps so much better.  Due for labs and flu/covid immunizations.  She is not a smoker.    PMH:  HTN  HLP  CVA 2021  OA, s/p L TKR  ELIGIO on cpap  Pre-DM     Meds:  Reviewed and reconciled in EPIC with patient during visit today.     Review of Systems   Constitutional:  Negative for fatigue.   HENT:  Negative for congestion.    Respiratory:  Negative for shortness of breath.    Cardiovascular:  Negative for chest pain.   Gastrointestinal:  Negative for abdominal pain.   Genitourinary:  Negative for difficulty urinating.   Musculoskeletal:  Negative for arthralgias.   Skin:  Negative for rash.   Neurological:  Negative for headaches.   Psychiatric/Behavioral:  Negative for sleep disturbance.      Objective:      Physical Exam  Vitals reviewed.   Constitutional:       Appearance: She is well-developed.   HENT:      Head: Normocephalic and atraumatic.   Cardiovascular:      Rate and Rhythm: Normal rate and regular rhythm.      Heart sounds: Normal heart sounds. No murmur heard.    No friction rub. No gallop.   Pulmonary:      Effort: Pulmonary effort is normal. No respiratory distress.      Breath sounds: Normal breath sounds. No wheezing or rales.   Chest:      Chest wall: No tenderness.   Skin:     General: Skin is warm and dry.      Findings: No erythema.   Neurological:      General: No focal deficit present.      Mental Status: She is alert.   Psychiatric:         Mood and Affect: Mood normal.       Assessment:       1. Essential hypertension    2. 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 Obstructive sleep apnea syndrome    4. Stage 3a chronic kidney disease    5. Abnormal finding of blood chemistry, unspecified          Plan:        Nelia was seen today for follow-up.    Diagnoses and all orders for this visit:    Essential hypertension - not at goal.  Refilling hydralazine, switching to carvedilol from metoprolol/hctz.  Come back in a month for recheck  -     hydrALAZINE (APRESOLINE) 100 MG tablet; Take 1 tablet (100 mg total) by mouth 2 (two) times daily. Blood pressure  -     carvediloL (COREG) 12.5 MG tablet; Take 1 tablet (12.5 mg total) by mouth 2 (two) times daily with meals.  -     COMPREHENSIVE METABOLIC PANEL; Future    H/O: stroke, left eye, transient blindness, no residual,  - stable, but we have to get that BP down  -     Lipid panel; Future    Obstructive sleep apnea syndrome - stable on treatment    Stage 3a chronic kidney disease    Abnormal finding of blood chemistry, unspecified  -     Lipid panel; Future    Rtc prn or in a month    FLAQUITA Gongora MD MPH  Staff Internist

## 2022-11-29 ENCOUNTER — OFFICE VISIT (OUTPATIENT)
Dept: INTERNAL MEDICINE | Facility: CLINIC | Age: 82
End: 2022-11-29
Payer: MEDICARE

## 2022-11-29 VITALS
HEART RATE: 69 BPM | HEIGHT: 66 IN | BODY MASS INDEX: 38.69 KG/M2 | SYSTOLIC BLOOD PRESSURE: 158 MMHG | OXYGEN SATURATION: 98 % | WEIGHT: 240.75 LBS | DIASTOLIC BLOOD PRESSURE: 66 MMHG

## 2022-11-29 DIAGNOSIS — Z74.09 IMPAIRED FUNCTIONAL MOBILITY, BALANCE, GAIT, AND ENDURANCE: ICD-10-CM

## 2022-11-29 DIAGNOSIS — G45.9 TIA (TRANSIENT ISCHEMIC ATTACK): ICD-10-CM

## 2022-11-29 DIAGNOSIS — I10 ESSENTIAL HYPERTENSION: Primary | ICD-10-CM

## 2022-11-29 DIAGNOSIS — G47.33 OBSTRUCTIVE SLEEP APNEA SYNDROME: ICD-10-CM

## 2022-11-29 PROCEDURE — 3078F DIAST BP <80 MM HG: CPT | Mod: CPTII,S$GLB,, | Performed by: INTERNAL MEDICINE

## 2022-11-29 PROCEDURE — 3078F PR MOST RECENT DIASTOLIC BLOOD PRESSURE < 80 MM HG: ICD-10-PCS | Mod: CPTII,S$GLB,, | Performed by: INTERNAL MEDICINE

## 2022-11-29 PROCEDURE — 99999 PR PBB SHADOW E&M-EST. PATIENT-LVL III: ICD-10-PCS | Mod: PBBFAC,,, | Performed by: INTERNAL MEDICINE

## 2022-11-29 PROCEDURE — 1101F PR PT FALLS ASSESS DOC 0-1 FALLS W/OUT INJ PAST YR: ICD-10-PCS | Mod: CPTII,S$GLB,, | Performed by: INTERNAL MEDICINE

## 2022-11-29 PROCEDURE — 99214 PR OFFICE/OUTPT VISIT, EST, LEVL IV, 30-39 MIN: ICD-10-PCS | Mod: S$GLB,,, | Performed by: INTERNAL MEDICINE

## 2022-11-29 PROCEDURE — 1159F PR MEDICATION LIST DOCUMENTED IN MEDICAL RECORD: ICD-10-PCS | Mod: CPTII,S$GLB,, | Performed by: INTERNAL MEDICINE

## 2022-11-29 PROCEDURE — 1101F PT FALLS ASSESS-DOCD LE1/YR: CPT | Mod: CPTII,S$GLB,, | Performed by: INTERNAL MEDICINE

## 2022-11-29 PROCEDURE — 99214 OFFICE O/P EST MOD 30 MIN: CPT | Mod: S$GLB,,, | Performed by: INTERNAL MEDICINE

## 2022-11-29 PROCEDURE — 1126F AMNT PAIN NOTED NONE PRSNT: CPT | Mod: CPTII,S$GLB,, | Performed by: INTERNAL MEDICINE

## 2022-11-29 PROCEDURE — 1160F PR REVIEW ALL MEDS BY PRESCRIBER/CLIN PHARMACIST DOCUMENTED: ICD-10-PCS | Mod: CPTII,S$GLB,, | Performed by: INTERNAL MEDICINE

## 2022-11-29 PROCEDURE — 3077F PR MOST RECENT SYSTOLIC BLOOD PRESSURE >= 140 MM HG: ICD-10-PCS | Mod: CPTII,S$GLB,, | Performed by: INTERNAL MEDICINE

## 2022-11-29 PROCEDURE — 1160F RVW MEDS BY RX/DR IN RCRD: CPT | Mod: CPTII,S$GLB,, | Performed by: INTERNAL MEDICINE

## 2022-11-29 PROCEDURE — 3288F FALL RISK ASSESSMENT DOCD: CPT | Mod: CPTII,S$GLB,, | Performed by: INTERNAL MEDICINE

## 2022-11-29 PROCEDURE — 1126F PR PAIN SEVERITY QUANTIFIED, NO PAIN PRESENT: ICD-10-PCS | Mod: CPTII,S$GLB,, | Performed by: INTERNAL MEDICINE

## 2022-11-29 PROCEDURE — 3288F PR FALLS RISK ASSESSMENT DOCUMENTED: ICD-10-PCS | Mod: CPTII,S$GLB,, | Performed by: INTERNAL MEDICINE

## 2022-11-29 PROCEDURE — 1159F MED LIST DOCD IN RCRD: CPT | Mod: CPTII,S$GLB,, | Performed by: INTERNAL MEDICINE

## 2022-11-29 PROCEDURE — 3077F SYST BP >= 140 MM HG: CPT | Mod: CPTII,S$GLB,, | Performed by: INTERNAL MEDICINE

## 2022-11-29 PROCEDURE — 99999 PR PBB SHADOW E&M-EST. PATIENT-LVL III: CPT | Mod: PBBFAC,,, | Performed by: INTERNAL MEDICINE

## 2022-11-29 RX ORDER — METOPROLOL TARTRATE AND HYDROCHLOROTHIAZIDE 100; 25 MG/1; MG/1
1 TABLET ORAL DAILY
COMMUNITY
End: 2023-02-24 | Stop reason: SDUPTHER

## 2022-11-29 RX ORDER — SPIRONOLACTONE 25 MG/1
25 TABLET ORAL DAILY
Qty: 90 TABLET | Refills: 3 | Status: SHIPPED | OUTPATIENT
Start: 2022-11-29 | End: 2022-12-02 | Stop reason: SDUPTHER

## 2022-11-29 NOTE — PROGRESS NOTES
Subjective:       Patient ID: Nelia Littlejohn is a 82 y.o. female.    Chief Complaint:   Hypertension    HPI - Feels well.  Bp is better at home - 132-138/76-89.  She saw it going up with carvedilol, so switched back to metoprolol/hctz.  She's using cpap every night.  Walks with a cane or walker at home.  Has had at least two TIA;s.  Utd on vaccines.    PMH:  Resistant HTN  HLP  CVA 2021  OA, s/p L TKR  ANUM on cpap  Pre-DM     Meds:  Reviewed and reconciled in EPIC with patient during visit today.     Review of Systems   Constitutional:  Negative for fever.   HENT:  Negative for congestion.    Respiratory:  Negative for shortness of breath.    Cardiovascular:  Negative for chest pain.   Gastrointestinal:  Negative for abdominal pain.   Genitourinary:  Negative for difficulty urinating.   Musculoskeletal:  Positive for gait problem.   Skin:  Negative for rash.   Neurological:  Negative for headaches.   Psychiatric/Behavioral:  Negative for sleep disturbance.      Objective:      Physical Exam  Vitals reviewed.   Constitutional:       Appearance: She is well-developed.   HENT:      Head: Normocephalic and atraumatic.   Cardiovascular:      Rate and Rhythm: Normal rate and regular rhythm.      Heart sounds: Normal heart sounds. No murmur heard.    No friction rub. No gallop.   Pulmonary:      Effort: Pulmonary effort is normal. No respiratory distress.      Breath sounds: Normal breath sounds. No wheezing or rales.   Chest:      Chest wall: No tenderness.   Skin:     General: Skin is warm and dry.      Findings: No erythema.   Neurological:      General: No focal deficit present.      Mental Status: She is alert.   Psychiatric:         Mood and Affect: Mood normal.       Assessment:       1. Essential hypertension    2. Obstructive sleep apnea syndrome    3. Impaired functional mobility, balance, gait, and endurance    4. TIA (transient ischemic attack), 6/29/2021          Plan:       Nelia was seen today for  hypertension.    Diagnoses and all orders for this visit:    Essential hypertension - still not at goal.  History of vascular events.  Stay with metoprolol/hctz (not carvedilol) and add spironolactone.  Continue hydralazine and amlodipine/benazepril  -     spironolactone (ALDACTONE) 25 MG tablet; Take 1 tablet (25 mg total) by mouth once daily.    Obstructive sleep apnea syndrome - stable on cpap    Impaired functional mobility, balance, gait, and endurance - gave positive feedback on ambulation    TIA (transient ischemic attack), 6/29/2021    Rtc prn, or in 6 weeks    FLAQUITA Gongora MD MPH  Staff Internist

## 2022-12-02 ENCOUNTER — PATIENT MESSAGE (OUTPATIENT)
Dept: INTERNAL MEDICINE | Facility: CLINIC | Age: 82
End: 2022-12-02
Payer: MEDICARE

## 2022-12-02 DIAGNOSIS — I10 ESSENTIAL HYPERTENSION: ICD-10-CM

## 2022-12-02 RX ORDER — SPIRONOLACTONE 25 MG/1
25 TABLET ORAL DAILY
Qty: 90 TABLET | Refills: 3 | Status: SHIPPED | OUTPATIENT
Start: 2022-12-02 | End: 2024-02-05 | Stop reason: SDUPTHER

## 2023-01-19 ENCOUNTER — OFFICE VISIT (OUTPATIENT)
Dept: INTERNAL MEDICINE | Facility: CLINIC | Age: 83
End: 2023-01-19
Payer: MEDICARE

## 2023-01-19 VITALS
HEART RATE: 80 BPM | OXYGEN SATURATION: 97 % | HEIGHT: 66 IN | BODY MASS INDEX: 38.94 KG/M2 | DIASTOLIC BLOOD PRESSURE: 60 MMHG | WEIGHT: 242.31 LBS | SYSTOLIC BLOOD PRESSURE: 122 MMHG

## 2023-01-19 DIAGNOSIS — E78.2 MIXED HYPERLIPIDEMIA: ICD-10-CM

## 2023-01-19 DIAGNOSIS — Z74.09 IMPAIRED FUNCTIONAL MOBILITY, BALANCE, GAIT, AND ENDURANCE: ICD-10-CM

## 2023-01-19 DIAGNOSIS — I73.9 PERIPHERAL VASCULAR DISEASE: ICD-10-CM

## 2023-01-19 DIAGNOSIS — E66.01 CLASS 2 SEVERE OBESITY WITH BODY MASS INDEX (BMI) OF 35 TO 39.9 WITH SERIOUS COMORBIDITY: ICD-10-CM

## 2023-01-19 DIAGNOSIS — I10 ESSENTIAL HYPERTENSION: Primary | ICD-10-CM

## 2023-01-19 DIAGNOSIS — N18.31 STAGE 3A CHRONIC KIDNEY DISEASE: ICD-10-CM

## 2023-01-19 PROBLEM — E66.812 CLASS 2 SEVERE OBESITY WITH BODY MASS INDEX (BMI) OF 35 TO 39.9 WITH SERIOUS COMORBIDITY: Status: ACTIVE | Noted: 2020-01-15

## 2023-01-19 PROCEDURE — 1160F RVW MEDS BY RX/DR IN RCRD: CPT | Mod: CPTII,S$GLB,, | Performed by: INTERNAL MEDICINE

## 2023-01-19 PROCEDURE — 1160F PR REVIEW ALL MEDS BY PRESCRIBER/CLIN PHARMACIST DOCUMENTED: ICD-10-PCS | Mod: CPTII,S$GLB,, | Performed by: INTERNAL MEDICINE

## 2023-01-19 PROCEDURE — 1126F AMNT PAIN NOTED NONE PRSNT: CPT | Mod: CPTII,S$GLB,, | Performed by: INTERNAL MEDICINE

## 2023-01-19 PROCEDURE — 99214 PR OFFICE/OUTPT VISIT, EST, LEVL IV, 30-39 MIN: ICD-10-PCS | Mod: S$GLB,,, | Performed by: INTERNAL MEDICINE

## 2023-01-19 PROCEDURE — 3074F PR MOST RECENT SYSTOLIC BLOOD PRESSURE < 130 MM HG: ICD-10-PCS | Mod: CPTII,S$GLB,, | Performed by: INTERNAL MEDICINE

## 2023-01-19 PROCEDURE — 99499 UNLISTED E&M SERVICE: CPT | Mod: S$GLB,,, | Performed by: INTERNAL MEDICINE

## 2023-01-19 PROCEDURE — 3078F PR MOST RECENT DIASTOLIC BLOOD PRESSURE < 80 MM HG: ICD-10-PCS | Mod: CPTII,S$GLB,, | Performed by: INTERNAL MEDICINE

## 2023-01-19 PROCEDURE — 1159F PR MEDICATION LIST DOCUMENTED IN MEDICAL RECORD: ICD-10-PCS | Mod: CPTII,S$GLB,, | Performed by: INTERNAL MEDICINE

## 2023-01-19 PROCEDURE — 3074F SYST BP LT 130 MM HG: CPT | Mod: CPTII,S$GLB,, | Performed by: INTERNAL MEDICINE

## 2023-01-19 PROCEDURE — 99999 PR PBB SHADOW E&M-EST. PATIENT-LVL III: ICD-10-PCS | Mod: PBBFAC,,, | Performed by: INTERNAL MEDICINE

## 2023-01-19 PROCEDURE — 3288F FALL RISK ASSESSMENT DOCD: CPT | Mod: CPTII,S$GLB,, | Performed by: INTERNAL MEDICINE

## 2023-01-19 PROCEDURE — 3078F DIAST BP <80 MM HG: CPT | Mod: CPTII,S$GLB,, | Performed by: INTERNAL MEDICINE

## 2023-01-19 PROCEDURE — 1159F MED LIST DOCD IN RCRD: CPT | Mod: CPTII,S$GLB,, | Performed by: INTERNAL MEDICINE

## 2023-01-19 PROCEDURE — 99499 RISK ADDL DX/OHS AUDIT: ICD-10-PCS | Mod: S$GLB,,, | Performed by: INTERNAL MEDICINE

## 2023-01-19 PROCEDURE — 1126F PR PAIN SEVERITY QUANTIFIED, NO PAIN PRESENT: ICD-10-PCS | Mod: CPTII,S$GLB,, | Performed by: INTERNAL MEDICINE

## 2023-01-19 PROCEDURE — 99214 OFFICE O/P EST MOD 30 MIN: CPT | Mod: S$GLB,,, | Performed by: INTERNAL MEDICINE

## 2023-01-19 PROCEDURE — 1101F PT FALLS ASSESS-DOCD LE1/YR: CPT | Mod: CPTII,S$GLB,, | Performed by: INTERNAL MEDICINE

## 2023-01-19 PROCEDURE — 3288F PR FALLS RISK ASSESSMENT DOCUMENTED: ICD-10-PCS | Mod: CPTII,S$GLB,, | Performed by: INTERNAL MEDICINE

## 2023-01-19 PROCEDURE — 1101F PR PT FALLS ASSESS DOC 0-1 FALLS W/OUT INJ PAST YR: ICD-10-PCS | Mod: CPTII,S$GLB,, | Performed by: INTERNAL MEDICINE

## 2023-01-19 PROCEDURE — 99999 PR PBB SHADOW E&M-EST. PATIENT-LVL III: CPT | Mod: PBBFAC,,, | Performed by: INTERNAL MEDICINE

## 2023-01-19 NOTE — PROGRESS NOTES
Subjective:       Patient ID: Nelia Littlejohn is a 83 y.o. female.    Chief Complaint:   Follow-up    HPI - Ms Littlejohn is doing well today.  She's thrilled that her BP is down today.  No complaints.  Accompanied by daughter this morning as usual.  HM utd    PMH:  Resistant HTN  HLP  CVA 2021  OA, s/p L TKR  ANUM on cpap  Pre-DM     Meds:  Reviewed and reconciled in EPIC with patient during visit today.     Review of Systems   Constitutional:  Negative for fever.   HENT:  Negative for congestion.    Respiratory:  Negative for shortness of breath.    Cardiovascular:  Negative for chest pain.   Gastrointestinal:  Negative for abdominal pain.   Genitourinary:  Negative for difficulty urinating.   Musculoskeletal:  Negative for arthralgias.   Skin:  Negative for rash.   Neurological:  Negative for headaches.   Psychiatric/Behavioral:  Negative for sleep disturbance.      Objective:      Physical Exam  Vitals reviewed.   Constitutional:       Appearance: She is well-developed.   HENT:      Head: Normocephalic and atraumatic.   Cardiovascular:      Rate and Rhythm: Normal rate and regular rhythm.      Heart sounds: Normal heart sounds. No murmur heard.    No friction rub. No gallop.   Pulmonary:      Effort: Pulmonary effort is normal. No respiratory distress.      Breath sounds: Normal breath sounds. No wheezing or rales.   Chest:      Chest wall: No tenderness.   Skin:     General: Skin is warm and dry.      Findings: No erythema.   Neurological:      General: No focal deficit present.      Mental Status: She is alert.   Psychiatric:         Mood and Affect: Mood normal.       Assessment:       1. Essential hypertension    2. Mixed hyperlipidemia    3. Stage 3a chronic kidney disease    4. Class 2 severe obesity with body mass index (BMI) of 35 to 39.9 with serious comorbidity    5. Impaired functional mobility, balance, gait, and endurance    6. Peripheral vascular disease          Plan:       Nelia was seen today for  follow-up.    Diagnoses and all orders for this visit:    Essential hypertension - at goal, stay the course    Mixed hyperlipidemia - stable on statin    Stage 3a chronic kidney disease - stable.  Continue present care    Class 2 severe obesity with body mass index (BMI) of 35 to 39.9 with serious comorbidity - weight a little lower this visit    Impaired functional mobility, balance, gait, and endurance - encouraged more ambulation with her walker    Peripheral vascular disease    Rtc prn, or in 3 months for BP    G Diana Gongora MD MPH  Staff Internist

## 2023-02-07 ENCOUNTER — PES CALL (OUTPATIENT)
Dept: ADMINISTRATIVE | Facility: CLINIC | Age: 83
End: 2023-02-07
Payer: MEDICARE

## 2023-02-24 ENCOUNTER — PATIENT MESSAGE (OUTPATIENT)
Dept: INTERNAL MEDICINE | Facility: CLINIC | Age: 83
End: 2023-02-24
Payer: MEDICARE

## 2023-02-24 RX ORDER — METOPROLOL TARTRATE AND HYDROCHLOROTHIAZIDE 100; 25 MG/1; MG/1
1 TABLET ORAL DAILY
Qty: 90 TABLET | Refills: 3 | Status: SHIPPED | OUTPATIENT
Start: 2023-02-24 | End: 2023-10-15

## 2023-02-24 NOTE — TELEPHONE ENCOUNTER
Care Due:                  Date            Visit Type   Department     Provider  --------------------------------------------------------------------------------                                EP -                              PRIMARY      [facility]  Last Visit: 01-      CARE ([facility])   BRIAN Gongora                              EP -                              PRIMARY      [facility] INTER[facility]  Next Visit: 04-      CARE ([facility])   BRIAN Gongora                                                            Last  Test          Frequency    Reason                     Performed    Due Date  --------------------------------------------------------------------------------    CBC.........  12 months..  allopurinoL..............  01- 01-    Uric Acid...  12 months..  allopurinoL..............  01- 01-    [facility] Embedded Care Gaps. Reference number: [id number]. 2/24/2023   2:12:06 PM CST

## 2023-03-27 ENCOUNTER — PES CALL (OUTPATIENT)
Dept: ADMINISTRATIVE | Facility: CLINIC | Age: 83
End: 2023-03-27
Payer: MEDICARE

## 2023-04-26 ENCOUNTER — TELEPHONE (OUTPATIENT)
Dept: ADMINISTRATIVE | Facility: CLINIC | Age: 83
End: 2023-04-26
Payer: MEDICARE

## 2023-04-27 ENCOUNTER — OFFICE VISIT (OUTPATIENT)
Dept: INTERNAL MEDICINE | Facility: CLINIC | Age: 83
End: 2023-04-27
Payer: MEDICARE

## 2023-04-27 VITALS
DIASTOLIC BLOOD PRESSURE: 50 MMHG | OXYGEN SATURATION: 97 % | BODY MASS INDEX: 37.38 KG/M2 | SYSTOLIC BLOOD PRESSURE: 128 MMHG | WEIGHT: 232.56 LBS | HEART RATE: 77 BPM | HEIGHT: 66 IN

## 2023-04-27 VITALS
HEART RATE: 69 BPM | SYSTOLIC BLOOD PRESSURE: 118 MMHG | BODY MASS INDEX: 37.52 KG/M2 | HEIGHT: 66 IN | OXYGEN SATURATION: 98 % | WEIGHT: 233.44 LBS | DIASTOLIC BLOOD PRESSURE: 60 MMHG

## 2023-04-27 DIAGNOSIS — M17.0 PRIMARY OSTEOARTHRITIS OF BOTH KNEES: ICD-10-CM

## 2023-04-27 DIAGNOSIS — E66.01 CLASS 2 SEVERE OBESITY WITH BODY MASS INDEX (BMI) OF 35 TO 39.9 WITH SERIOUS COMORBIDITY: ICD-10-CM

## 2023-04-27 DIAGNOSIS — Z86.73 H/O: STROKE: ICD-10-CM

## 2023-04-27 DIAGNOSIS — I10 ESSENTIAL HYPERTENSION: Primary | ICD-10-CM

## 2023-04-27 DIAGNOSIS — I70.0 AORTIC ATHEROSCLEROSIS: ICD-10-CM

## 2023-04-27 DIAGNOSIS — G47.33 OBSTRUCTIVE SLEEP APNEA SYNDROME: ICD-10-CM

## 2023-04-27 DIAGNOSIS — Z96.652 STATUS POST TOTAL LEFT KNEE REPLACEMENT: ICD-10-CM

## 2023-04-27 DIAGNOSIS — E04.2 MULTINODULAR GOITER: ICD-10-CM

## 2023-04-27 DIAGNOSIS — N18.31 STAGE 3A CHRONIC KIDNEY DISEASE: ICD-10-CM

## 2023-04-27 DIAGNOSIS — R19.7 DIARRHEA, UNSPECIFIED TYPE: ICD-10-CM

## 2023-04-27 DIAGNOSIS — Z00.00 ENCOUNTER FOR PREVENTIVE HEALTH EXAMINATION: Primary | ICD-10-CM

## 2023-04-27 DIAGNOSIS — E78.2 MIXED HYPERLIPIDEMIA: ICD-10-CM

## 2023-04-27 DIAGNOSIS — Z87.898 HISTORY OF PREDIABETES: ICD-10-CM

## 2023-04-27 DIAGNOSIS — G45.9 TIA (TRANSIENT ISCHEMIC ATTACK): ICD-10-CM

## 2023-04-27 DIAGNOSIS — I73.9 PERIPHERAL VASCULAR DISEASE: ICD-10-CM

## 2023-04-27 DIAGNOSIS — Z74.09 IMPAIRED FUNCTIONAL MOBILITY, BALANCE, GAIT, AND ENDURANCE: ICD-10-CM

## 2023-04-27 DIAGNOSIS — G47.00 INSOMNIA, UNSPECIFIED TYPE: ICD-10-CM

## 2023-04-27 DIAGNOSIS — I10 ESSENTIAL HYPERTENSION: ICD-10-CM

## 2023-04-27 PROCEDURE — 1170F PR FUNCTIONAL STATUS ASSESSED: ICD-10-PCS | Mod: CPTII,S$GLB,, | Performed by: PHYSICIAN ASSISTANT

## 2023-04-27 PROCEDURE — 1126F AMNT PAIN NOTED NONE PRSNT: CPT | Mod: CPTII,S$GLB,, | Performed by: INTERNAL MEDICINE

## 2023-04-27 PROCEDURE — 3078F DIAST BP <80 MM HG: CPT | Mod: CPTII,S$GLB,, | Performed by: INTERNAL MEDICINE

## 2023-04-27 PROCEDURE — 1159F PR MEDICATION LIST DOCUMENTED IN MEDICAL RECORD: ICD-10-PCS | Mod: CPTII,S$GLB,, | Performed by: INTERNAL MEDICINE

## 2023-04-27 PROCEDURE — 1101F PT FALLS ASSESS-DOCD LE1/YR: CPT | Mod: CPTII,S$GLB,, | Performed by: INTERNAL MEDICINE

## 2023-04-27 PROCEDURE — 3078F PR MOST RECENT DIASTOLIC BLOOD PRESSURE < 80 MM HG: ICD-10-PCS | Mod: CPTII,S$GLB,, | Performed by: INTERNAL MEDICINE

## 2023-04-27 PROCEDURE — 1101F PR PT FALLS ASSESS DOC 0-1 FALLS W/OUT INJ PAST YR: ICD-10-PCS | Mod: CPTII,S$GLB,, | Performed by: PHYSICIAN ASSISTANT

## 2023-04-27 PROCEDURE — 3288F FALL RISK ASSESSMENT DOCD: CPT | Mod: CPTII,S$GLB,, | Performed by: INTERNAL MEDICINE

## 2023-04-27 PROCEDURE — 99214 PR OFFICE/OUTPT VISIT, EST, LEVL IV, 30-39 MIN: ICD-10-PCS | Mod: S$GLB,,, | Performed by: INTERNAL MEDICINE

## 2023-04-27 PROCEDURE — 99999 PR PBB SHADOW E&M-EST. PATIENT-LVL III: CPT | Mod: PBBFAC,,, | Performed by: INTERNAL MEDICINE

## 2023-04-27 PROCEDURE — 1160F PR REVIEW ALL MEDS BY PRESCRIBER/CLIN PHARMACIST DOCUMENTED: ICD-10-PCS | Mod: CPTII,S$GLB,, | Performed by: PHYSICIAN ASSISTANT

## 2023-04-27 PROCEDURE — G0439 PR MEDICARE ANNUAL WELLNESS SUBSEQUENT VISIT: ICD-10-PCS | Mod: S$GLB,,, | Performed by: PHYSICIAN ASSISTANT

## 2023-04-27 PROCEDURE — 1170F FXNL STATUS ASSESSED: CPT | Mod: CPTII,S$GLB,, | Performed by: PHYSICIAN ASSISTANT

## 2023-04-27 PROCEDURE — 1126F PR PAIN SEVERITY QUANTIFIED, NO PAIN PRESENT: ICD-10-PCS | Mod: CPTII,S$GLB,, | Performed by: INTERNAL MEDICINE

## 2023-04-27 PROCEDURE — 3074F PR MOST RECENT SYSTOLIC BLOOD PRESSURE < 130 MM HG: ICD-10-PCS | Mod: CPTII,S$GLB,, | Performed by: PHYSICIAN ASSISTANT

## 2023-04-27 PROCEDURE — 1101F PT FALLS ASSESS-DOCD LE1/YR: CPT | Mod: CPTII,S$GLB,, | Performed by: PHYSICIAN ASSISTANT

## 2023-04-27 PROCEDURE — 3074F SYST BP LT 130 MM HG: CPT | Mod: CPTII,S$GLB,, | Performed by: INTERNAL MEDICINE

## 2023-04-27 PROCEDURE — G0439 PPPS, SUBSEQ VISIT: HCPCS | Mod: S$GLB,,, | Performed by: PHYSICIAN ASSISTANT

## 2023-04-27 PROCEDURE — 99999 PR PBB SHADOW E&M-EST. PATIENT-LVL IV: ICD-10-PCS | Mod: PBBFAC,,, | Performed by: PHYSICIAN ASSISTANT

## 2023-04-27 PROCEDURE — 3078F DIAST BP <80 MM HG: CPT | Mod: CPTII,S$GLB,, | Performed by: PHYSICIAN ASSISTANT

## 2023-04-27 PROCEDURE — 1160F RVW MEDS BY RX/DR IN RCRD: CPT | Mod: CPTII,S$GLB,, | Performed by: PHYSICIAN ASSISTANT

## 2023-04-27 PROCEDURE — 99214 OFFICE O/P EST MOD 30 MIN: CPT | Mod: S$GLB,,, | Performed by: INTERNAL MEDICINE

## 2023-04-27 PROCEDURE — 1101F PR PT FALLS ASSESS DOC 0-1 FALLS W/OUT INJ PAST YR: ICD-10-PCS | Mod: CPTII,S$GLB,, | Performed by: INTERNAL MEDICINE

## 2023-04-27 PROCEDURE — 1160F RVW MEDS BY RX/DR IN RCRD: CPT | Mod: CPTII,S$GLB,, | Performed by: INTERNAL MEDICINE

## 2023-04-27 PROCEDURE — 1160F PR REVIEW ALL MEDS BY PRESCRIBER/CLIN PHARMACIST DOCUMENTED: ICD-10-PCS | Mod: CPTII,S$GLB,, | Performed by: INTERNAL MEDICINE

## 2023-04-27 PROCEDURE — 3288F FALL RISK ASSESSMENT DOCD: CPT | Mod: CPTII,S$GLB,, | Performed by: PHYSICIAN ASSISTANT

## 2023-04-27 PROCEDURE — 3074F SYST BP LT 130 MM HG: CPT | Mod: CPTII,S$GLB,, | Performed by: PHYSICIAN ASSISTANT

## 2023-04-27 PROCEDURE — 1159F PR MEDICATION LIST DOCUMENTED IN MEDICAL RECORD: ICD-10-PCS | Mod: CPTII,S$GLB,, | Performed by: PHYSICIAN ASSISTANT

## 2023-04-27 PROCEDURE — 99999 PR PBB SHADOW E&M-EST. PATIENT-LVL IV: CPT | Mod: PBBFAC,,, | Performed by: PHYSICIAN ASSISTANT

## 2023-04-27 PROCEDURE — 3078F PR MOST RECENT DIASTOLIC BLOOD PRESSURE < 80 MM HG: ICD-10-PCS | Mod: CPTII,S$GLB,, | Performed by: PHYSICIAN ASSISTANT

## 2023-04-27 PROCEDURE — 3288F PR FALLS RISK ASSESSMENT DOCUMENTED: ICD-10-PCS | Mod: CPTII,S$GLB,, | Performed by: INTERNAL MEDICINE

## 2023-04-27 PROCEDURE — 3288F PR FALLS RISK ASSESSMENT DOCUMENTED: ICD-10-PCS | Mod: CPTII,S$GLB,, | Performed by: PHYSICIAN ASSISTANT

## 2023-04-27 PROCEDURE — 1159F MED LIST DOCD IN RCRD: CPT | Mod: CPTII,S$GLB,, | Performed by: INTERNAL MEDICINE

## 2023-04-27 PROCEDURE — 99999 PR PBB SHADOW E&M-EST. PATIENT-LVL III: ICD-10-PCS | Mod: PBBFAC,,, | Performed by: INTERNAL MEDICINE

## 2023-04-27 PROCEDURE — 1159F MED LIST DOCD IN RCRD: CPT | Mod: CPTII,S$GLB,, | Performed by: PHYSICIAN ASSISTANT

## 2023-04-27 PROCEDURE — 3074F PR MOST RECENT SYSTOLIC BLOOD PRESSURE < 130 MM HG: ICD-10-PCS | Mod: CPTII,S$GLB,, | Performed by: INTERNAL MEDICINE

## 2023-04-27 NOTE — PROGRESS NOTES
"  Nelia Littlejohn presented for a  Medicare AWV and comprehensive Health Risk Assessment today. The following components were reviewed and updated:    Medical history  Family History  Social history  Allergies and Current Medications  Health Risk Assessment  Health Maintenance  Care Team         ** See Completed Assessments for Annual Wellness Visit within the encounter summary.**         The following assessments were completed:  Living Situation  CAGE  Depression Screening  Timed Get Up and Go  Whisper Test  Cognitive Function Screening  Nutrition Screening  ADL Screening  PAQ Screening          Vitals:    04/27/23 0931   BP: (!) 128/50   BP Location: Right arm   Patient Position: Sitting   BP Method: Large (Manual)   Pulse: 77   SpO2: 97%   Weight: 105.5 kg (232 lb 9.4 oz)   Height: 5' 6" (1.676 m)     Body mass index is 37.54 kg/m².  Physical Exam  Vitals and nursing note reviewed.   Constitutional:       Appearance: Normal appearance. She is well-developed.   HENT:      Head: Normocephalic.      Right Ear: External ear normal.      Left Ear: External ear normal.   Eyes:      Pupils: Pupils are equal, round, and reactive to light.   Cardiovascular:      Rate and Rhythm: Normal rate and regular rhythm.      Heart sounds: Normal heart sounds. No murmur heard.    No friction rub. No gallop.   Pulmonary:      Effort: Pulmonary effort is normal. No respiratory distress.      Breath sounds: Normal breath sounds.   Abdominal:      Palpations: Abdomen is soft.      Tenderness: There is no abdominal tenderness.   Musculoskeletal:      Comments: Ambulates with walker    Skin:     General: Skin is warm and dry.   Neurological:      Mental Status: She is alert.             Diagnoses and health risks identified today and associated recommendations/orders:    1. Encounter for preventive health examination  Assessments completed.  Preventative health recommendations reviewed.  Discussed screening eye exam - she defers.    2. " H/O: stroke, left eye, transient blindness, no residual,   Stable, controlled on current medical therapy, followed by PCP.    3. TIA (transient ischemic attack), 6/29/2021  Stable, controlled on current medical therapy, followed by PCP.    4. Mixed hyperlipidemia  Stable, controlled on current medical therapy, followed by PCP.  Lab Results   Component Value Date    CHOL 168 10/25/2022    TRIG 159 (H) 10/25/2022    HDL 43 10/25/2022    LDLCALC 93.2 10/25/2022     5. Essential hypertension  Stable, controlled on current medical therapy, followed by PCP.  BP Readings from Last 5 Encounters:   04/27/23 (!) 128/50   01/19/23 122/60   11/29/22 (!) 158/66   10/25/22 (!) 152/64   08/29/22 (!) 156/63      6. Aortic atherosclerosis (CT 2014)  Stable, controlled on current medical therapy, followed by PCP.    7. Peripheral vascular disease  Stable, controlled on current medical therapy, followed by PCP.    8. Stage 3a chronic kidney disease  Stable, followed by PCP.    9. Multinodular goiter  Stable, followed by PCP.    10. History of prediabetes 9/2016  Stable, followed by PCP.  Lab Results   Component Value Date    HGBA1C 5.5 01/22/2021    HGBA1C 5.7 (H) 01/09/2019    HGBA1C 5.3 10/26/2017     11. Class 2 severe obesity with body mass index (BMI) of 35 to 39.9 with serious comorbidity  Stable, followed by PCP.  Wt Readings from Last 4 Encounters:   04/27/23 105.5 kg (232 lb 9.4 oz)   01/19/23 109.9 kg (242 lb 4.6 oz)   11/29/22 109.2 kg (240 lb 11.9 oz)   10/25/22 107.6 kg (237 lb 3.4 oz)     12. Primary osteoarthritis of both knees  Stable, controlled on current medical therapy, followed by PCP.    13. Status post total left knee replacement, 10-.  Stable, no complications, followed by PCP.    14. Obstructive sleep apnea syndrome  Stable, followed by PCP.    15. Insomnia, unspecified type  Stable, followed by PCP.    16. Impaired functional mobility, balance, gait, and endurance  Stable, ambulates with  walker.      Provided Nelia with a 5-10 year written screening schedule and personal prevention plan. Recommendations were developed using the USPSTF age appropriate recommendations. Education, counseling, and referrals were provided as needed. After Visit Summary printed and given to patient which includes a list of additional screenings\tests needed.    No follow-ups on file.    Magalys Teran PA-C  I offered to discuss advanced care planning, including how to pick a person who would make decisions for you if you were unable to make them for yourself, called a health care power of , and what kind of decisions you might make such as use of life sustaining treatments such as ventilators and tube feeding when faced with a life limiting illness recorded on a living will that they will need to know. (How you want to be cared for as you near the end of your natural life)     X Patient is interested in learning more about how to make advanced directives.  I provided them paperwork and offered to discuss this with them.

## 2023-04-27 NOTE — PATIENT INSTRUCTIONS
Counseling and Referral of Other Preventative  (Italic type indicates deductible and co-insurance are waived)    Patient Name: Nelia Littlejohn  Today's Date: 4/27/2023    Health Maintenance       Date Due Completion Date    Lipid Panel 10/25/2027 10/25/2022    TETANUS VACCINE 04/09/2029 4/9/2019        No orders of the defined types were placed in this encounter.    The following information is provided to all patients.  This information is to help you find resources for any of the problems found today that may be affecting your health:                Living healthy guide: www.[facility].[place].[facility]      Understanding Diabetes: www.diabetes.org      Eating healthy: www.cdc.gov/healthyweight      CDC home safety checklist: www.cdc.gov/steadi/patient.html      Agency on Aging: www.goea.[place].[facility]      Alcoholics anonymous (AA): www.aa.org      Physical Activity: www.tiffany.nih.gov/go0craa      Tobacco use: www.quitwithusla.org

## 2023-04-27 NOTE — PROGRESS NOTES
Subjective:       Patient ID: Nelia Littlejohn is a 83 y.o. female.    Chief Complaint:   Follow-up    HPI - she feels well today.  She is been having intermittent loose bowel for the past couple of months.  Pepto-Bismol worked, but it does not work anymore she is continuing to take all medications for blood pressure, and pressure remains at goal.  Health maintenance up-to-date.  She is not a smoker.    PMH:  Resistant HTN, requiring 6 meds  HLP  CVA 2021  OA, s/p L TKR  ANUM on cpap  Pre-DM     Meds:  Reviewed and reconciled in EPIC with patient during visit today.     Review of Systems   Constitutional:  Negative for fever.   HENT:  Negative for congestion.    Respiratory:  Negative for shortness of breath.    Cardiovascular:  Negative for chest pain.   Gastrointestinal:  Negative for abdominal pain.   Genitourinary:  Negative for difficulty urinating.   Musculoskeletal:  Positive for arthralgias.   Skin:  Negative for rash.   Neurological:  Negative for headaches.   Psychiatric/Behavioral:  Negative for sleep disturbance.      Objective:      Physical Exam  Constitutional:       Appearance: Normal appearance. She is obese.   HENT:      Head: Normocephalic and atraumatic.   Pulmonary:      Effort: Pulmonary effort is normal.   Neurological:      Mental Status: She is alert. Mental status is at baseline.   Psychiatric:         Mood and Affect: Mood normal.       Assessment:       1. Essential hypertension    2. 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 Class 2 severe obesity with body mass index (BMI) of 35 to 39.9 with serious comorbidity    4. Diarrhea, unspecified type        Plan:       Nelia was seen today for follow-up.    Diagnoses and all orders for this visit:    Essential hypertension - at goal.  Stay the course    H/O: stroke, left eye, transient blindness, no residual,  - stable.  Continue present care    Class 2 severe obesity with body mass index (BMI) of 35 to 39.9  with serious comorbidity - stable.  Weight loss    Diarrhea, unspecified type - discussed use of imodium ad after loose bm's.      Rtc prn, or in 6 months    G Diana Gongora MD MPH  Staff Internist

## 2023-05-02 RX ORDER — ATORVASTATIN CALCIUM 80 MG/1
80 TABLET, FILM COATED ORAL DAILY
Qty: 90 TABLET | Refills: 1 | Status: SHIPPED | OUTPATIENT
Start: 2023-05-02 | End: 2023-10-15

## 2023-05-02 RX ORDER — AMLODIPINE AND BENAZEPRIL HYDROCHLORIDE 10; 40 MG/1; MG/1
1 CAPSULE ORAL EVERY MORNING
Qty: 90 CAPSULE | Refills: 1 | Status: SHIPPED | OUTPATIENT
Start: 2023-05-02 | End: 2023-10-15

## 2023-05-02 RX ORDER — ALLOPURINOL 300 MG/1
TABLET ORAL
Qty: 90 TABLET | Refills: 3 | Status: SHIPPED | OUTPATIENT
Start: 2023-05-02 | End: 2024-03-26 | Stop reason: SDUPTHER

## 2023-05-02 NOTE — TELEPHONE ENCOUNTER
Care Due:                  Date            Visit Type   Department     Provider  --------------------------------------------------------------------------------                                EP -                              PRIMARY      [facility] INTER[facility]  Last Visit: 04-      CARE ([facility])   BRIAN Gongora                              EP -                              PRIMARY      [facility] INTERNAL  Next Visit: 10-      CARE ([facility])   BRIAN Gongora                                                            Last  Test          Frequency    Reason                     Performed    Due Date  --------------------------------------------------------------------------------    CBC.........  12 months..  allopurinoL..............  01- 01-    Uric Acid...  12 months..  allopurinoL..............  01- 01-    [facility] Embedded Care Due Messages. Reference number: [id number].   5/02/2023 2:29:06 PM CDT

## 2023-05-02 NOTE — TELEPHONE ENCOUNTER
Refill Routing Note   Medication(s) are not appropriate for processing by [facility] for the following reason(s):      Required labs outdated    [facility] action(s):  Defer  Approve Labs due            Appointments  past 12m or future 3m with PCP    Date Provider   Last Visit   4/27/2023 Bernardo Gongora II, MD   Next Visit   10/23/2023 Bernardo Gongora II, MD   ED visits in past 90 days: 0        Note composed:3:25 PM 05/02/2023

## 2023-06-06 ENCOUNTER — OFFICE VISIT (OUTPATIENT)
Dept: SLEEP MEDICINE | Facility: CLINIC | Age: 83
End: 2023-06-06
Payer: MEDICARE

## 2023-06-06 VITALS
DIASTOLIC BLOOD PRESSURE: 72 MMHG | HEIGHT: 66 IN | WEIGHT: 231.5 LBS | SYSTOLIC BLOOD PRESSURE: 128 MMHG | BODY MASS INDEX: 37.21 KG/M2 | HEART RATE: 72 BPM

## 2023-06-06 DIAGNOSIS — F51.09 OTHER INSOMNIA NOT DUE TO A SUBSTANCE OR KNOWN PHYSIOLOGICAL CONDITION: ICD-10-CM

## 2023-06-06 DIAGNOSIS — G47.10 HYPERSOMNOLENCE: ICD-10-CM

## 2023-06-06 DIAGNOSIS — G47.33 OSA (OBSTRUCTIVE SLEEP APNEA): Primary | ICD-10-CM

## 2023-06-06 DIAGNOSIS — R35.1 NOCTURIA: ICD-10-CM

## 2023-06-06 DIAGNOSIS — R06.83 SNORING: ICD-10-CM

## 2023-06-06 PROCEDURE — 1159F MED LIST DOCD IN RCRD: CPT | Mod: CPTII,S$GLB,, | Performed by: INTERNAL MEDICINE

## 2023-06-06 PROCEDURE — 99999 PR PBB SHADOW E&M-EST. PATIENT-LVL III: CPT | Mod: PBBFAC,,, | Performed by: INTERNAL MEDICINE

## 2023-06-06 PROCEDURE — 1101F PT FALLS ASSESS-DOCD LE1/YR: CPT | Mod: CPTII,S$GLB,, | Performed by: INTERNAL MEDICINE

## 2023-06-06 PROCEDURE — 1126F AMNT PAIN NOTED NONE PRSNT: CPT | Mod: CPTII,S$GLB,, | Performed by: INTERNAL MEDICINE

## 2023-06-06 PROCEDURE — 1126F PR PAIN SEVERITY QUANTIFIED, NO PAIN PRESENT: ICD-10-PCS | Mod: CPTII,S$GLB,, | Performed by: INTERNAL MEDICINE

## 2023-06-06 PROCEDURE — 1101F PR PT FALLS ASSESS DOC 0-1 FALLS W/OUT INJ PAST YR: ICD-10-PCS | Mod: CPTII,S$GLB,, | Performed by: INTERNAL MEDICINE

## 2023-06-06 PROCEDURE — 3074F PR MOST RECENT SYSTOLIC BLOOD PRESSURE < 130 MM HG: ICD-10-PCS | Mod: CPTII,S$GLB,, | Performed by: INTERNAL MEDICINE

## 2023-06-06 PROCEDURE — 99214 OFFICE O/P EST MOD 30 MIN: CPT | Mod: S$GLB,,, | Performed by: INTERNAL MEDICINE

## 2023-06-06 PROCEDURE — 1159F PR MEDICATION LIST DOCUMENTED IN MEDICAL RECORD: ICD-10-PCS | Mod: CPTII,S$GLB,, | Performed by: INTERNAL MEDICINE

## 2023-06-06 PROCEDURE — 3078F DIAST BP <80 MM HG: CPT | Mod: CPTII,S$GLB,, | Performed by: INTERNAL MEDICINE

## 2023-06-06 PROCEDURE — 3078F PR MOST RECENT DIASTOLIC BLOOD PRESSURE < 80 MM HG: ICD-10-PCS | Mod: CPTII,S$GLB,, | Performed by: INTERNAL MEDICINE

## 2023-06-06 PROCEDURE — 3074F SYST BP LT 130 MM HG: CPT | Mod: CPTII,S$GLB,, | Performed by: INTERNAL MEDICINE

## 2023-06-06 PROCEDURE — 99999 PR PBB SHADOW E&M-EST. PATIENT-LVL III: ICD-10-PCS | Mod: PBBFAC,,, | Performed by: INTERNAL MEDICINE

## 2023-06-06 PROCEDURE — 3288F PR FALLS RISK ASSESSMENT DOCUMENTED: ICD-10-PCS | Mod: CPTII,S$GLB,, | Performed by: INTERNAL MEDICINE

## 2023-06-06 PROCEDURE — 3288F FALL RISK ASSESSMENT DOCD: CPT | Mod: CPTII,S$GLB,, | Performed by: INTERNAL MEDICINE

## 2023-06-06 PROCEDURE — 99214 PR OFFICE/OUTPT VISIT, EST, LEVL IV, 30-39 MIN: ICD-10-PCS | Mod: S$GLB,,, | Performed by: INTERNAL MEDICINE

## 2023-06-06 NOTE — PROGRESS NOTES
"  ESTABLISHED PATIENT VISIT    Nelia Littlejohn  is a pleasant 83 y.o. female  with PMH significant for stroke in 2016, evaluated for possible TIA (2021, unconfirmed), HLD, HTN, CKD III, ANUM dx 2004 (not on CPAP-intolerance of FFM)  .    Here today for follow-up    PLAN last visit:   -will proceed with sleep testing (will try nasal mask)  -discussed trial of PAP therapy if ANUM present   -driving precautions were discussed with the patient    Since last visit:     Using CPAP nightly  ordered auto 5-12    PAP history   Problems Had trouble   Mask Nasal pillows with chin strap (works pretty well)  FFM (leak)   Nasal (does well with it), occasional dry mouth    Pressure comfortable   Benefit Sleeping better   DME HME   Machine age Early 2022   Download 5.27.23: 29/30 x 8h 17min, 5-12 (10.8/11.8/11.9), Leak 9/30/48, AHI 0.4       SLEEP SCHEDULE   Bed Time  23:00-23:30   Sleep Latency Not long   Arousals 0-1   Back to sleep 15 min   Wake time 10-11AM   Naps none   Nocturia none   Work Retired       Vitals:    06/06/23 1135   BP: 128/72   BP Location: Right arm   Patient Position: Sitting   BP Method: Medium (Automatic)   Pulse: 72   Weight: 105 kg (231 lb 7.7 oz)   Height: 5' 6" (1.676 m)     Physical Exam:    GEN:   Well-appearing  Psych:  Appropriate affect, demonstrates insight  SKIN:  No rash on the face or bridge of the nose    LABS:   No results found for: HGB, CO2      RECORDS REVIEWED:    PSG Date 11.10.21, AHI = 88.8, delayed SOL (sleep onset RERAs)    3/15/22: 27/30 x 9h 15min, 5-12(10.5/11.7/11.9), leak 7/29/75, AHI 1.3    ASSESSMENT    No flowsheet data found.  PROBLEM DESCRIPTION Interval HX Status   Very severe ANUM   + snoring and snoring arousals  HEENT: MP3, + oropharynx narrow in A-P dimension and + narrow pharyngeal opening    PAP: has done well Using nightly controlled   CPAP intolerance Had FFM which leaked  Nasal mask working better   Doing well Doing better    sleepiness   + sleepiness when inactive "   denies sleepiness when driving (does not drive)  ESS 16/24 on intake    PAP: Less sleepy  No longer needing naps Occasional sleepiness if stays up late improved   Insomnia   Trouble falling and staying asleep    PAP: Sleeping through the night Not waking up often improved   Comorbidities: HTN, hx of CVA    PLAN       -adjust auto to 8-13, ramp auto 6  -the patient is using and benefiting from PAP therapy      RTC 1 year         The patient was given open opportunity to ask questions and/or express concerns about treatment plan.   All questions/concerns were discussed.     Two patient identifiers used prior to evaluation.

## 2023-07-12 DIAGNOSIS — M10.9 INTERVAL GOUT: ICD-10-CM

## 2023-07-12 DIAGNOSIS — G45.9 TIA (TRANSIENT ISCHEMIC ATTACK): ICD-10-CM

## 2023-07-13 RX ORDER — ASPIRIN 325 MG
TABLET, DELAYED RELEASE (ENTERIC COATED) ORAL
Qty: 90 TABLET | Refills: 3 | Status: SHIPPED | OUTPATIENT
Start: 2023-07-13 | End: 2024-03-26 | Stop reason: SDUPTHER

## 2023-08-09 ENCOUNTER — TELEPHONE (OUTPATIENT)
Dept: ORTHOPEDICS | Facility: CLINIC | Age: 83
End: 2023-08-09
Payer: MEDICARE

## 2023-08-09 NOTE — TELEPHONE ENCOUNTER
Spoke with medicaid patient she stated this is a new problem she,s having hip pain , P.A.,s can,t see medicaid patient for a new problem , we will refer her [facility]

## 2023-08-09 NOTE — TELEPHONE ENCOUNTER
Spoke with patient again regarding her medical insurance [facility] [employer] is her primary insurance

## 2023-08-10 ENCOUNTER — HOSPITAL ENCOUNTER (OUTPATIENT)
Dept: RADIOLOGY | Facility: HOSPITAL | Age: 83
Discharge: HOME | End: 2023-08-10
Attending: PHYSICIAN ASSISTANT
Payer: MEDICARE

## 2023-08-10 ENCOUNTER — OFFICE VISIT (OUTPATIENT)
Dept: ORTHOPEDICS | Facility: CLINIC | Age: 83
End: 2023-08-10
Payer: MEDICARE

## 2023-08-10 VITALS
HEART RATE: 72 BPM | SYSTOLIC BLOOD PRESSURE: 123 MMHG | DIASTOLIC BLOOD PRESSURE: 64 MMHG | BODY MASS INDEX: 36.9 KG/M2 | HEIGHT: 66 IN | WEIGHT: 229.63 LBS

## 2023-08-10 DIAGNOSIS — M54.50 LUMBAR SPINE PAIN: ICD-10-CM

## 2023-08-10 DIAGNOSIS — M25.551 PAIN OF RIGHT HIP: ICD-10-CM

## 2023-08-10 DIAGNOSIS — M51.36 DEGENERATIVE DISC DISEASE, LUMBAR: Primary | ICD-10-CM

## 2023-08-10 PROCEDURE — 1160F PR REVIEW ALL MEDS BY PRESCRIBER/CLIN PHARMACIST DOCUMENTED: ICD-10-PCS | Mod: CPTII,S$GLB,, | Performed by: PHYSICIAN ASSISTANT

## 2023-08-10 PROCEDURE — 1160F RVW MEDS BY RX/DR IN RCRD: CPT | Mod: CPTII,S$GLB,, | Performed by: PHYSICIAN ASSISTANT

## 2023-08-10 PROCEDURE — 1125F AMNT PAIN NOTED PAIN PRSNT: CPT | Mod: CPTII,S$GLB,, | Performed by: PHYSICIAN ASSISTANT

## 2023-08-10 PROCEDURE — 73502 XR HIP WITH PELVIS WHEN PERFORMED, 2 OR 3  VIEWS RIGHT: ICD-10-PCS | Mod: 26,RT,, | Performed by: INTERNAL MEDICINE

## 2023-08-10 PROCEDURE — 99214 OFFICE O/P EST MOD 30 MIN: CPT | Mod: S$GLB,,, | Performed by: PHYSICIAN ASSISTANT

## 2023-08-10 PROCEDURE — 72110 X-RAY EXAM L-2 SPINE 4/>VWS: CPT | Mod: 26,,, | Performed by: RADIOLOGY

## 2023-08-10 PROCEDURE — 1159F MED LIST DOCD IN RCRD: CPT | Mod: CPTII,S$GLB,, | Performed by: PHYSICIAN ASSISTANT

## 2023-08-10 PROCEDURE — 72110 X-RAY EXAM L-2 SPINE 4/>VWS: CPT | Mod: TC

## 2023-08-10 PROCEDURE — 99214 PR OFFICE/OUTPT VISIT, EST, LEVL IV, 30-39 MIN: ICD-10-PCS | Mod: S$GLB,,, | Performed by: PHYSICIAN ASSISTANT

## 2023-08-10 PROCEDURE — 1125F PR PAIN SEVERITY QUANTIFIED, PAIN PRESENT: ICD-10-PCS | Mod: CPTII,S$GLB,, | Performed by: PHYSICIAN ASSISTANT

## 2023-08-10 PROCEDURE — 1159F PR MEDICATION LIST DOCUMENTED IN MEDICAL RECORD: ICD-10-PCS | Mod: CPTII,S$GLB,, | Performed by: PHYSICIAN ASSISTANT

## 2023-08-10 PROCEDURE — 3078F PR MOST RECENT DIASTOLIC BLOOD PRESSURE < 80 MM HG: ICD-10-PCS | Mod: CPTII,S$GLB,, | Performed by: PHYSICIAN ASSISTANT

## 2023-08-10 PROCEDURE — 73502 X-RAY EXAM HIP UNI 2-3 VIEWS: CPT | Mod: TC,RT

## 2023-08-10 PROCEDURE — 1100F PR PT FALLS ASSESS DOC 2+ FALLS/FALL W/INJURY/YR: ICD-10-PCS | Mod: CPTII,S$GLB,, | Performed by: PHYSICIAN ASSISTANT

## 2023-08-10 PROCEDURE — 3288F FALL RISK ASSESSMENT DOCD: CPT | Mod: CPTII,S$GLB,, | Performed by: PHYSICIAN ASSISTANT

## 2023-08-10 PROCEDURE — 3078F DIAST BP <80 MM HG: CPT | Mod: CPTII,S$GLB,, | Performed by: PHYSICIAN ASSISTANT

## 2023-08-10 PROCEDURE — 99999 PR PBB SHADOW E&M-EST. PATIENT-LVL IV: ICD-10-PCS | Mod: PBBFAC,,, | Performed by: PHYSICIAN ASSISTANT

## 2023-08-10 PROCEDURE — 73502 X-RAY EXAM HIP UNI 2-3 VIEWS: CPT | Mod: 26,RT,, | Performed by: INTERNAL MEDICINE

## 2023-08-10 PROCEDURE — 3288F PR FALLS RISK ASSESSMENT DOCUMENTED: ICD-10-PCS | Mod: CPTII,S$GLB,, | Performed by: PHYSICIAN ASSISTANT

## 2023-08-10 PROCEDURE — 72110 XR LUMBAR SPINE AP AND LAT WITH FLEX/EXT: ICD-10-PCS | Mod: 26,,, | Performed by: RADIOLOGY

## 2023-08-10 PROCEDURE — 3074F PR MOST RECENT SYSTOLIC BLOOD PRESSURE < 130 MM HG: ICD-10-PCS | Mod: CPTII,S$GLB,, | Performed by: PHYSICIAN ASSISTANT

## 2023-08-10 PROCEDURE — 3074F SYST BP LT 130 MM HG: CPT | Mod: CPTII,S$GLB,, | Performed by: PHYSICIAN ASSISTANT

## 2023-08-10 PROCEDURE — 1100F PTFALLS ASSESS-DOCD GE2>/YR: CPT | Mod: CPTII,S$GLB,, | Performed by: PHYSICIAN ASSISTANT

## 2023-08-10 PROCEDURE — 99999 PR PBB SHADOW E&M-EST. PATIENT-LVL IV: CPT | Mod: PBBFAC,,, | Performed by: PHYSICIAN ASSISTANT

## 2023-08-10 RX ORDER — METHYLPREDNISOLONE 4 MG/1
TABLET ORAL
Qty: 1 EACH | Refills: 0 | Status: SHIPPED | OUTPATIENT
Start: 2023-08-10 | End: 2023-08-30 | Stop reason: ALTCHOICE

## 2023-08-10 NOTE — PROGRESS NOTES
SUBJECTIVE:     Chief Complaint & History of Present Illness:  Nelia Littlejohn is a Established  patient 83 y.o. female who is seen here today with a complaint of    Chief Complaint   Patient presents with    Right Hip - Pain, Injury    .  Patient is here today for evaluation treatment of persistent soreness pain in tenderness in the lateral aspect of her right hip.  She does not remember a specific trauma or injury to the area but does have some soreness and tenderness with certain activities movement likely rising from a seated position or bending at the waist.  She is not had any tenderness to palpation over the trochanteric bursa and has no groin pain  On a scale of 1-10, with 10 being worst pain imaginable, he rates this pain as 3 on good days and 6 on bad days.  she describes the pain as sore and achy.    Review of patient's allergies indicates:  No Known Allergies      Current Outpatient Medications   Medication Sig Dispense Refill    allopurinoL (ZYLOPRIM) 300 MG tablet TAKE 1 TABLET(300 MG) BY MOUTH EVERY DAY 90 tablet 3    amLODIPine-benazepriL (LOTREL) 10-40 mg per capsule Take 1 capsule by mouth every morning. 90 capsule 1    aspirin (ECOTRIN) 325 MG EC tablet TAKE 1 TABLET(325 MG) BY MOUTH EVERY DAY 90 tablet 3    atorvastatin (LIPITOR) 80 MG tablet Take 1 tablet (80 mg total) by mouth once daily. 90 tablet 1    hydrALAZINE (APRESOLINE) 100 MG tablet Take 1 tablet (100 mg total) by mouth 2 (two) times daily. Blood pressure 180 tablet 3    metoprolol ta-hydrochlorothiaz (LOPRESSOR HCT) 100-25 mg per tablet Take 1 tablet by mouth once daily. 90 tablet 3    MULTIVIT-IRON-MIN-FOLIC ACID 3,500-18-0.4 UNIT-MG-MG ORAL CHEW Take by mouth once daily.      spironolactone (ALDACTONE) 25 MG tablet Take 1 tablet (25 mg total) by mouth once daily. 90 tablet 3    methylPREDNISolone (MEDROL DOSEPACK) 4 mg tablet use as directed 1 each 0     No current facility-administered medications for this visit.       Past  "Medical History:   Diagnosis Date    Cataract     Elevated alkaline phosphatase level 01/29/2013    Gout, joint     H/O: stroke, left eye, transient blindness, no residual,  09/11/2012 09- Vision has returned. Vision is much better, can read.     High cholesterol     History of prediabetes 10/26/2017    HTN (hypertension) 09/25/2012    Hyperlipemia 09/11/2012    Hypertension     Interval gout 09/11/2012       Past Surgical History:   Procedure Laterality Date    CATARACT EXTRACTION Bilateral     EYE SURGERY      HYSTERECTOMY      JOINT REPLACEMENT      Yag capsulotomy left eye  10/10/2012       Vital Signs (Most Recent)  Vitals:    08/10/23 0949   BP: 123/64   Pulse: 72           Review of Systems:  ROS:  Constitutional: no fever or chills, Positive for obstructive sleep apnea   Eyes: no visual changes  ENT: no nasal congestion or sore throat  Respiratory: no cough or shortness of breath  Cardiovascular: no chest pain or palpitations, Positive aortic atherosclerosis  Gastrointestinal: no nausea or vomiting, tolerating diet  Genitourinary: no hematuria or dysuria, CKD stage 3  Integument/Breast: no rash or pruritis  Hematologic/Lymphatic: no easy bruising or lymphadenopathy  Musculoskeletal: no arthralgias or myalgias, Positive history of gout, osteoarthritis left knee, status post left TKA,  Neurological: no seizures or tremors, Positive for TIA  Behavioral/Psych: no auditory or visual hallucinations  Endocrine: no heat or cold intolerance                OBJECTIVE:     PHYSICAL EXAM:  Height: 5' 6" (167.6 cm) Weight: 104.2 kg (229 lb 9.8 oz), General Appearance: Well nourished, well developed, in no acute distress.  Neurological: Mood & affect are normal.  Back Exam:      Tenderness: Lumbar   Swelling:  None       Range of Motion:      Flexion: 80     Extension: 50     Lateral Bend:   Right 50                              Left 50     Rotation:          Right 70                              Left 80 "       SLR:                  Right Negative                             Left Negative       Muscle Strength      Abductor: 5/5     Adductor: 5/5     Quadriceps: 5/5     Hamstrings: 5/5           Sensation: Normal   Gait: Normal     Mild-to-moderate soreness over the right greater trochanteric bursal region without tenderness        Hip Examination:  positives:  Soreness over the lateral trochanteric bursal region and negatives: FROM  no tenderness  no pain with heel impact  pulses full    RADIOGRAPHS:  X-rays the hip taken today films reviewed by me demonstrate mild arthritic changes in the hip with some joint space narrowing but no evidence of impingement or sclerotic changes no osteophytic spurring no fracture dislocation or other bony abnormalities    X-rays lumbar spine taken today films reviewed by me grade 1 anteriolisthesis at L4-L5 disc space narrowing in the same areas    ASSESSMENT/PLAN:       ICD-10-CM ICD-9-CM   1. Degenerative disc disease, lumbar  M51.36 722.52   2. Pain of right hip  M25.551 719.45   3. Lumbar spine pain  M54.50 724.2       Plan: We discussed with the patient at length all the different treatment options available for her spine including anti-inflammatories, acetaminophen, rest, ice, physical therapy, strengthening and range of motion exercise, occasional cortisone injections for temporary relief, and finally possible surgical interventions  Medrol Dosepak as per package directions  Consult to Physical therapy for Healthy Back program  Follow-up in 4 weeks if symptoms not significantly improved sooner symptoms dictate

## 2023-08-30 ENCOUNTER — OFFICE VISIT (OUTPATIENT)
Dept: INTERNAL MEDICINE | Facility: CLINIC | Age: 83
End: 2023-08-30
Payer: MEDICARE

## 2023-08-30 VITALS
DIASTOLIC BLOOD PRESSURE: 58 MMHG | HEIGHT: 66 IN | WEIGHT: 226.88 LBS | HEART RATE: 89 BPM | TEMPERATURE: 99 F | BODY MASS INDEX: 36.46 KG/M2 | SYSTOLIC BLOOD PRESSURE: 126 MMHG | OXYGEN SATURATION: 98 %

## 2023-08-30 DIAGNOSIS — U07.1 COVID-19: Primary | ICD-10-CM

## 2023-08-30 DIAGNOSIS — Z20.822 EXPOSURE TO COVID-19 VIRUS: ICD-10-CM

## 2023-08-30 LAB
CTP QC/QA: YES
SARS-COV-2 RDRP RESP QL NAA+PROBE: POSITIVE

## 2023-08-30 PROCEDURE — 99999 PR PBB SHADOW E&M-EST. PATIENT-LVL IV: CPT | Mod: PBBFAC,,, | Performed by: PHYSICIAN ASSISTANT

## 2023-08-30 PROCEDURE — 1126F AMNT PAIN NOTED NONE PRSNT: CPT | Mod: CPTII,S$GLB,, | Performed by: PHYSICIAN ASSISTANT

## 2023-08-30 PROCEDURE — 1160F PR REVIEW ALL MEDS BY PRESCRIBER/CLIN PHARMACIST DOCUMENTED: ICD-10-PCS | Mod: CPTII,S$GLB,, | Performed by: PHYSICIAN ASSISTANT

## 2023-08-30 PROCEDURE — 3074F PR MOST RECENT SYSTOLIC BLOOD PRESSURE < 130 MM HG: ICD-10-PCS | Mod: CPTII,S$GLB,, | Performed by: PHYSICIAN ASSISTANT

## 2023-08-30 PROCEDURE — 1159F PR MEDICATION LIST DOCUMENTED IN MEDICAL RECORD: ICD-10-PCS | Mod: CPTII,S$GLB,, | Performed by: PHYSICIAN ASSISTANT

## 2023-08-30 PROCEDURE — 87635 SARS-COV-2 COVID-19 AMP PRB: CPT | Mod: QW,S$GLB,, | Performed by: PHYSICIAN ASSISTANT

## 2023-08-30 PROCEDURE — 3288F PR FALLS RISK ASSESSMENT DOCUMENTED: ICD-10-PCS | Mod: CPTII,S$GLB,, | Performed by: PHYSICIAN ASSISTANT

## 2023-08-30 PROCEDURE — 1101F PT FALLS ASSESS-DOCD LE1/YR: CPT | Mod: CPTII,S$GLB,, | Performed by: PHYSICIAN ASSISTANT

## 2023-08-30 PROCEDURE — 99213 PR OFFICE/OUTPT VISIT, EST, LEVL III, 20-29 MIN: ICD-10-PCS | Mod: S$GLB,,, | Performed by: PHYSICIAN ASSISTANT

## 2023-08-30 PROCEDURE — 1101F PR PT FALLS ASSESS DOC 0-1 FALLS W/OUT INJ PAST YR: ICD-10-PCS | Mod: CPTII,S$GLB,, | Performed by: PHYSICIAN ASSISTANT

## 2023-08-30 PROCEDURE — 99213 OFFICE O/P EST LOW 20 MIN: CPT | Mod: S$GLB,,, | Performed by: PHYSICIAN ASSISTANT

## 2023-08-30 PROCEDURE — 1160F RVW MEDS BY RX/DR IN RCRD: CPT | Mod: CPTII,S$GLB,, | Performed by: PHYSICIAN ASSISTANT

## 2023-08-30 PROCEDURE — 1126F PR PAIN SEVERITY QUANTIFIED, NO PAIN PRESENT: ICD-10-PCS | Mod: CPTII,S$GLB,, | Performed by: PHYSICIAN ASSISTANT

## 2023-08-30 PROCEDURE — 3078F DIAST BP <80 MM HG: CPT | Mod: CPTII,S$GLB,, | Performed by: PHYSICIAN ASSISTANT

## 2023-08-30 PROCEDURE — 99999 PR PBB SHADOW E&M-EST. PATIENT-LVL IV: ICD-10-PCS | Mod: PBBFAC,,, | Performed by: PHYSICIAN ASSISTANT

## 2023-08-30 PROCEDURE — 3078F PR MOST RECENT DIASTOLIC BLOOD PRESSURE < 80 MM HG: ICD-10-PCS | Mod: CPTII,S$GLB,, | Performed by: PHYSICIAN ASSISTANT

## 2023-08-30 PROCEDURE — 3074F SYST BP LT 130 MM HG: CPT | Mod: CPTII,S$GLB,, | Performed by: PHYSICIAN ASSISTANT

## 2023-08-30 PROCEDURE — 87635: ICD-10-PCS | Mod: QW,S$GLB,, | Performed by: PHYSICIAN ASSISTANT

## 2023-08-30 PROCEDURE — 1159F MED LIST DOCD IN RCRD: CPT | Mod: CPTII,S$GLB,, | Performed by: PHYSICIAN ASSISTANT

## 2023-08-30 PROCEDURE — 3288F FALL RISK ASSESSMENT DOCD: CPT | Mod: CPTII,S$GLB,, | Performed by: PHYSICIAN ASSISTANT

## 2023-08-30 RX ORDER — BENZONATATE 100 MG/1
100 CAPSULE ORAL 3 TIMES DAILY PRN
Qty: 20 CAPSULE | Refills: 0 | Status: SHIPPED | OUTPATIENT
Start: 2023-08-30 | End: 2023-09-09

## 2023-08-30 NOTE — PROGRESS NOTES
"Subjective     Patient ID: Nelia Littlejohn is a 83 y.o. female.    Chief Complaint: Cough    HPI    Established pt of Bernardo Gongora II, MD (new to me)    Same day/urgent care appt.     C/o fatigue, chills, headache, dry cough, runny nose, eyes runny  Onset 2 days ago  Taking zyrtec with mild relief.  +Covid exposure (close friend)      Past Medical History:   Diagnosis Date    Cataract     Elevated alkaline phosphatase level 01/29/2013    Gout, joint     H/O: stroke, left eye, transient blindness, no residual,  09/11/2012 09- Vision has returned. Vision is much better, can read.     High cholesterol     History of prediabetes 10/26/2017    HTN (hypertension) 09/25/2012    Hyperlipemia 09/11/2012    Hypertension     Interval gout 09/11/2012     Social History     Tobacco Use    Smoking status: Never    Smokeless tobacco: Never   Substance Use Topics    Alcohol use: No    Drug use: No     Review of patient's allergies indicates:  No Known Allergies    Review of Systems   Constitutional:  Positive for chills and fatigue. Negative for fever and unexpected weight change.   HENT:  Positive for rhinorrhea and sore throat. Negative for ear discharge and ear pain.    Respiratory:  Positive for cough. Negative for shortness of breath and wheezing.    Cardiovascular:  Negative for chest pain and leg swelling.   Gastrointestinal:  Negative for abdominal pain, nausea and vomiting.   Integumentary:  Negative for rash.   Neurological:  Negative for weakness and headaches.          Objective  BP (!) 126/58 (BP Location: Left arm, Patient Position: Sitting, BP Method: Medium (Manual))   Pulse 89   Ht 5' 6" (1.676 m)   Wt 102.9 kg (226 lb 13.7 oz)   SpO2 98%   BMI 36.62 kg/m²       Physical Exam  Vitals reviewed.   Constitutional:       General: She is not in acute distress.     Appearance: She is well-developed. She is not ill-appearing.   HENT:      Head: Normocephalic and atraumatic.      Right Ear: " Tympanic membrane, ear canal and external ear normal.      Left Ear: Tympanic membrane, ear canal and external ear normal.      Nose: Congestion present.      Mouth/Throat:      Mouth: Mucous membranes are moist.      Pharynx: Oropharynx is clear.   Cardiovascular:      Rate and Rhythm: Normal rate and regular rhythm.      Heart sounds: No murmur heard.  Pulmonary:      Effort: Pulmonary effort is normal.      Breath sounds: Normal breath sounds. No wheezing or rales.   Musculoskeletal:      Right lower leg: No edema.      Left lower leg: No edema.   Lymphadenopathy:      Cervical: No cervical adenopathy.   Skin:     General: Skin is warm and dry.      Findings: No rash.   Neurological:      Mental Status: She is alert.   Psychiatric:         Mood and Affect: Mood normal.            Assessment and Plan     1. COVID-19  -     POCT COVID-19 Rapid Screening (positive)  -     benzonatate (TESSALON) 100 MG capsule; Take 1 capsule (100 mg total) by mouth 3 (three) times daily as needed for Cough.  Dispense: 20 capsule; Refill: 0  -     nirmatrelvir-ritonavir 300 mg (150 mg x 2)-100 mg copackaged tablets (EUA); Take 3 tablets by mouth 2 (two) times daily for 5 days. Each dose contains 2 nirmatrelvir (pink tablets) and 1 ritonavir (white tablet). Take all 3 tablets together  Dispense: 30 tablet; Refill: 0    2. Exposure to COVID-19 virus  -     benzonatate (TESSALON) 100 MG capsule; Take 1 capsule (100 mg total) by mouth 3 (three) times daily as needed for Cough.  Dispense: 20 capsule; Refill: 0  -     nirmatrelvir-ritonavir 300 mg (150 mg x 2)-100 mg copackaged tablets (EUA); Take 3 tablets by mouth 2 (two) times daily for 5 days. Each dose contains 2 nirmatrelvir (pink tablets) and 1 ritonavir (white tablet). Take all 3 tablets together  Dispense: 30 tablet; Refill: 0        Risk COVID = 5  Home isolation guideline reviewed  Symptomatic care   Advised to HOLD Statin while taking paxlovid       Carole Taylor PA-C

## 2023-08-30 NOTE — PATIENT INSTRUCTIONS
Tylenol/ibuprofen as needed    Tessalon as needed for cough    Stay well hydrated, drink plenty fluids    Continue allergy meds    Flonase as needed    Plain mucinex to help thin congestion    HOLD ATORVASTATIN WHILE TAKING PAXLOVID

## 2023-09-16 ENCOUNTER — HOSPITAL ENCOUNTER (OUTPATIENT)
Facility: HOSPITAL | Age: 83
Discharge: HOME | End: 2023-09-18
Attending: EMERGENCY MEDICINE | Admitting: INTERNAL MEDICINE
Payer: MEDICARE

## 2023-09-16 DIAGNOSIS — R79.89 POSITIVE D DIMER: ICD-10-CM

## 2023-09-16 DIAGNOSIS — R07.9 CHEST PAIN: Primary | ICD-10-CM

## 2023-09-16 DIAGNOSIS — I82.411 ACUTE DEEP VEIN THROMBOSIS (DVT) OF FEMORAL VEIN OF RIGHT LOWER EXTREMITY: ICD-10-CM

## 2023-09-16 DIAGNOSIS — E04.1 THYROID NODULE: ICD-10-CM

## 2023-09-16 LAB
ALBUMIN SERPL BCP-MCNC: 3.6 G/DL (ref 3.5–5.2)
ALP SERPL-CCNC: 101 U/L (ref 55–135)
ALT SERPL W/O P-5'-P-CCNC: 15 U/L (ref 10–44)
ANION GAP SERPL CALC-SCNC: 11 MMOL/L (ref 8–16)
AST SERPL-CCNC: 20 U/L (ref 10–40)
BASOPHILS # BLD AUTO: 0.03 K/UL (ref 0–0.2)
BASOPHILS NFR BLD: 0.3 % (ref 0–1.9)
BILIRUB SERPL-MCNC: 0.7 MG/DL (ref 0.1–1)
BNP SERPL-MCNC: 48 PG/ML (ref 0–99)
BUN SERPL-MCNC: 23 MG/DL (ref 8–23)
CALCIUM SERPL-MCNC: 9.2 MG/DL (ref 8.7–10.5)
CHLORIDE SERPL-SCNC: 110 MMOL/L (ref 95–110)
CO2 SERPL-SCNC: 20 MMOL/L (ref 23–29)
CREAT SERPL-MCNC: 1.4 MG/DL (ref 0.5–1.4)
D DIMER PPP IA.FEU-MCNC: 3.16 MG/L FEU
DIFFERENTIAL METHOD: ABNORMAL
EOSINOPHIL # BLD AUTO: 0.1 K/UL (ref 0–0.5)
EOSINOPHIL NFR BLD: 1.1 % (ref 0–8)
ERYTHROCYTE [DISTWIDTH] IN BLOOD BY AUTOMATED COUNT: 16.1 % (ref 11.5–14.5)
EST. GFR  (NO RACE VARIABLE): 37.3 ML/MIN/1.73 M^2
GLUCOSE SERPL-MCNC: 105 MG/DL (ref 70–110)
HCT VFR BLD AUTO: 35 % (ref 37–48.5)
HGB BLD-MCNC: 10.9 G/DL (ref 12–16)
IMM GRANULOCYTES # BLD AUTO: 0.02 K/UL (ref 0–0.04)
IMM GRANULOCYTES NFR BLD AUTO: 0.2 % (ref 0–0.5)
LYMPHOCYTES # BLD AUTO: 2.6 K/UL (ref 1–4.8)
LYMPHOCYTES NFR BLD: 28.3 % (ref 18–48)
MCH RBC QN AUTO: 29.7 PG (ref 27–31)
MCHC RBC AUTO-ENTMCNC: 31.1 G/DL (ref 32–36)
MCV RBC AUTO: 95 FL (ref 82–98)
MONOCYTES # BLD AUTO: 0.6 K/UL (ref 0.3–1)
MONOCYTES NFR BLD: 6.6 % (ref 4–15)
NEUTROPHILS # BLD AUTO: 5.8 K/UL (ref 1.8–7.7)
NEUTROPHILS NFR BLD: 63.5 % (ref 38–73)
NRBC BLD-RTO: 0 /100 WBC
PLATELET # BLD AUTO: 312 K/UL (ref 150–450)
PMV BLD AUTO: 10.1 FL (ref 9.2–12.9)
POC CARDIAC TROPONIN I: 0 NG/ML (ref 0–0.08)
POTASSIUM SERPL-SCNC: 4.5 MMOL/L (ref 3.5–5.1)
PROT SERPL-MCNC: 7.1 G/DL (ref 6–8.4)
RBC # BLD AUTO: 3.67 M/UL (ref 4–5.4)
SAMPLE: NORMAL
SARS-COV-2 RDRP RESP QL NAA+PROBE: NEGATIVE
SODIUM SERPL-SCNC: 141 MMOL/L (ref 136–145)
TROPONIN I SERPL DL<=0.01 NG/ML-MCNC: <0.006 NG/ML (ref 0–0.03)
TROPONIN I SERPL DL<=0.01 NG/ML-MCNC: <0.006 NG/ML (ref 0–0.03)
WBC # BLD AUTO: 9.06 K/UL (ref 3.9–12.7)

## 2023-09-16 PROCEDURE — 93010 ELECTROCARDIOGRAM REPORT: CPT | Mod: ,,, | Performed by: INTERNAL MEDICINE

## 2023-09-16 PROCEDURE — 93005 ELECTROCARDIOGRAM TRACING: CPT

## 2023-09-16 PROCEDURE — U0002 COVID-19 LAB TEST NON-CDC: HCPCS | Performed by: PHYSICIAN ASSISTANT

## 2023-09-16 PROCEDURE — 85379 FIBRIN DEGRADATION QUANT: CPT | Performed by: PHYSICIAN ASSISTANT

## 2023-09-16 PROCEDURE — 84484 ASSAY OF TROPONIN QUANT: CPT

## 2023-09-16 PROCEDURE — 99285 EMERGENCY DEPT VISIT HI MDM: CPT | Mod: 25

## 2023-09-16 PROCEDURE — 83880 ASSAY OF NATRIURETIC PEPTIDE: CPT | Performed by: EMERGENCY MEDICINE

## 2023-09-16 PROCEDURE — 84443 ASSAY THYROID STIM HORMONE: CPT | Performed by: EMERGENCY MEDICINE

## 2023-09-16 PROCEDURE — 84484 ASSAY OF TROPONIN QUANT: CPT | Performed by: EMERGENCY MEDICINE

## 2023-09-16 PROCEDURE — 93010 EKG 12-LEAD: ICD-10-PCS | Mod: ,,, | Performed by: INTERNAL MEDICINE

## 2023-09-16 PROCEDURE — 25000003 PHARM REV CODE 250: Performed by: PHYSICIAN ASSISTANT

## 2023-09-16 PROCEDURE — 85025 COMPLETE CBC W/AUTO DIFF WBC: CPT | Performed by: EMERGENCY MEDICINE

## 2023-09-16 PROCEDURE — 80053 COMPREHEN METABOLIC PANEL: CPT | Performed by: EMERGENCY MEDICINE

## 2023-09-16 RX ORDER — ASPIRIN 325 MG
325 TABLET ORAL
Status: COMPLETED | OUTPATIENT
Start: 2023-09-16 | End: 2023-09-16

## 2023-09-16 RX ADMIN — ASPIRIN 325 MG ORAL TABLET 325 MG: 325 PILL ORAL at 10:09

## 2023-09-16 NOTE — Clinical Note
Diagnosis: Chest pain [[id number]]   Future Attending Provider: HECTOR DELGADO [[id number]]   Admitting Provider:: HECTOR DELGADO [[id number]]

## 2023-09-17 PROBLEM — R79.89 POSITIVE D DIMER: Status: ACTIVE | Noted: 2023-09-17

## 2023-09-17 PROBLEM — R07.9 CHEST PAIN: Status: ACTIVE | Noted: 2023-09-17

## 2023-09-17 LAB
ASCENDING AORTA: 3.15 CM
AV INDEX (PROSTH): 0.53
AV MEAN GRADIENT: 9 MMHG
AV PEAK GRADIENT: 17 MMHG
AV VALVE AREA BY VELOCITY RATIO: 1.72 CM²
AV VALVE AREA: 1.68 CM²
AV VELOCITY RATIO: 0.55
BACTERIA #/AREA URNS AUTO: ABNORMAL /HPF
BILIRUB UR QL STRIP: NEGATIVE
BSA FOR ECHO PROCEDURE: 2.18 M2
CLARITY UR REFRACT.AUTO: CLEAR
COLOR UR AUTO: YELLOW
CRP SERPL-MCNC: 6.4 MG/L (ref 0–8.2)
CV ECHO LV RWT: 0.54 CM
DOP CALC AO PEAK VEL: 2.06 M/S
DOP CALC AO VTI: 53.25 CM
DOP CALC LVOT AREA: 3.1 CM2
DOP CALC LVOT DIAMETER: 2 CM
DOP CALC LVOT PEAK VEL: 1.13 M/S
DOP CALC LVOT STROKE VOLUME: 89.33 CM3
DOP CALCLVOT PEAK VEL VTI: 28.45 CM
E WAVE DECELERATION TIME: 174.85 MSEC
E/A RATIO: 0.92
E/E' RATIO: 8 M/S
ECHO LV POSTERIOR WALL: 1.16 CM (ref 0.6–1.1)
ERYTHROCYTE [SEDIMENTATION RATE] IN BLOOD BY PHOTOMETRIC METHOD: 58 MM/HR (ref 0–36)
ESTIMATED AVG GLUCOSE: 105 MG/DL (ref 68–131)
FRACTIONAL SHORTENING: 36 % (ref 28–44)
GLUCOSE UR QL STRIP: NEGATIVE
HBA1C MFR BLD: 5.3 % (ref 4–5.6)
HGB UR QL STRIP: NEGATIVE
INTERVENTRICULAR SEPTUM: 1.14 CM (ref 0.6–1.1)
IVRT: 121.79 MSEC
KETONES UR QL STRIP: NEGATIVE
LA MAJOR: 5.42 CM
LA MINOR: 5.8 CM
LA WIDTH: 4.02 CM
LEFT ATRIUM SIZE: 4.14 CM
LEFT ATRIUM VOLUME INDEX MOD: 21.2 ML/M2
LEFT ATRIUM VOLUME INDEX: 37.6 ML/M2
LEFT ATRIUM VOLUME MOD: 44.77 CM3
LEFT ATRIUM VOLUME: 79.27 CM3
LEFT INTERNAL DIMENSION IN SYSTOLE: 2.72 CM (ref 2.1–4)
LEFT VENTRICLE DIASTOLIC VOLUME INDEX: 38.52 ML/M2
LEFT VENTRICLE DIASTOLIC VOLUME: 81.28 ML
LEFT VENTRICLE MASS INDEX: 81 G/M2
LEFT VENTRICLE SYSTOLIC VOLUME INDEX: 13.1 ML/M2
LEFT VENTRICLE SYSTOLIC VOLUME: 27.55 ML
LEFT VENTRICULAR INTERNAL DIMENSION IN DIASTOLE: 4.26 CM (ref 3.5–6)
LEFT VENTRICULAR MASS: 171.15 G
LEUKOCYTE ESTERASE UR QL STRIP: ABNORMAL
LV LATERAL E/E' RATIO: 7.6 M/S
LV SEPTAL E/E' RATIO: 8.44 M/S
MICROSCOPIC COMMENT: ABNORMAL
MV PEAK A VEL: 0.83 M/S
MV PEAK E VEL: 0.76 M/S
NITRITE UR QL STRIP: NEGATIVE
PH UR STRIP: 6 [PH] (ref 5–8)
PISA TR MAX VEL: 2.74 M/S
PROCALCITONIN SERPL IA-MCNC: 0.03 NG/ML
PROT UR QL STRIP: NEGATIVE
RA MAJOR: 4.3 CM
RA PRESSURE ESTIMATED: 3 MMHG
RA WIDTH: 3.6 CM
RBC #/AREA URNS AUTO: 8 /HPF (ref 0–4)
RIGHT VENTRICULAR END-DIASTOLIC DIMENSION: 3.12 CM
RV TB RVSP: 6 MMHG
SINUS: 2.62 CM
SP GR UR STRIP: 1.02 (ref 1–1.03)
SQUAMOUS #/AREA URNS AUTO: 7 /HPF
STJ: 2.38 CM
TDI LATERAL: 0.1 M/S
TDI SEPTAL: 0.09 M/S
TDI: 0.1 M/S
TR MAX PG: 30 MMHG
TRICUSPID ANNULAR PLANE SYSTOLIC EXCURSION: 2.49 CM
TROPONIN I SERPL DL<=0.01 NG/ML-MCNC: <0.006 NG/ML (ref 0–0.03)
TSH SERPL DL<=0.005 MIU/L-ACNC: 0.58 UIU/ML (ref 0.4–4)
TV REST PULMONARY ARTERY PRESSURE: 33 MMHG
URN SPEC COLLECT METH UR: ABNORMAL
WBC #/AREA URNS AUTO: 45 /HPF (ref 0–5)
Z-SCORE OF LEFT VENTRICULAR DIMENSION IN END DIASTOLE: -4.4
Z-SCORE OF LEFT VENTRICULAR DIMENSION IN END SYSTOLE: -3.12

## 2023-09-17 PROCEDURE — 83036 HEMOGLOBIN GLYCOSYLATED A1C: CPT | Performed by: EMERGENCY MEDICINE

## 2023-09-17 PROCEDURE — 96361 HYDRATE IV INFUSION ADD-ON: CPT

## 2023-09-17 PROCEDURE — 84484 ASSAY OF TROPONIN QUANT: CPT | Performed by: PHYSICIAN ASSISTANT

## 2023-09-17 PROCEDURE — 86140 C-REACTIVE PROTEIN: CPT | Performed by: NURSE PRACTITIONER

## 2023-09-17 PROCEDURE — 84145 PROCALCITONIN (PCT): CPT | Performed by: NURSE PRACTITIONER

## 2023-09-17 PROCEDURE — G0378 HOSPITAL OBSERVATION PER HR: HCPCS

## 2023-09-17 PROCEDURE — 25000003 PHARM REV CODE 250: Performed by: NURSE PRACTITIONER

## 2023-09-17 PROCEDURE — 63600175 PHARM REV CODE 636 W HCPCS: Performed by: PHYSICIAN ASSISTANT

## 2023-09-17 PROCEDURE — 94660 CPAP INITIATION&MGMT: CPT

## 2023-09-17 PROCEDURE — 81001 URINALYSIS AUTO W/SCOPE: CPT | Performed by: NURSE PRACTITIONER

## 2023-09-17 PROCEDURE — 99900035 HC TECH TIME PER 15 MIN (STAT)

## 2023-09-17 PROCEDURE — 85652 RBC SED RATE AUTOMATED: CPT | Performed by: NURSE PRACTITIONER

## 2023-09-17 PROCEDURE — 63600175 PHARM REV CODE 636 W HCPCS: Performed by: NURSE PRACTITIONER

## 2023-09-17 PROCEDURE — 27000190 HC CPAP FULL FACE MASK W/VALVE

## 2023-09-17 PROCEDURE — 36415 COLL VENOUS BLD VENIPUNCTURE: CPT | Performed by: NURSE PRACTITIONER

## 2023-09-17 PROCEDURE — 99223 PR INITIAL HOSPITAL CARE,LEVL III: ICD-10-PCS | Mod: ,,, | Performed by: NURSE PRACTITIONER

## 2023-09-17 PROCEDURE — 25000003 PHARM REV CODE 250

## 2023-09-17 PROCEDURE — 25500020 PHARM REV CODE 255: Performed by: EMERGENCY MEDICINE

## 2023-09-17 PROCEDURE — 96372 THER/PROPH/DIAG INJ SC/IM: CPT | Mod: 59 | Performed by: NURSE PRACTITIONER

## 2023-09-17 PROCEDURE — 99223 1ST HOSP IP/OBS HIGH 75: CPT | Mod: ,,, | Performed by: NURSE PRACTITIONER

## 2023-09-17 PROCEDURE — 87086 URINE CULTURE/COLONY COUNT: CPT | Performed by: NURSE PRACTITIONER

## 2023-09-17 RX ORDER — ACETAMINOPHEN 500 MG
1000 TABLET ORAL EVERY 8 HOURS PRN
Status: DISCONTINUED | OUTPATIENT
Start: 2023-09-17 | End: 2023-09-18 | Stop reason: HOSPADM

## 2023-09-17 RX ORDER — ASPIRIN 81 MG/1
81 TABLET ORAL DAILY
Status: DISCONTINUED | OUTPATIENT
Start: 2023-09-17 | End: 2023-09-18 | Stop reason: HOSPADM

## 2023-09-17 RX ORDER — SODIUM CHLORIDE 0.9 % (FLUSH) 0.9 %
10 SYRINGE (ML) INJECTION EVERY 12 HOURS PRN
Status: DISCONTINUED | OUTPATIENT
Start: 2023-09-17 | End: 2023-09-18 | Stop reason: HOSPADM

## 2023-09-17 RX ORDER — TALC
6 POWDER (GRAM) TOPICAL NIGHTLY PRN
Status: DISCONTINUED | OUTPATIENT
Start: 2023-09-17 | End: 2023-09-18 | Stop reason: HOSPADM

## 2023-09-17 RX ORDER — AMLODIPINE BESYLATE 10 MG/1
10 TABLET ORAL DAILY
Status: DISCONTINUED | OUTPATIENT
Start: 2023-09-17 | End: 2023-09-18 | Stop reason: HOSPADM

## 2023-09-17 RX ORDER — ASPIRIN 325 MG
325 TABLET, DELAYED RELEASE (ENTERIC COATED) ORAL DAILY
Status: DISCONTINUED | OUTPATIENT
Start: 2023-09-17 | End: 2023-09-17

## 2023-09-17 RX ORDER — GLUCAGON 1 MG
1 KIT INJECTION
Status: DISCONTINUED | OUTPATIENT
Start: 2023-09-17 | End: 2023-09-18 | Stop reason: HOSPADM

## 2023-09-17 RX ORDER — HEPARIN SODIUM 5000 [USP'U]/ML
5000 INJECTION, SOLUTION INTRAVENOUS; SUBCUTANEOUS EVERY 8 HOURS
Status: DISCONTINUED | OUTPATIENT
Start: 2023-09-17 | End: 2023-09-18 | Stop reason: HOSPADM

## 2023-09-17 RX ORDER — IBUPROFEN 200 MG
24 TABLET ORAL
Status: DISCONTINUED | OUTPATIENT
Start: 2023-09-17 | End: 2023-09-18 | Stop reason: HOSPADM

## 2023-09-17 RX ORDER — GUAIFENESIN 100 MG/5ML
200 SOLUTION ORAL EVERY 4 HOURS PRN
Status: DISCONTINUED | OUTPATIENT
Start: 2023-09-17 | End: 2023-09-18 | Stop reason: HOSPADM

## 2023-09-17 RX ORDER — IBUPROFEN 200 MG
16 TABLET ORAL
Status: DISCONTINUED | OUTPATIENT
Start: 2023-09-17 | End: 2023-09-18 | Stop reason: HOSPADM

## 2023-09-17 RX ORDER — GUAIFENESIN 100 MG/5ML
200 SOLUTION ORAL ONCE
Status: COMPLETED | OUTPATIENT
Start: 2023-09-17 | End: 2023-09-17

## 2023-09-17 RX ORDER — ONDANSETRON 2 MG/ML
4 INJECTION INTRAMUSCULAR; INTRAVENOUS EVERY 8 HOURS PRN
Status: DISCONTINUED | OUTPATIENT
Start: 2023-09-17 | End: 2023-09-18 | Stop reason: HOSPADM

## 2023-09-17 RX ORDER — LISINOPRIL 20 MG/1
40 TABLET ORAL DAILY
Status: DISCONTINUED | OUTPATIENT
Start: 2023-09-17 | End: 2023-09-18 | Stop reason: HOSPADM

## 2023-09-17 RX ORDER — HYDRALAZINE HYDROCHLORIDE 50 MG/1
100 TABLET, FILM COATED ORAL 2 TIMES DAILY
Status: DISCONTINUED | OUTPATIENT
Start: 2023-09-17 | End: 2023-09-18 | Stop reason: HOSPADM

## 2023-09-17 RX ORDER — METOPROLOL TARTRATE 100 MG/1
100 TABLET ORAL DAILY
Status: DISCONTINUED | OUTPATIENT
Start: 2023-09-17 | End: 2023-09-17

## 2023-09-17 RX ORDER — IPRATROPIUM BROMIDE AND ALBUTEROL SULFATE 2.5; .5 MG/3ML; MG/3ML
3 SOLUTION RESPIRATORY (INHALATION) EVERY 4 HOURS PRN
Status: DISCONTINUED | OUTPATIENT
Start: 2023-09-17 | End: 2023-09-18 | Stop reason: HOSPADM

## 2023-09-17 RX ORDER — ACETAMINOPHEN 325 MG/1
650 TABLET ORAL EVERY 4 HOURS PRN
Status: DISCONTINUED | OUTPATIENT
Start: 2023-09-17 | End: 2023-09-18 | Stop reason: HOSPADM

## 2023-09-17 RX ORDER — HYDROCHLOROTHIAZIDE 25 MG/1
25 TABLET ORAL DAILY
Status: DISCONTINUED | OUTPATIENT
Start: 2023-09-17 | End: 2023-09-18 | Stop reason: HOSPADM

## 2023-09-17 RX ORDER — ALLOPURINOL 300 MG/1
300 TABLET ORAL DAILY
Status: DISCONTINUED | OUTPATIENT
Start: 2023-09-17 | End: 2023-09-18 | Stop reason: HOSPADM

## 2023-09-17 RX ORDER — NALOXONE HCL 0.4 MG/ML
0.02 VIAL (ML) INJECTION
Status: DISCONTINUED | OUTPATIENT
Start: 2023-09-17 | End: 2023-09-18 | Stop reason: HOSPADM

## 2023-09-17 RX ORDER — ATORVASTATIN CALCIUM 40 MG/1
80 TABLET, FILM COATED ORAL DAILY
Status: DISCONTINUED | OUTPATIENT
Start: 2023-09-17 | End: 2023-09-18 | Stop reason: HOSPADM

## 2023-09-17 RX ADMIN — IOHEXOL 75 ML: 350 INJECTION, SOLUTION INTRAVENOUS at 12:09

## 2023-09-17 RX ADMIN — THERA TABS 1 TABLET: TAB at 08:09

## 2023-09-17 RX ADMIN — ASPIRIN 81 MG: 81 TABLET, COATED ORAL at 08:09

## 2023-09-17 RX ADMIN — GUAIFENESIN AND DEXTROMETHORPHAN HYDROBROMIDE 1 TABLET: 600; 30 TABLET, EXTENDED RELEASE ORAL at 09:09

## 2023-09-17 RX ADMIN — ALLOPURINOL 300 MG: 300 TABLET ORAL at 08:09

## 2023-09-17 RX ADMIN — HEPARIN SODIUM 5000 UNITS: 5000 INJECTION INTRAVENOUS; SUBCUTANEOUS at 03:09

## 2023-09-17 RX ADMIN — METOPROLOL TARTRATE 100 MG: 100 TABLET, FILM COATED ORAL at 08:09

## 2023-09-17 RX ADMIN — HYDRALAZINE HYDROCHLORIDE 100 MG: 50 TABLET ORAL at 08:09

## 2023-09-17 RX ADMIN — LISINOPRIL 40 MG: 20 TABLET ORAL at 08:09

## 2023-09-17 RX ADMIN — GUAIFENESIN 200 MG: 200 SOLUTION ORAL at 05:09

## 2023-09-17 RX ADMIN — HEPARIN SODIUM 5000 UNITS: 5000 INJECTION INTRAVENOUS; SUBCUTANEOUS at 10:09

## 2023-09-17 RX ADMIN — SODIUM CHLORIDE, POTASSIUM CHLORIDE, SODIUM LACTATE AND CALCIUM CHLORIDE 1000 ML: 600; 310; 30; 20 INJECTION, SOLUTION INTRAVENOUS at 12:09

## 2023-09-17 RX ADMIN — HYDROCHLOROTHIAZIDE 25 MG: 25 TABLET ORAL at 08:09

## 2023-09-17 RX ADMIN — HEPARIN SODIUM 5000 UNITS: 5000 INJECTION INTRAVENOUS; SUBCUTANEOUS at 05:09

## 2023-09-17 RX ADMIN — AMLODIPINE BESYLATE 10 MG: 10 TABLET ORAL at 08:09

## 2023-09-17 RX ADMIN — GUAIFENESIN AND DEXTROMETHORPHAN HYDROBROMIDE 1 TABLET: 600; 30 TABLET, EXTENDED RELEASE ORAL at 11:09

## 2023-09-17 RX ADMIN — ATORVASTATIN CALCIUM 80 MG: 40 TABLET, FILM COATED ORAL at 08:09

## 2023-09-17 NOTE — CARE UPDATE
Nelia Littlejohn was placed in  observation for workup of chest pain. Concerned for PE based on SOB, CP, cough, and elevated d dimer with RF for clotting including obesity, recent COVID illness, and sedentary lifestyle. CTA not revealing of a clot, but significant motion artifact was noted. BLE doppler US reveals R distal femoral non-occlusive DVT. High bleeding risk based on HASBLED score (4). Due to bleeding risk will hold off on AC. Surveillance recommended with outpatient LE doppler US once a week x 2 occurrences. Cardiac chest pain rule out in progress. EKG reveals sinus rhythm with 1st AV block, with nonspecific ST-T wave changes, trops neg x 3. HEART score 5, moderate risk. Recommend IP stress testing tomorrow morning. Plan to dc home when medically cleared. Referral to vascular. PCP follow up.

## 2023-09-17 NOTE — ASSESSMENT & PLAN NOTE
Body mass index is 36.49 kg/m². Obesity complicates all aspects of disease management from diagnostic modalities to treatment.

## 2023-09-17 NOTE — PLAN OF CARE
Problem: Adult Inpatient Plan of Care  Goal: Plan of Care Review  Outcome: Ongoing, Progressing  Goal: Patient-Specific Goal (Individualized)  Outcome: Ongoing, Progressing  Goal: Absence of Hospital-Acquired Illness or Injury  Outcome: Ongoing, Progressing  Goal: Optimal Comfort and Wellbeing  Outcome: Ongoing, Progressing  Goal: Readiness for Transition of Care  Outcome: Ongoing, Progressing     Problem: Hypertension Acute  Goal: Blood Pressure Within Desired Range  Outcome: Ongoing, Progressing     Problem: Fall Injury Risk  Goal: Absence of Fall and Fall-Related Injury  Outcome: Ongoing, Progressing     Problem: Obstructive Sleep Apnea Risk or Actual  Goal: Unobstructed Breathing During Sleep  Outcome: Ongoing, Progressing     Problem: Bariatric Environmental Safety  Goal: Safety Maintained with Care  Outcome: Ongoing, Progressing     Problem: Adjustment to Illness (Chronic Kidney Disease)  Goal: Optimal Coping with Chronic Illness  Outcome: Ongoing, Progressing     Problem: Electrolyte Imbalance (Chronic Kidney Disease)  Goal: Electrolyte Balance  Outcome: Ongoing, Progressing     Problem: Fluid Volume Excess (Chronic Kidney Disease)  Goal: Fluid Balance  Outcome: Ongoing, Progressing     Problem: Functional Decline (Chronic Kidney Disease)  Goal: Optimal Functional Ability  Outcome: Ongoing, Progressing     Problem: Hematologic Alteration (Chronic Kidney Disease)  Goal: Absence of Anemia Signs and Symptoms  Outcome: Ongoing, Progressing     Problem: Oral Intake Inadequate (Chronic Kidney Disease)  Goal: Optimal Oral Intake  Outcome: Ongoing, Progressing     Problem: Pain (Chronic Kidney Disease)  Goal: Acceptable Pain Control  Outcome: Ongoing, Progressing     Problem: Renal Function Impairment (Chronic Kidney Disease)  Goal: Minimize Renal Failure Effects  Outcome: Ongoing, Progressing     Problem: Chest Pain  Goal: Resolution of Chest Pain Symptoms  Outcome: Ongoing, Progressing

## 2023-09-17 NOTE — PLAN OF CARE
Problem: Adult Inpatient Plan of Care  Goal: Plan of Care Review  Outcome: Ongoing, Progressing  Goal: Patient-Specific Goal (Individualized)  Outcome: Ongoing, Progressing  Goal: Absence of Hospital-Acquired Illness or Injury  Outcome: Ongoing, Progressing  Goal: Optimal Comfort and Wellbeing  Outcome: Ongoing, Progressing  Goal: Readiness for Transition of Care  Outcome: Ongoing, Progressing     Problem: Hypertension Acute  Goal: Blood Pressure Within Desired Range  Outcome: Ongoing, Progressing     Problem: Fall Injury Risk  Goal: Absence of Fall and Fall-Related Injury  Outcome: Ongoing, Progressing     Problem: Obstructive Sleep Apnea Risk or Actual  Goal: Unobstructed Breathing During Sleep  Outcome: Ongoing, Progressing     Problem: Bariatric Environmental Safety  Goal: Safety Maintained with Care  Outcome: Ongoing, Progressing     Problem: Adjustment to Illness (Chronic Kidney Disease)  Goal: Optimal Coping with Chronic Illness  Outcome: Ongoing, Progressing     Problem: Electrolyte Imbalance (Chronic Kidney Disease)  Goal: Electrolyte Balance  Outcome: Ongoing, Progressing     Problem: Fluid Volume Excess (Chronic Kidney Disease)  Goal: Fluid Balance  Outcome: Ongoing, Progressing     Problem: Functional Decline (Chronic Kidney Disease)  Goal: Optimal Functional Ability  Outcome: Ongoing, Progressing     Problem: Hematologic Alteration (Chronic Kidney Disease)  Goal: Absence of Anemia Signs and Symptoms  Outcome: Ongoing, Progressing     Problem: Oral Intake Inadequate (Chronic Kidney Disease)  Goal: Optimal Oral Intake  Outcome: Ongoing, Progressing     Problem: Pain (Chronic Kidney Disease)  Goal: Acceptable Pain Control  Outcome: Ongoing, Progressing     Problem: Renal Function Impairment (Chronic Kidney Disease)  Goal: Minimize Renal Failure Effects  Outcome: Ongoing, Progressing     Problem: Chest Pain  Goal: Resolution of Chest Pain Symptoms  Outcome: Ongoing, Progressing     Pt progressing towards  goals. No distress notice. No falls or injuries during shift. Bed in lowest position, call light within reach, belonging at bedside. Safety precaution maintain.Plan of Care reviewed. Pt verbalized understanding.

## 2023-09-17 NOTE — ASSESSMENT & PLAN NOTE
Chronic issue. -180s, likely elevated due to not taking nightly medication today    Home meds for hypertension were reviewed and noted below. Hospital anti-hypertensive changes were made as shown below.  Hypertension Medications             amLODIPine-benazepriL (LOTREL) 10-40 mg per capsule Take 1 capsule by mouth every morning.    hydrALAZINE (APRESOLINE) 100 MG tablet Take 1 tablet (100 mg total) by mouth 2 (two) times daily. Blood pressure    metoprolol ta-hydrochlorothiaz (LOPRESSOR HCT) 100-25 mg per tablet Take 1 tablet by mouth once daily.    spironolactone (ALDACTONE) 25 MG tablet Take 1 tablet (25 mg total) by mouth once daily.        Will utilize p.r.n. blood pressure medication only if patient's blood pressure greater than  180/110 and she develops symptoms such as worsening chest pain or shortness of breath.

## 2023-09-17 NOTE — NURSING
Pt is AAOx4. No distress noted.Echo in progress.Call light in reach. Bed in low locked position.   Side rails x2. Belongings at bedside.   Pt free of fall and injuries Questions and concerns voiced and answered.Plan of care continues.

## 2023-09-17 NOTE — SUBJECTIVE & OBJECTIVE
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 Vision is much better, can read.     High cholesterol     History of prediabetes 10/26/2017    HTN (hypertension) 09/25/2012    Hyperlipemia 09/11/2012    Hypertension     Interval gout 09/11/2012       Past Surgical History:   Procedure Laterality Date    CATARACT EXTRACTION Bilateral     EYE SURGERY      HYSTERECTOMY      JOINT REPLACEMENT      Yag capsulotomy left eye  10/10/2012       Review of patient's allergies indicates:  No Known Allergies    No current facility-administered medications on file prior to encounter.     Current Outpatient Medications on File Prior to Encounter   Medication Sig    allopurinoL (ZYLOPRIM) 300 MG tablet TAKE 1 TABLET(300 MG) BY MOUTH EVERY DAY    amLODIPine-benazepriL (LOTREL) 10-40 mg per capsule Take 1 capsule by mouth every morning.    aspirin (ECOTRIN) 325 MG EC tablet TAKE 1 TABLET(325 MG) BY MOUTH EVERY DAY    atorvastatin (LIPITOR) 80 MG tablet Take 1 tablet (80 mg total) by mouth once daily.    hydrALAZINE (APRESOLINE) 100 MG tablet Take 1 tablet (100 mg total) by mouth 2 (two) times daily. Blood pressure    metoprolol ta-hydrochlorothiaz (LOPRESSOR HCT) 100-25 mg per tablet Take 1 tablet by mouth once daily.    MULTIVIT-IRON-MIN-FOLIC ACID 3,500-18-0.4 UNIT-MG-MG ORAL CHEW Take by mouth once daily.    spironolactone (ALDACTONE) 25 MG tablet Take 1 tablet (25 mg total) by mouth once daily.     Family History       Problem Relation (Age of Onset)    Cancer Sister    Cataracts Father    Diabetes Father, Mother, Brother, Brother    Heart disease Brother    Hypertension Brother, Son, Son    No Known Problems Son, Daughter, Maternal Grandmother, Maternal Grandfather, Paternal Grandmother, Paternal Grandfather, Brother, Maternal Aunt, Maternal Uncle,  Paternal Aunt, Paternal Uncle, Son, Son    Stroke Father          Tobacco Use    Smoking status: Never    Smokeless tobacco: Never   Substance and Sexual Activity    Alcohol use: No    Drug use: No    Sexual activity: Not Currently     Review of Systems   Constitutional:  Negative for appetite change, chills, diaphoresis, fatigue and fever.   HENT:  Negative for congestion, rhinorrhea and sore throat.    Eyes:  Negative for photophobia and visual disturbance.   Respiratory:  Positive for cough, chest tightness and shortness of breath. Negative for wheezing.         Green sputum   Cardiovascular:  Positive for chest pain. Negative for palpitations and leg swelling.   Gastrointestinal:  Positive for diarrhea. Negative for abdominal distention, abdominal pain, nausea and vomiting.   Genitourinary:  Negative for dysuria, frequency and hematuria.   Musculoskeletal:  Negative for back pain, gait problem and myalgias.   Skin:  Negative for color change, pallor, rash and wound.   Neurological:  Negative for dizziness, syncope, weakness and light-headedness.   Psychiatric/Behavioral:  Negative for confusion and hallucinations. The patient is not nervous/anxious.      Objective:     Vital Signs (Most Recent):  Temp: 98.2 °F (36.8 °C) (09/17/23 0137)  Pulse: 75 (09/17/23 0137)  Resp: 18 (09/17/23 0137)  BP: (!) 163/70 (09/17/23 0137)  SpO2: 98 % (09/17/23 0137) Vital Signs (24h Range):  Temp:  [98.1 °F (36.7 °C)-98.2 °F (36.8 °C)] 98.2 °F (36.8 °C)  Pulse:  [75-88] 75  Resp:  [17-18] 18  SpO2:  [98 %-99 %] 98 %  BP: (163-182)/(70-90) 163/70     Weight: 102.5 kg (226 lb)  Body mass index is 36.48 kg/m².     Physical Exam  Vitals and nursing note reviewed.   Constitutional:       General: She is not in acute distress.     Appearance: She is obese. She is not toxic-appearing or diaphoretic.   HENT:      Head: Normocephalic and atraumatic.      Nose: Nose normal.      Mouth/Throat:      Mouth: Mucous membranes are moist.       Pharynx: Oropharynx is clear.   Eyes:      Extraocular Movements: Extraocular movements intact.      Pupils: Pupils are equal, round, and reactive to light.   Cardiovascular:      Rate and Rhythm: Normal rate and regular rhythm.      Pulses: Normal pulses.           Dorsalis pedis pulses are 2+ on the right side and 2+ on the left side.        Posterior tibial pulses are 2+ on the right side and 2+ on the left side.      Heart sounds: Normal heart sounds.   Pulmonary:      Effort: Pulmonary effort is normal. No respiratory distress.      Breath sounds: Normal breath sounds. No wheezing or rales.   Chest:      Chest wall: No tenderness.   Abdominal:      General: Bowel sounds are normal. There is no distension.      Palpations: Abdomen is soft.      Tenderness: There is no abdominal tenderness. There is no guarding.   Musculoskeletal:         General: Normal range of motion.      Cervical back: Normal range of motion.      Right lower leg: No edema.      Left lower leg: No edema.   Skin:     General: Skin is warm and dry.      Capillary Refill: Capillary refill takes less than 2 seconds.   Neurological:      General: No focal deficit present.      Mental Status: She is alert and oriented to person, place, and time.      Cranial Nerves: Cranial nerves 2-12 are intact.      Sensory: Sensation is intact.      Motor: Motor function is intact. No weakness.   Psychiatric:         Mood and Affect: Mood normal.         Speech: Speech normal.         Behavior: Behavior normal. Behavior is cooperative.              CRANIAL NERVES     CN III, IV, VI   Pupils are equal, round, and reactive to light.       Significant Labs: All pertinent labs within the past 24 hours have been reviewed.  CBC:   Recent Labs   Lab 09/16/23 2226   WBC 9.06   HGB 10.9*   HCT 35.0*        CMP:   Recent Labs   Lab 09/16/23 2226      K 4.5      CO2 20*      BUN 23   CREATININE 1.4   CALCIUM 9.2   PROT 7.1   ALBUMIN 3.6    BILITOT 0.7   ALKPHOS 101   AST 20   ALT 15   ANIONGAP 11     Cardiac Markers:   Recent Labs   Lab 09/16/23 2226   BNP 48     Troponin:   Recent Labs   Lab 09/16/23 2226   TROPONINI <0.006  <0.006     Significant Imaging: I have reviewed all pertinent imaging results/findings within the past 24 hours.    Imaging Results              CTA Chest Non-Coronary (PE Studies) (Final result)  Result time 09/17/23 00:53:53      Final result by Eliseo Candelaria MD (09/17/23 00:53:53)                   Impression:      Limited by significant motion artifact.  No large central pulmonary arterial filling defect, and no definite filling defect to the level of the proximal segmental pulmonary arteries.  Distal embolism cannot be excluded on the basis of this exam. Correlation of these findings with D-dimer and/or lower extremity ultrasound as warranted.    Enlarged thyroid with possible nodule in the inferior left lobe of the thyroid gland as seen on prior thyroid ultrasound in 2015.  Consider correlation with physical exam findings and follow-up thyroid ultrasound if clinically warranted.    Electronically signed by resident: Kameron Can  Date:    09/17/2023  Time:    00:20    Electronically signed by: lEiseo Candelaria MD  Date:    09/17/2023  Time:    00:53               Narrative:    EXAMINATION:  CTA CHEST NON CORONARY (PE STUDIES)    CLINICAL HISTORY:  Pulmonary embolism (PE) suspected, positive D-dimer;    TECHNIQUE:  Low dose axial images, sagittal and coronal reformations were obtained from the thoracic inlet to the lung bases following the IV administration of 75 mL of Omnipaque 350.  Contrast timing was optimized to evaluate the pulmonary arteries.    COMPARISON:  Chest radiograph 09/16/2023, CT chest 09/27/2014.  Thyroid ultrasound, 10/08/2015.    FINDINGS:  Pulmonary vasculature: Significant motion artifact.  Allowing for this, no large central pulmonary arterial filling defect, and no definite filling defect  "to the level of the proximal segmental pulmonary arteries.  Distal embolism cannot be excluded on the basis of this exam.    Aorta: Left-sided aortic arch.  Mild to moderate calcific atherosclerosis.    Base of Neck: No significant abnormality.    Thoracic soft tissues: Enlarged thyroid gland with heterogeneous attenuation.  There is a possible nodule in the inferior aspect of the left lobe of the thyroid measuring approximately 2.2 cm in size (series 2, image 58).    Heart: Normal size. No effusion.  Coronary artery calcifications.    Keily/Mediastinum: No pathologic cristobal enlargement.    Airways: Patent.    Lungs/Pleura: Significant motion artifact.  Minimal ground-glass opacities likely subsegmental atelectasis.  No pleural effusion.    Esophagus: Normal.    Upper Abdomen: No acute abnormality.  Nodular thickening of the adrenal glands as seen on prior CT chest.  Simple appearing right renal cyst.  Probable simple left renal cyst, though incompletely included in the field of view, and evaluation of the upper abdomen is limited by motion.  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    Bones: No acute fracture. No suspicious lytic or sclerotic lesions.  Degenerative changes in the thoracic spine.                                       X-Ray Chest AP Portable (Final result)  Result time 09/16/23 23:03:38      Final result by Eliseo Candelaria MD (09/16/23 23:03:38)                   Impression:      No acute cardiopulmonary finding identified on this single view.      Electronically signed by: Eliseo Candelaria MD  Date:    09/16/2023  Time:    23:03               Narrative:    EXAMINATION:  XR CHEST AP PORTABLE    CLINICAL HISTORY:  Provided history is "Chest Pain;  ".    TECHNIQUE:  One view of the chest.    COMPARISON:  12/07/2020.    FINDINGS:  Cardiomediastinal silhouette is not enlarged.  Atherosclerotic calcifications overlie the aortic arch.  No confluent area of " consolidation.  No large pleural effusion.  No pneumothorax.  No detrimental change when compared with the prior study.

## 2023-09-17 NOTE — ED PROVIDER NOTES
Encounter Date: 9/16/2023       History     Chief Complaint   Patient presents with    Chest Pain     C/o chest pain x two days. Reports SOB upon exertion     The history is provided by the patient and medical records. No  was used.     Nelia Littlejohn is a 83 y.o. female with medical history of HTN, HLD presenting to the ED with the chief complaint of chest pain.     Awoke with L sternal chest pain yesterday. Described as a tightness and intermittent for a few minutes. Pain worsens with position changes. Reports having diffuse lower chest tightness this evening when sitting up on the couch which prompted her ED visit. Reports 0/10 pain at the time of my exam. Takes baby ASA daily, but did not take it today. Denies fever, SOB, cough, abdominal pain, vomiting, urinary or bowel movement changes. No personal history of cardiac disease.    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 Vision is much better, can read.     High cholesterol     History of prediabetes 10/26/2017    HTN (hypertension) 09/25/2012    Hyperlipemia 09/11/2012    Hypertension     Interval gout 09/11/2012     Past Surgical History:   Procedure Laterality Date    CATARACT EXTRACTION Bilateral     EYE SURGERY      HYSTERECTOMY      JOINT REPLACEMENT      Yag capsulotomy left eye  10/10/2012     Family History   Problem Relation Age of Onset    Diabetes Father     Stroke Father     Cataracts Father     Diabetes Mother     Cancer Sister         breast cancer    Diabetes Brother     Heart disease Brother     No Known Problems Son     No Known Problems Daughter     No Known Problems Maternal Grandmother     No Known Problems Maternal Grandfather     No Known Problems Paternal Grandmother      No Known Problems Paternal Grandfather     No Known Problems Brother     No Known Problems Maternal Aunt     No Known Problems Maternal Uncle     No Known Problems Paternal Aunt     No Known Problems Paternal Uncle     Hypertension Brother     Diabetes Brother     Hypertension Son     Hypertension Son     No Known Problems Son     No Known Problems Son     Amblyopia Neg Hx     Blindness Neg Hx     Glaucoma Neg Hx     Macular degeneration Neg Hx     Retinal detachment Neg Hx     Strabismus Neg Hx     Thyroid disease Neg Hx      Social History     Tobacco Use    Smoking status: Never    Smokeless tobacco: Never   Substance Use Topics    Alcohol use: No    Drug use: No     Review of Systems   Cardiovascular:  Positive for chest pain.       Physical Exam     Initial Vitals [09/16/23 2149]   BP Pulse Resp Temp SpO2   (!) 182/90 88 17 98.1 °F (36.7 °C) 99 %      MAP       --         Physical Exam    Constitutional: She appears well-developed and well-nourished. She is not diaphoretic. No distress.   HENT:   Head: Normocephalic and atraumatic.   Mouth/Throat: Oropharynx is clear and moist. No oropharyngeal exudate.   Eyes: Conjunctivae and EOM are normal. Pupils are equal, round, and reactive to light. No scleral icterus.   Neck: Neck supple.   Normal range of motion.  Cardiovascular:  Normal rate and regular rhythm.           Pulmonary/Chest: Breath sounds normal. No respiratory distress. She has no wheezes. She exhibits no tenderness.   Abdominal: Abdomen is soft. She exhibits no distension. There is no abdominal tenderness. There is no rebound.   Musculoskeletal:         General: No tenderness or edema. Normal range of motion.      Cervical back: Normal range of motion and neck supple.     Neurological: She is alert and oriented to person, place, and time. She has normal strength. No sensory deficit.   Skin: Skin is warm and dry. No rash noted. No erythema.   Psychiatric: She has a normal mood and affect.         ED  Course   Procedures  Labs Reviewed   CBC W/ AUTO DIFFERENTIAL - Abnormal; Notable for the following components:       Result Value    RBC 3.67 (*)     Hemoglobin 10.9 (*)     Hematocrit 35.0 (*)     MCHC 31.1 (*)     RDW 16.1 (*)     All other components within normal limits   COMPREHENSIVE METABOLIC PANEL - Abnormal; Notable for the following components:    CO2 20 (*)     eGFR 37.3 (*)     All other components within normal limits   D DIMER, QUANTITATIVE - Abnormal; Notable for the following components:    D-Dimer 3.16 (*)     All other components within normal limits   TROPONIN I   B-TYPE NATRIURETIC PEPTIDE   TROPONIN I   SARS-COV-2 RNA AMPLIFICATION, QUAL   TROPONIN ISTAT   TROPONIN I   POCT TROPONIN   POCT TROPONIN          Imaging Results              CTA Chest Non-Coronary (PE Studies) (Final result)  Result time 09/17/23 00:53:53      Final result by Eliseo Candelaria MD (09/17/23 00:53:53)                   Impression:      Limited by significant motion artifact.  No large central pulmonary arterial filling defect, and no definite filling defect to the level of the proximal segmental pulmonary arteries.  Distal embolism cannot be excluded on the basis of this exam. Correlation of these findings with D-dimer and/or lower extremity ultrasound as warranted.    Enlarged thyroid with possible nodule in the inferior left lobe of the thyroid gland as seen on prior thyroid ultrasound in 2015.  Consider correlation with physical exam findings and follow-up thyroid ultrasound if clinically warranted.    Electronically signed by resident: Kameron Can  Date:    09/17/2023  Time:    00:20    Electronically signed by: Eliseo Candelaria MD  Date:    09/17/2023  Time:    00:53               Narrative:    EXAMINATION:  CTA CHEST NON CORONARY (PE STUDIES)    CLINICAL HISTORY:  Pulmonary embolism (PE) suspected, positive D-dimer;    TECHNIQUE:  Low dose axial images, sagittal and coronal reformations were obtained from the  thoracic inlet to the lung bases following the IV administration of 75 mL of Omnipaque 350.  Contrast timing was optimized to evaluate the pulmonary arteries.    COMPARISON:  Chest radiograph 09/16/2023, CT chest 09/27/2014.  Thyroid ultrasound, 10/08/2015.    FINDINGS:  Pulmonary vasculature: Significant motion artifact.  Allowing for this, no large central pulmonary arterial filling defect, and no definite filling defect to the level of the proximal segmental pulmonary arteries.  Distal embolism cannot be excluded on the basis of this exam.    Aorta: Left-sided aortic arch.  Mild to moderate calcific atherosclerosis.    Base of Neck: No significant abnormality.    Thoracic soft tissues: Enlarged thyroid gland with heterogeneous attenuation.  There is a possible nodule in the inferior aspect of the left lobe of the thyroid measuring approximately 2.2 cm in size (series 2, image 58).    Heart: Normal size. No effusion.  Coronary artery calcifications.    Keily/Mediastinum: No pathologic cristobal enlargement.    Airways: Patent.    Lungs/Pleura: Significant motion artifact.  Minimal ground-glass opacities likely subsegmental atelectasis.  No pleural effusion.    Esophagus: Normal.    Upper Abdomen: No acute abnormality.  Nodular thickening of the adrenal glands as seen on prior CT chest.  Simple appearing right renal cyst.  Probable simple left renal cyst, though incompletely included in the field of view, and evaluation of the upper abdomen is limited by motion.  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    Bones: No acute fracture. No suspicious lytic or sclerotic lesions.  Degenerative changes in the thoracic spine.                                       X-Ray Chest AP Portable (Final result)  Result time 09/16/23 23:03:38      Final result by Eliseo Candelaria MD (09/16/23 23:03:38)                   Impression:      No acute cardiopulmonary finding identified on this single  "view.      Electronically signed by: Eliseo Candelaria MD  Date:    09/16/2023  Time:    23:03               Narrative:    EXAMINATION:  XR CHEST AP PORTABLE    CLINICAL HISTORY:  Provided history is "Chest Pain;  ".    TECHNIQUE:  One view of the chest.    COMPARISON:  12/07/2020.    FINDINGS:  Cardiomediastinal silhouette is not enlarged.  Atherosclerotic calcifications overlie the aortic arch.  No confluent area of consolidation.  No large pleural effusion.  No pneumothorax.  No detrimental change when compared with the prior study.                                       Medications   lactated ringers bolus 1,000 mL (1,000 mLs Intravenous New Bag 9/17/23 0018)   aspirin tablet 325 mg (325 mg Oral Given 9/16/23 2243)   iohexoL (OMNIPAQUE 350) injection 75 mL (75 mLs Intravenous Given 9/17/23 0018)     Medical Decision Making  83 y.o. female with medical history of HTN, HLD presenting to the ED c/o intermittent chest pain beginning yesterday.     DDx includes but not limited to ACS, pneumonia, pulmonary embolism, GERD, costochondritis, cardiac arrhythmia, musculoskeletal injury, aortic dissection, pericarditis, endocarditis.      Amount and/or Complexity of Data Reviewed  External Data Reviewed: labs and notes.  Labs: ordered. Decision-making details documented in ED Course.  Radiology: ordered and independent interpretation performed. Decision-making details documented in ED Course.  ECG/medicine tests: ordered and independent interpretation performed.     Details: Sinus rhythm 99 bpm with 1st degree AVB. Mild diffuse ST depression. No STEMI  Discussion of management or test interpretation with external provider(s): Hospital Medicine regarding admission    Risk  OTC drugs.  Prescription drug management.  Decision regarding hospitalization.      Additional MDM:   Heart Score:    History:          Moderately suspicious.  ECG:             Nonspecific repolarisation disturbance  Age:               >65 years  Risk " factors: 1-2 risk factors  Troponin:       Less than or equal to normal limit  Heart Score = 5                ED Course as of 09/17/23 0103   Sun Sep 17, 2023   0102 D-Dimer(!): 3.16  D-dimer positive. CTA PE study ordered [BA]   0102 Troponin I: <0.006  Negative. Will continue to trend [BA]   0102 CXR without large consolidation, pleural effusion, or pneumothorax on my read. [BA]   0102 CT chest limited by bolus timing but no evidence of PE. Thyroid nodule noted. [BA]   0102 Heart score 5. [BA]   0103 Discussed with HM, placed in observation for further work-up of chest pain. Patient expresses understanding and agreeable to the plan. I have discussed the care of this patient with my supervising physician.  [BA]      ED Course User Index  [BA] Trenton Sanchez PA-C                    Clinical Impression:   Final diagnoses:  [R07.9] Chest pain (Primary)  [E04.1] Thyroid nodule        ED Disposition Condition    Observation Stable                Trenton Sanchez PA-C  09/17/23 0104

## 2023-09-17 NOTE — HPI
Nelia Littlejohn is a 83 y.o. female with PMHx of HTN, HLD, Gout, CKD3, ANUM presents to ED c/o CP x 2 days and ongoing SOB x several weeks. Her SOB is worse with exertion and she is coughs up small amount of green sputum. She describes the chest pain as a tight, achy pressure that started in the middle of her chest and radiated across her entire chest. The pain is intermittent, lasting several minutes and resolving spontaneously. Not worse with exertion. The patient denies any chest pain currently. She denies any palpitations, n/v, fever, chills, dizziness, lightheadedness, weakness, or fatigue.    In the ED, BP elevated -180s otherwise vitals stable, afebrile. CBC mild anemia. Bicarb 20. Cr 1.4 (bl 1.2). D-dimer 3.16. BNP 48. Troponin 0.006. EKG SR 1st degree block, rate 89, no acute ischemic changes. CXR Cardiomediastinal silhouette is not enlarged. Atherosclerotic calcifications overlie the aortic arch.  No consolidation, large pleural effusion, pneumothorax, or detrimental change when compared with the prior study. CTA Chest- Limited by significant motion artifact. No large central pulmonary arterial filling defect, and no definite filling defect to the level of the proximal segmental pulmonary arteries. Distal embolism cannot be excluded on the basis of this exam. Correlation of these findings with D-dimer and/or lower extremity ultrasound as warranted. Given 1L LR IVF and 325mg ASA.

## 2023-09-17 NOTE — ED NOTES
Nelia Littlejohn, an 83 y.o. female presents to the ED    Chief Complaint   Patient presents with    Chest Pain     C/o chest pain x two days. 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 Vision is much better, can read.     High cholesterol     History of prediabetes 10/26/2017    HTN (hypertension) 09/25/2012    Hyperlipemia 09/11/2012    Hypertension     Interval gout 09/11/2012

## 2023-09-17 NOTE — ASSESSMENT & PLAN NOTE
-D dimer 3.16  -Wells score: 0  -CTA chest limited by significant motion artifact.  No large central pulmonary arterial filling defect, and no definite filling defect to the level of the proximal segmental pulmonary arteries.  Distal embolism cannot be excluded on the basis of this exam. Correlation of these findings with D-dimer and/or lower extremity ultrasound as warranted.  -BLE US pending.

## 2023-09-17 NOTE — H&P
[facility] [place] - Emergency Dept  [facility] Medicine  History & Physical    Patient Name: Nelia Littlejohn  MRN: [id number]  Patient Class: OP- Observation  Admission Date: 9/16/2023  Attending Physician: Mik Bauman MD   Primary Care Provider: Bernardo Gongora II, MD         Patient information was obtained from patient, past medical records and ER records.     Subjective:     Principal Problem:Chest pain    Chief Complaint:   Chief Complaint   Patient presents with    Chest Pain     C/o chest pain x two days. Reports SOB upon exertion        HPI: Nelia Littlejohn is a 83 y.o. female with PMHx of HTN, HLD, Gout, CKD3, ANUM presents to ED c/o CP x 2 days and ongoing SOB x several weeks. Her SOB is worse with exertion and she is coughs up small amount of green sputum. She describes the chest pain as a tight, achy pressure that started in the middle of her chest and radiated across her entire chest. The pain is intermittent, lasting several minutes and resolving spontaneously. Not worse with exertion. The patient denies any chest pain currently. She denies any palpitations, n/v, fever, chills, dizziness, lightheadedness, weakness, or fatigue.    In the ED, BP elevated -180s otherwise vitals stable, afebrile. CBC mild anemia. Bicarb 20. Cr 1.4 (bl 1.2). D-dimer 3.16. BNP 48. Troponin 0.006. EKG SR 1st degree block, rate 89, no acute ischemic changes. CXR Cardiomediastinal silhouette is not enlarged. Atherosclerotic calcifications overlie the aortic arch.  No consolidation, large pleural effusion, pneumothorax, or detrimental change when compared with the prior study. CTA Chest- Limited by significant motion artifact. No large central pulmonary arterial filling defect, and no definite filling defect to the level of the proximal segmental pulmonary arteries. Distal embolism cannot be excluded on the basis of this exam. Correlation of these findings with D-dimer and/or lower extremity ultrasound as warranted. Given 1L LR IVF  and 325mg ASA.      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 Vision is much better, can read.     High cholesterol     History of prediabetes 10/26/2017    HTN (hypertension) 09/25/2012    Hyperlipemia 09/11/2012    Hypertension     Interval gout 09/11/2012       Past Surgical History:   Procedure Laterality Date    CATARACT EXTRACTION Bilateral     EYE SURGERY      HYSTERECTOMY      JOINT REPLACEMENT      Yag capsulotomy left eye  10/10/2012       Review of patient's allergies indicates:  No Known Allergies    No current facility-administered medications on file prior to encounter.     Current Outpatient Medications on File Prior to Encounter   Medication Sig    allopurinoL (ZYLOPRIM) 300 MG tablet TAKE 1 TABLET(300 MG) BY MOUTH EVERY DAY    amLODIPine-benazepriL (LOTREL) 10-40 mg per capsule Take 1 capsule by mouth every morning.    aspirin (ECOTRIN) 325 MG EC tablet TAKE 1 TABLET(325 MG) BY MOUTH EVERY DAY    atorvastatin (LIPITOR) 80 MG tablet Take 1 tablet (80 mg total) by mouth once daily.    hydrALAZINE (APRESOLINE) 100 MG tablet Take 1 tablet (100 mg total) by mouth 2 (two) times daily. Blood pressure    metoprolol ta-hydrochlorothiaz (LOPRESSOR HCT) 100-25 mg per tablet Take 1 tablet by mouth once daily.    MULTIVIT-IRON-MIN-FOLIC ACID 3,500-18-0.4 UNIT-MG-MG ORAL CHEW Take by mouth once daily.    spironolactone (ALDACTONE) 25 MG tablet Take 1 tablet (25 mg total) by mouth once daily.     Family History       Problem Relation (Age of Onset)    Cancer Sister    Cataracts Father    Diabetes Father, Mother, Brother, Brother    Heart disease Brother    Hypertension Brother, Son, Son    No Known Problems Son, Daughter, Maternal Grandmother, Maternal Grandfather, Paternal Grandmother, Paternal Grandfather, Brother, Maternal  Aunt, Maternal Uncle, Paternal Aunt, Paternal Uncle, Son, Son    Stroke Father          Tobacco Use    Smoking status: Never    Smokeless tobacco: Never   Substance and Sexual Activity    Alcohol use: No    Drug use: No    Sexual activity: Not Currently     Review of Systems   Constitutional:  Negative for appetite change, chills, diaphoresis, fatigue and fever.   HENT:  Negative for congestion, rhinorrhea and sore throat.    Eyes:  Negative for photophobia and visual disturbance.   Respiratory:  Positive for cough, chest tightness and shortness of breath. Negative for wheezing.         Green sputum   Cardiovascular:  Positive for chest pain. Negative for palpitations and leg swelling.   Gastrointestinal:  Positive for diarrhea. Negative for abdominal distention, abdominal pain, nausea and vomiting.   Genitourinary:  Negative for dysuria, frequency and hematuria.   Musculoskeletal:  Negative for back pain, gait problem and myalgias.   Skin:  Negative for color change, pallor, rash and wound.   Neurological:  Negative for dizziness, syncope, weakness and light-headedness.   Psychiatric/Behavioral:  Negative for confusion and hallucinations. The patient is not nervous/anxious.      Objective:     Vital Signs (Most Recent):  Temp: 98.2 °F (36.8 °C) (09/17/23 0137)  Pulse: 75 (09/17/23 0137)  Resp: 18 (09/17/23 0137)  BP: (!) 163/70 (09/17/23 0137)  SpO2: 98 % (09/17/23 0137) Vital Signs (24h Range):  Temp:  [98.1 °F (36.7 °C)-98.2 °F (36.8 °C)] 98.2 °F (36.8 °C)  Pulse:  [75-88] 75  Resp:  [17-18] 18  SpO2:  [98 %-99 %] 98 %  BP: (163-182)/(70-90) 163/70     Weight: 102.5 kg (226 lb)  Body mass index is 36.48 kg/m².     Physical Exam  Vitals and nursing note reviewed.   Constitutional:       General: She is not in acute distress.     Appearance: She is obese. She is not toxic-appearing or diaphoretic.   HENT:      Head: Normocephalic and atraumatic.      Nose: Nose normal.      Mouth/Throat:      Mouth: Mucous  membranes are moist.      Pharynx: Oropharynx is clear.   Eyes:      Extraocular Movements: Extraocular movements intact.      Pupils: Pupils are equal, round, and reactive to light.   Cardiovascular:      Rate and Rhythm: Normal rate and regular rhythm.      Pulses: Normal pulses.           Dorsalis pedis pulses are 2+ on the right side and 2+ on the left side.        Posterior tibial pulses are 2+ on the right side and 2+ on the left side.      Heart sounds: Normal heart sounds.   Pulmonary:      Effort: Pulmonary effort is normal. No respiratory distress.      Breath sounds: Normal breath sounds. No wheezing or rales.   Chest:      Chest wall: No tenderness.   Abdominal:      General: Bowel sounds are normal. There is no distension.      Palpations: Abdomen is soft.      Tenderness: There is no abdominal tenderness. There is no guarding.   Musculoskeletal:         General: Normal range of motion.      Cervical back: Normal range of motion.      Right lower leg: No edema.      Left lower leg: No edema.   Skin:     General: Skin is warm and dry.      Capillary Refill: Capillary refill takes less than 2 seconds.   Neurological:      General: No focal deficit present.      Mental Status: She is alert and oriented to person, place, and time.      Cranial Nerves: Cranial nerves 2-12 are intact.      Sensory: Sensation is intact.      Motor: Motor function is intact. No weakness.   Psychiatric:         Mood and Affect: Mood normal.         Speech: Speech normal.         Behavior: Behavior normal. Behavior is cooperative.              CRANIAL NERVES     CN III, IV, VI   Pupils are equal, round, and reactive to light.       Significant Labs: All pertinent labs within the past 24 hours have been reviewed.  CBC:   Recent Labs   Lab 09/16/23 2226   WBC 9.06   HGB 10.9*   HCT 35.0*        CMP:   Recent Labs   Lab 09/16/23 2226      K 4.5      CO2 20*      BUN 23   CREATININE 1.4   CALCIUM 9.2    PROT 7.1   ALBUMIN 3.6   BILITOT 0.7   ALKPHOS 101   AST 20   ALT 15   ANIONGAP 11     Cardiac Markers:   Recent Labs   Lab 09/16/23 2226   BNP 48     Troponin:   Recent Labs   Lab 09/16/23 2226   TROPONINI <0.006  <0.006     Significant Imaging: I have reviewed all pertinent imaging results/findings within the past 24 hours.    Imaging Results              CTA Chest Non-Coronary (PE Studies) (Final result)  Result time 09/17/23 00:53:53      Final result by Eliseo Candelaria MD (09/17/23 00:53:53)                   Impression:      Limited by significant motion artifact.  No large central pulmonary arterial filling defect, and no definite filling defect to the level of the proximal segmental pulmonary arteries.  Distal embolism cannot be excluded on the basis of this exam. Correlation of these findings with D-dimer and/or lower extremity ultrasound as warranted.    Enlarged thyroid with possible nodule in the inferior left lobe of the thyroid gland as seen on prior thyroid ultrasound in 2015.  Consider correlation with physical exam findings and follow-up thyroid ultrasound if clinically warranted.    Electronically signed by resident: Kameron Can  Date:    09/17/2023  Time:    00:20    Electronically signed by: Eliseo Candelaria MD  Date:    09/17/2023  Time:    00:53               Narrative:    EXAMINATION:  CTA CHEST NON CORONARY (PE STUDIES)    CLINICAL HISTORY:  Pulmonary embolism (PE) suspected, positive D-dimer;    TECHNIQUE:  Low dose axial images, sagittal and coronal reformations were obtained from the thoracic inlet to the lung bases following the IV administration of 75 mL of Omnipaque 350.  Contrast timing was optimized to evaluate the pulmonary arteries.    COMPARISON:  Chest radiograph 09/16/2023, CT chest 09/27/2014.  Thyroid ultrasound, 10/08/2015.    FINDINGS:  Pulmonary vasculature: Significant motion artifact.  Allowing for this, no large central pulmonary arterial filling defect, and no  "definite filling defect to the level of the proximal segmental pulmonary arteries.  Distal embolism cannot be excluded on the basis of this exam.    Aorta: Left-sided aortic arch.  Mild to moderate calcific atherosclerosis.    Base of Neck: No significant abnormality.    Thoracic soft tissues: Enlarged thyroid gland with heterogeneous attenuation.  There is a possible nodule in the inferior aspect of the left lobe of the thyroid measuring approximately 2.2 cm in size (series 2, image 58).    Heart: Normal size. No effusion.  Coronary artery calcifications.    Keily/Mediastinum: No pathologic cristobal enlargement.    Airways: Patent.    Lungs/Pleura: Significant motion artifact.  Minimal ground-glass opacities likely subsegmental atelectasis.  No pleural effusion.    Esophagus: Normal.    Upper Abdomen: No acute abnormality.  Nodular thickening of the adrenal glands as seen on prior CT chest.  Simple appearing right renal cyst.  Probable simple left renal cyst, though incompletely included in the field of view, and evaluation of the upper abdomen is limited by motion.  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    Bones: No acute fracture. No suspicious lytic or sclerotic lesions.  Degenerative changes in the thoracic spine.                                       X-Ray Chest AP Portable (Final result)  Result time 09/16/23 23:03:38      Final result by Eliseo Candelaria MD (09/16/23 23:03:38)                   Impression:      No acute cardiopulmonary finding identified on this single view.      Electronically signed by: Eliseo Candelaria MD  Date:    09/16/2023  Time:    23:03               Narrative:    EXAMINATION:  XR CHEST AP PORTABLE    CLINICAL HISTORY:  Provided history is "Chest Pain;  ".    TECHNIQUE:  One view of the chest.    COMPARISON:  12/07/2020.    FINDINGS:  Cardiomediastinal silhouette is not enlarged.  Atherosclerotic calcifications overlie the aortic arch.  No " confluent area of consolidation.  No large pleural effusion.  No pneumothorax.  No detrimental change when compared with the prior study.                                      Assessment/Plan:     * Chest pain  - EKG without acute ischemic changes, HR 89  - Initial troponin <0.006, continue to trend x3 total.  - ASA administered in the ED.  - Continue ASA, statin, BB  - Cardiac monitoring.  - PRN EKG for chest pain  - ECHO pending.  - If troponin rises, start ACS protocol   -Previous ECHO reviewed from 1/25/2017:  CONCLUSIONS     1 - Normal left ventricular systolic function (EF 60-65%).     2 - Normal left ventricular diastolic function.     3 - Normal right ventricular systolic function .     4 - The estimated PA systolic pressure is 25 mmHg.     5 - Trivial mitral regurgitation.     6 - Mild tricuspid regurgitation.    Positive D dimer  -D dimer 3.16  -Wells score: 0  -CTA chest limited by significant motion artifact.  No large central pulmonary arterial filling defect, and no definite filling defect to the level of the proximal segmental pulmonary arteries.  Distal embolism cannot be excluded on the basis of this exam. Correlation of these findings with D-dimer and/or lower extremity ultrasound as warranted.  -BLE US pending.    Peripheral vascular disease  -Chronic issue.  -Continue ASA and statin.    Class 2 severe obesity with body mass index (BMI) of 35 to 39.9 with serious comorbidity  Body mass index is 36.49 kg/m². Obesity complicates all aspects of disease management from diagnostic modalities to treatment.    Stage 3 chronic kidney disease  Creatine stable for now. BMP reviewed- noted Estimated Creatinine Clearance: 36.8 mL/min (based on SCr of 1.4 mg/dL). according to latest data. Monitor UOP and serial BMP and adjust therapy as needed. Renally dose meds.    Obstructive sleep apnea syndrome  -Chronic issue.  -Continue CPAP qhs.    Essential hypertension  Chronic issue. -180s, likely elevated due  to not taking nightly medication today    Home meds for hypertension were reviewed and noted below. Hospital anti-hypertensive changes were made as shown below.  Hypertension Medications               amLODIPine-benazepriL (LOTREL) 10-40 mg per capsule Take 1 capsule by mouth every morning.    hydrALAZINE (APRESOLINE) 100 MG tablet Take 1 tablet (100 mg total) by mouth 2 (two) times daily. Blood pressure    metoprolol ta-hydrochlorothiaz (LOPRESSOR HCT) 100-25 mg per tablet Take 1 tablet by mouth once daily.    spironolactone (ALDACTONE) 25 MG tablet Take 1 tablet (25 mg total) by mouth once daily.          Will utilize p.r.n. blood pressure medication only if patient's blood pressure greater than  180/110 and she develops symptoms such as worsening chest pain or shortness of breath.    Mixed hyperlipidemia   Patient is chronically on statin.will continue for now. Monitor clinically. Last LDL was   Lab Results   Component Value Date    LDLCALC 93.2 10/25/2022          VTE Risk Mitigation (From admission, onward)           Ordered     heparin (porcine) injection 5,000 Units  Every 8 hours         09/17/23 0141     IP VTE HIGH RISK PATIENT  Once         09/17/23 0141     Place sequential compression device  Until discontinued         09/17/23 0141                         On 09/17/2023, patient should be placed in hospital observation services under my care in collaboration with Jermaine Barlow DO.      Savana Stevens NP  Department of Hospital Medicine  [facility] - Emergency Dept

## 2023-09-17 NOTE — ASSESSMENT & PLAN NOTE
Creatine stable for now. BMP reviewed- noted Estimated Creatinine Clearance: 36.8 mL/min (based on SCr of 1.4 mg/dL). according to latest data. Monitor UOP and serial BMP and adjust therapy as needed. Renally dose meds.

## 2023-09-17 NOTE — HOSPITAL COURSE
Nelia Littlejohn was placed in  observation for workup of chest pain. Concerned for PE based on SOB, CP, cough, and elevated d dimer with RF for clotting including obesity, recent covid infection, and sedentary lifestyle. Ddimer >3, Wells score 0. CTA not revealing of a clot, but significant motion artifact was noted. BLE doppler US reveals R distal femoral non-occlusive DVT. Moderate-high bleeding risk based on HASBLED score (2-3). Due to bleeding risk will hold off on AC. Guidelines recommend surveillance LE doppler US once a week x 2 occurrences. EKG without acute ST-T wave changes, trops neg x 3. HEART score 5, moderate risk. Recommend IP stress testing. DSE performed without evidence of myocardial ischemia. Discharge plan discussed and patient/family expressed understanding. All questions answered. Referral to vascular. PCP follow up.

## 2023-09-17 NOTE — ASSESSMENT & PLAN NOTE
Patient is chronically on statin.will continue for now. Monitor clinically. Last LDL was   Lab Results   Component Value Date    LDLCALC 93.2 10/25/2022

## 2023-09-17 NOTE — PLAN OF CARE
[facility] - Observation 11H  Initial Discharge Assessment       Primary Care Provider: Bernardo Gongora II, MD    Admission Diagnosis: Thyroid nodule [E04.1]  Chest pain [R07.9]    Admission Date: 9/16/2023  Expected Discharge Date: 9/18/2023    Transition of Care Barriers: (P) None    Payor: [employer]ICARE / Plan: [employer] / Product Type: Medicare Advantage /     Extended Emergency Contact Information  Primary Emergency Contact: Yamil Escalante  Address: [address] [place] [address] [facility]  Home Phone: [phone]  Mobile Phone: [phone]  Relation: Son  Secondary Emergency Contact: Leydi Gutierrez  Mobile Phone: [phone]  Relation: Daughter  Preferred language: English   needed? No    Discharge Plan A: (P) Home with family  Discharge Plan B: (P) Home      [id number] - [place], [place] - [address] [facility] [address]  [address] [address]  Phone: [phone] Fax: [phone]    [id number] [address] AT [facility] & [address] [address]  Phone: [phone] Fax: [phone]      Initial Assessment (most recent)       Adult Discharge Assessment - 09/17/23 1803          Discharge Assessment    Assessment Type Discharge Planning Assessment (P)      Confirmed/corrected address, phone number and insurance Yes (P)      Confirmed Demographics Correct on Facesheet (P)      Source of Information patient (P)      When was your last doctors appointment? -- (P)    1 month ago.    Does patient/caregiver understand observation status No (P)      Was observation education provided? Yes (P)      Communicated BAIRON with patient/caregiver Yes (P)      Reason For Admission Thyroid nodule. (P)      People in Home child(giuseppe), adult (P)      Do you expect to return to your current living situation?  Yes (P)      Do you have help at home or someone to help you manage your care at home? Yes (P)      Who are your caregiver(s) and their phone number(s)? Daughter, Leydi Gutierrez [phone] (P)      Prior to hospitilization cognitive status: Alert/Oriented (P)      Current cognitive status: Alert/Oriented (P)      Equipment Currently Used at Home walker, rolling (P)      Readmission within 30 days? No (P)      Patient currently being followed by outpatient case management? No (P)      Do you currently have service(s) that help you manage your care at home? No (P)      Do you take prescription medications? Yes (P)      Do you have prescription coverage? Yes (P)      Coverage [employer] (P)      Do you have any problems affording any of your prescribed medications? No (P)      Is the patient taking medications as prescribed? yes (P)      Who is going to help you get home at discharge? Daughter (P)      How do you get to doctors appointments? family or friend will provide (P)      Are you on dialysis? No (P)      Do you take coumadin? No (P)      DME Needed Upon Discharge  none (P)      Discharge Plan discussed with: Patient (P)      Transition of Care Barriers None (P)      Discharge Plan A Home with family (P)      Discharge Plan B Home (P)         Physical Activity    On average, how many days per week do you engage in moderate to strenuous exercise (like a brisk walk)? 0 days (P)      On average, how many minutes do you engage in exercise at this level? 0 min (P)         Financial Resource Strain    How hard is it for you to pay for the very basics like food, housing, medical care, and heating? Not very hard (P)         Housing Stability    In the last 12 months, was there a time when you were not able to pay the mortgage or rent on time? No (P)      In the last 12 months, how many places have you lived? 1 (P)      In the last 12 months, was there a time when you did not have a steady place to sleep  or slept in a shelter (including now)? No (P)         Transportation Needs    In the past 12 months, has lack of transportation kept you from medical appointments or from getting medications? No (P)      In the past 12 months, has lack of transportation kept you from meetings, work, or from getting things needed for daily living? No (P)         Food Insecurity    Within the past 12 months, you worried that your food would run out before you got the money to buy more. Never true (P)      Within the past 12 months, the food you bought just didn't last and you didn't have money to get more. Never true (P)         Stress    Do you feel stress - tense, restless, nervous, or anxious, or unable to sleep at night because your mind is troubled all the time - these days? Only a little (P)    Grief/Loss.       Social Connections    In a typical week, how many times do you talk on the phone with family, friends, or neighbors? More than three times a week (P)      How often do you get together with friends or relatives? Once a week (P)      How often do you attend Episcopalian or Restorationism services? More than 4 times per year (P)      Do you belong to any clubs or organizations such as Episcopalian groups, unions, fraternal or athletic groups, or school groups? No (P)      How often do you attend meetings of the clubs or organizations you belong to? Never (P)      Are you , , , , never , or living with a partner?  (P)         Alcohol Use    Q1: How often do you have a drink containing alcohol? Never (P)      Q2: How many drinks containing alcohol do you have on a typical day when you are drinking? Patient does not drink (P)      Q3: How often do you have six or more drinks on one occasion? Never (P)         OTHER    Name(s) of People in Home Daughter, Leydi Gutierrez (P)                       SW met with pt at chairside to complete Brief/Initial Discharge Planning Assessment. Pt. Is  and  lives with her daughter, Leydi in [place], [place]. The home is a single story home with 2-3 steps to enter. DME: rolling walker. No Home Health, Dialysis or Coumadin. Pt. does not drive, daughter provides transportation. Pt. anticipating a Stress Test tomorrow with possible d/c home after. Pt. To call the local [place] [facility] for Adult Day Healthcare Programs in her area.    Bhavin Herr LMSW

## 2023-09-17 NOTE — ASSESSMENT & PLAN NOTE
- EKG without acute ischemic changes, HR 89  - Initial troponin <0.006, continue to trend x3 total.  - ASA administered in the ED.  - Continue ASA, statin, BB  - Cardiac monitoring.  - PRN EKG for chest pain  - ECHO pending.  - If troponin rises, start ACS protocol   -Previous ECHO reviewed from 1/25/2017:  CONCLUSIONS     1 - Normal left ventricular systolic function (EF 60-65%).     2 - Normal left ventricular diastolic function.     3 - Normal right ventricular systolic function .     4 - The estimated PA systolic pressure is 25 mmHg.     5 - Trivial mitral regurgitation.     6 - Mild tricuspid regurgitation.

## 2023-09-18 VITALS
HEIGHT: 66 IN | HEART RATE: 82 BPM | DIASTOLIC BLOOD PRESSURE: 54 MMHG | BODY MASS INDEX: 36.32 KG/M2 | TEMPERATURE: 98 F | RESPIRATION RATE: 16 BRPM | OXYGEN SATURATION: 100 % | WEIGHT: 226 LBS | SYSTOLIC BLOOD PRESSURE: 117 MMHG

## 2023-09-18 LAB
ALBUMIN SERPL BCP-MCNC: 3.2 G/DL (ref 3.5–5.2)
ALP SERPL-CCNC: 91 U/L (ref 55–135)
ALT SERPL W/O P-5'-P-CCNC: 10 U/L (ref 10–44)
ANION GAP SERPL CALC-SCNC: 11 MMOL/L (ref 8–16)
ASCENDING AORTA: 2.92 CM
AST SERPL-CCNC: 14 U/L (ref 10–40)
BACTERIA UR CULT: NORMAL
BASOPHILS # BLD AUTO: 0.03 K/UL (ref 0–0.2)
BASOPHILS NFR BLD: 0.4 % (ref 0–1.9)
BILIRUB SERPL-MCNC: 1 MG/DL (ref 0.1–1)
BSA FOR ECHO PROCEDURE: 2.18 M2
BUN SERPL-MCNC: 20 MG/DL (ref 8–23)
CALCIUM SERPL-MCNC: 8.9 MG/DL (ref 8.7–10.5)
CHLORIDE SERPL-SCNC: 109 MMOL/L (ref 95–110)
CO2 SERPL-SCNC: 18 MMOL/L (ref 23–29)
CREAT SERPL-MCNC: 1.3 MG/DL (ref 0.5–1.4)
CV ECHO LV RWT: 0.44 CM
CV STRESS BASE HR: 56 BPM
DIASTOLIC BLOOD PRESSURE: 59 MMHG
DIFFERENTIAL METHOD: ABNORMAL
DOP CALC LVOT AREA: 3.6 CM2
DOP CALC LVOT DIAMETER: 2.14 CM
DOP CALC LVOT PEAK VEL: 1.2 M/S
DOP CALC LVOT STROKE VOLUME: 93.11 CM3
DOP CALCLVOT PEAK VEL VTI: 25.9 CM
E WAVE DECELERATION TIME: 221.03 MSEC
E/A RATIO: 0.78
E/E' RATIO: 9.18 M/S
ECHO LV POSTERIOR WALL: 1.01 CM (ref 0.6–1.1)
EOSINOPHIL # BLD AUTO: 0.1 K/UL (ref 0–0.5)
EOSINOPHIL NFR BLD: 1.2 % (ref 0–8)
ERYTHROCYTE [DISTWIDTH] IN BLOOD BY AUTOMATED COUNT: 16 % (ref 11.5–14.5)
EST. GFR  (NO RACE VARIABLE): 40.8 ML/MIN/1.73 M^2
FRACTIONAL SHORTENING: 34 % (ref 28–44)
GLUCOSE SERPL-MCNC: 75 MG/DL (ref 70–110)
HCT VFR BLD AUTO: 29.8 % (ref 37–48.5)
HGB BLD-MCNC: 10 G/DL (ref 12–16)
IMM GRANULOCYTES # BLD AUTO: 0.01 K/UL (ref 0–0.04)
IMM GRANULOCYTES NFR BLD AUTO: 0.1 % (ref 0–0.5)
INTERVENTRICULAR SEPTUM: 0.9 CM (ref 0.6–1.1)
IVRT: 97.05 MSEC
LA MAJOR: 6.1 CM
LA MINOR: 5.64 CM
LA WIDTH: 3.8 CM
LEFT ATRIUM SIZE: 3.85 CM
LEFT ATRIUM VOLUME INDEX: 34.5 ML/M2
LEFT ATRIUM VOLUME: 72.88 CM3
LEFT INTERNAL DIMENSION IN SYSTOLE: 3.02 CM (ref 2.1–4)
LEFT VENTRICLE DIASTOLIC VOLUME INDEX: 46.01 ML/M2
LEFT VENTRICLE DIASTOLIC VOLUME: 97.08 ML
LEFT VENTRICLE MASS INDEX: 70 G/M2
LEFT VENTRICLE SYSTOLIC VOLUME INDEX: 16.9 ML/M2
LEFT VENTRICLE SYSTOLIC VOLUME: 35.64 ML
LEFT VENTRICULAR INTERNAL DIMENSION IN DIASTOLE: 4.59 CM (ref 3.5–6)
LEFT VENTRICULAR MASS: 148.63 G
LV LATERAL E/E' RATIO: 9.75 M/S
LV SEPTAL E/E' RATIO: 8.67 M/S
LYMPHOCYTES # BLD AUTO: 3.1 K/UL (ref 1–4.8)
LYMPHOCYTES NFR BLD: 36.6 % (ref 18–48)
MAGNESIUM SERPL-MCNC: 2 MG/DL (ref 1.6–2.6)
MCH RBC QN AUTO: 29.7 PG (ref 27–31)
MCHC RBC AUTO-ENTMCNC: 33.6 G/DL (ref 32–36)
MCV RBC AUTO: 88 FL (ref 82–98)
MONOCYTES # BLD AUTO: 0.7 K/UL (ref 0.3–1)
MONOCYTES NFR BLD: 8.1 % (ref 4–15)
MV A" WAVE DURATION": 10.28 MSEC
MV PEAK A VEL: 1 M/S
MV PEAK E VEL: 0.78 M/S
MV STENOSIS PRESSURE HALF TIME: 64.1 MS
MV VALVE AREA P 1/2 METHOD: 3.43 CM2
NEUTROPHILS # BLD AUTO: 4.5 K/UL (ref 1.8–7.7)
NEUTROPHILS NFR BLD: 53.6 % (ref 38–73)
NRBC BLD-RTO: 0 /100 WBC
OHS CV CPX 1 MINUTE RECOVERY HEART RATE: 122 BPM
OHS CV CPX 85 PERCENT MAX PREDICTED HEART RATE MALE: 113
OHS CV CPX MAX PREDICTED HEART RATE: 133
OHS CV CPX PATIENT IS FEMALE: 1
OHS CV CPX PATIENT IS MALE: 0
OHS CV CPX PEAK DIASTOLIC BLOOD PRESSURE: 57 MMHG
OHS CV CPX PEAK HEAR RATE: 123 BPM
OHS CV CPX PEAK RATE PRESSURE PRODUCT: NORMAL
OHS CV CPX PEAK SYSTOLIC BLOOD PRESSURE: 105 MMHG
OHS CV CPX PERCENT MAX PREDICTED HEART RATE ACHIEVED: 93
OHS CV CPX RATE PRESSURE PRODUCT PRESENTING: 7056
OHS CV INITIAL DOSE: 10 MCG/KG/MIN
OHS CV PEAK DOSE: 40 MCG/KG/MIN
PISA TR MAX VEL: 2 M/S
PLATELET # BLD AUTO: 261 K/UL (ref 150–450)
PMV BLD AUTO: 10.4 FL (ref 9.2–12.9)
POTASSIUM SERPL-SCNC: 4.3 MMOL/L (ref 3.5–5.1)
PROT SERPL-MCNC: 6.1 G/DL (ref 6–8.4)
PULM VEIN S/D RATIO: 1.83
PV PEAK D VEL: 0.4 M/S
PV PEAK S VEL: 0.73 M/S
RA MAJOR: 4.99 CM
RA PRESSURE ESTIMATED: 3 MMHG
RA WIDTH: 2.76 CM
RBC # BLD AUTO: 3.37 M/UL (ref 4–5.4)
RIGHT VENTRICULAR END-DIASTOLIC DIMENSION: 3.38 CM
RV TB RVSP: 5 MMHG
SINUS: 3.06 CM
SODIUM SERPL-SCNC: 138 MMOL/L (ref 136–145)
STJ: 2.5 CM
SYSTOLIC BLOOD PRESSURE: 126 MMHG
TDI LATERAL: 0.08 M/S
TDI SEPTAL: 0.09 M/S
TDI: 0.09 M/S
TR MAX PG: 16 MMHG
TRICUSPID ANNULAR PLANE SYSTOLIC EXCURSION: 2.45 CM
TROPONIN I SERPL DL<=0.01 NG/ML-MCNC: 0.01 NG/ML (ref 0–0.03)
TV REST PULMONARY ARTERY PRESSURE: 19 MMHG
WBC # BLD AUTO: 8.44 K/UL (ref 3.9–12.7)
Z-SCORE OF LEFT VENTRICULAR DIMENSION IN END DIASTOLE: -3.65
Z-SCORE OF LEFT VENTRICULAR DIMENSION IN END SYSTOLE: -2.3

## 2023-09-18 PROCEDURE — G0378 HOSPITAL OBSERVATION PER HR: HCPCS

## 2023-09-18 PROCEDURE — 63600150 PHARM REV CODE 636: Performed by: INTERNAL MEDICINE

## 2023-09-18 PROCEDURE — 84484 ASSAY OF TROPONIN QUANT: CPT | Performed by: INTERNAL MEDICINE

## 2023-09-18 PROCEDURE — 36415 COLL VENOUS BLD VENIPUNCTURE: CPT | Performed by: INTERNAL MEDICINE

## 2023-09-18 PROCEDURE — 63600175 PHARM REV CODE 636 W HCPCS: Performed by: NURSE PRACTITIONER

## 2023-09-18 PROCEDURE — 99239 HOSP IP/OBS DSCHRG MGMT >30: CPT | Mod: ,,,

## 2023-09-18 PROCEDURE — 99900035 HC TECH TIME PER 15 MIN (STAT)

## 2023-09-18 PROCEDURE — 80053 COMPREHEN METABOLIC PANEL: CPT | Performed by: NURSE PRACTITIONER

## 2023-09-18 PROCEDURE — 96375 TX/PRO/DX INJ NEW DRUG ADDON: CPT | Mod: 59

## 2023-09-18 PROCEDURE — 25000003 PHARM REV CODE 250: Performed by: NURSE PRACTITIONER

## 2023-09-18 PROCEDURE — 25000003 PHARM REV CODE 250

## 2023-09-18 PROCEDURE — 96374 THER/PROPH/DIAG INJ IV PUSH: CPT | Mod: 59

## 2023-09-18 PROCEDURE — 83735 ASSAY OF MAGNESIUM: CPT | Performed by: NURSE PRACTITIONER

## 2023-09-18 PROCEDURE — 99239 PR HOSPITAL DISCHARGE DAY,>30 MIN: ICD-10-PCS | Mod: ,,,

## 2023-09-18 PROCEDURE — 96372 THER/PROPH/DIAG INJ SC/IM: CPT | Mod: 59 | Performed by: NURSE PRACTITIONER

## 2023-09-18 PROCEDURE — 85025 COMPLETE CBC W/AUTO DIFF WBC: CPT | Performed by: NURSE PRACTITIONER

## 2023-09-18 PROCEDURE — 63600175 PHARM REV CODE 636 W HCPCS: Performed by: INTERNAL MEDICINE

## 2023-09-18 PROCEDURE — 36415 COLL VENOUS BLD VENIPUNCTURE: CPT | Performed by: NURSE PRACTITIONER

## 2023-09-18 RX ORDER — DOBUTAMINE HYDROCHLORIDE 100 MG/100ML
10 INJECTION INTRAVENOUS
Status: COMPLETED | OUTPATIENT
Start: 2023-09-18 | End: 2023-09-18

## 2023-09-18 RX ORDER — ATROPINE SULFATE 0.1 MG/ML
1 INJECTION INTRAVENOUS
Status: COMPLETED | OUTPATIENT
Start: 2023-09-18 | End: 2023-09-18

## 2023-09-18 RX ADMIN — AMLODIPINE BESYLATE 10 MG: 10 TABLET ORAL at 09:09

## 2023-09-18 RX ADMIN — THERA TABS 1 TABLET: TAB at 09:09

## 2023-09-18 RX ADMIN — LISINOPRIL 40 MG: 20 TABLET ORAL at 09:09

## 2023-09-18 RX ADMIN — DOBUTAMINE HYDROCHLORIDE 10 MCG/KG/MIN: 100 INJECTION INTRAVENOUS at 03:09

## 2023-09-18 RX ADMIN — ATORVASTATIN CALCIUM 80 MG: 40 TABLET, FILM COATED ORAL at 09:09

## 2023-09-18 RX ADMIN — HYDRALAZINE HYDROCHLORIDE 100 MG: 50 TABLET ORAL at 09:09

## 2023-09-18 RX ADMIN — GUAIFENESIN AND DEXTROMETHORPHAN HYDROBROMIDE 1 TABLET: 600; 30 TABLET, EXTENDED RELEASE ORAL at 09:09

## 2023-09-18 RX ADMIN — ATROPINE SULFATE 1 MG: 0.1 INJECTION INTRAVENOUS at 03:09

## 2023-09-18 RX ADMIN — HEPARIN SODIUM 5000 UNITS: 5000 INJECTION INTRAVENOUS; SUBCUTANEOUS at 05:09

## 2023-09-18 RX ADMIN — ASPIRIN 81 MG: 81 TABLET, COATED ORAL at 09:09

## 2023-09-18 RX ADMIN — ALLOPURINOL 300 MG: 300 TABLET ORAL at 09:09

## 2023-09-18 NOTE — PLAN OF CARE
[facility] - Observation 11H  Discharge Final Note    Primary Care Provider: Bernardo Gongora II, MD    Expected Discharge Date: 9/18/2023    Final Discharge Note (most recent)       Final Note - 09/18/23 1720          Final Note    Assessment Type Final Discharge Note (P)      Anticipated Discharge Disposition Home or Self Care (P)      Hospital Resources/Appts/Education Provided Provided patient/caregiver with written discharge plan information;Provided education on problems/symptoms using teachback;Appointments scheduled and added to AVS (P)         Post-Acute Status    Coverage [facility] Medicare (P)      Other Status No Post-Acute Service Needs (P)      Discharge Delays None known at this time (P)                      Important Message from Medicare             Pt. discharged home. After review of pt's medical record, no discharge needs identified. Family providing transportation home.     Bhavin Herr LMSW

## 2023-09-24 NOTE — DISCHARGE SUMMARY
[facility] - Observation [address] [place] [address] Medicine  Discharge Summary      Patient Name: Nelia Littlejohn  MRN: [id number]  GAMAL: [id number]  Patient Class: OP- Observation  Admission Date: 9/16/2023  Hospital Length of Stay: 0 days  Discharge Date and Time:  09/24/2023 10:40 AM  Attending Physician: No att. providers found   Discharging Provider: Elma Villasenor PA-C  Primary Care Provider: Bernardo Gongroa II, MD  [facility] Medicine Team: [facility] HOSP MED E Elma Villasenor PA-C  Primary Care Team: [facility] HOSP MED E    HPI:   Nelia Littlejohn is a 83 y.o. female with PMHx of HTN, HLD, Gout, CKD3, ANUM presents to ED c/o CP x 2 days and ongoing SOB x several weeks. Her SOB is worse with exertion and she is coughs up small amount of green sputum. She describes the chest pain as a tight, achy pressure that started in the middle of her chest and radiated across her entire chest. The pain is intermittent, lasting several minutes and resolving spontaneously. Not worse with exertion. The patient denies any chest pain currently. She denies any palpitations, n/v, fever, chills, dizziness, lightheadedness, weakness, or fatigue.    In the ED, BP elevated -180s otherwise vitals stable, afebrile. CBC mild anemia. Bicarb 20. Cr 1.4 (bl 1.2). D-dimer 3.16. BNP 48. Troponin 0.006. EKG SR 1st degree block, rate 89, no acute ischemic changes. CXR Cardiomediastinal silhouette is not enlarged. Atherosclerotic calcifications overlie the aortic arch.  No consolidation, large pleural effusion, pneumothorax, or detrimental change when compared with the prior study. CTA Chest- Limited by significant motion artifact. No large central pulmonary arterial filling defect, and no definite filling defect to the level of the proximal segmental pulmonary arteries. Distal embolism cannot be excluded on the basis of this exam. Correlation of these findings with D-dimer and/or lower extremity ultrasound as warranted. Given 1L LR IVF and 325mg ASA.      *  No surgery found *      Hospital Course:   Nelia Littlejohn was placed in  observation for workup of chest pain. Concerned for PE based on SOB, CP, cough, and elevated d dimer with RF for clotting including obesity, recent covid infection, and sedentary lifestyle. Ddimer >3, Wells score 0. CTA not revealing of a clot, but significant motion artifact was noted. BLE doppler US reveals R distal femoral non-occlusive DVT. Moderate-high bleeding risk based on HASBLED score (2-3). Due to bleeding risk will hold off on AC. Guidelines recommend surveillance LE doppler US once a week x 2 occurrences. EKG without acute ST-T wave changes, trops neg x 3. HEART score 5, moderate risk. Recommend IP stress testing. DSE performed without evidence of myocardial ischemia. Discharge plan discussed and patient/family expressed understanding. All questions answered. Referral to vascular. PCP follow up.        Goals of Care Treatment Preferences:  Code Status: Full Code      Consults:     No new Assessment & Plan notes have been filed under this hospital service since the last note was generated.  Service: Hospital Medicine    Final Active Diagnoses:    Diagnosis Date Noted POA    PRINCIPAL PROBLEM:  Chest pain [R07.9] 09/17/2023 Yes    Positive D dimer [R79.89] 09/17/2023 Yes    Peripheral vascular disease [I73.9] 02/03/2022 Yes    Class 2 severe obesity with body mass index (BMI) of 35 to 39.9 with serious comorbidity [E66.01] 01/15/2020 Yes    Stage 3 chronic kidney disease [N18.30] 01/12/2019 Yes    Obstructive sleep apnea syndrome [G47.33] 03/01/2016 Yes    Essential hypertension [I10] 09/25/2012 Yes    Mixed hyperlipidemia [E78.2] 09/11/2012 Yes      Problems Resolved During this Admission:       Discharged Condition: stable    Disposition: Home or Self Care    Follow Up:    Patient Instructions:      US Lower Extremity Veins Bilateral   Standing Status: Future Standing Exp. Date: 09/18/24   Scheduling Instructions:  Surveillance of R distal femoral DVT (1/2)     US Lower Extremity Veins Bilateral   Standing Status: Future Standing Exp. Date: 09/18/24   Scheduling Instructions: Surveillance of R distal femoral DVT (2/2)     Ambulatory referral/consult to Vascular Surgery   Standing Status: Future   Referral Priority: Urgent Referral Type: Consultation   Referral Reason: Specialty Services Required   Requested Specialty: Vascular Surgery   Number of Visits Requested: 1     Diet Cardiac     Notify your health care provider if you experience any of the following:  severe uncontrolled pain     Notify your health care provider if you experience any of the following:  difficulty breathing or increased cough     Notify your health care provider if you experience any of the following:  persistent dizziness, light-headedness, or visual disturbances     Notify your health care provider if you experience any of the following:  increased confusion or weakness     Notify your health care provider if you experience any of the following:   Order Comments: To notify provider: Call [phone], ask for Elma PLUMMER with Hospital Medicine Department     Activity as tolerated       Significant Diagnostic Studies: N/A    Pending Diagnostic Studies:     None         Medications:  Reconciled Home Medications:      Medication List      START taking these medications    dextromethorphan-guaiFENesin  mg  mg per 12 hr tablet  Commonly known as: MUCINEX DM  Take 1 tablet by mouth 2 (two) times daily. for 10 days        CONTINUE taking these medications    allopurinoL 300 MG tablet  Commonly known as: ZYLOPRIM  TAKE 1 TABLET(300 MG) BY MOUTH EVERY DAY     amLODIPine-benazepriL 10-40 mg per capsule  Commonly known as: LOTREL  Take 1 capsule by mouth every morning.     aspirin 325 MG EC tablet  Commonly known as: ECOTRIN  TAKE 1 TABLET(325 MG) BY MOUTH EVERY DAY     atorvastatin 80 MG tablet  Commonly known as: LIPITOR  Take 1 tablet (80 mg  total) by mouth once daily.     hydrALAZINE 100 MG tablet  Commonly known as: APRESOLINE  Take 1 tablet (100 mg total) by mouth 2 (two) times daily. Blood pressure     metoprolol ta-hydrochlorothiaz 100-25 mg per tablet  Commonly known as: LOPRESSOR HCT  Take 1 tablet by mouth once daily.     multivit-iron-min-folic acid 3,500-18-0.4 unit-mg-mg Chew  Take by mouth once daily.     spironolactone 25 MG tablet  Commonly known as: ALDACTONE  Take 1 tablet (25 mg total) by mouth once daily.            Indwelling Lines/Drains at time of discharge:   Lines/Drains/Airways     None                 Time spent on the discharge of patient: 36 minutes         Elma Villasenor PA-C  Department of Hospital Medicine  [facility] - Observation 11H

## 2023-09-25 ENCOUNTER — OFFICE VISIT (OUTPATIENT)
Dept: INTERNAL MEDICINE | Facility: CLINIC | Age: 83
End: 2023-09-25
Payer: MEDICARE

## 2023-09-25 VITALS
HEIGHT: 66 IN | BODY MASS INDEX: 36.32 KG/M2 | WEIGHT: 226 LBS | DIASTOLIC BLOOD PRESSURE: 60 MMHG | SYSTOLIC BLOOD PRESSURE: 108 MMHG | HEART RATE: 66 BPM | OXYGEN SATURATION: 97 %

## 2023-09-25 DIAGNOSIS — G47.33 OBSTRUCTIVE SLEEP APNEA SYNDROME: ICD-10-CM

## 2023-09-25 DIAGNOSIS — R54 FRAILTY: ICD-10-CM

## 2023-09-25 DIAGNOSIS — Z74.09 IMPAIRED FUNCTIONAL MOBILITY, BALANCE, GAIT, AND ENDURANCE: ICD-10-CM

## 2023-09-25 DIAGNOSIS — I10 ESSENTIAL HYPERTENSION: Primary | ICD-10-CM

## 2023-09-25 DIAGNOSIS — E66.01 CLASS 2 SEVERE OBESITY WITH BODY MASS INDEX (BMI) OF 35 TO 39.9 WITH SERIOUS COMORBIDITY: ICD-10-CM

## 2023-09-25 DIAGNOSIS — I82.511 CHRONIC DEEP VEIN THROMBOSIS (DVT) OF FEMORAL VEIN OF RIGHT LOWER EXTREMITY: ICD-10-CM

## 2023-09-25 PROBLEM — R07.9 CHEST PAIN: Status: RESOLVED | Noted: 2023-09-17 | Resolved: 2023-09-25

## 2023-09-25 PROBLEM — R79.89 POSITIVE D DIMER: Status: RESOLVED | Noted: 2023-09-17 | Resolved: 2023-09-25

## 2023-09-25 PROCEDURE — 3288F PR FALLS RISK ASSESSMENT DOCUMENTED: ICD-10-PCS | Mod: CPTII,S$GLB,, | Performed by: INTERNAL MEDICINE

## 2023-09-25 PROCEDURE — 1126F PR PAIN SEVERITY QUANTIFIED, NO PAIN PRESENT: ICD-10-PCS | Mod: CPTII,S$GLB,, | Performed by: INTERNAL MEDICINE

## 2023-09-25 PROCEDURE — 1160F RVW MEDS BY RX/DR IN RCRD: CPT | Mod: CPTII,S$GLB,, | Performed by: INTERNAL MEDICINE

## 2023-09-25 PROCEDURE — 1126F AMNT PAIN NOTED NONE PRSNT: CPT | Mod: CPTII,S$GLB,, | Performed by: INTERNAL MEDICINE

## 2023-09-25 PROCEDURE — G0008 FLU VACCINE - QUADRIVALENT - ADJUVANTED: ICD-10-PCS | Mod: S$GLB,,, | Performed by: INTERNAL MEDICINE

## 2023-09-25 PROCEDURE — 3078F PR MOST RECENT DIASTOLIC BLOOD PRESSURE < 80 MM HG: ICD-10-PCS | Mod: CPTII,S$GLB,, | Performed by: INTERNAL MEDICINE

## 2023-09-25 PROCEDURE — 3074F SYST BP LT 130 MM HG: CPT | Mod: CPTII,S$GLB,, | Performed by: INTERNAL MEDICINE

## 2023-09-25 PROCEDURE — 3078F DIAST BP <80 MM HG: CPT | Mod: CPTII,S$GLB,, | Performed by: INTERNAL MEDICINE

## 2023-09-25 PROCEDURE — 99999 PR PBB SHADOW E&M-EST. PATIENT-LVL III: CPT | Mod: PBBFAC,,, | Performed by: INTERNAL MEDICINE

## 2023-09-25 PROCEDURE — 1159F PR MEDICATION LIST DOCUMENTED IN MEDICAL RECORD: ICD-10-PCS | Mod: CPTII,S$GLB,, | Performed by: INTERNAL MEDICINE

## 2023-09-25 PROCEDURE — 3074F PR MOST RECENT SYSTOLIC BLOOD PRESSURE < 130 MM HG: ICD-10-PCS | Mod: CPTII,S$GLB,, | Performed by: INTERNAL MEDICINE

## 2023-09-25 PROCEDURE — 99214 OFFICE O/P EST MOD 30 MIN: CPT | Mod: S$GLB,,, | Performed by: INTERNAL MEDICINE

## 2023-09-25 PROCEDURE — 90694 VACC AIIV4 NO PRSRV 0.5ML IM: CPT | Mod: S$GLB,,, | Performed by: INTERNAL MEDICINE

## 2023-09-25 PROCEDURE — 99999 PR PBB SHADOW E&M-EST. PATIENT-LVL III: ICD-10-PCS | Mod: PBBFAC,,, | Performed by: INTERNAL MEDICINE

## 2023-09-25 PROCEDURE — 3288F FALL RISK ASSESSMENT DOCD: CPT | Mod: CPTII,S$GLB,, | Performed by: INTERNAL MEDICINE

## 2023-09-25 PROCEDURE — 1159F MED LIST DOCD IN RCRD: CPT | Mod: CPTII,S$GLB,, | Performed by: INTERNAL MEDICINE

## 2023-09-25 PROCEDURE — G0008 ADMIN INFLUENZA VIRUS VAC: HCPCS | Mod: S$GLB,,, | Performed by: INTERNAL MEDICINE

## 2023-09-25 PROCEDURE — 90694 FLU VACCINE - QUADRIVALENT - ADJUVANTED: ICD-10-PCS | Mod: S$GLB,,, | Performed by: INTERNAL MEDICINE

## 2023-09-25 PROCEDURE — 1101F PR PT FALLS ASSESS DOC 0-1 FALLS W/OUT INJ PAST YR: ICD-10-PCS | Mod: CPTII,S$GLB,, | Performed by: INTERNAL MEDICINE

## 2023-09-25 PROCEDURE — 99214 PR OFFICE/OUTPT VISIT, EST, LEVL IV, 30-39 MIN: ICD-10-PCS | Mod: S$GLB,,, | Performed by: INTERNAL MEDICINE

## 2023-09-25 PROCEDURE — 1101F PT FALLS ASSESS-DOCD LE1/YR: CPT | Mod: CPTII,S$GLB,, | Performed by: INTERNAL MEDICINE

## 2023-09-25 PROCEDURE — 1160F PR REVIEW ALL MEDS BY PRESCRIBER/CLIN PHARMACIST DOCUMENTED: ICD-10-PCS | Mod: CPTII,S$GLB,, | Performed by: INTERNAL MEDICINE

## 2023-09-25 NOTE — PROGRESS NOTES
Subjective:       Patient ID: Nelia Littlejohn is a 83 y.o. female.    Chief Complaint:   Follow-up    HPI - she was admitted from September 6 through September 17th for chest discomfort.  She ruled out for MI.  Was found to have a partial right distal femoral vein DVT.  She was deemed a poor candidate for anticoagulation, so she is having serial ultrasounds.  Stress echo was negative for ischemia.  Today, she feels fine.  She has some swelling in the right leg worse than the left.  She was accompanied by a family member today who asked about exercise.  We discussed pool work.  She uses CPAP for her sleep apnea and likes it.  She is not a smoker.     PMH:  Resistant HTN, requiring 6 meds  DVT Sept 2023  HLP  CVA 2021  OA, s/p L TKR  ANUM on cpap  Pre-DM     Meds:  Reviewed and reconciled in EPIC with patient during visit today.    Review of Systems   Constitutional:  Negative for fever.   HENT:  Negative for congestion.    Respiratory:  Negative for shortness of breath.    Cardiovascular:  Negative for chest pain.   Gastrointestinal:  Negative for abdominal pain.   Genitourinary:  Negative for difficulty urinating.   Musculoskeletal:  Positive for arthralgias and gait problem.   Skin:  Negative for rash.   Neurological:  Negative for headaches.   Psychiatric/Behavioral:  Negative for sleep disturbance.        Objective:      Physical Exam  Vitals reviewed.   Constitutional:       Appearance: She is well-developed.      Comments: Large, frail woman using a walker   HENT:      Head: Normocephalic and atraumatic.   Cardiovascular:      Rate and Rhythm: Normal rate and regular rhythm.      Heart sounds: Normal heart sounds. No murmur heard.     No friction rub. No gallop.   Pulmonary:      Effort: Pulmonary effort is normal. No respiratory distress.      Breath sounds: Normal breath sounds. No wheezing or rales.   Chest:      Chest wall: No tenderness.   Musculoskeletal:      Right lower leg: Edema present.      Left  lower leg: No edema.   Skin:     General: Skin is warm and dry.      Findings: No erythema.   Neurological:      General: No focal deficit present.      Mental Status: She is alert.   Psychiatric:         Mood and Affect: Mood normal.         Assessment:       1. Essential hypertension    2. Chronic deep vein thrombosis (DVT) of femoral vein of right lower extremity    3. Impaired functional mobility, balance, gait, and endurance    4. Class 2 severe obesity with body mass index (BMI) of 35 to 39.9 with serious comorbidity    5. Obstructive sleep apnea syndrome    6. Frailty        Plan:       Nelia was seen today for follow-up.    Diagnoses and all orders for this visit:    Essential hypertension - at goal; multiple meds.  Stay the course    Chronic deep vein thrombosis (DVT) of femoral vein of right lower extremity - new problem.  Await u/s results    Impaired functional mobility, balance, gait, and endurance - seems stable.  Pool-based exercise is lowest impact, and she should start with that    Class 2 severe obesity with body mass index (BMI) of 35 to 39.9 with serious comorbidity    Obstructive sleep apnea syndrome - stable on cpap.  continue    Frailty    Other orders  -     Influenza - Quadrivalent (Adjuvanted)    Rtc prn or in 6 months    FLAQUITA Gongora MD MPH  Staff Internist

## 2023-09-28 ENCOUNTER — HOSPITAL ENCOUNTER (OUTPATIENT)
Dept: RADIOLOGY | Facility: HOSPITAL | Age: 83
Discharge: HOME | End: 2023-09-28
Payer: MEDICARE

## 2023-09-28 DIAGNOSIS — R79.89 POSITIVE D DIMER: ICD-10-CM

## 2023-09-28 DIAGNOSIS — I82.411 ACUTE DEEP VEIN THROMBOSIS (DVT) OF FEMORAL VEIN OF RIGHT LOWER EXTREMITY: ICD-10-CM

## 2023-09-28 PROCEDURE — 93970 US LOWER EXTREMITY VEINS BILATERAL: ICD-10-PCS | Mod: 26,,, | Performed by: RADIOLOGY

## 2023-09-28 PROCEDURE — 93970 EXTREMITY STUDY: CPT | Mod: TC

## 2023-09-28 PROCEDURE — 93970 EXTREMITY STUDY: CPT | Mod: 26,,, | Performed by: RADIOLOGY

## 2023-10-05 ENCOUNTER — TELEPHONE (OUTPATIENT)
Dept: VASCULAR SURGERY | Facility: CLINIC | Age: 83
End: 2023-10-05
Payer: MEDICARE

## 2023-10-05 NOTE — TELEPHONE ENCOUNTER
Spoke with the pt and informed scheduled with the wrong provider and she is not a new pt to this service.Pt informed that she's established with Dr Tipton and his nurse Summer will contact him to schedule an appt.Pt verbalized understanding of information received.

## 2023-10-13 NOTE — TELEPHONE ENCOUNTER
Care Due:                  Date            Visit Type   Department     Provider  --------------------------------------------------------------------------------                                HOSPITAL     [facility] INTERNAL  Last Visit: 09-      FOLLOW UP    MEDICINE       Bernardo Gongora                              EP -                              PRIMARY      [facility] INTERNAL  Next Visit: 10-      CARE ([facility])   MEDICINE       Bernardo Gongora                                                            Last  Test          Frequency    Reason                     Performed    Due Date  --------------------------------------------------------------------------------    Lipid Panel.  12 months..  atorvastatin.............  10-   10-    Uric Acid...  12 months..  allopurinoL..............  01- 01-    Health [facility] Embedded Care Due Messages. Reference number: [id number].   10/13/2023 4:51:04 PM CDT

## 2023-10-15 RX ORDER — ATORVASTATIN CALCIUM 80 MG/1
80 TABLET, FILM COATED ORAL
Qty: 90 TABLET | Refills: 0 | Status: SHIPPED | OUTPATIENT
Start: 2023-10-15 | End: 2023-12-11

## 2023-10-15 RX ORDER — AMLODIPINE AND BENAZEPRIL HYDROCHLORIDE 10; 40 MG/1; MG/1
CAPSULE ORAL EVERY MORNING
Qty: 90 CAPSULE | Refills: 3 | Status: SHIPPED | OUTPATIENT
Start: 2023-10-15

## 2023-10-15 RX ORDER — METOPROLOL TARTRATE AND HYDROCHLOROTHIAZIDE 100; 25 MG/1; MG/1
1 TABLET ORAL DAILY
Qty: 90 TABLET | Refills: 3 | Status: SHIPPED | OUTPATIENT
Start: 2023-10-15 | End: 2024-03-26 | Stop reason: SDUPTHER

## 2023-10-15 NOTE — TELEPHONE ENCOUNTER
Provider Staff:  Action required for this patient     Please see care gap opportunities below in Care Due Message.    Thanks!  [facility]     Appointments      Date Provider   Last Visit   9/25/2023 Bernardo Gongora II, MD   Next Visit   10/23/2023 Bernardo Gongora II, MD      Refill Decision Note   Nelia Littlejohn  is requesting a refill authorization.  Brief Assessment and Rationale for Refill:  Approve     Medication Therapy Plan:         Comments:     Note composed:2:54 AM 10/15/2023

## 2023-11-30 ENCOUNTER — OFFICE VISIT (OUTPATIENT)
Dept: CARDIOLOGY | Facility: CLINIC | Age: 83
End: 2023-11-30
Payer: MEDICARE

## 2023-11-30 VITALS
DIASTOLIC BLOOD PRESSURE: 76 MMHG | BODY MASS INDEX: 36.1 KG/M2 | SYSTOLIC BLOOD PRESSURE: 124 MMHG | WEIGHT: 224.63 LBS | HEIGHT: 66 IN | HEART RATE: 64 BPM

## 2023-11-30 DIAGNOSIS — N18.31 STAGE 3A CHRONIC KIDNEY DISEASE: ICD-10-CM

## 2023-11-30 DIAGNOSIS — E78.2 MIXED HYPERLIPIDEMIA: ICD-10-CM

## 2023-11-30 DIAGNOSIS — I25.10 CORONARY ARTERY DISEASE INVOLVING NATIVE CORONARY ARTERY OF NATIVE HEART WITHOUT ANGINA PECTORIS: ICD-10-CM

## 2023-11-30 DIAGNOSIS — E66.01 CLASS 2 SEVERE OBESITY WITH BODY MASS INDEX (BMI) OF 35 TO 39.9 WITH SERIOUS COMORBIDITY: ICD-10-CM

## 2023-11-30 DIAGNOSIS — R60.9 EDEMA, UNSPECIFIED TYPE: ICD-10-CM

## 2023-11-30 DIAGNOSIS — I10 ESSENTIAL HYPERTENSION: ICD-10-CM

## 2023-11-30 DIAGNOSIS — I73.9 PERIPHERAL VASCULAR DISEASE: Primary | ICD-10-CM

## 2023-11-30 DIAGNOSIS — I70.0 AORTIC ATHEROSCLEROSIS: ICD-10-CM

## 2023-11-30 PROCEDURE — 1159F MED LIST DOCD IN RCRD: CPT | Mod: CPTII,S$GLB,, | Performed by: INTERNAL MEDICINE

## 2023-11-30 PROCEDURE — 1159F PR MEDICATION LIST DOCUMENTED IN MEDICAL RECORD: ICD-10-PCS | Mod: CPTII,S$GLB,, | Performed by: INTERNAL MEDICINE

## 2023-11-30 PROCEDURE — 1125F AMNT PAIN NOTED PAIN PRSNT: CPT | Mod: CPTII,S$GLB,, | Performed by: INTERNAL MEDICINE

## 2023-11-30 PROCEDURE — 93000 ELECTROCARDIOGRAM COMPLETE: CPT | Mod: S$GLB,,, | Performed by: INTERNAL MEDICINE

## 2023-11-30 PROCEDURE — 3074F SYST BP LT 130 MM HG: CPT | Mod: CPTII,S$GLB,, | Performed by: INTERNAL MEDICINE

## 2023-11-30 PROCEDURE — 3078F DIAST BP <80 MM HG: CPT | Mod: CPTII,S$GLB,, | Performed by: INTERNAL MEDICINE

## 2023-11-30 PROCEDURE — 3078F PR MOST RECENT DIASTOLIC BLOOD PRESSURE < 80 MM HG: ICD-10-PCS | Mod: CPTII,S$GLB,, | Performed by: INTERNAL MEDICINE

## 2023-11-30 PROCEDURE — 3074F PR MOST RECENT SYSTOLIC BLOOD PRESSURE < 130 MM HG: ICD-10-PCS | Mod: CPTII,S$GLB,, | Performed by: INTERNAL MEDICINE

## 2023-11-30 PROCEDURE — 1160F RVW MEDS BY RX/DR IN RCRD: CPT | Mod: CPTII,S$GLB,, | Performed by: INTERNAL MEDICINE

## 2023-11-30 PROCEDURE — 99999 PR PBB SHADOW E&M-EST. PATIENT-LVL III: CPT | Mod: PBBFAC,,, | Performed by: INTERNAL MEDICINE

## 2023-11-30 PROCEDURE — 1101F PR PT FALLS ASSESS DOC 0-1 FALLS W/OUT INJ PAST YR: ICD-10-PCS | Mod: CPTII,S$GLB,, | Performed by: INTERNAL MEDICINE

## 2023-11-30 PROCEDURE — 1125F PR PAIN SEVERITY QUANTIFIED, PAIN PRESENT: ICD-10-PCS | Mod: CPTII,S$GLB,, | Performed by: INTERNAL MEDICINE

## 2023-11-30 PROCEDURE — 1101F PT FALLS ASSESS-DOCD LE1/YR: CPT | Mod: CPTII,S$GLB,, | Performed by: INTERNAL MEDICINE

## 2023-11-30 PROCEDURE — 3288F PR FALLS RISK ASSESSMENT DOCUMENTED: ICD-10-PCS | Mod: CPTII,S$GLB,, | Performed by: INTERNAL MEDICINE

## 2023-11-30 PROCEDURE — 3288F FALL RISK ASSESSMENT DOCD: CPT | Mod: CPTII,S$GLB,, | Performed by: INTERNAL MEDICINE

## 2023-11-30 PROCEDURE — 99999 PR PBB SHADOW E&M-EST. PATIENT-LVL III: ICD-10-PCS | Mod: PBBFAC,,, | Performed by: INTERNAL MEDICINE

## 2023-11-30 PROCEDURE — 99204 PR OFFICE/OUTPT VISIT, NEW, LEVL IV, 45-59 MIN: ICD-10-PCS | Mod: 25,S$GLB,, | Performed by: INTERNAL MEDICINE

## 2023-11-30 PROCEDURE — 93000 EKG 12-LEAD: ICD-10-PCS | Mod: S$GLB,,, | Performed by: INTERNAL MEDICINE

## 2023-11-30 PROCEDURE — 1160F PR REVIEW ALL MEDS BY PRESCRIBER/CLIN PHARMACIST DOCUMENTED: ICD-10-PCS | Mod: CPTII,S$GLB,, | Performed by: INTERNAL MEDICINE

## 2023-11-30 PROCEDURE — 99204 OFFICE O/P NEW MOD 45 MIN: CPT | Mod: 25,S$GLB,, | Performed by: INTERNAL MEDICINE

## 2023-11-30 NOTE — PROGRESS NOTES
HISTORY:    83-year-old female with a history of hypertension, hyperlipidemia, CVA '21, RLE DVT September 2023, ANUM on CPAP, and osteoarthritis status post left TKR presenting for initial evaluation by me.      Patient comes in today to discuss h/o DVT. Presented in September with a CP syndrome. D-dimer was elevated. CTA PE negative w motion artifact. RLE us documented RLE non-occlusive DVT w/ out acute RLE symptoms. Had a repeat presentation for SOB where she was admitted w stable us findings. Also underwent DSE that was nml.    CP and SOB has improved/resolved. Does have chronic LIND. Also has chronic RLE knee pain with known OA. No le edema or swelling. No orthopnea.    Activity levels mild and limited by knee pain and balance issues. Uses a walker to ambulate.     Tolerates aspirin 81 x 1, amlodipine-benazepril 10-40 x1, hydralazine 100 x 2, metoprolol-hydrochlorothiazide 100-25 x 1, spironolactone 25 x 1, atorvastatin 80 x 1. Blood pressures controlled at home.     PHYSICAL EXAM:    Vitals:    11/30/23 0820   BP: 124/76   Pulse: 64       NAD, A+Ox3.  No jvd, no bruit.  RRR nml s1,s2. No murmurs.  CTA B no wheezes or crackles.  No edema.    LABS/STUDIES (imaging reviewed during clinic visit):    September 2023 hemoglobin 10.0 with an MCV of 88.  Creatinine 1.3 with a BUN of 20.  Albumin 3.2.  2022 LDL 93 and HDL 43.  Triglycerides 159.  BNP and troponin normal.    ECG today demonstrates sinus rhythm with no Q-waves or ST changes.  Holter 2021 sinus rhythm with no evidence of significant ectopy or arrhythmia.  Event monitor 2021 sinus rhythm with an average heart rate of 65 beats per minute.  Six symptomatic episodes of near symptoms all correlating with sinus rhythm.  No evidence of arrhythmia.  TTE September 2023 normal LV size and function with EF 60 65%.  Normal diastology.  Mild aortic valve stenosis with a peak velocity 2.06 m/sec.  CVP 3.    DSE September 2023 no evidence of ischemia.  Venous ultrasound  September 2028 right distal femoral vein DVT, nonocclusive.  CTA chest September 2023 no evidence of pulmonary embolism.  Aortic and coronary calcifications noted.      ASSESSMENT & PLAN:    1. Peripheral vascular disease    2. Essential hypertension    3. Mixed hyperlipidemia    4. Stage 3a chronic kidney disease    5. Aortic atherosclerosis (CT 2014)    6. Class 2 severe obesity with body mass index (BMI) of 35 to 39.9 with serious comorbidity    7. Coronary artery disease involving native coronary artery of native heart without angina pectoris    8. Edema, unspecified type        Orders Placed This Encounter    CV US Lower Extremity Veins Bilateral Insufficiency    IN OFFICE EKG 12-LEAD (to [place])    IN OFFICE EKG 12-LEAD (to [place])        DVT in September '23. Unclear if it was acute or not. Tx'd w asa without progression. Not sure of benefit of AC this far out even it was acute. No RLE symptoms, but CP/SOB that has improved suspicious for acute RLE DVT w PE despite negative imaging. CP/SOB resolved at this time. On asa. Repeat us, does have some chronic B LE symptoms c/w venous insufficiency. Uses compression stockings. Needs to increase activity.    Bps controlled on amlodipine-benazepril 10-40 x1, hydralazine 100 x 2, metoprolol-hydrochlorothiazide 100-25 x 1, spironolactone 25 x 1.    LDL 93 in '22. On atorvastatin 80x1. Asymptomatic CAD evidenced on CT chest w negative DSE.    We discussed the importance of diet modifications and instituting an exercise regimen.    Follow up in about 4 months (around 3/30/2024).      Macy Sharma MD

## 2023-12-07 ENCOUNTER — HOSPITAL ENCOUNTER (OUTPATIENT)
Dept: CARDIOLOGY | Facility: HOSPITAL | Age: 83
Discharge: HOME | End: 2023-12-07
Attending: INTERNAL MEDICINE
Payer: MEDICARE

## 2023-12-07 DIAGNOSIS — R60.9 EDEMA, UNSPECIFIED TYPE: ICD-10-CM

## 2023-12-07 DIAGNOSIS — I73.9 PERIPHERAL VASCULAR DISEASE: ICD-10-CM

## 2023-12-07 PROCEDURE — 93970 EXTREMITY STUDY: CPT | Mod: 26,,, | Performed by: INTERNAL MEDICINE

## 2023-12-07 PROCEDURE — 93970 CV US LOWER VENOUS INSUFFICIENCY BILATERAL (CUPID ONLY): ICD-10-PCS | Mod: 26,,, | Performed by: INTERNAL MEDICINE

## 2023-12-07 PROCEDURE — 93970 EXTREMITY STUDY: CPT | Mod: TC

## 2023-12-08 LAB
LEFT GREAT SAPHENOUS DISTAL THIGH DIA: 0.31 CM
LEFT GREAT SAPHENOUS JUNCTION DIA: 0.48 CM
LEFT GREAT SAPHENOUS KNEE DIA: 0.3 CM
LEFT GREAT SAPHENOUS MIDDLE THIGH DIA: 0.49 CM
LEFT GREAT SAPHENOUS PROXIMAL CALF DIA: 0.17 CM
LEFT SMALL SAPHENOUS SPJ DIA: 0.32 CM
RIGHT GREAT SAPHENOUS DISTAL THIGH DIA: 0.31 CM
RIGHT GREAT SAPHENOUS JUNCTION DIA: 0.6 CM
RIGHT GREAT SAPHENOUS KNEE DIA: 0.16 CM
RIGHT GREAT SAPHENOUS MIDDLE THIGH DIA: 0.34 CM
RIGHT GREAT SAPHENOUS PROXIMAL CALF DIA: 0.2 CM
RIGHT SMALL SAPHENOUS SPJ DIA: 0.35 CM

## 2023-12-09 NOTE — TELEPHONE ENCOUNTER
No care due was identified.  [facility] Embedded Care Due Messages. Reference number: [id number].   12/09/2023 4:52:51 PM CST

## 2023-12-11 RX ORDER — ATORVASTATIN CALCIUM 80 MG/1
80 TABLET, FILM COATED ORAL
Qty: 90 TABLET | Refills: 3 | Status: SHIPPED | OUTPATIENT
Start: 2023-12-11

## 2023-12-11 NOTE — TELEPHONE ENCOUNTER
Refill Routing Note   Medication(s) are not appropriate for processing by [facility] for the following reason(s):        Required labs outdated    [facility] action(s):  Defer               Appointments  past 12m or future 3m with PCP    Date Provider   Last Visit   9/25/2023 Bernardo Gongora II, MD   Next Visit   Visit date not found Bernardo Gongora II, MD   ED visits in past 90 days: 0        Note composed:10:42 AM 12/11/2023

## 2024-02-05 DIAGNOSIS — I10 ESSENTIAL HYPERTENSION: ICD-10-CM

## 2024-02-06 RX ORDER — SPIRONOLACTONE 25 MG/1
25 TABLET ORAL DAILY
Qty: 90 TABLET | Refills: 2 | Status: ON HOLD | OUTPATIENT
Start: 2024-02-06

## 2024-02-06 NOTE — TELEPHONE ENCOUNTER
Provider Staff:  Action required for this patient     Please see care gap opportunities below in Care Due Message.    Thanks!  [facility]     Appointments      Date Provider   Last Visit   9/25/2023 Bernardo Gongora II, MD   Next Visit   Visit date not found Bernardo Gongora II, MD      Refill Decision Note   Nelia Littlejohn  is requesting a refill authorization.  Brief Assessment and Rationale for Refill:  Approve     Medication Therapy Plan:         Comments:     Note composed:3:06 AM 02/06/2024           
Care Due:                  Date            Visit Type   Department     Provider  --------------------------------------------------------------------------------                                [facility] INTERNAL  Last Visit: 09-      FOLLOW UP    MEDICINE       Bernardo Gongora  Next Visit: None Scheduled  None         None Found                                                            Last  Test          Frequency    Reason                     Performed    Due Date  --------------------------------------------------------------------------------    Lipid Panel.  12 months..  atorvastatin.............  10-   10-    Uric Acid...  12 months..  allopurinoL..............  Not Found    Overdue    [employer] Embedded Care Due Messages. Reference number: [id number].   2/05/2024 5:26:24 PM CST  
No

## 2024-03-26 ENCOUNTER — OFFICE VISIT (OUTPATIENT)
Dept: INTERNAL MEDICINE | Facility: CLINIC | Age: 84
End: 2024-03-26
Payer: MEDICARE

## 2024-03-26 ENCOUNTER — LAB VISIT (OUTPATIENT)
Dept: LAB | Facility: HOSPITAL | Age: 84
End: 2024-03-26
Attending: INTERNAL MEDICINE
Payer: MEDICARE

## 2024-03-26 VITALS
SYSTOLIC BLOOD PRESSURE: 138 MMHG | BODY MASS INDEX: 35.68 KG/M2 | HEART RATE: 45 BPM | DIASTOLIC BLOOD PRESSURE: 60 MMHG | HEIGHT: 66 IN | WEIGHT: 222 LBS

## 2024-03-26 DIAGNOSIS — E66.01 CLASS 2 SEVERE OBESITY WITH BODY MASS INDEX (BMI) OF 35 TO 39.9 WITH SERIOUS COMORBIDITY: ICD-10-CM

## 2024-03-26 DIAGNOSIS — I73.9 PERIPHERAL VASCULAR DISEASE: ICD-10-CM

## 2024-03-26 DIAGNOSIS — E78.2 MIXED HYPERLIPIDEMIA: ICD-10-CM

## 2024-03-26 DIAGNOSIS — I10 ESSENTIAL HYPERTENSION: Primary | ICD-10-CM

## 2024-03-26 DIAGNOSIS — N18.32 STAGE 3B CHRONIC KIDNEY DISEASE: ICD-10-CM

## 2024-03-26 DIAGNOSIS — G45.9 TIA (TRANSIENT ISCHEMIC ATTACK): ICD-10-CM

## 2024-03-26 DIAGNOSIS — I10 ESSENTIAL HYPERTENSION: ICD-10-CM

## 2024-03-26 DIAGNOSIS — M17.0 PRIMARY OSTEOARTHRITIS OF BOTH KNEES: ICD-10-CM

## 2024-03-26 DIAGNOSIS — M10.9 INTERVAL GOUT: ICD-10-CM

## 2024-03-26 PROBLEM — N18.31 STAGE 3A CHRONIC KIDNEY DISEASE: Status: ACTIVE | Noted: 2024-03-26

## 2024-03-26 LAB
ALBUMIN SERPL BCP-MCNC: 4 G/DL (ref 3.5–5.2)
ALP SERPL-CCNC: 104 U/L (ref 55–135)
ALT SERPL W/O P-5'-P-CCNC: 13 U/L (ref 10–44)
ANION GAP SERPL CALC-SCNC: 9 MMOL/L (ref 8–16)
AST SERPL-CCNC: 17 U/L (ref 10–40)
BILIRUB SERPL-MCNC: 1.3 MG/DL (ref 0.1–1)
BUN SERPL-MCNC: 23 MG/DL (ref 8–23)
CALCIUM SERPL-MCNC: 10.1 MG/DL (ref 8.7–10.5)
CHLORIDE SERPL-SCNC: 110 MMOL/L (ref 95–110)
CHOLEST SERPL-MCNC: 172 MG/DL (ref 120–199)
CHOLEST/HDLC SERPL: 4.1 {RATIO} (ref 2–5)
CO2 SERPL-SCNC: 22 MMOL/L (ref 23–29)
CREAT SERPL-MCNC: 1.5 MG/DL (ref 0.5–1.4)
EST. GFR  (NO RACE VARIABLE): 34.2 ML/MIN/1.73 M^2
ESTIMATED AVG GLUCOSE: 108 MG/DL (ref 68–131)
GLUCOSE SERPL-MCNC: 100 MG/DL (ref 70–110)
HBA1C MFR BLD: 5.4 % (ref 4–5.6)
HDLC SERPL-MCNC: 42 MG/DL (ref 40–75)
HDLC SERPL: 24.4 % (ref 20–50)
LDLC SERPL CALC-MCNC: 101.8 MG/DL (ref 63–159)
NONHDLC SERPL-MCNC: 130 MG/DL
POTASSIUM SERPL-SCNC: 4.8 MMOL/L (ref 3.5–5.1)
PROT SERPL-MCNC: 7.2 G/DL (ref 6–8.4)
SODIUM SERPL-SCNC: 141 MMOL/L (ref 136–145)
TRIGL SERPL-MCNC: 141 MG/DL (ref 30–150)

## 2024-03-26 PROCEDURE — 80053 COMPREHEN METABOLIC PANEL: CPT | Performed by: INTERNAL MEDICINE

## 2024-03-26 PROCEDURE — 99999 PR PBB SHADOW E&M-EST. PATIENT-LVL III: CPT | Mod: PBBFAC,,, | Performed by: INTERNAL MEDICINE

## 2024-03-26 PROCEDURE — 36415 COLL VENOUS BLD VENIPUNCTURE: CPT | Performed by: INTERNAL MEDICINE

## 2024-03-26 PROCEDURE — 99214 OFFICE O/P EST MOD 30 MIN: CPT | Mod: S$GLB,,, | Performed by: INTERNAL MEDICINE

## 2024-03-26 PROCEDURE — 80061 LIPID PANEL: CPT | Performed by: INTERNAL MEDICINE

## 2024-03-26 PROCEDURE — 83036 HEMOGLOBIN GLYCOSYLATED A1C: CPT | Performed by: INTERNAL MEDICINE

## 2024-03-26 RX ORDER — METOPROLOL TARTRATE AND HYDROCHLOROTHIAZIDE 100; 25 MG/1; MG/1
1 TABLET ORAL DAILY
Qty: 90 TABLET | Refills: 3 | Status: ON HOLD | OUTPATIENT
Start: 2024-03-26

## 2024-03-26 RX ORDER — ASPIRIN 325 MG
325 TABLET, DELAYED RELEASE (ENTERIC COATED) ORAL DAILY
Qty: 90 TABLET | Refills: 3 | Status: ON HOLD | OUTPATIENT
Start: 2024-03-26 | End: 2024-06-19 | Stop reason: HOSPADM

## 2024-03-26 RX ORDER — ALLOPURINOL 300 MG/1
300 TABLET ORAL DAILY
Qty: 90 TABLET | Refills: 3 | Status: ON HOLD | OUTPATIENT
Start: 2024-03-26

## 2024-03-26 NOTE — PROGRESS NOTES
Subjective:       Patient ID: Nelia Littlejohn is a 84 y.o. female.    Chief Complaint:   Follow-up    HPI - Here with her daughter today.  She needs some refills.  Asked about getting her right knee replaced; left knee replacement was done 8 years ago.  She otherwise feels well.  Due for RSV vaccination.  Some seasonal allergies; recommended zyrtec.  She is not a smoker.    PMH:  Resistant HTN, controlled on 6 meds  DVT Sept 2023  HLP  CVA 2021  OA, s/p L TKR  ANUM on cpap  Pre-DM  Left TKR 2016     Meds:  Reviewed and reconciled in EPIC with patient during visit today.     Review of Systems   Constitutional:  Negative for fever.   HENT:  Negative for congestion.    Respiratory:  Negative for shortness of breath.    Cardiovascular:  Negative for chest pain.   Gastrointestinal:  Negative for abdominal pain.   Genitourinary:  Negative for difficulty urinating.   Musculoskeletal:  Positive for arthralgias and gait problem.   Skin:  Negative for rash.   Neurological:  Negative for headaches.   Psychiatric/Behavioral:  Negative for sleep disturbance.        Objective:      Physical Exam  Vitals reviewed.   Constitutional:       Appearance: She is well-developed. She is obese.   HENT:      Head: Normocephalic and atraumatic.   Cardiovascular:      Rate and Rhythm: Normal rate and regular rhythm.      Heart sounds: Normal heart sounds. No murmur heard.     No friction rub. No gallop.   Pulmonary:      Effort: Pulmonary effort is normal. No respiratory distress.      Breath sounds: Normal breath sounds. No wheezing or rales.   Chest:      Chest wall: No tenderness.   Skin:     General: Skin is warm and dry.      Findings: No erythema.   Neurological:      General: No focal deficit present.      Mental Status: She is alert.   Psychiatric:         Mood and Affect: Mood normal.         Assessment:       1. Essential hypertension    2. Primary osteoarthritis of both knees    3. Mixed hyperlipidemia    4. TIA (transient ischemic  attack), 6/29/2021    5. Interval gout    6. Class 2 severe obesity with body mass index (BMI) of 35 to 39.9 with serious comorbidity    7. Peripheral vascular disease    8. Stage 3b chronic kidney disease        Plan:       Nelia was seen today for follow-up.    Diagnoses and all orders for this visit:    Essential hypertension - at goal.  Labs and refills today  -     metoprolol ta-hydrochlorothiaz (LOPRESSOR HCT) 100-25 mg per tablet; Take 1 tablet by mouth once daily.  -     COMPREHENSIVE METABOLIC PANEL; Future  -     Hemoglobin A1C; Future    Primary osteoarthritis of both knees - worsening.  I doubt she's medically stable enough for TKR, but perhaps an injection might help.  Ortho referral  -     Ambulatory referral/consult to Orthopedics; Future    Mixed hyperlipidemia - stable on statin.  labs  -     Lipid panel; Future    TIA (transient ischemic attack), 6/29/2021 - asymptomatic now.  Stay with ASA  -     aspirin (ECOTRIN) 325 MG EC tablet; Take 1 tablet (325 mg total) by mouth once daily.    Interval gout - stable.  refills  -     allopurinoL (ZYLOPRIM) 300 MG tablet; Take 1 tablet (300 mg total) by mouth once daily.    Class 2 severe obesity with body mass index (BMI) of 35 to 39.9 with serious comorbidity    Peripheral vascular disease - noted in chart.  Asymptomatic      Stage 3b chronic kidney disease - unclear status.  Labs today    Rtc prn    FLAQUITA Gongora MD MPH  Staff Internist

## 2024-04-18 ENCOUNTER — PATIENT MESSAGE (OUTPATIENT)
Dept: ORTHOPEDICS | Facility: CLINIC | Age: 84
End: 2024-04-18

## 2024-04-18 ENCOUNTER — OFFICE VISIT (OUTPATIENT)
Dept: CARDIOLOGY | Facility: CLINIC | Age: 84
End: 2024-04-18
Payer: MEDICARE

## 2024-04-18 ENCOUNTER — HOSPITAL ENCOUNTER (OUTPATIENT)
Dept: RADIOLOGY | Facility: HOSPITAL | Age: 84
Discharge: HOME | End: 2024-04-18
Attending: PHYSICIAN ASSISTANT
Payer: MEDICARE

## 2024-04-18 ENCOUNTER — OFFICE VISIT (OUTPATIENT)
Dept: ORTHOPEDICS | Facility: CLINIC | Age: 84
End: 2024-04-18
Payer: MEDICARE

## 2024-04-18 ENCOUNTER — TELEPHONE (OUTPATIENT)
Dept: ORTHOPEDICS | Facility: CLINIC | Age: 84
End: 2024-04-18

## 2024-04-18 VITALS
HEART RATE: 66 BPM | BODY MASS INDEX: 33.98 KG/M2 | SYSTOLIC BLOOD PRESSURE: 132 MMHG | HEIGHT: 66 IN | WEIGHT: 211.44 LBS | DIASTOLIC BLOOD PRESSURE: 58 MMHG

## 2024-04-18 VITALS
HEART RATE: 62 BPM | HEIGHT: 66 IN | BODY MASS INDEX: 35.03 KG/M2 | SYSTOLIC BLOOD PRESSURE: 125 MMHG | DIASTOLIC BLOOD PRESSURE: 71 MMHG | RESPIRATION RATE: 20 BRPM | WEIGHT: 218 LBS

## 2024-04-18 DIAGNOSIS — N18.32 STAGE 3B CHRONIC KIDNEY DISEASE: ICD-10-CM

## 2024-04-18 DIAGNOSIS — I25.10 CORONARY ARTERY DISEASE INVOLVING NATIVE CORONARY ARTERY OF NATIVE HEART WITHOUT ANGINA PECTORIS: ICD-10-CM

## 2024-04-18 DIAGNOSIS — I10 ESSENTIAL HYPERTENSION: ICD-10-CM

## 2024-04-18 DIAGNOSIS — M17.0 PRIMARY OSTEOARTHRITIS OF BOTH KNEES: ICD-10-CM

## 2024-04-18 DIAGNOSIS — Z86.718 HISTORY OF DVT (DEEP VEIN THROMBOSIS): Primary | ICD-10-CM

## 2024-04-18 DIAGNOSIS — M17.11 PRIMARY OSTEOARTHRITIS OF RIGHT KNEE: Primary | ICD-10-CM

## 2024-04-18 DIAGNOSIS — I70.0 AORTIC ATHEROSCLEROSIS: ICD-10-CM

## 2024-04-18 DIAGNOSIS — E78.2 MIXED HYPERLIPIDEMIA: ICD-10-CM

## 2024-04-18 DIAGNOSIS — Z01.810 PREOPERATIVE CARDIOVASCULAR EXAMINATION: ICD-10-CM

## 2024-04-18 DIAGNOSIS — Z96.652 STATUS POST LEFT KNEE REPLACEMENT: ICD-10-CM

## 2024-04-18 DIAGNOSIS — E66.01 CLASS 2 SEVERE OBESITY WITH BODY MASS INDEX (BMI) OF 35 TO 39.9 WITH SERIOUS COMORBIDITY: ICD-10-CM

## 2024-04-18 PROBLEM — I82.511 CHRONIC DEEP VEIN THROMBOSIS (DVT) OF FEMORAL VEIN OF RIGHT LOWER EXTREMITY: Status: ACTIVE | Noted: 2024-04-18

## 2024-04-18 PROCEDURE — 73562 X-RAY EXAM OF KNEE 3: CPT | Mod: TC,50

## 2024-04-18 PROCEDURE — 3074F SYST BP LT 130 MM HG: CPT | Mod: CPTII,S$GLB,, | Performed by: INTERNAL MEDICINE

## 2024-04-18 PROCEDURE — 1159F MED LIST DOCD IN RCRD: CPT | Mod: CPTII,S$GLB,, | Performed by: PHYSICIAN ASSISTANT

## 2024-04-18 PROCEDURE — 3288F FALL RISK ASSESSMENT DOCD: CPT | Mod: CPTII,S$GLB,, | Performed by: PHYSICIAN ASSISTANT

## 2024-04-18 PROCEDURE — 3075F SYST BP GE 130 - 139MM HG: CPT | Mod: CPTII,S$GLB,, | Performed by: PHYSICIAN ASSISTANT

## 2024-04-18 PROCEDURE — 1101F PT FALLS ASSESS-DOCD LE1/YR: CPT | Mod: CPTII,S$GLB,, | Performed by: INTERNAL MEDICINE

## 2024-04-18 PROCEDURE — 73562 X-RAY EXAM OF KNEE 3: CPT | Mod: 26,50,, | Performed by: RADIOLOGY

## 2024-04-18 PROCEDURE — 99214 OFFICE O/P EST MOD 30 MIN: CPT | Mod: S$GLB,,, | Performed by: PHYSICIAN ASSISTANT

## 2024-04-18 PROCEDURE — 99999 PR PBB SHADOW E&M-EST. PATIENT-LVL III: CPT | Mod: PBBFAC,,, | Performed by: INTERNAL MEDICINE

## 2024-04-18 PROCEDURE — 99999 PR PBB SHADOW E&M-EST. PATIENT-LVL IV: CPT | Mod: PBBFAC,,, | Performed by: PHYSICIAN ASSISTANT

## 2024-04-18 PROCEDURE — 1160F RVW MEDS BY RX/DR IN RCRD: CPT | Mod: CPTII,S$GLB,, | Performed by: PHYSICIAN ASSISTANT

## 2024-04-18 PROCEDURE — 1125F AMNT PAIN NOTED PAIN PRSNT: CPT | Mod: CPTII,S$GLB,, | Performed by: PHYSICIAN ASSISTANT

## 2024-04-18 PROCEDURE — 1160F RVW MEDS BY RX/DR IN RCRD: CPT | Mod: CPTII,S$GLB,, | Performed by: INTERNAL MEDICINE

## 2024-04-18 PROCEDURE — 1101F PT FALLS ASSESS-DOCD LE1/YR: CPT | Mod: CPTII,S$GLB,, | Performed by: PHYSICIAN ASSISTANT

## 2024-04-18 PROCEDURE — 1126F AMNT PAIN NOTED NONE PRSNT: CPT | Mod: CPTII,S$GLB,, | Performed by: INTERNAL MEDICINE

## 2024-04-18 PROCEDURE — 3078F DIAST BP <80 MM HG: CPT | Mod: CPTII,S$GLB,, | Performed by: PHYSICIAN ASSISTANT

## 2024-04-18 PROCEDURE — 3288F FALL RISK ASSESSMENT DOCD: CPT | Mod: CPTII,S$GLB,, | Performed by: INTERNAL MEDICINE

## 2024-04-18 PROCEDURE — 1159F MED LIST DOCD IN RCRD: CPT | Mod: CPTII,S$GLB,, | Performed by: INTERNAL MEDICINE

## 2024-04-18 PROCEDURE — 99214 OFFICE O/P EST MOD 30 MIN: CPT | Mod: S$GLB,,, | Performed by: INTERNAL MEDICINE

## 2024-04-18 PROCEDURE — 3078F DIAST BP <80 MM HG: CPT | Mod: CPTII,S$GLB,, | Performed by: INTERNAL MEDICINE

## 2024-04-18 NOTE — TELEPHONE ENCOUNTER
I called the patient today regarding her appointment. I left a message for the patient to call me back. I left my name and phone number.

## 2024-04-18 NOTE — PROGRESS NOTES
HISTORY:    84-year-old female with a history of hypertension, hyperlipidemia, CVA '21, RLE DVT September 2023, ANUM on CPAP, and osteoarthritis status post left TKR presenting for follow-up.      Reports feeling well at this time. No CP, SOB at rest. Chronic, mild LIND. No RLE symptoms other than knee pain 2/2 OA. No edema or orthopnea.    Activity levels mild and limited by knee pain and balance issues. Uses a walker to ambulate.     Tolerates aspirin 325 x 1, amlodipine-benazepril 10-40 x1, hydralazine 100 x 2, metoprolol-hydrochlorothiazide 100-25 x 1, spironolactone 25 x 1, atorvastatin 80 x 1. Blood pressures controlled at home.     PHYSICAL EXAM:    Vitals:    04/18/24 1043   BP: 125/71   Pulse: 62   Resp: 20         NAD, A+Ox3.  No jvd, no bruit.  RRR nml s1,s2. No murmurs.  CTA B no wheezes or crackles.  No edema.    LABS/STUDIES (imaging reviewed during clinic visit):    September 2023 hemoglobin 10.0 with an MCV of 88.  March 2024 creatinine 1.5/BUN 23/GFR 34 (baseline).  Albumin 4.0.  /HDL 42/.  2023  BNP and troponin normal.    ECG 2023 demonstrates sinus rhythm with no Q-waves or ST changes.  Holter 2021 sinus rhythm with no evidence of significant ectopy or arrhythmia.  Event monitor 2021 sinus rhythm with an average heart rate of 65 beats per minute.  Six symptomatic episodes of near symptoms all correlating with sinus rhythm.  No evidence of arrhythmia.  TTE September 2023 normal LV size and function with EF 60 65%.  Normal diastology.  Mild aortic valve stenosis with a peak velocity 2.06 m/sec.  CVP 3.    DSE September 2023 no evidence of ischemia.  Venous ultrasound December 2023 no evidence of DVT bilaterally.  No evidence of superficial venous reflux bilaterally.  September 2023 right distal femoral vein DVT, nonocclusive.  CTA chest September 2023 no evidence of pulmonary embolism.  Aortic and coronary calcifications noted.      ASSESSMENT & PLAN:    1. History of DVT (deep vein  thrombosis)    2. Class 2 severe obesity with body mass index (BMI) of 35 to 39.9 with serious comorbidity    3. Stage 3b chronic kidney disease    4. Mixed hyperlipidemia    5. Aortic atherosclerosis (CT 2014)    6. Coronary artery disease involving native coronary artery of native heart without angina pectoris    7. Essential hypertension    8. Preoperative cardiovascular examination                  DVT in September '23 that resolved without AC. Unclear trigger and seemingly incidental as she never had RLE symptoms. On asa, okay to switch to 81x1.     Bps controlled on amlodipine-benazepril 10-40 x1, hydralazine 100 x 2, metoprolol-hydrochlorothiazide 100-25 x 1, spironolactone 25 x 1.     on atorvastatin 80x1. Asymptomatic CAD evidenced on CT chest w negative DSE.    We discussed the importance of diet modifications and instituting an exercise regimen.    Pt has anticipated RLE TKR. Does have an element of chronic, stable LIND with anormal DSE in late '23. No further CV testing prior to anticipated surgery. Would be as aggressive as possible with DVT prophylaxis given history.    Follow up in about 6 months (around 10/18/2024).      Macy Sharma MD

## 2024-04-18 NOTE — PROGRESS NOTES
SUBJECTIVE:     Chief Complaint & History of Present Illness:  Nelia Littlejohn is a Established patient 84 y.o. female who is seen here today with a complaint of    Chief Complaint   Patient presents with    Right Knee - Pain    .  Patient is here today for evaluation treatment of progressively worsening right knee pain.  She is a patient well-known to me was last seen treated the clinic 08/10/2023 for hip and back pain from which she is progressing well.  She is status post left TKA approximately 8 years ago from which she is doing very well.  She is aware she has moderate to severe arthritis in the right knee and is in need of knee replacement surgery.  At this point she would like to discuss moving forward with knee replacement surgery as soon as possible after the 15th of May.   On a scale of 1-10, with 10 being worst pain imaginable, he rates this pain as 3 on good days and 7 on bad days.  she describes the pain as sore and achy.    Review of patient's allergies indicates:  No Known Allergies      Current Outpatient Medications   Medication Sig Dispense Refill    allopurinoL (ZYLOPRIM) 300 MG tablet Take 1 tablet (300 mg total) by mouth once daily. 90 tablet 3    amLODIPine-benazepriL (LOTREL) 10-40 mg per capsule TAKE 1 TABLET BY MOUTH EVERY MORNING 90 capsule 3    aspirin (ECOTRIN) 325 MG EC tablet Take 1 tablet (325 mg total) by mouth once daily. 90 tablet 3    atorvastatin (LIPITOR) 80 MG tablet TAKE 1 TABLET BY MOUTH EVERY DAY 90 tablet 3    hydrALAZINE (APRESOLINE) 100 MG tablet Take 1 tablet (100 mg total) by mouth 2 (two) times daily. Blood pressure 180 tablet 3    metoprolol ta-hydrochlorothiaz (LOPRESSOR HCT) 100-25 mg per tablet Take 1 tablet by mouth once daily. 90 tablet 3    MULTIVIT-IRON-MIN-FOLIC ACID 3,500-18-0.4 UNIT-MG-MG ORAL CHEW Take by mouth once daily.      spironolactone (ALDACTONE) 25 MG tablet Take 1 tablet (25 mg total) by mouth once daily. 90 tablet 2     No current  "facility-administered medications for this visit.       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 Vision is much better, can read.     High cholesterol     History of prediabetes 10/26/2017    HTN (hypertension) 09/25/2012    Hyperlipemia 09/11/2012    Hypertension     Interval gout 09/11/2012       Past Surgical History:   Procedure Laterality Date    CATARACT EXTRACTION Bilateral     EYE SURGERY      HYSTERECTOMY      JOINT REPLACEMENT      Yag capsulotomy left eye  10/10/2012       Vital Signs (Most Recent)  Vitals:    04/18/24 0946   BP: (!) 132/58   Pulse: 66           Review of Systems:  ROS:  Constitutional: no fever or chills, positive for obstructive sleep apnea  Eyes: no visual changes  ENT: no nasal congestion or sore throat  Respiratory: no cough or shortness of breath  Cardiovascular: no chest pain or palpitations, positive for hypertension, aortic atherosclerosis, peripheral vascular disease coronary artery disease  Gastrointestinal: no nausea or vomiting, tolerating diet  Genitourinary: no hematuria or dysuria, CKD 3B  Integument/Breast: no rash or pruritis  Hematologic/Lymphatic: no easy bruising or lymphadenopathy  Musculoskeletal: no arthralgias or myalgias, positive history of gout, status post left TKA 2016  Neurological: no seizures or tremors, positive history of stroke, TIA  Behavioral/Psych: no auditory or visual hallucinations  Endocrine: no heat or cold intolerance, multinodular goiter                OBJECTIVE:     PHYSICAL EXAM:  Height: 5' 6" (167.6 cm) Weight: 95.9 kg (211 lb 6.7 oz), General Appearance: Well nourished, well developed, in no acute distress.  Neurological: Mood & affect are normal.    right  Knee Exam:  Knee Range of Motion:5-115 degrees flexion and 5 flexion contracture "   Effusion:none  Condition of skin:intact  Location of tenderness:Medial joint line   Strength:limited by pain and 5 of 5  Stability:  Lachman: stable, LCL: stable, MCL: stable, PCL: stable, and posteromedial (dial): stable  Varus /Valgus stress:  normal  Barb:   negative/negative    left  Knee Exam:  Knee Range of Motion:0-120 degrees flexion   Effusion:none  Condition of skin:intact  Location of tenderness:None   Strength:5 of 5  Stability:  Lachman: stable, LCL: stable, MCL: stable, and PCL: stable  Varus /Valgus stress:  normal        Hip Examination:  full painless range of motion, without tenderness    RADIOGRAPHS:  X-rays the knees taken today films reviewed by me demonstrate well-fixed well-aligned prosthesis in the left knee with no evidence of loosening wear damage or other mechanical abnormalities angle is such the degree of the polys difficult to ascertain.  Right knee demonstrate severe arthritic changes tricompartmentally with complete loss of medial joint space marked sclerotic changes and osteophytic spurring tricompartmentally with complete loss in the medial compartment no evidence of fracture dislocation    ASSESSMENT/PLAN:       ICD-10-CM ICD-9-CM   1. Primary osteoarthritis of both knees  M17.0 715.16       Plan: We discussed with the patient at length all the different treatment options available for  the knee including anti-inflammatories, acetaminophen, rest, ice, knee strengthening exercise, occasional cortisone injections for temporary relief, Viscosupplimentation injections, arthroscopic debridement osteotomy, and finally knee arthroplasty.   Case was discussed with Dr. Rubio and adult reconstructive surgery we will schedule her a follow-up appointment with adult reconstructive surgery to move forward towards knee replacement surgery we would like repeat x-rays of the left knee to ensure no excessive wear of the poly

## 2024-04-18 NOTE — TELEPHONE ENCOUNTER
----- Message from Ernie Rubio III, MD sent at 4/18/2024  9:44 AM CDT -----  Regarding: R TKA  R velys TKA sometime after 5/15th    Castillo, please reach out to her to reserve a surgery date and we can see her for a pre-op appt asap    Valderrama she comes back we need a better AP L TKA to show the joint is ok    Thank you

## 2024-04-19 ENCOUNTER — TELEPHONE (OUTPATIENT)
Dept: ORTHOPEDICS | Facility: CLINIC | Age: 84
End: 2024-04-19
Payer: MEDICARE

## 2024-04-19 NOTE — TELEPHONE ENCOUNTER
I attempted to call the patient regarding her upcoming appointment.  I want to try and find a surgery date that works for them. The patient did not answer and her voice mailbox is full.     ----- Message from Ernie Rubio III, MD sent at 4/18/2024  9:44 AM CDT -----  Regarding: R TKA  R velys TKA sometime after 5/15th    Castillo, please reach out to her to reserve a surgery date and we can see her for a pre-op appt asap    Valderrama she comes back we need a better AP L TKA to show the joint is ok    Thank you

## 2024-04-23 DIAGNOSIS — M17.11 PRIMARY OSTEOARTHRITIS OF RIGHT KNEE: Primary | ICD-10-CM

## 2024-04-25 DIAGNOSIS — M17.11 PRIMARY OSTEOARTHRITIS OF RIGHT KNEE: Primary | ICD-10-CM

## 2024-04-25 DIAGNOSIS — Z96.652 PRESENCE OF LEFT ARTIFICIAL KNEE JOINT: ICD-10-CM

## 2024-04-29 NOTE — PROGRESS NOTES
Subjective:     HPI:   Nelia Littlejohn is a 84 y.o. female who presents for eval R knee OA ref by Bebo Perez    History of Present Illness  The patient presents for evaluation of right knee pain.    The patient denies any history of myocardial infarction or cardiac stents. She has a past medical history of transient ischemic attack (TIA) several years ago, gout (managed with allopurinol), prediabetes (well-managed), and kidney disease. An arterial ultrasound conducted in 2023 revealed a nonocclusive thrombus in the right distal femoral vein, which was subsequently repeated on 09/28/2023 and 12/2023. A subsequent ultrasound in 12/2023 revealed a Baker's cyst, but no thrombus. She is not currently on any anticoagulant therapy. Approximately a week ago, she consulted her cardiologist, who found no abnormalities. She underwent left knee arthroplasty performed by Dr. Wheeler between 2015 and 2017, and reports no significant pain, problems, or concerns in her left knee. She has not undergone any surgical procedures on her right knee. Her pain management regimen includes Tylenol as needed, and she has never received an injection in her right knee. Post-surgery, she underwent physical therapy for her right knee, but has not utilized a brace. She utilizes a walker and cane for mobility, but is unable to kneel or stand for extended periods. Both her daughters and son assist her in standing. Her right knee has been causing her discomfort for an extended period and is progressively worsening. Upon awakening in the morning, she is unable to bear weight on her right knee and must drag it for a while until her circulation improves, after which she can ambulate. She describes her entire right knee as bothersome.    Supplemental Information  She uses a CPAP machine.    Global R knee pain     Left TKA approximately (10-11-16) 8 years at [facility] Dr George Bello -  The patient reported no issues with his inicision healing and no  "complications with infection after surgery. The patient spent 1 or 2 night(s) in the hospital. The patient had 2 weeks of home health physical therapy and 6 weeks of out patient physical therapy. The patient stated that she healed well since surgery.     L TKA doing well, no sig pain/problems/concerns, happy     Past surgical history: None R knee    Hx DVT: Per Dr. Sharma, "DVT in  that resolved without AC. Unclear trigger and seemingly incidental as she never had RLE symptoms. On asa, okay to switch to 81x1."    Medications: Tylenol (500mg, PRN)    Injections: None in right knee, only left knee, has severe bone on bone R knee arthritis, will not provide meaningful relief    Physical Therapy: None, did PT after L TKA    Bracing:  None.     Assistive Devices: Yes, walker and cane, the patient uses the walker when she has to walk long distances    Walkin - 5 blocks    Limitations:  difficulty walking, difficulty standing for long periods of time   -kneeling at [employer]\    Occupation: Retired - the patient used to work at the [email or web address]     Social support: The patient stated that they live at home with their daughters. The patient stated that their children  would be able to help take care of them if they were to have surgery.   Son her today     ROS:  The updated medical history is in the chart and has been reviewed. A review of systems is updated and there is no reported vision changes, ear/nose/mouth/throat complaints,  chest pain, shortness of breath, abdominal pain, urological complaints, fevers or chills, psychiatric complaints. Musculoskeletal and neurologcial symptoms are as documented. All other systems are negative.      Objective:   Exam:  There were no vitals filed for this visit.  Body mass index is 35.51 kg/m².    Physical examination included assessment of the patient's general appearance with particular attention to development, nutrition, body habitus, attention to grooming, and any " evidence of distress.  Constitutional: The patient is a well-developed, well-nourished patient in no acute distress.   Cardiovascular: Vascular examination included warmth and capillary refill as well inspection for edema and assessment of pedal pulses. Pulses are palpable and regular.  Musculoskeletal: Gait was assessed as to whether it was steady, non-antalgic, and/or required the use of an assist device. The patient was also asked to walk independently and get onto the examination table.  Skin: The skin was examined for any obvious rashes or lesions in the extremity.  Neurologic: Sensation is intact to light touch in the extremity. The patient has good coordination without hyperreflexia and is alert and oriented to person, place and time and has normal mood and affect.     All of the above were examined and found to be within normal limits except for the following pertinent clinical findings:    Physical Exam  The patient exhibits a severe limp and antalgic gait, favoring the right knee. There is no groin pain with straight leg raise. No pain with active or passive range of motion of her hip joint. She is distally neurovascularly intact with good strength, sensation, and pulses. Her left knee has a well-healed total knee incision. The knee has a range of motion from 0 to 120 degrees, good stability, no extensor lag, no effusion, and nontender. On the right knee, she has a range of motion from 20 to 120 degrees, 0 degrees alignment which corrects a few degrees into valgus. She is tender to palpation at the medial, lateral, and patellofemoral joint lines with mild-to-moderate effusion. The knee is stable to anterior-posterior varus and valgus stresses with severe flexion contracture, but no extensor laxity.          Imaging:    Results  Laboratory Studies  A1c was 5.4.    Imaging  Arterial ultrasound in 2023 showed a nonocclusive thrombus in the right distal femoral vein. Ultrasound in December showed a Baker's  cyst.    KNEE R ARTHRITIS    Indication:  Right knee pain  Exam Ordered: Radiographs of the right knee include a standing anteroposterior view, a standing posterioanterior view, a lateral view in full flexion, and a sunrise view  Details of Examination: Exam shows evidence of joint space narrowing, osteophyte formation, and subchondral sclerosis, all consistent with degenerative arthritis of the knee.  No other significant findings are noted.  Impression:  Degenerative Arthritis, Right Knee    Klg4 severe varus bone on bone L knee arthritis    L TKA well fixed/oriented, no sig wear    CRP 9/17/23: 6.4    US venous 9/17/23:   Nonocclusive right distal femoral vein thrombus.   US venous 9/28/23:   Persistent nonocclusive thrombosis of the right distal femoral vein.   CV US veins 12/23:   There is no evidence of a right lower extremity DVT.    There is no evidence of a left lower extremity DVT.    No evidence of superficial venous reflux bilaterally.    A Baker's cyst measuring 1.3 cm x 1.6 cm  was seen in the right extremity.        2022: US Art  Impression:     Ankle-brachial index of 0.9 on the right and 0.94 on the left, suggestive of mild diffuse peripheral arterial disease.     Borderline doubling of velocities within the midportion of the left femoral artery, suggestive of focal stenosis approaching 50%.     Electronically signed by resident: Blank Sena  Date:                                            01/25/2022  Time:                                           14:03     Electronically signed by:Mik Esteban Jr  Date:                                            01/25/2022  Time:                                           14:45      Assessment:       ICD-10-CM ICD-9-CM   1. Primary osteoarthritis of right knee  M17.11 715.16   2. Presence of left artificial knee joint  Z96.652 V43.65      Severe pre-op flexion contracture on the right    L TKA R Ace 2018 doing well, CRP 9/23 normal  Says did not have  "severe flexion contracture on the left pre-op    Gout - allopurinol  TIA/CVA, no residual  preDM 5.4  CKD 1.5/34  DVT non-occ RLE 9/23 resolved without AC  ANUM + CPAP  Anemia Hgb 10  PAD    Saw cardioligist 4/18/24:   DVT in September '23 that resolved without AC. Unclear trigger and seemingly incidental as she never had RLE symptoms. On asa, okay to switch to 81x1.   "Pt has anticipated RLE TKR. Does have an element of chronic, stable LIND with anormal DSE in late '23. No further CV testing prior to anticipated surgery. Would be as aggressive as possible with DVT prophylaxis given history. "    Plan:     Assessment & Plan  1. Bone-on-bone arthritis of the right knee.  The patient has previously attempted treatment with anti-inflammatory medications, injections, and physical therapy for her left knee. Currently, she is utilizing a walker for mobility. The surgical approach would be a right knee replacement, a remnant from her previous surgery. A therapy order will be placed, with the aim of improving her knee mobility prior to surgery. The surgery will be scheduled at the orthopedic hospital in [facility], where she will be monitored for 1 to 2 nights due to the severity of her knee condition. A knee immobilizer brace will be considered for nighttime use to maintain her knee in full extension. Information regarding knee replacements, particularly the new robot, will be provided.    The above findings were discussed with patient length. We discussed the risks of conservative versus surgical management knee arthritis. Conservative management consisting of anti-inflammatory medications, glucosamine/chondroitin sulfate, weight loss, physical therapy, activity modification, as well as injections (lubricant versus corticosteroid) was discussed at length. At this point considering the patient's level of activity, pain, and radiographic findings I recommend TKA    This patient has significant symptoms in their knee " that are affecting their quality of life and daily activities.  They have tried non-operative treatment including analgesics, an exercise program, and activity modification, but the symptoms have persisted. I believe they make a good candidate for knee arthroplasty.     The risks and benefits of knee arthroplasty have been discussed with the patient which include, but are not limited to infections (including severe sequelae), potential component failure, fracture, DVT, pulmonary embolus, nerve palsy, poor range of motion, the possibility of bone grafting, persistent pain, wound healing complications, transfusions, medical complications and death.     We discussed surgical options including implant type and why I believe the implant selected is a good match for the patient's needs. The patient expressed understanding and agrees to proceed with the planned surgery.     Pre-operative planning will include the following:  A pre-surgical evaluation by will be arranged.  Pre-op orders will be placed.  We will make arrangements with the operating room for proper time and staffing.  We will make Social Service arrangements for the patient.    Implants:   Company: [employer]  System: Attune  Velys all poly tibia    DVT prophylaxis: ASA 81mg x1 @12hrs post op, Eliquis 2.5mg BID x30 days @24hrs post op    Dispo: outpatient PT    Admission status: Outpatient/23hr OBS    Location: [facility] TKA   1-2 night  PT for pre-hab to work on flexion contracture  Consider knee immobilizer at night      Anytime after may 15th grandsons graduation  F/u for pre-op visit - ROM check    No orders of the defined types were placed in this encounter.      This note was generated with the assistance of ambient listening technology. Verbal consent was obtained by the patient and accompanying visitor(s) for the recording of patient appointment to facilitate this note. I attest to having reviewed and edited the generated note for accuracy, though some  syntax or spelling errors may persist. Please contact the author of this note for any clarification.            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 Vision is much better, can read.     High cholesterol     History of prediabetes 10/26/2017    HTN (hypertension) 09/25/2012    Hyperlipemia 09/11/2012    Hypertension     Interval gout 09/11/2012       Past Surgical History:   Procedure Laterality Date    CATARACT EXTRACTION Bilateral     EYE SURGERY      HYSTERECTOMY      JOINT REPLACEMENT      Yag capsulotomy left eye  10/10/2012       Family History   Problem Relation Name Age of Onset    Diabetes Father      Stroke Father      Cataracts Father      Diabetes Mother      Cancer Sister Katelyn         breast cancer    Diabetes Brother Lester     Heart disease Brother Lester     No Known Problems Son Raj     No Known Problems Daughter Leydi     No Known Problems Maternal Grandmother      No Known Problems Maternal Grandfather      No Known Problems Paternal Grandmother      No Known Problems Paternal Grandfather      No Known Problems Brother Boris     No Known Problems Maternal Aunt      No Known Problems Maternal Uncle      No Known Problems Paternal Aunt      No Known Problems Paternal Uncle      Hypertension Brother Santi     Diabetes Brother Laila     Hypertension Son Garrick     Hypertension Son Wil     No Known Problems Son Daljit     No Known Problems Son Austen     Amblyopia Neg Hx      Blindness Neg Hx      Glaucoma Neg Hx      Macular degeneration Neg Hx      Retinal detachment Neg Hx      Strabismus Neg Hx      Thyroid disease Neg Hx         Social History     Socioeconomic History    Marital status:    Occupational History    Occupation:  at [employer]     Comment: [employer]; now retired   Tobacco  Use    Smoking status: Never    Smokeless tobacco: Never   Substance and Sexual Activity    Alcohol use: No    Drug use: No    Sexual activity: Not Currently   Social History Narrative        She has 5 sons.  3 live in [place] and one son is here in [place].  She has a daughter who lives here in [place].     Social Determinants of Health     Financial Resource Strain: Low Risk  (9/17/2023)    Overall Financial Resource Strain (CARDIA)     Difficulty of Paying Living Expenses: Not very hard   Food Insecurity: No Food Insecurity (9/17/2023)    Hunger Vital Sign     Worried About Running Out of Food in the Last Year: Never true     Ran Out of Food in the Last Year: Never true   Transportation Needs: No Transportation Needs (9/17/2023)    PRAPARE - Transportation     Lack of Transportation (Medical): No     Lack of Transportation (Non-Medical): No   Physical Activity: Inactive (9/17/2023)    Exercise Vital Sign     Days of Exercise per Week: 0 days     Minutes of Exercise per Session: 0 min   Stress: No Stress Concern Present (9/17/2023)    [employer] [place] - Occupational Stress Questionnaire     Feeling of Stress : Only a little   Housing Stability: Low Risk  (9/17/2023)    Housing Stability Vital Sign     Unable to Pay for Housing in the Last Year: No     Number of Places Lived in the Last Year: 1     Unstable Housing in the Last Year: No

## 2024-04-30 ENCOUNTER — HOSPITAL ENCOUNTER (OUTPATIENT)
Dept: RADIOLOGY | Facility: HOSPITAL | Age: 84
Discharge: HOME | End: 2024-04-30
Attending: ORTHOPAEDIC SURGERY
Payer: MEDICARE

## 2024-04-30 ENCOUNTER — OFFICE VISIT (OUTPATIENT)
Dept: ORTHOPEDICS | Facility: CLINIC | Age: 84
End: 2024-04-30
Payer: MEDICARE

## 2024-04-30 VITALS — BODY MASS INDEX: 35.36 KG/M2 | WEIGHT: 220 LBS | HEIGHT: 66 IN

## 2024-04-30 DIAGNOSIS — M17.11 PRIMARY OSTEOARTHRITIS OF RIGHT KNEE: Primary | ICD-10-CM

## 2024-04-30 DIAGNOSIS — M17.11 PRIMARY OSTEOARTHRITIS OF RIGHT KNEE: ICD-10-CM

## 2024-04-30 DIAGNOSIS — Z96.652 PRESENCE OF LEFT ARTIFICIAL KNEE JOINT: ICD-10-CM

## 2024-04-30 PROCEDURE — 1101F PT FALLS ASSESS-DOCD LE1/YR: CPT | Mod: CPTII,S$GLB,, | Performed by: ORTHOPAEDIC SURGERY

## 2024-04-30 PROCEDURE — 99999 PR PBB SHADOW E&M-EST. PATIENT-LVL III: CPT | Mod: PBBFAC,,, | Performed by: ORTHOPAEDIC SURGERY

## 2024-04-30 PROCEDURE — 1159F MED LIST DOCD IN RCRD: CPT | Mod: CPTII,S$GLB,, | Performed by: ORTHOPAEDIC SURGERY

## 2024-04-30 PROCEDURE — 99215 OFFICE O/P EST HI 40 MIN: CPT | Mod: S$GLB,,, | Performed by: ORTHOPAEDIC SURGERY

## 2024-04-30 PROCEDURE — 1125F AMNT PAIN NOTED PAIN PRSNT: CPT | Mod: CPTII,S$GLB,, | Performed by: ORTHOPAEDIC SURGERY

## 2024-04-30 PROCEDURE — 3288F FALL RISK ASSESSMENT DOCD: CPT | Mod: CPTII,S$GLB,, | Performed by: ORTHOPAEDIC SURGERY

## 2024-04-30 PROCEDURE — 73560 X-RAY EXAM OF KNEE 1 OR 2: CPT | Mod: TC,50

## 2024-04-30 PROCEDURE — 73560 X-RAY EXAM OF KNEE 1 OR 2: CPT | Mod: 26,50,, | Performed by: RADIOLOGY

## 2024-05-02 ENCOUNTER — CLINICAL SUPPORT (OUTPATIENT)
Dept: REHABILITATION | Facility: HOSPITAL | Age: 84
End: 2024-05-02
Attending: ORTHOPAEDIC SURGERY
Payer: MEDICARE

## 2024-05-02 DIAGNOSIS — Z74.09 IMPAIRED FUNCTIONAL MOBILITY, BALANCE, GAIT, AND ENDURANCE: ICD-10-CM

## 2024-05-02 DIAGNOSIS — M62.81 QUADRICEPS WEAKNESS: ICD-10-CM

## 2024-05-02 DIAGNOSIS — M17.11 PRIMARY OSTEOARTHRITIS OF RIGHT KNEE: ICD-10-CM

## 2024-05-02 DIAGNOSIS — M25.661 DECREASED ROM OF RIGHT KNEE: Primary | ICD-10-CM

## 2024-05-02 DIAGNOSIS — R29.898 WEAKNESS OF RIGHT HIP: ICD-10-CM

## 2024-05-02 PROCEDURE — 97161 PT EVAL LOW COMPLEX 20 MIN: CPT | Mod: PO | Performed by: PHYSICAL THERAPIST

## 2024-05-02 NOTE — PLAN OF CARE
[facility]   Physical Therapy Initial Evaluation     Date: 5/2/2024   Name: Nelia Littlejohn  Clinic Number: [id number]    Therapy Diagnosis:   Encounter Diagnoses   Name Primary?    Primary osteoarthritis of right knee     Decreased ROM of right knee Yes    Weakness of right hip     Impaired functional mobility, balance, gait, and endurance     Quadriceps weakness      Physician: Ernie Rubio III, *    Physician Orders: PT Eval and Treat right knee  Medical Diagnosis from Referral:    Primary osteoarthritis of right knee [M17.11   Evaluation Date: 5/2/2024  Authorization Period Expiration: 4/30/2025  Plan of Care Expiration: 8/2/2024  Progress Note Due: 6/2/2024  Visit # / Visits authorized: 1/ 1   FOTO: -/ 3     Precautions: Standard    Time In: 1105  Time Out: 1205  Total Appointment Time (timed & untimed codes): 60 minutes      SUBJECTIVE       History of current condition: Nelia reports pain in the right knee that is intermittent. She says that she is scheduled for surgery on 6/19/2024.  PMH of left TKA 8 years ago.    Date of onset: 12 to 18 months. Pain began to intensify about six months ago, gradually.    Falls: 0  Pain:  Current 0/10, worst 7/10, best 0/10   Location: right knee , global in location   Description: Throbbing  Aggravating Factors: Sitting after a few hours and Morning. Says she is not able to place weight on the leg. After moving around some it improves.   Easing Factors: movement   Sleep: not disturbed     Current Level of Function:   Personal - not limited   Domestic - standing for extended periods limits cooking, ADL limited (mop,sweep,vacuum)   Community -shopping due to limited ability to walk for extended periods  Occupation - NA   Sports/recreation/fitness - - able to exercise at a senior center for [employer] exercises.   Prior Level of Function: prior to six months ago.   Prior Therapy: not for the right knee.   Social History: single  lives with their  daughter in a single family home.   Occupation: retired     Patients goals: get the knee straight and reduce some of the pain before surgery.   Imaging, X-Ray 4/25/2024:     Right knee joint is narrowed medially with loss of joint cartilage in bony sclerosis. Total knee prosthesis is seen in place on the left.         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 Vision is much better, can read.     High cholesterol     History of prediabetes 10/26/2017    HTN (hypertension) 09/25/2012    Hyperlipemia 09/11/2012    Hypertension     Interval gout 09/11/2012       Surgical History:   Nelia Littlejohn  has a past surgical history that includes Hysterectomy; Yag capsulotomy left eye (10/10/2012); Cataract extraction (Bilateral); Eye surgery; and Joint replacement.    Medications:   Nelia has a current medication list which includes the following prescription(s): allopurinol, amlodipine-benazepril, aspirin, atorvastatin, hydralazine, metoprolol ta-hydrochlorothiaz, multivit-iron-min-folic acid, and spironolactone.    Allergies:   Review of patient's allergies indicates:  No Known Allergies       OBJECTIVE     Physical Appearance: right knee is edematous.   Posture Alignment: stands with the right knee partially flexed at 25 degrees.   Sensation: Light Touch: Intact  Palpation: pain medial patella facets, otherwise unremarkable at the joint lines anterior, medial and lateral as well as posterior.     Knee  Right  Pain/Dysfunction with Movement    AROM PROM MMT    Flexion 115 120 3+/5    Extension -35 -15 3-/5 P with MMT     MMT:                       R                    Hip flexors              3+/5                   Gluteus medius     3-/5                   Gluteus jase  2+/5                                        Special Tests: Knee   Right   Lachman's - instability and - pain   Valgus at 0 Deg + pain and - instability   Valgus at 30 Deg - instability and - pain   Varus at 0 Deg - instability and - pain   Varus at 30 Deg - instability and - pain   [place] Grind Test + pain         Functional Movement:  Squat    dysfunctional / pain - slight     Single Leg Stand      Eyes Open    R - dysfunctional / pain = not able   L -  functional / no pain     FLEXIBILITY  Hamstrings R  50 / 80       Gait: With AD.  Device Used -  Wide base cane  Analysis: step length is not equal. Right shorter than left. Maintains a forward lean.     Girth: inches   Left Right   Mid patella 18 1/2 17 1/2   Joint Line 16 1/2  15 1/2            Limitation/Restriction for FOTO knee Survey - not provided     Therapist reviewed FOTO scores for Nelia Littlejohn on 5/2/2024.   FOTO documents entered into [employer] - see Media section.    Limitation Score: -%         TREATMENT     Total Treatment time (time-based codes) separate from Evaluation: 0 minutes      Nelia received the treatments listed below:      therapeutic exercises to develop strength, endurance, ROM, and flexibility for - minutes including:  -    manual therapy techniques:  were applied to the: - for - minutes, including:  -    neuromuscular re-education activities to improve: Balance, Proprioception, and stability for - minutes. The following activities were included:  -    therapeutic activities to improve functional performance for -  minutes, including:  -    gait training to improve functional mobility and safety for -  minutes, including:  -    PATIENT EDUCATION AND HOME EXERCISES     Education provided:   -Findings of evaluation and examination, and affect of these on plan for treatment  -Prognosis and expectations  -Home exercise program and expectations of therapy     Written Home Exercises Provided: no.     ASSESSMENT     Nelia is a 84 y.o. female referred to outpatient Physical Therapy  with a medical diagnosis of  Primary osteoarthritis of right knee [M17.11 . Patient presents with clinical findings of limited knee motion in flexion and extension. She maintains a passive terminal knee extension mobility dysfunction. Pain is experienced during knee flexion. End range pain with overstretch into knee extension. Hip and thigh muscle weakness is profound. Stability challenges are identified.     Patient prognosis is Good.   Patient will benefit from skilled outpatient Physical Therapy to address the deficits stated above and in the chart below, provide patient /family education, and to maximize patientt's level of independence.     Plan of care discussed with patient: Yes  Patient's spiritual, cultural and educational needs considered and patient is agreeable to the plan of care and goals as stated below:     Anticipated Barriers for therapy: none    Medical Necessity is demonstrated by the following  History  Co-morbidities and personal factors that may impact the plan of care [x] LOW: no personal factors / co-morbidities  [] MODERATE: 1-2 personal factors / co-morbidities  [] HIGH: 3+ personal factors / co-morbidities    Moderate / High Support Documentation:   Co-morbidities affecting plan of care: none    Personal Factors:   no deficits     Examination  Body Structures and Functions, activity limitations and participation restrictions that may impact the plan of care [x] LOW: addressing 1-2 elements  [] MODERATE: 3+ elements  [] HIGH: 4+ elements (please support below)    Moderate / High Support Documentation: -     Clinical Presentation [x] LOW: stable  [] MODERATE: Evolving  [] HIGH: Unstable     Decision Making/ Complexity Score: low       Goals:    Ankle / Lower extremity  Short Term Goals: 5 weeks   1. Pts pain decreased 20 - 40% for improved functional mobility and quality of life ONGOING  2. Pts PROM in knee flexion and extension increased by 10 to 15 degrees  in both flexion and extension  to enhance AROM and functional mobility  ONGOING  3. Pts strength in the hip and thigh musculature increased by 1/2 grade to facilitate improved active mobility and functional stability ONGOING  4. Pt to exhibit correct return demonstration of exercises for self-management and independence with HEP ONGOING  5. Pt to demonstrate enhanced neuromuscular response  with single leg static balance for improved functional mobility and gait pattern  ONGOING    Long Term Goals : 10 weeks   1.  Pts pain level decreased to 75% or greater for with sitting for extended periods for restoring functional mobility and ADL. ONGOING  2. Pts AROM in knee flexion and extension increased by 10 to 15 degrees to restore functional mobility and ADL  ONGOING  3. Pts strength in the thigh and hip musculature increased by one grade to facilitate improved active mobility and functional stability  ONGOING  4. Pt will be independent with HEP for self-management. ONGOING   5. Pt to exhibit dynamic stability on the RLE / LLE for stable functional mobility and gait. ONGOING   6. Pt to demonstrate symmetrical weight bearing w/o pain to improve gait pattern with assistive device ONGOING   Plan     Plan of care Certification: 5/2/2024 to 8/2/2024.    Outpatient Physical Therapy 2 times weekly for 10 weeks to include the following interventions: Manual Therapy, Neuromuscular Re-ed, Patient Education, Therapeutic Activities, and Therapeutic Exercise.     Sher Castro, PT

## 2024-05-02 NOTE — PROGRESS NOTES
[facility]   Physical Therapy Initial Evaluation     Date: 5/2/2024   Name: Nelia Littlejohn  Clinic Number: [id number]    Therapy Diagnosis:   Encounter Diagnoses   Name Primary?    Primary osteoarthritis of right knee     Decreased ROM of right knee Yes    Weakness of right hip     Impaired functional mobility, balance, gait, and endurance     Quadriceps weakness      Physician: Ernie Rubio III, *    Physician Orders: PT Eval and Treat right knee  Medical Diagnosis from Referral:    Primary osteoarthritis of right knee [M17.11   Evaluation Date: 5/2/2024  Authorization Period Expiration: 4/30/2025  Plan of Care Expiration: 8/2/2024  Progress Note Due: 6/2/2024  Visit # / Visits authorized: 1/ 1   FOTO: -/ 3     Precautions: Standard    Time In: 1105  Time Out: 1205  Total Appointment Time (timed & untimed codes): 60 minutes      SUBJECTIVE       History of current condition: Nelia reports pain in the right knee that is intermittent. She says that she is scheduled for surgery on 6/19/2024.  PMH of left TKA 8 years ago.    Date of onset: 12 to 18 months. Pain began to intensify about six months ago, gradually.    Falls: 0  Pain:  Current 0/10, worst 7/10, best 0/10   Location: right knee , global in location   Description: Throbbing  Aggravating Factors: Sitting after a few hours and Morning. Says she is not able to place weight on the leg. After moving around some it improves.   Easing Factors:  movement   Sleep: not disturbed     Current Level of Function:   Personal - not limited   Domestic - standing for extended periods limits cooking, ADL limited (mop,sweep,vacuum)   Community -shopping due to limited ability to walk for extended periods  Occupation - NA   Sports/recreation/fitness - - able to exercise at a senior center for [employer] exercises.   Prior Level of Function: prior to six months ago.   Prior Therapy: not for the right knee.   Social History: single  lives with their  daughter in a single family home.   Occupation: retired     Patients goals: get the knee straight and reduce some of the pain before surgery.   Imaging, X-Ray 4/25/2024:     Right knee joint is narrowed medially with loss of joint cartilage in bony sclerosis. Total knee prosthesis is seen in place on the left.         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 Vision is much better, can read.     High cholesterol     History of prediabetes 10/26/2017    HTN (hypertension) 09/25/2012    Hyperlipemia 09/11/2012    Hypertension     Interval gout 09/11/2012       Surgical History:   Nelia Littlejohn  has a past surgical history that includes Hysterectomy; Yag capsulotomy left eye (10/10/2012); Cataract extraction (Bilateral); Eye surgery; and Joint replacement.    Medications:   Nelia has a current medication list which includes the following prescription(s): allopurinol, amlodipine-benazepril, aspirin, atorvastatin, hydralazine, metoprolol ta-hydrochlorothiaz, multivit-iron-min-folic acid, and spironolactone.    Allergies:   Review of patient's allergies indicates:  No Known Allergies       OBJECTIVE     Physical Appearance: right knee is edematous.   Posture Alignment: stands with the right knee partially flexed at 25 degrees.   Sensation: Light Touch: Intact  Palpation: pain medial patella facets, otherwise unremarkable at the joint lines anterior, medial and lateral as well as posterior.     Knee  Right  Pain/Dysfunction with Movement    AROM PROM MMT    Flexion 115 120 3+/5    Extension -35 -15 3-/5 P with MMT     MMT:                       R                    Hip flexors              3+/5                   Gluteus medius     3-/5                   Gluteus jase  2+/5                                        Special Tests: Knee   Right   Lachman's - instability and - pain   Valgus at 0 Deg + pain and - instability   Valgus at 30 Deg - instability and - pain   Varus at 0 Deg - instability and - pain   Varus at 30 Deg - instability and - pain   [place] Grind Test + pain         Functional Movement:  Squat    dysfunctional / pain - slight     Single Leg Stand      Eyes Open    R - dysfunctional / pain = not able   L -  functional / no pain     FLEXIBILITY  Hamstrings R  50 / 80       Gait: With AD.  Device Used -  Wide base cane  Analysis: step length is not equal. Right shorter than left. Maintains a forward lean.     Girth: inches   Left Right   Mid patella 18 1/2 17 1/2   Joint Line 16 1/2  15 1/2            Limitation/Restriction for FOTO knee Survey - not provided     Therapist reviewed FOTO scores for Nelia Littlejohn on 5/2/2024.   FOTO documents entered into [employer] - see Media section.    Limitation Score: -%         TREATMENT     Total Treatment time (time-based codes) separate from Evaluation: 0 minutes      Nelia received the treatments listed below:      therapeutic exercises to develop strength, endurance, ROM, and flexibility for - minutes including:  -    manual therapy techniques:  were applied to the: - for - minutes, including:  -    neuromuscular re-education activities to improve: Balance, Proprioception, and stability for - minutes. The following activities were included:  -    therapeutic activities to improve functional performance for -  minutes, including:  -    gait training to improve functional mobility and safety for -  minutes, including:  -    PATIENT EDUCATION AND HOME EXERCISES     Education provided:   -Findings of evaluation and examination, and affect of these on plan for treatment  -Prognosis and expectations  -Home exercise program and expectations of therapy     Written Home Exercises Provided: no.     ASSESSMENT     Nelia is a 84 y.o. female referred to outpatient Physical Therapy  with a medical diagnosis of  Primary osteoarthritis of right knee [M17.11 . Patient presents with clinical findings of limited knee motion in flexion and extension. She maintains a passive terminal knee extension mobility dysfunction. Pain is experienced during knee flexion. End range pain with overstretch into knee extension. Hip and thigh muscle weakness is profound. Stability challenges are identified.     Patient prognosis is Good.   Patient will benefit from skilled outpatient Physical Therapy to address the deficits stated above and in the chart below, provide patient /family education, and to maximize patientt's level of independence.     Plan of care discussed with patient: Yes  Patient's spiritual, cultural and educational needs considered and patient is agreeable to the plan of care and goals as stated below:     Anticipated Barriers for therapy: none    Medical Necessity is demonstrated by the following  History  Co-morbidities and personal factors that may impact the plan of care [x] LOW: no personal factors / co-morbidities  [] MODERATE: 1-2 personal factors / co-morbidities  [] HIGH: 3+ personal factors / co-morbidities    Moderate / High Support Documentation:   Co-morbidities affecting plan of care: none    Personal Factors:   no deficits     Examination  Body Structures and Functions, activity limitations and participation restrictions that may impact the plan of care [x] LOW: addressing 1-2 elements  [] MODERATE: 3+ elements  [] HIGH: 4+ elements (please support below)    Moderate / High Support Documentation: -     Clinical Presentation [x] LOW: stable  [] MODERATE: Evolving  [] HIGH: Unstable     Decision Making/ Complexity Score: low       Goals:    Ankle / Lower extremity  Short Term Goals: 5 weeks   1. Pts pain decreased 20 - 40% for improved functional mobility and quality of life ONGOING  2. Pts PROM in knee flexion and extension increased by 10 to 15 degrees  in both flexion and extension  to enhance AROM and functional mobility  ONGOING  3. Pts strength in the hip and thigh musculature increased by 1/2 grade to facilitate improved active mobility and functional stability ONGOING  4. Pt to exhibit correct return demonstration of exercises for self-management and independence with HEP ONGOING  5. Pt to demonstrate enhanced neuromuscular response  with single leg static balance for improved functional mobility and gait pattern  ONGOING    Long Term Goals : 10 weeks   1.  Pts pain level decreased to 75% or greater for with sitting for extended periods for restoring functional mobility and ADL. ONGOING  2. Pts AROM in knee flexion and extension increased by 10 to 15 degrees to restore functional mobility and ADL  ONGOING  3. Pts strength in the thigh and hip musculature increased by one grade to facilitate improved active mobility and functional stability  ONGOING  4. Pt will be independent with HEP for self-management. ONGOING   5. Pt to exhibit dynamic stability on the RLE / LLE for stable functional mobility and gait. ONGOING   6. Pt to demonstrate symmetrical weight bearing w/o pain to improve gait pattern with assistive device ONGOING   Plan     Plan of care Certification: 5/2/2024 to 8/2/2024.    Outpatient Physical Therapy 2 times weekly for 10 weeks to include the following interventions: Manual Therapy, Neuromuscular Re-ed, Patient Education, Therapeutic Activities, and Therapeutic Exercise.     Sher Castro, PT

## 2024-05-16 ENCOUNTER — TELEPHONE (OUTPATIENT)
Dept: PREADMISSION TESTING | Facility: HOSPITAL | Age: 84
End: 2024-05-16
Payer: MEDICARE

## 2024-05-16 RX ORDER — NAPROXEN SODIUM 220 MG/1
81 TABLET, FILM COATED ORAL DAILY
Status: ON HOLD | COMMUNITY
End: 2024-06-19 | Stop reason: HOSPADM

## 2024-05-16 NOTE — ANESTHESIA PAT ROS NOTE
05/16/2024  Nelia Littlejohn is a 84 y.o., female.      Pre-op Assessment          Review of Systems           Anesthesia Assessment: Preoperative EQUATION    Planned Procedure: Procedure(s) (LRB):  ARTHROPLASTY, KNEE, TOTAL, USING COMPUTER-ASSISTED NAVIGATION: VELYS: RIGHT: DEPUY - ATTUNE (Right)  Requested Anesthesia Type:Regional  Surgeon: Ernie Rubio III, MD  Service: Orthopedics  Known or anticipated Date of Surgery:6/19/2024    Surgeon notes: reviewed    Electronic QUestionnaire Assessment completed via nurse interview with patient.        Triage considerations:     The patient has no apparent active cardiac condition (No unstable coronary Syndrome such as severe unstable angina or recent [<1 month] myocardial infarction, decompensated CHF, severe valvular   disease or significant arrhythmia)    Previous anesthesia records:Not available     Patient reports no personal or family history of anesthesia complications.    Last PCP note: within 3 months , within [facility]        Dr. Gongora  Subspecialty notes: Ortho  Cardiology             Dr. Sharma  Sleep Medicine      Dr. Walker  Vascular Surgery   Dr. 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   3/26/24     A1C   Lipid panel   CMP  12/7/23     CV US Lower Extrem Veins Bilat  11/30/23    EKG  9/28/23      US Lower Ext Veins Bilat  9/18/23      DSE  9/17/23      TTE       CTA Chest non-coronary (PE studies)  9/16/23      CXR  8/10/23      X Ray Lumbar spine  1/25/22      US Lower Ext Arteries Bilat w/ EM  8/3/21        Cardiac Event Monitor  7/16/21      Holter Monitor 48  hr  10/15/16  os   CT Head w/o contrast  9/11/12      US Carotid Bilat              Instructions given. (See in Nurse's note)    Optimization:  Anesthesia Preop Clinic Assessment  Indicated  Will schedule POC.    Medical Opinion Indicated       Sub-specialist consult indicated:   [facility] NP.       Plan:    Testing:  CBC, CMP, EKG, and PT/INR   Pre-anesthesia  visit       Visit focus: possible regional anesthesia and/or nerve block      Consultation: . [facility] NP for medical and anesthesia optimization.       Patient  has previously scheduled Medical Appointment:    6/4    Navigation: Tests Scheduled.              Consults scheduled.             Results will be tracked by Preop Clinic.         4/18/24  DVT in September '23 that resolved without AC. Unclear trigger and seemingly incidental as she never had RLE symptoms. On asa, okay to switch to 81x1.            Pt has anticipated RLE TKR. Does have an element of chronic, stable LIND with anormal DSE in late '23. No further CV testing prior to anticipated surgery. Would be as aggressive as possible with DVT prophylaxis given history.     Follow up in about 6 months (around 10/18/2024).        Macy Sharma MD

## 2024-05-16 NOTE — TELEPHONE ENCOUNTER
----- Message from Farhana Fischer RN sent at 5/16/2024 12:01 PM CDT -----  6/4      Please schedule EKG an labs (CBC, CMP< PT/INR).  Thanks,  Ida

## 2024-05-16 NOTE — PRE-PROCEDURE INSTRUCTIONS
Chart review; triage plan initiated.    Spoke to patient regarding upcoming scheduled surgery.  Name, , and allergies verified.  Medical, surgical, and anesthesia history reviewed.  Instructed to hold vitamins, supplements and NSAIDs for one week prior to surgery (last day can take is ). Informed that the remaining medication instructions will be provided at the POC visit. Pt verbalized understanding.

## 2024-05-23 ENCOUNTER — CLINICAL SUPPORT (OUTPATIENT)
Dept: REHABILITATION | Facility: HOSPITAL | Age: 84
End: 2024-05-23
Payer: MEDICARE

## 2024-05-23 DIAGNOSIS — Z74.09 IMPAIRED FUNCTIONAL MOBILITY, BALANCE, GAIT, AND ENDURANCE: ICD-10-CM

## 2024-05-23 DIAGNOSIS — M25.661 DECREASED ROM OF RIGHT KNEE: ICD-10-CM

## 2024-05-23 DIAGNOSIS — R29.898 WEAKNESS OF RIGHT HIP: Primary | ICD-10-CM

## 2024-05-23 DIAGNOSIS — M62.81 QUADRICEPS WEAKNESS: ICD-10-CM

## 2024-05-23 PROCEDURE — 97110 THERAPEUTIC EXERCISES: CPT | Mod: PO | Performed by: PHYSICAL THERAPIST

## 2024-05-23 NOTE — PROGRESS NOTES
"              [facility]   Physical Therapy Treatment Note     Name: Nelia Littlejohn  Clinic Number: [id number]    Therapy Diagnosis:   Encounter Diagnoses   Name Primary?    Weakness of right hip Yes    Quadriceps weakness     Impaired functional mobility, balance, gait, and endurance     Decreased ROM of right knee      Physician: Ernie Rubio III, *    Visit Date: 5/23/2024    Physician Orders: PT Eval and Treat right knee  Medical Diagnosis from Referral:    Primary osteoarthritis of right knee [M17.11   Evaluation Date: 5/2/2024  Authorization Period Expiration: 4/30/2025  Plan of Care Expiration: 8/2/2024  Progress Note Due: 6/2/2024  Visit # / Visits authorized: 1/ 1   FOTO: -/ 3   PTA Visit #: -/5     Precautions: Standard    Time In: 1240  Time Out: 1340  Total Billable Time: 35 minutes      SUBJECTIVE     Pt reports: the knee is feeling pretty good.  She was not issued a home exercise program at [facility]  Response to previous treatment: [facility] performed and did fine   Functional change: ongoing    Pain: 0/10  Location: right knee        OBJECTIVE           TREATMENT        Nelia received the treatments listed below:     .BOLD INDICATES ACTIVITIES PERFORMED / DISCUSSED     Leg press   Recumbent bike  Upright bike   UE ergometer  Treadmill   Elliptical          THERAPEUTIC EXERCISES to develop  strength, endurance, ROM, and flexibility for 60 minutes including   SUPINE  -passive knee extension  3'   -Quad sets hold 7" x 20 ea w/towel at ankle and behind knee   -SAQ x20  -heel slides x20  -knee to chest from hooklying x20  -bridge x20    SIDELYING  -clams x20   -reverse clams x20   -hydrant x20     PRONE  -  -    STANDING.  -heel raises x20    SITTING  -LAQ   x30     -SAQ   x40     NEUROMUSCULAR RE-EDUCATION activities to improve: Balance, Proprioception, motor control and stability  for - minutes. The following activities were included:  -     THERAPEUTIC ACTIVITIES to improve " functional performance for -  minutes, including:  -     MANUAL THERAPY TECHNIQUES  were applied to - for - minutes.       MODALITY      PATIENT EDUCATION AND HOME EXERCISES     Home Exercises Provided and Patient Education Provided     Education provided:   --Findings of evaluation and examination, and affect of these on plan for treatment  -Prognosis and expectations  -Home exercise program and expectations of therapy     Written Home Exercises Provided: no  ASSESSMENT     The patient completed the routine noted. She responded very well to passive stretching and the exercises performed. Related to walking better after the session and did not report pain,.     Nelia Is progressing towards her goals.   Pt prognosis is Good.     Pt will continue to benefit from skilled outpatient physical therapy to address the deficits listed in the problem list box on initial evaluation, provide pt/family education and to maximize pt's level of independence in the home and community environment.     Pt's spiritual, cultural and educational needs considered and pt agreeable to plan of care and goals.     Anticipated barriers to physical therapy: none    Goals:     Ankle / Lower extremity  Short Term Goals: 5 weeks   1. Pts pain decreased 20 - 40% for improved functional mobility and quality of life ONGOING  2. Pts PROM in knee flexion and extension increased by 10 to 15 degrees  in both flexion and extension to enhance AROM and functional mobility  ONGOING  3. Pts strength in the hip and thigh musculature increased by 1/2 grade to facilitate improved active mobility and functional stability ONGOING  4. Pt to exhibit correct return demonstration of exercises for self-management and independence with HEP ONGOING  5. Pt to demonstrate enhanced neuromuscular response  with single leg static balance for improved functional mobility and gait pattern  ONGOING     Long Term Goals : 10 weeks   1.  Pts pain level decreased to 75% or greater  for with sitting for extended periods for restoring functional mobility and ADL. ONGOING  2. Pts AROM in knee flexion and extension increased by 10 to 15 degrees to restore functional mobility and ADL  ONGOING  3. Pts strength in the thigh and hip musculature increased by one grade to facilitate improved active mobility and functional stability  ONGOING  4. Pt will be independent with HEP for self-management. ONGOING   5. Pt to exhibit dynamic stability on the RLE / LLE for stable functional mobility and gait. ONGOING   6. Pt to demonstrate symmetrical weight bearing w/o pain to improve gait pattern with assistive device    PLAN     Plan of care Certification: 5/2/2024 to 8/2/2024.     Outpatient Physical Therapy 2 times weekly for 10 weeks to include the following interventions: Manual Therapy, Neuromuscular Re-ed, Patient Education, Therapeutic Activities, and Therapeutic Exercise.     Sher Castro, PT

## 2024-05-24 DIAGNOSIS — I10 ESSENTIAL HYPERTENSION: ICD-10-CM

## 2024-05-25 NOTE — TELEPHONE ENCOUNTER
Care Due:                  Date            Visit Type   Department     Provider  --------------------------------------------------------------------------------                                MYCHART                              FOLLOWUP/OF  [facility] INTERNAL  Last Visit: 03-      FICE VISIT   BRIAN Gongora                              EP -                              PRIMARY      [facility] INTERNAL  Next Visit: 09-      CARE ([facility])   MEDICINE       Bernardo Gongora                                                            Last  Test          Frequency    Reason                     Performed    Due Date  --------------------------------------------------------------------------------    Uric Acid...  12 months..  allopurinoL..............  Not Found    Overdue    [employer] Embedded Care Due Messages. Reference number: [id number].   5/24/2024 7:47:31 PM CDT

## 2024-05-27 RX ORDER — HYDRALAZINE HYDROCHLORIDE 100 MG/1
100 TABLET, FILM COATED ORAL 2 TIMES DAILY
Qty: 180 TABLET | Refills: 3 | Status: ON HOLD | OUTPATIENT
Start: 2024-05-27

## 2024-05-28 ENCOUNTER — CLINICAL SUPPORT (OUTPATIENT)
Dept: REHABILITATION | Facility: HOSPITAL | Age: 84
End: 2024-05-28
Payer: MEDICARE

## 2024-05-28 DIAGNOSIS — M62.81 QUADRICEPS WEAKNESS: ICD-10-CM

## 2024-05-28 DIAGNOSIS — Z00.00 ENCOUNTER FOR MEDICARE ANNUAL WELLNESS EXAM: ICD-10-CM

## 2024-05-28 DIAGNOSIS — R29.898 WEAKNESS OF RIGHT HIP: ICD-10-CM

## 2024-05-28 DIAGNOSIS — Z74.09 IMPAIRED FUNCTIONAL MOBILITY, BALANCE, GAIT, AND ENDURANCE: ICD-10-CM

## 2024-05-28 DIAGNOSIS — M25.661 DECREASED ROM OF RIGHT KNEE: Primary | ICD-10-CM

## 2024-05-28 PROCEDURE — 97110 THERAPEUTIC EXERCISES: CPT | Mod: PO | Performed by: PHYSICAL THERAPIST

## 2024-05-28 NOTE — PROGRESS NOTES
"              [facility]   Physical Therapy Treatment Note     Name: Nelia Littlejohn  Clinic Number: [id number]    Therapy Diagnosis:   Encounter Diagnoses   Name Primary?    Decreased ROM of right knee Yes    Impaired functional mobility, balance, gait, and endurance     Quadriceps weakness     Weakness of right hip        Physician: Ernie Rubio III, *    Visit Date: 5/28/2024    Physician Orders: PT Eval and Treat right knee  Medical Diagnosis from Referral:    Primary osteoarthritis of right knee [M17.11   Evaluation Date: 5/2/2024  Authorization Period Expiration: 4/30/2025  Plan of Care Expiration: 8/2/2024  Progress Note Due: 6/2/2024  Visit # / Visits authorized: 1/ 1, 2 /20  FOTO: -/ 3   PTA Visit #: -/5     Precautions: Standard    Time In: 1505  Time Out: 1600  Total Billable Time: 35 minutes      SUBJECTIVE     Pt reports: the knee is feeling pretty good. There is some pain when I first get up in the AM but there is no significant pain.   She was not issued a home exercise program at   Response to previous treatment: did fine   Functional change: ongoing    Pain: 0/10  Location: right knee        OBJECTIVE           TREATMENT        Nelia received the treatments listed below:     .BOLD INDICATES ACTIVITIES PERFORMED / DISCUSSED     Leg press   Recumbent bike 8'   Upright bike   UE ergometer  Treadmill   Elliptical          THERAPEUTIC EXERCISES to develop  strength, endurance, ROM, and flexibility for 55 minutes including   SUPINE  -passive knee extension  3'   -Quad sets hold 7" x 20 ea w/towel at ankle and behind knee   -SAQ x20  -heel slides x20  -knee to chest from hooklying x20  -bridge x20    SIDELYING  -clams x20   -reverse clams x20   -hydrant x20     PRONE  -  -    STANDING.  -heel raises x20    SITTING  -LAQ   x30     -SAQ   x40     NEUROMUSCULAR RE-EDUCATION activities to improve: Balance, Proprioception, motor control and stability  for - minutes. The " following activities were included:  -     THERAPEUTIC ACTIVITIES to improve functional performance for -  minutes, including:  -     MANUAL THERAPY TECHNIQUES  were applied to - for - minutes.       MODALITY      PATIENT EDUCATION AND HOME EXERCISES     Home Exercises Provided and Patient Education Provided     Education provided:   --Findings of evaluation and examination, and affect of these on plan for treatment  -Prognosis and expectations  -Home exercise program and expectations of therapy     Written Home Exercises Provided: no  ASSESSMENT     Exercises were performed and completed without any pain. She exhibited good mobility. Tolerated the bicycle well.Revolutions were not compromised.     Nelia Is progressing towards her goals.   Pt prognosis is Good.     Pt will continue to benefit from skilled outpatient physical therapy to address the deficits listed in the problem list box on initial evaluation, provide pt/family education and to maximize pt's level of independence in the home and community environment.     Pt's spiritual, cultural and educational needs considered and pt agreeable to plan of care and goals.     Anticipated barriers to physical therapy: none    Goals:     Ankle / Lower extremity  Short Term Goals: 5 weeks   1. Pts pain decreased 20 - 40% for improved functional mobility and quality of life ONGOING  2. Pts PROM in knee flexion and extension increased by 10 to 15 degrees  in both flexion and extension to enhance AROM and functional mobility  ONGOING  3. Pts strength in the hip and thigh musculature increased by 1/2 grade to facilitate improved active mobility and functional stability ONGOING  4. Pt to exhibit correct return demonstration of exercises for self-management and independence with HEP ONGOING  5. Pt to demonstrate enhanced neuromuscular response  with single leg static balance for improved functional mobility and gait pattern  ONGOING     Long Term Goals : 10 weeks   1.  Pts  pain level decreased to 75% or greater for with sitting for extended periods for restoring functional mobility and ADL. ONGOING  2. Pts AROM in knee flexion and extension increased by 10 to 15 degrees to restore functional mobility and ADL  ONGOING  3. Pts strength in the thigh and hip musculature increased by one grade to facilitate improved active mobility and functional stability  ONGOING  4. Pt will be independent with HEP for self-management. ONGOING   5. Pt to exhibit dynamic stability on the RLE / LLE for stable functional mobility and gait. ONGOING   6. Pt to demonstrate symmetrical weight bearing w/o pain to improve gait pattern with assistive device    PLAN     Plan of care Certification: 5/2/2024 to 8/2/2024.     Outpatient Physical Therapy 2 times weekly for 10 weeks to include the following interventions: Manual Therapy, Neuromuscular Re-ed, Patient Education, Therapeutic Activities, and Therapeutic Exercise.     Sher Castro, PT

## 2024-05-30 ENCOUNTER — CLINICAL SUPPORT (OUTPATIENT)
Dept: REHABILITATION | Facility: HOSPITAL | Age: 84
End: 2024-05-30
Payer: MEDICARE

## 2024-05-30 DIAGNOSIS — M25.661 DECREASED ROM OF RIGHT KNEE: Primary | ICD-10-CM

## 2024-05-30 DIAGNOSIS — R29.898 WEAKNESS OF RIGHT HIP: ICD-10-CM

## 2024-05-30 DIAGNOSIS — M62.81 QUADRICEPS WEAKNESS: ICD-10-CM

## 2024-05-30 DIAGNOSIS — Z74.09 IMPAIRED FUNCTIONAL MOBILITY, BALANCE, GAIT, AND ENDURANCE: ICD-10-CM

## 2024-05-30 PROCEDURE — 97110 THERAPEUTIC EXERCISES: CPT | Mod: PO | Performed by: PHYSICAL THERAPIST

## 2024-05-30 NOTE — PROGRESS NOTES
"              [facility]   Physical Therapy Treatment Note     Name: Nelia Littlejohn  Clinic Number: [id number]    Therapy Diagnosis:   Encounter Diagnoses   Name Primary?    Decreased ROM of right knee Yes    Impaired functional mobility, balance, gait, and endurance     Quadriceps weakness     Weakness of right hip          Physician: Ernie Rubio III, *    Visit Date: 5/30/2024    Physician Orders: PT Eval and Treat right knee  Medical Diagnosis from Referral:    Primary osteoarthritis of right knee [M17.11   Evaluation Date: 5/2/2024  Authorization Period Expiration: 4/30/2025  Plan of Care Expiration: 8/2/2024  Progress Note Due: 6/2/2024  Visit # / Visits authorized: 1/ 1, 3 /20  FOTO: -/ 3   PTA Visit #: -/5     Precautions: Standard    Time In: 915    arrived 907  Time Out: 1005  Total Billable Time: 35 minutes      SUBJECTIVE     Pt reports: the knee is feeling pretty good. There is some pain when I first get up in the AM but there is no significant pain.   She was not issued a home exercise program at   Response to previous treatment: did fine   Functional change: ongoing    Pain: 0/10  Location: right knee        OBJECTIVE           TREATMENT        Nelia received the treatments listed below:     .BOLD INDICATES ACTIVITIES PERFORMED / DISCUSSED     Leg press   Recumbent bike 8'   Upright bike   UE ergometer  Treadmill   Elliptical          THERAPEUTIC EXERCISES to develop  strength, endurance, ROM, and flexibility for 55 minutes including   SUPINE  -passive knee extension  3'   -Quad sets hold 7" x 20 ea w/towel at ankle and behind knee   -SAQ x20  -heel slides x20  -knee to chest from hooklying x20  -bridge x20    SIDELYING  -clams x20   -reverse clams x20   -hydrant x20     PRONE  -  -    STANDING.  -heel raises x20    SITTING  -LAQ   x30     -SAQ   x40     NEUROMUSCULAR RE-EDUCATION activities to improve: Balance, Proprioception, motor control and stability  for - " minutes. The following activities were included:  -     THERAPEUTIC ACTIVITIES to improve functional performance for -  minutes, including:  -     MANUAL THERAPY TECHNIQUES  were applied to - for - minutes.       MODALITY      PATIENT EDUCATION AND HOME EXERCISES     Home Exercises Provided and Patient Education Provided     Education provided:   --Findings of evaluation and examination, and affect of these on plan for treatment  -Prognosis and expectations  -Home exercise program and expectations of therapy     Written Home Exercises Provided: no  ASSESSMENT     Routine performed and completed without significant difficulty.  Progress with CC activity.     Nelia Is progressing towards her goals.   Pt prognosis is Good.     Pt will continue to benefit from skilled outpatient physical therapy to address the deficits listed in the problem list box on initial evaluation, provide pt/family education and to maximize pt's level of independence in the home and community environment.     Pt's spiritual, cultural and educational needs considered and pt agreeable to plan of care and goals.     Anticipated barriers to physical therapy: none    Goals:     Ankle / Lower extremity  Short Term Goals: 5 weeks   1. Pts pain decreased 20 - 40% for improved functional mobility and quality of life ONGOING  2. Pts PROM in knee flexion and extension increased by 10 to 15 degrees  in both flexion and extension to enhance AROM and functional mobility  ONGOING  3. Pts strength in the hip and thigh musculature increased by 1/2 grade to facilitate improved active mobility and functional stability ONGOING  4. Pt to exhibit correct return demonstration of exercises for self-management and independence with HEP ONGOING  5. Pt to demonstrate enhanced neuromuscular response  with single leg static balance for improved functional mobility and gait pattern  ONGOING     Long Term Goals : 10 weeks   1.  Pts pain level decreased to 75% or greater for  with sitting for extended periods for restoring functional mobility and ADL. ONGOING  2. Pts AROM in knee flexion and extension increased by 10 to 15 degrees to restore functional mobility and ADL  ONGOING  3. Pts strength in the thigh and hip musculature increased by one grade to facilitate improved active mobility and functional stability  ONGOING  4. Pt will be independent with HEP for self-management. ONGOING   5. Pt to exhibit dynamic stability on the RLE / LLE for stable functional mobility and gait. ONGOING   6. Pt to demonstrate symmetrical weight bearing w/o pain to improve gait pattern with assistive device    PLAN     Plan of care Certification: 5/2/2024 to 8/2/2024.     Outpatient Physical Therapy 2 times weekly for 10 weeks to include the following interventions: Manual Therapy, Neuromuscular Re-ed, Patient Education, Therapeutic Activities, and Therapeutic Exercise.     Sher Castro, PT

## 2024-06-03 ENCOUNTER — TELEPHONE (OUTPATIENT)
Dept: INTERNAL MEDICINE | Facility: CLINIC | Age: 84
End: 2024-06-03
Payer: MEDICARE

## 2024-06-04 ENCOUNTER — HOSPITAL ENCOUNTER (OUTPATIENT)
Dept: RADIOLOGY | Facility: HOSPITAL | Age: 84
Discharge: HOME | End: 2024-06-04
Attending: ORTHOPAEDIC SURGERY
Payer: MEDICARE

## 2024-06-04 ENCOUNTER — HOSPITAL ENCOUNTER (OUTPATIENT)
Dept: CARDIOLOGY | Facility: CLINIC | Age: 84
Discharge: HOME | End: 2024-06-04
Payer: MEDICARE

## 2024-06-04 ENCOUNTER — OFFICE VISIT (OUTPATIENT)
Dept: ORTHOPEDICS | Facility: CLINIC | Age: 84
End: 2024-06-04
Payer: MEDICARE

## 2024-06-04 ENCOUNTER — OFFICE VISIT (OUTPATIENT)
Dept: INTERNAL MEDICINE | Facility: CLINIC | Age: 84
End: 2024-06-04
Payer: MEDICARE

## 2024-06-04 VITALS
TEMPERATURE: 98 F | WEIGHT: 222.69 LBS | SYSTOLIC BLOOD PRESSURE: 140 MMHG | BODY MASS INDEX: 35.79 KG/M2 | DIASTOLIC BLOOD PRESSURE: 68 MMHG | HEART RATE: 71 BPM | HEIGHT: 66 IN | OXYGEN SATURATION: 96 %

## 2024-06-04 DIAGNOSIS — Z96.651 S/P TOTAL KNEE REPLACEMENT, RIGHT: ICD-10-CM

## 2024-06-04 DIAGNOSIS — Z86.718 HISTORY OF DVT (DEEP VEIN THROMBOSIS): ICD-10-CM

## 2024-06-04 DIAGNOSIS — M10.9 INTERVAL GOUT: ICD-10-CM

## 2024-06-04 DIAGNOSIS — Z87.898 HISTORY OF PREDIABETES: ICD-10-CM

## 2024-06-04 DIAGNOSIS — I25.10 CORONARY ARTERY DISEASE INVOLVING NATIVE CORONARY ARTERY OF NATIVE HEART WITHOUT ANGINA PECTORIS: ICD-10-CM

## 2024-06-04 DIAGNOSIS — G47.33 OBSTRUCTIVE SLEEP APNEA SYNDROME: ICD-10-CM

## 2024-06-04 DIAGNOSIS — E04.2 MULTINODULAR GOITER: ICD-10-CM

## 2024-06-04 DIAGNOSIS — E66.01 CLASS 2 SEVERE OBESITY WITH BODY MASS INDEX (BMI) OF 35 TO 39.9 WITH SERIOUS COMORBIDITY: ICD-10-CM

## 2024-06-04 DIAGNOSIS — Z01.818 PRE-OP TESTING: ICD-10-CM

## 2024-06-04 DIAGNOSIS — M17.11 PRIMARY OSTEOARTHRITIS OF RIGHT KNEE: Primary | ICD-10-CM

## 2024-06-04 DIAGNOSIS — G45.9 TIA (TRANSIENT ISCHEMIC ATTACK): ICD-10-CM

## 2024-06-04 DIAGNOSIS — N18.32 STAGE 3B CHRONIC KIDNEY DISEASE: ICD-10-CM

## 2024-06-04 DIAGNOSIS — I10 ESSENTIAL HYPERTENSION: Primary | ICD-10-CM

## 2024-06-04 DIAGNOSIS — M25.661 DECREASED ROM OF RIGHT KNEE: ICD-10-CM

## 2024-06-04 DIAGNOSIS — M17.11 PRIMARY OSTEOARTHRITIS OF RIGHT KNEE: ICD-10-CM

## 2024-06-04 LAB
OHS QRS DURATION: 82 MS
OHS QTC CALCULATION: 408 MS

## 2024-06-04 PROCEDURE — 1126F AMNT PAIN NOTED NONE PRSNT: CPT | Mod: CPTII,S$GLB,, | Performed by: NURSE PRACTITIONER

## 2024-06-04 PROCEDURE — 3078F DIAST BP <80 MM HG: CPT | Mod: CPTII,S$GLB,, | Performed by: NURSE PRACTITIONER

## 2024-06-04 PROCEDURE — 73560 X-RAY EXAM OF KNEE 1 OR 2: CPT | Mod: 26,RT,, | Performed by: RADIOLOGY

## 2024-06-04 PROCEDURE — 1160F RVW MEDS BY RX/DR IN RCRD: CPT | Mod: CPTII,S$GLB,,

## 2024-06-04 PROCEDURE — 93005 ELECTROCARDIOGRAM TRACING: CPT | Mod: S$GLB,,, | Performed by: ANESTHESIOLOGY

## 2024-06-04 PROCEDURE — 99214 OFFICE O/P EST MOD 30 MIN: CPT | Mod: S$GLB,,,

## 2024-06-04 PROCEDURE — 73560 X-RAY EXAM OF KNEE 1 OR 2: CPT | Mod: TC,RT

## 2024-06-04 PROCEDURE — 99999 PR PBB SHADOW E&M-EST. PATIENT-LVL III: CPT | Mod: PBBFAC,,, | Performed by: NURSE PRACTITIONER

## 2024-06-04 PROCEDURE — 99999 PR PBB SHADOW E&M-EST. PATIENT-LVL II: CPT | Mod: PBBFAC,,, | Performed by: ORTHOPAEDIC SURGERY

## 2024-06-04 PROCEDURE — 99215 OFFICE O/P EST HI 40 MIN: CPT | Mod: S$GLB,,, | Performed by: NURSE PRACTITIONER

## 2024-06-04 PROCEDURE — 93010 ELECTROCARDIOGRAM REPORT: CPT | Mod: S$GLB,,, | Performed by: INTERNAL MEDICINE

## 2024-06-04 PROCEDURE — 99499 UNLISTED E&M SERVICE: CPT | Mod: S$GLB,,, | Performed by: ORTHOPAEDIC SURGERY

## 2024-06-04 PROCEDURE — 1159F MED LIST DOCD IN RCRD: CPT | Mod: CPTII,S$GLB,,

## 2024-06-04 PROCEDURE — 3077F SYST BP >= 140 MM HG: CPT | Mod: CPTII,S$GLB,, | Performed by: NURSE PRACTITIONER

## 2024-06-04 PROCEDURE — 99999 PR PBB SHADOW E&M-EST. PATIENT-LVL III: CPT | Mod: PBBFAC,,,

## 2024-06-04 RX ORDER — OXYCODONE HYDROCHLORIDE 5 MG/1
10 TABLET ORAL
Status: CANCELLED | OUTPATIENT
Start: 2024-06-04

## 2024-06-04 RX ORDER — CETIRIZINE HYDROCHLORIDE 10 MG/1
10 TABLET ORAL DAILY
Status: ON HOLD | COMMUNITY

## 2024-06-04 RX ORDER — OXYCODONE HYDROCHLORIDE 5 MG/1
5 TABLET ORAL
Status: CANCELLED | OUTPATIENT
Start: 2024-06-04

## 2024-06-04 RX ORDER — MORPHINE SULFATE 2 MG/ML
2 INJECTION, SOLUTION INTRAMUSCULAR; INTRAVENOUS
Status: CANCELLED | OUTPATIENT
Start: 2024-06-04

## 2024-06-04 RX ORDER — POLYETHYLENE GLYCOL 3350 17 G/17G
17 POWDER, FOR SOLUTION ORAL DAILY
Status: CANCELLED | OUTPATIENT
Start: 2024-06-05

## 2024-06-04 RX ORDER — SODIUM CHLORIDE 0.9 % (FLUSH) 0.9 %
10 SYRINGE (ML) INJECTION
Status: CANCELLED | OUTPATIENT
Start: 2024-06-04

## 2024-06-04 RX ORDER — ACETAMINOPHEN 500 MG
1000 TABLET ORAL EVERY 6 HOURS
Status: CANCELLED | OUTPATIENT
Start: 2024-06-04

## 2024-06-04 RX ORDER — LIDOCAINE HYDROCHLORIDE 10 MG/ML
1 INJECTION, SOLUTION EPIDURAL; INFILTRATION; INTRACAUDAL; PERINEURAL
Status: CANCELLED | OUTPATIENT
Start: 2024-06-04

## 2024-06-04 RX ORDER — CEFAZOLIN SODIUM 2 G/50ML
2 SOLUTION INTRAVENOUS
Status: CANCELLED | OUTPATIENT
Start: 2024-06-04

## 2024-06-04 RX ORDER — TALC
6 POWDER (GRAM) TOPICAL NIGHTLY PRN
Status: CANCELLED | OUTPATIENT
Start: 2024-06-04

## 2024-06-04 RX ORDER — FENTANYL CITRATE 50 UG/ML
25 INJECTION, SOLUTION INTRAMUSCULAR; INTRAVENOUS EVERY 5 MIN PRN
Status: CANCELLED | OUTPATIENT
Start: 2024-06-04

## 2024-06-04 RX ORDER — METHOCARBAMOL 750 MG/1
750 TABLET, FILM COATED ORAL 3 TIMES DAILY
Status: CANCELLED | OUTPATIENT
Start: 2024-06-04

## 2024-06-04 RX ORDER — MIDAZOLAM HYDROCHLORIDE 1 MG/ML
4 INJECTION, SOLUTION INTRAMUSCULAR; INTRAVENOUS
Status: CANCELLED | OUTPATIENT
Start: 2024-06-04 | End: 2024-06-05

## 2024-06-04 RX ORDER — ASPIRIN 81 MG/1
81 TABLET ORAL 2 TIMES DAILY
Status: CANCELLED | OUTPATIENT
Start: 2024-06-04

## 2024-06-04 RX ORDER — CEFAZOLIN SODIUM 2 G/50ML
2 SOLUTION INTRAVENOUS
Status: CANCELLED | OUTPATIENT
Start: 2024-06-04 | End: 2024-06-05

## 2024-06-04 RX ORDER — NALOXONE HCL 0.4 MG/ML
0.02 VIAL (ML) INJECTION
Status: CANCELLED | OUTPATIENT
Start: 2024-06-04

## 2024-06-04 RX ORDER — PROCHLORPERAZINE EDISYLATE 5 MG/ML
5 INJECTION INTRAMUSCULAR; INTRAVENOUS EVERY 6 HOURS PRN
Status: CANCELLED | OUTPATIENT
Start: 2024-06-04

## 2024-06-04 RX ORDER — SODIUM CHLORIDE 9 MG/ML
INJECTION, SOLUTION INTRAVENOUS CONTINUOUS
Status: CANCELLED | OUTPATIENT
Start: 2024-06-04 | End: 2024-06-05

## 2024-06-04 RX ORDER — PREGABALIN 75 MG/1
75 CAPSULE ORAL
Status: CANCELLED | OUTPATIENT
Start: 2024-06-04

## 2024-06-04 RX ORDER — FAMOTIDINE 20 MG/1
20 TABLET, FILM COATED ORAL 2 TIMES DAILY
Status: CANCELLED | OUTPATIENT
Start: 2024-06-04

## 2024-06-04 RX ORDER — AMOXICILLIN 250 MG
1 CAPSULE ORAL 2 TIMES DAILY
Status: CANCELLED | OUTPATIENT
Start: 2024-06-04

## 2024-06-04 RX ORDER — PREGABALIN 75 MG/1
75 CAPSULE ORAL NIGHTLY
Status: CANCELLED | OUTPATIENT
Start: 2024-06-04

## 2024-06-04 RX ORDER — SODIUM CHLORIDE 9 MG/ML
INJECTION, SOLUTION INTRAVENOUS
Status: CANCELLED | OUTPATIENT
Start: 2024-06-04

## 2024-06-04 RX ORDER — BISACODYL 10 MG/1
10 SUPPOSITORY RECTAL EVERY 12 HOURS PRN
Status: CANCELLED | OUTPATIENT
Start: 2024-06-04

## 2024-06-04 RX ORDER — MUPIROCIN 20 MG/G
1 OINTMENT TOPICAL
Status: CANCELLED | OUTPATIENT
Start: 2024-06-04

## 2024-06-04 RX ORDER — ONDANSETRON HYDROCHLORIDE 2 MG/ML
4 INJECTION, SOLUTION INTRAVENOUS EVERY 8 HOURS PRN
Status: CANCELLED | OUTPATIENT
Start: 2024-06-04

## 2024-06-04 RX ORDER — ACETAMINOPHEN 500 MG
1000 TABLET ORAL
Status: CANCELLED | OUTPATIENT
Start: 2024-06-04

## 2024-06-04 RX ORDER — FENTANYL CITRATE 50 UG/ML
100 INJECTION, SOLUTION INTRAMUSCULAR; INTRAVENOUS
Status: CANCELLED | OUTPATIENT
Start: 2024-06-04 | End: 2024-06-05

## 2024-06-04 RX ORDER — MUPIROCIN 20 MG/G
1 OINTMENT TOPICAL 2 TIMES DAILY
Status: CANCELLED | OUTPATIENT
Start: 2024-06-04 | End: 2024-06-09

## 2024-06-04 NOTE — ASSESSMENT & PLAN NOTE
BMI 35.94  Patient would benefit from weight loss and has not set goals to achieve success.   Lifestyle changes should be made by eating healthy, exercising at least 150 minutes weekly, and avoiding sedentary behavior.

## 2024-06-04 NOTE — PROGRESS NOTES
[facility]  Progress Note    Patient Name: Nelia Littlejohn  MRN: [id number]  Date of Evaluation- 06/04/2024  PCP- Bernardo Gongora II, MD    Future cases for Nelia Littlejohn [[id number]]       Case ID Status Date Time Jules Procedure Provider Location    [id number] [facility] 6/19/2024  8:00  ARTHROPLASTY, KNEE, TOTAL, USING COMPUTER-ASSISTED NAVIGATION: VELYS: RIGHT: DEPUY - ATTErnie Kelley III, MD [[id number]] [facility]            HPI:  This is a 84 y.o. female  who presents today for a preoperative evaluation in preparation for right knee replacement  Surgery is indicated for osteoarthritis     .   Patient is new to me.    The history has been obtained by speaking with the patient and reviewing the electronic medical record and/or outside health information. Significant health conditions for the perioperative period are discussed below in assessment and plan.     Patient reports current health status to be Good.  Denies any new symptoms before surgery.       Subjective/ Objective:     Chief Complaint: Preoperative evaulation, perioperative medical management, and complication reduction plan.     Functional Capacity:  Able to climb a flight of stairs without CP or Syncope.  Reports she might get out of breath.  Had recent stress test negative for ischemia.   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 Vision is much better, can read.     High cholesterol     History of prediabetes 10/26/2017    HTN (hypertension) 09/25/2012     "Hyperlipemia 09/11/2012    Hypertension     Interval gout 09/11/2012    Status post cataract extraction and insertion of intraocular lens 11/27/2012    sp Yag Cap OS      Stroke      Past Surgical History:   Procedure Laterality Date    CATARACT EXTRACTION Bilateral     EYE SURGERY      HYSTERECTOMY      JOINT REPLACEMENT      Yag capsulotomy left eye  10/10/2012     Review of Systems   Constitutional:  Negative for chills, fatigue and fever.   HENT:  Negative for trouble swallowing and voice change.    Eyes:  Negative for photophobia and visual disturbance.        No acute visual changes   Respiratory:  Negative for apnea, cough, chest tightness, shortness of breath and wheezing.         HAS ANUM WEARS CPAP   Cardiovascular:  Negative for chest pain, palpitations and leg swelling.   Gastrointestinal:  Negative for abdominal distention, abdominal pain, blood in stool, constipation, diarrhea, nausea and vomiting.        NO FLD, hepatitis, cirrhosis  No BRB or black tarry stool     Genitourinary:  Negative for difficulty urinating, dysuria, flank pain, frequency, hematuria and urgency.   Musculoskeletal:  Positive for arthralgias and gait problem. Negative for back pain, joint swelling, myalgias, neck pain and neck stiffness.   Neurological:  Negative for dizziness, tremors, seizures, syncope, weakness, light-headedness, numbness and headaches.   Psychiatric/Behavioral:  Negative for suicidal ideas.         VITALS  Visit Vitals  BP (!) 140/68 (BP Location: Left arm, Patient Position: Sitting)   Pulse 71   Temp 97.6 °F (36.4 °C) (Oral)   Ht 5' 6" (1.676 m)   Wt 101 kg (222 lb 10.6 oz)   SpO2 96%   BMI 35.94 kg/m²          Physical Exam  Constitutional:       General: She is not in acute distress.     Appearance: Normal appearance. She is well-developed. She is not diaphoretic.   HENT:      Head: Normocephalic.      Right Ear: Hearing normal.      Left Ear: Hearing normal.      Nose: Nose normal.      Mouth/Throat:      " Lips: Pink.      Mouth: Mucous membranes are moist.      Pharynx: No oropharyngeal exudate.   Eyes:      General: Lids are normal.         Right eye: No discharge.         Left eye: No discharge.      Conjunctiva/sclera: Conjunctivae normal.      Pupils: Pupils are equal, round, and reactive to light.   Neck:      Thyroid: No thyromegaly.      Vascular: No carotid bruit or JVD.      Trachea: Trachea and phonation normal. No tracheal deviation.   Cardiovascular:      Rate and Rhythm: Normal rate and regular rhythm.      Pulses: Normal pulses.           Carotid pulses are 2+ on the right side and 2+ on the left side.       Radial pulses are 2+ on the right side and 2+ on the left side.        Posterior tibial pulses are 2+ on the right side and 2+ on the left side.      Heart sounds: Normal heart sounds. No murmur heard.     No friction rub. No gallop.   Pulmonary:      Effort: Pulmonary effort is normal. No respiratory distress.      Breath sounds: Normal breath sounds. No stridor. No wheezing or rales.      Comments: Clear Anterior and Posterior BBS  Abdominal:      General: Abdomen is protuberant. Bowel sounds are normal. There is no distension.      Palpations: Abdomen is soft.      Tenderness: There is no abdominal tenderness. There is no guarding.   Musculoskeletal:         General: No tenderness or deformity. Normal range of motion.      Cervical back: Normal range of motion and neck supple. No rigidity.      Right lower leg: No edema.      Left lower leg: No edema.   Lymphadenopathy:      Head:      Right side of head: No submental, submandibular, tonsillar, preauricular, posterior auricular or occipital adenopathy.      Left side of head: No submental, submandibular, tonsillar, preauricular, posterior auricular or occipital adenopathy.      Cervical: No cervical adenopathy.      Right cervical: No superficial cervical adenopathy.     Left cervical: No superficial cervical adenopathy.   Skin:     General: Skin  is warm and dry.      Capillary Refill: Capillary refill takes 2 to 3 seconds.      Coloration: Skin is not pale.      Findings: No erythema or rash.   Neurological:      Mental Status: She is alert and oriented to person, place, and time. Mental status is at baseline.      GCS: GCS eye subscore is 4. GCS verbal subscore is 5. GCS motor subscore is 6.      Motor: No abnormal muscle tone.      Coordination: Coordination normal.   Psychiatric:         Attention and Perception: Attention and perception normal.         Mood and Affect: Mood and affect normal.         Speech: Speech normal.         Behavior: Behavior normal. Behavior is cooperative.         Thought Content: Thought content normal.         Cognition and Memory: Cognition and memory normal.         Judgment: Judgment normal.          Significant Labs:  Lab Results   Component Value Date    WBC 8.86 06/04/2024    HGB 12.0 06/04/2024    HCT 37.7 06/04/2024     06/04/2024    CHOL 172 03/26/2024    TRIG 141 03/26/2024    HDL 42 03/26/2024    ALT 23 06/04/2024    AST 22 06/04/2024     06/04/2024    K 4.8 06/04/2024     06/04/2024    CREATININE 1.3 06/04/2024    BUN 23 06/04/2024    CO2 21 (L) 06/04/2024    TSH 0.533 06/04/2024    INR 0.9 06/04/2024    HGBA1C 5.4 03/26/2024       Diagnostic Studies: No relevant studies.    EKG:   Results for orders placed or performed during the hospital encounter of 06/04/24   EKG 12-lead    Collection Time: 06/04/24 10:25 AM   Result Value Ref Range    QRS Duration 82 ms    OHS QTC Calculation 408 ms    Narrative    Test Reason : Z01.818,    Vent. Rate : 062 BPM     Atrial Rate : 062 BPM     P-R Int : 254 ms          QRS Dur : 082 ms      QT Int : 402 ms       P-R-T Axes : 051 -31 051 degrees     QTc Int : 408 ms    Sinus rhythm with sinus arrhythmia with 1st degree A-V block  Left axis deviation  Moderate voltage criteria for LVH, may be normal variant  Abnormal ECG  When compared with ECG of 30-NOV-2023  08:30,  No significant change was found  Confirmed by CHANDRA MCQUEEN MD [id number]) on 6/4/2024 11:15:02 AM    Referred By: DESTINY KNIGHT           Confirmed By:CHANDRA MCQUEEN MD       2D ECHO:  TTE:  Results for orders placed or performed during the hospital encounter of 09/16/23   Echo   Result Value Ref Range    BSA 2.18 m2    LVOT stroke volume 89.33 cm3    LVIDd 4.26 3.5 - 6.0 cm    LV Systolic Volume 27.55 mL    LV Systolic Volume Index 13.1 mL/m2    LVIDs 2.72 2.1 - 4.0 cm    LV Diastolic Volume 81.28 mL    LV Diastolic Volume Index 38.52 mL/m2    IVS 1.14 (A) 0.6 - 1.1 cm    LVOT diameter 2.0 cm    LVOT area 3.1 cm2    FS 36 28 - 44 %    Left Ventricle Relative Wall Thickness 0.54 cm    Posterior Wall 1.16 (A) 0.6 - 1.1 cm    LV mass 171.15 g    LV Mass Index 81 g/m2    MV Peak E Edgardo 0.76 m/s    TDI LATERAL 0.10 m/s    TDI SEPTAL 0.09 m/s    E/E' ratio 8.00 m/s    MV Peak A Edgardo 0.83 m/s    TR Max Edgardo 2.74 m/s    E/A ratio 0.92     IVRT 121.79 msec    E wave deceleration time 174.85 msec    LV SEPTAL E/E' RATIO 8.44 m/s    LA Volume Index 37.6 mL/m2    LV LATERAL E/E' RATIO 7.60 m/s    LA volume 79.27 cm3    LVOT peak edgardo 1.13 m/s    LA size 4.14 cm    Left Atrium Minor Axis 5.80 cm    Left Atrium Major Axis 5.42 cm    LA volume (mod) 44.77 cm3    LA WIDTH 4.02 cm    LA Volume Index (Mod) 21.2 mL/m2    RVDD 3.12 cm    TAPSE 2.49 cm    RA Major Axis 4.30 cm    RA Width 3.60 cm    AV mean gradient 9 mmHg    AV peak gradient 17 mmHg    Ao peak edgardo 2.06 m/s    Ao VTI 53.25 cm    LVOT peak VTI 28.45 cm    AV valve area 1.68 cm²    AV Velocity Ratio 0.55     AV index (prosthetic) 0.53     JONATHAN by Velocity Ratio 1.72 cm²    Triscuspid Valve Regurgitation Peak Gradient 30 mmHg    Sinus 2.62 cm    STJ 2.38 cm    Ascending aorta 3.15 cm    Mean e' 0.10 m/s    ZLVIDS -3.12     ZLVIDD -4.40     TV resting pulmonary artery pressure 33 mmHg    RV TB RVSP 6 mmHg    Est. RA pres 3 mmHg    Narrative      Left Ventricle: The left  ventricle is normal in size. Normal wall   thickness. There is concentric remodeling. Normal wall motion. There is   normal systolic function with a visually estimated ejection fraction of 60   - 65%. There is normal diastolic function.    Right Ventricle: Normal right ventricular cavity size. Wall thickness   is normal. Right ventricle wall motion  is normal. Systolic function is   normal.    Left Atrium: Left atrium is mildly dilated.    Aortic Valve: The aortic valve is a trileaflet valve. There is mild   aortic valve sclerosis. Mildly restricted motion. There is mild stenosis.   Aortic valve area by VTI is 1.68 cm². Aortic valve peak velocity is 2.06   m/s. Mean gradient is 9 mmHg. The dimensionless index is 0.53.    Pulmonary Artery: The estimated pulmonary artery systolic pressure is   33 mmHg.    IVC/SVC: Normal venous pressure at 3 mmHg.         NOE:  No results found for this or any previous visit.     Imaging     Active Cardiac Conditions: None      Revised Cardiac Risk Index   High -Risk Surgery  Intraperitoneal; Intrathoracic; suprainguinal vascular Yes- + 1 No- 0   History of Ischemic Heart Disease   (Hx of MI/positive exercise test/current chest pain due to ischemia/use of nitrate therapy/EKG with pathological Q waves) Yes- + 1 No- 0   History of CHF  (Pulmonary edema/bilateral rales or S3 gallop/PND/CXR showing pulmonary vascular redistribution) Yes- + 1 No- 0   History of CVA   (Prior stroke or TIA) Yes- + 1 No- 0   Pre-operative treatment with insulin Yes- + 1 No- 0   Pre-operative creatinine > 2mg/dl Yes- + 1 No- 0   Total:      Risk Status:  Estimated risk of cardiac complications after non-cardiac surgery using the Revised Cardiac Risk Index for Preoperative risk is 6.0 %      ARISCAT (Canet) risk index: Low: 1.6% risk of post-op pulmonary complications.    American Society of Anesthesiologists Physical Status classification (ASA): 3           No further cardiac workup needed prior to  surgery.        Preoperative cardiac risk assessment-  The patient does not have any active cardiac conditions . Revised cardiac risk index predictors- ---.Functional capacity is more than 4 Mets. She will be undergoing a Orthopedic procedure that carries a Moderate Risk risk     The estimated risk of the rate of adverse cardiac outcomes  6%    No further cardiac work up is indicated prior to proceeding with the surgery     Orders Placed This Encounter    TSH       American Society of Anesthesiologists Physical status classification ( ASA ) class: 3     Postoperative pulmonary complication risk assessment: 1.6     ARISCAT ( Canet) risk index- risk class -  Low, if duration of surgery is under 3 hours, intermediate, if duration of surgery is over 3 hours          Assessment/Plan:     Essential hypertension  Current /68  today.    Taking: amlodipine, benazepril, hydralazine, metoprolol, HCTZ, Spironolactone  Patient reports home BP readings of: 120s/60s      Lifestyle changes to reduce systolic BP:  exercise 30 minutes per day,  5 days per week or 150 minutes weekly; sodium reduction and avoidance of high salt foods such as processed meats, frozen meals and  fast foods.   Keeping a healthy weight/BMI can help with better BP control    BP acceptable for surgery. I recommend monitoring BP during perioperative period as uncontrolled pain can elevate blood pressure.         Stage 3b chronic kidney disease  GFR 40.5 BUN 23 Cr 1.3  Deleterious effects NSAID's , Beneficial effects of Hydration discussed   Tylenol as needed for pain   I suggest monitoring renal function, in put and out put status randa-operatively. I  suggest avoiding nephrotoxic medication including NSAIDs, COX2 inhibitors, intravenous contrast agent,avoiding hypotension to prevent further renal impairment.     History of DVT (deep vein thrombosis)  History of DVT/PE- Patient unsure about the circumstances r/t DVT  Medical record states DVT in  September '23 that resolved without AC. Unclear trigger and seemingly incidental as she never had RLE symptoms.   Had recent BLE US- negative for DVT  Interpretation Summary 12/7/24    There is no evidence of a right lower extremity DVT.    There is no evidence of a left lower extremity DVT.    No evidence of superficial venous reflux bilaterally.    A Baker's cyst measuring 1.3 cm x 1.6 cm  was seen in the right extremity.      DVT prophylaxis: compression stockings, sequential  compression devices and anti coagulation as per Ortho team    Obstructive sleep apnea syndrome  This client has a diagnosis of obstructive sleep apnea (ANUM)    Instructions were given to avoid the following: sleeping supine, weight gain ,alcoholic beverages , sedative medications, and CNS depressant use as these can all worsen ANUM.    Untreated sleep apnea has been shown to increase daytime sleepiness, hypertension, heart disease and stroke which were discussed with the patient at this time    Please use caution with medications that induce respiratory depression in the perioperative period    History of prediabetes 9/2016  Last A1c 5.3    TIA (transient ischemic attack), 6/29/2021  [facility] provided at [facility]   daughter reported right facial droop, slurred speech and aphasia  all symptoms resolved at the time of her arrival l  CT head negative for intracerebral bleed chronic ischemic changes  CTA negative for perfusion defects  MRI Brain negative for acute infarcts mild chronic microangiopathic changes in the periventricular white matter. A couple of punctate foci suggestive of chronic petechial hemorrhages       Multinodular goiter  TSH 0.533    1/21/2019  TECHNIQUE:Ultrasound of the thyroid and cervical lymph nodes was performed.  COMPARISON:Ultrasound thyroid 12/30/2015  IMPRESSION:      Few nodules within thyroid lobes, none of which meet ACR criteria for FNA    FINDINGS:  The right lobe of the thyroid measures 5.8 x 3.2 x  2.5 cm and contains at least 3 nodules, none of which meet criteria for FNA or sonographic follow-up.  The largest nodule is mixed cystic and solid (TR 2) located at the inferior pole, measuring 0.8 x 0.9 x 0.5 cm.     The thyroid isthmus is unremarkable and measures 0.5 cm.     The left lobe of the thyroid measures 6.1 x 2.6 x 3.5 cm and contains at least 2 nodules, none of which meet criteria for FNA or sonographic follow-up.  The largest nodule appears mixed cystic and solid and isoechoic within the midpole, measuring 2.1 x 1.9 x 1.7 cm, not significantly changed in size or appearance from prior ultrasound in 2015.     Thyroid vascularity is within normal limits.     There are no abnormal lymph nodes within the visualized portions of the neck.        Interval gout  This patient has a history of Gout   Please consider continuing the current maintenance medication for gout and keep the patient adequately hydrated.  Dehydration and surgical stress  can precipitate an acute gout flare-up      Coronary artery disease involving native coronary artery of native heart without angina pectoris  Stress test 9/18/23      Summary  Show Result Comparison     Post-stress Impression: The study is negative with no echocardiographic evidence of stress induced ischemia.    ECG Conclusion: The ECG portion of the study is negative for ischemia.    Stress ECG: There are no ST segment deviation identified during the protocol. During stress, frequent PACs are noted. During stress, rare PVCs are noted. There is normal blood pressure response with stress.    Stress Protocol: The patient reported no symptoms during the stress test. The test was stopped because the end of the protocol was reached.    Left Ventricle: The left ventricle is normal in size. Normal wall thickness. There is concentric remodeling. Normal wall motion. There is normal systolic function with a visually estimated ejection fraction of 55 - 60%. There is normal  diastolic function.    Right Ventricle: Normal right ventricular cavity size. Right ventricle wall motion  is normal. Systolic function is normal.    Pulmonary Artery: The estimated pulmonary artery systolic pressure is 19 mmHg.    IVC/SVC: Normal venous pressure at 3 mmHg.    Class 2 severe obesity with body mass index (BMI) of 35 to 39.9 with serious comorbidity  BMI 35.94  Patient would benefit from weight loss and has not set goals to achieve success.   Lifestyle changes should be made by eating healthy, exercising at least 150 minutes weekly, and avoiding sedentary behavior.     Decreased ROM of right knee  Sx scheduled 6/19/24      Preventive perioperative care    Thromboembolic prophylaxis:  Her risk factors for thrombosis include morbid obesity, surgical procedure, age, and reduced mobility.I suggest  thromboembolic prophylaxis ( mechanical/pharmacological, weighing the risk benefits of pharmacological agent use considering randa procedural bleeding )  during the perioperative period.I suggested being active in the post operative period.      Postoperative pulmonary complication prophylaxis-Risk factors for post operative pulmonary complications include age over 65 years and ASA class >2- I suggest incentive spirometry use, early ambulation, and pain control so as to avoid diaphragmatic splinting  Brush teeth twice per day, oral rinses, sleep with the head of the bed up 30 degrees     Renal complication prophylaxis-Risk factors for renal complications include pre-existing renal disease, age, and hypertension . I suggest keeping her well hydrated and avoidance/ minimizing the use of  NSAID's,HILL 2 Inhibitors ,IV contrast if possible in the perioperative period.I suggested drinking 2 litre's of water a day      Surgical site Infection Prophylaxis-I  suggest appropriate antibiotic for Prophylaxis against Surgical site infections Shower with Hibiclens in the night before surgery and the morning of surgery      Delirium prophylaxis-Risk factors - Advanced Age - I suggest avoidance / minimizing the use of  Benzodiazepines ( unless the patient has been taking it on a regular basis ),Anticholinergic medication,Antihistamines ( like  Benadryl).I suggest minimizing the use of opioid medication and use of IV tylenol,if it is appropriate. I suggest using the lowest possible dose of opioids for the shortest duration possible in the perioperative period. I suggest to Keep shades/blinds open during the day, lights off and shades closed at night to encourage normal sleep/wake cycle.I encourage the presence of the family member with the patient at all times, if at all possible as mental status changes can be picked up early by the family members and they help with reorientation. I encouraged the presence of family to help with orientation in the perioperative period. Benadryl avoidance suggested      In view of Spine procedure the patient  is at risk of postoperative urinary retention.  I suggest avoidance / minimizing the of  Benzodiazepines,Anticholinergic medication,antihistamines ( Benadryl) , if possible in the perioperative period. I suggest using the minimum possible use of opioids for the minimum period of time in the perioperative period. Benadryl avoidance suggested      This visit was focused on Preoperative evaluation, Perioperative Medical management, complication reduction plans. I suggest that the patient follows up with primary care or relevant sub specialists for ongoing health care.    I appreciate the opportunity to be involved in this patients care. Please feel free to contact me if there were any questions about this consultation.    Patient is optimized    I spent a total of 54 minutes on the day of the visit.This includes face to face time and non-face to face time preparing to see the patient (eg, review of tests), obtaining and/or reviewing separately obtained history, documenting clinical information in  the electronic or other health record, independently interpreting results and communicating results to the patient/family/caregiver, or care coordinator.      Aguilar Nation NP  Perioperative Medicine  [facility]   Pager [phone]

## 2024-06-04 NOTE — ASSESSMENT & PLAN NOTE
Stress test 9/18/23      Summary  Show Result Comparison     Post-stress Impression: The study is negative with no echocardiographic evidence of stress induced ischemia.    ECG Conclusion: The ECG portion of the study is negative for ischemia.    Stress ECG: There are no ST segment deviation identified during the protocol. During stress, frequent PACs are noted. During stress, rare PVCs are noted. There is normal blood pressure response with stress.    Stress Protocol: The patient reported no symptoms during the stress test. The test was stopped because the end of the protocol was reached.    Left Ventricle: The left ventricle is normal in size. Normal wall thickness. There is concentric remodeling. Normal wall motion. There is normal systolic function with a visually estimated ejection fraction of 55 - 60%. There is normal diastolic function.    Right Ventricle: Normal right ventricular cavity size. Right ventricle wall motion  is normal. Systolic function is normal.    Pulmonary Artery: The estimated pulmonary artery systolic pressure is 19 mmHg.    IVC/SVC: Normal venous pressure at 3 mmHg.

## 2024-06-04 NOTE — ASSESSMENT & PLAN NOTE
CAre provided at [facility]   daughter reported right facial droop, slurred speech and aphasia  all symptoms resolved at the time of her arrival l  CT head negative for intracerebral bleed chronic ischemic changes  CTA negative for perfusion defects  MRI Brain negative for acute infarcts mild chronic microangiopathic changes in the periventricular white matter. A couple of punctate foci suggestive of chronic petechial hemorrhages

## 2024-06-04 NOTE — DISCHARGE INSTRUCTIONS
Your surgery has been scheduled for:_______6/19/24___________________________________    You should report to:  ____[facility], located on the [place] side of the first floor of the           [facility] ([phone])  ____[facility], located on the [facility] side of the            Second floor of the [facility] ([phone])  ____3rd Floor [facility] located on the [facility] side of the [facility] [phone]  __X__[facility]: located at [address] [place] [address]. [address]     Please Note   Tell your doctor if you take Aspirin, products containing Aspirin, herbal medications  or blood thinners, such as Coumadin, Ticlid, or Plavix.  (Consult your provider regarding holding or stopping before surgery).  Arrange for someone to drive you home following surgery.  You will not be allowed to leave the surgical facility alone or drive yourself home following sedation and anesthesia.    Before Surgery  Stop taking all herbal medications, vitamins, and supplements 7 days prior to surgery  No Motrin/Advil (Ibuprofen) 7 days before surgery  No Aleve (Naproxen) 7 days before surgery   No Goody's/BC Powder 7 days before surgery  Refrain from drinking alcoholic beverages for 24 hours before and after surgery  Stop or limit smoking at least 24 hours prior to surgery  You may take Tylenol for pain    Night before Surgery  Do not eat or drink after midnight  Take a shower or bath (shower is recommended).  Bathe with Hibiclens soap or an antibacterial soap from the neck down.  If not supplied by your surgeon, hibiclens soap will need to be purchased over the counter in pharmacy.  Rinse soap off thoroughly.  Shampoo your hair with your regular shampoo    The Day of Surgery  Take another bath or shower with hibiclens or any antibacterial soap, to reduce the chance of infection.  Take heart  and blood pressure medications with a small sip of water, as advised by the perioperative team.  Do not take fluid pills  You may brush your teeth and rinse your mouth, but do not swallow any additional water.   Do not apply perfumes, powder, body lotions or deodorant on the day of surgery.  Nail polish should be removed.  Do not wear makeup or moisturizer  Wear comfortable clothes, such as a button front shirt and loose fitting pants.  Leave all jewelry, including body piercings, and valuables at home.    Bring any devices you will need after surgery such as crutches or canes.  If you have sleep apnea, please bring your CPAP machine  In the event that your physical condition changes including the onset of a cold or respiratory illness, or if you have to delay or cancel your surgery, please notify your surgeon.

## 2024-06-04 NOTE — H&P
CC:  Right knee pain    Nelia Littlejohn is a 84 y.o. female with history of Right knee pain. Pain is worse with activity and weight bearing.  Patient has experienced interference of activities of daily living due to decreased range of motion and an increase in joint pain and swelling.  Patient has failed non-operative treatment including NSAIDs, corticosteroid injections, viscosupplement injections, and activity modification.  Nelia Littlejohn currently ambulates using assistive device .     Relevant medical conditions of significance in perioperative period:  HTN- on medication managed by PCP  HLD- on medication managed by PCP  Hx of DVT- on medication managed by PCP  CKD 3b- monitored by PCP  ANUM- monitored by PCP       Given documented medical comorbidities including those listed above and based off of CMS criteria patient would meet inpatient admission status guidelines. This case has been approved as inpatient.     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 Vision is much better, can read.     High cholesterol     History of prediabetes 10/26/2017    HTN (hypertension) 09/25/2012    Hyperlipemia 09/11/2012    Hypertension     Interval gout 09/11/2012    Status post cataract extraction and insertion of intraocular lens 11/27/2012    sp Yag Cap OS      Stroke        Past Surgical History:   Procedure Laterality Date    CATARACT EXTRACTION Bilateral     EYE SURGERY      HYSTERECTOMY      JOINT REPLACEMENT      Yag capsulotomy left eye  10/10/2012       Family History   Problem Relation Name Age of Onset    Diabetes Father      Stroke Father      Cataracts Father      Diabetes Mother      Cancer Sister Katelyn         breast cancer    Diabetes Brother Lester     Heart  disease Brother Lester     No Known Problems Son Raj     No Known Problems Daughter Leydi     No Known Problems Maternal Grandmother      No Known Problems Maternal Grandfather      No Known Problems Paternal Grandmother      No Known Problems Paternal Grandfather      No Known Problems Brother Boris     No Known Problems Maternal Aunt      No Known Problems Maternal Uncle      No Known Problems Paternal Aunt      No Known Problems Paternal Uncle      Hypertension Brother Santi     Diabetes Brother Laila     Hypertension Son Garrick     Hypertension Son Wil     No Known Problems Son Daljit     No Known Problems Son Austen     Amblyopia Neg Hx      Blindness Neg Hx      Glaucoma Neg Hx      Macular degeneration Neg Hx      Retinal detachment Neg Hx      Strabismus Neg Hx      Thyroid disease Neg Hx         Review of patient's allergies indicates:  No Known Allergies      Current Outpatient Medications:     allopurinoL (ZYLOPRIM) 300 MG tablet, Take 1 tablet (300 mg total) by mouth once daily., Disp: 90 tablet, Rfl: 3    amLODIPine-benazepriL (LOTREL) 10-40 mg per capsule, TAKE 1 TABLET BY MOUTH EVERY MORNING, Disp: 90 capsule, Rfl: 3    aspirin (ECOTRIN) 325 MG EC tablet, Take 1 tablet (325 mg total) by mouth once daily., Disp: 90 tablet, Rfl: 3    aspirin 81 MG Chew, Take 81 mg by mouth once daily. Patient instructed by Dr. Sharma on 4/18/24, Disp: , Rfl:     atorvastatin (LIPITOR) 80 MG tablet, TAKE 1 TABLET BY MOUTH EVERY DAY, Disp: 90 tablet, Rfl: 3    cetirizine (ZYRTEC) 10 MG tablet, Take 10 mg by mouth once daily., Disp: , Rfl:     hydrALAZINE (APRESOLINE) 100 MG tablet, TAKE 1 TABLET BY MOUTH TWICE DAILY FOR BLOOD PRESSURE, Disp: 180 tablet, Rfl: 3    metoprolol ta-hydrochlorothiaz (LOPRESSOR HCT) 100-25 mg per tablet, Take 1 tablet by mouth once daily., Disp: 90 tablet, Rfl: 3    MULTIVIT-IRON-MIN-FOLIC ACID 3,500-18-0.4 UNIT-MG-MG ORAL CHEW, Take by mouth once daily., Disp: , Rfl:      spironolactone (ALDACTONE) 25 MG tablet, Take 1 tablet (25 mg total) by mouth once daily., Disp: 90 tablet, Rfl: 2    Review of Systems:  Constitutional: no fever or chills  Eyes: no visual changes  ENT: no nasal congestion or sore throat  Respiratory: no cough or shortness of breath  Cardiovascular: no chest pain or palpitations  Gastrointestinal: no nausea or vomiting, tolerating diet  Genitourinary: no hematuria or dysuria  Integument/Breast: no rash or pruritis  Hematologic/Lymphatic: no easy bruising or lymphadenopathy  Musculoskeletal: positive for knee pain  Neurological: no seizures or tremors  Behavioral/Psych: no auditory or visual hallucinations  Endocrine: no heat or cold intolerance    PE:  There were no vitals taken for this visit.  General: Pleasant, cooperative, NAD   Gait: antalgic  HEENT: NCAT, sclera nonicteric   Lungs: Respirations clear bilaterally; equal and unlabored.   CV: S1S2; 2+ bilateral upper and lower extremity pulses.   Skin: Intact throughout with no rashes, erythema, or lesions  Extremities: No LE edema,  no erythema or warmth of the skin in either lower extremity.    Right knee exam:  Knee Range of Motion:  , pain with passive range of motion  Effusion:  Mild  Condition of skin:intact  Location of tenderness:Medial joint line and Lateral joint line   Strength:normal  Stability: stable to testing    Hip Examination: painless PROM of hip     Radiographs: Radiographs reveal advanced degenerative changes including subchondral cyst formation, subchondral sclerosis, osteophyte formation, joint space narrowing.     Knee Alignment: normal    Diagnosis: Primary osteoarthritis Right knee    Plan: Right total knee arthroplasty    Due to the serious nature of total joint infection and the high prevalence of community acquired MRSA, vancomycin will be used perioperatively.

## 2024-06-04 NOTE — PROGRESS NOTES
Pre-op R TKA 6/19/24    No interval changes    Exam unchanged    This patient has significant symptoms in their knee that are affecting their quality of life and daily activities.  They have tried non-operative treatment including analgesics, an exercise program, and activity modification, but the symptoms have persisted. I believe they make a good candidate for knee arthroplasty.     The risks and benefits of knee arthroplasty have been discussed with the patient which include, but are not limited to infections (including severe sequelae), potential component failure, fracture, DVT, pulmonary embolus, nerve palsy, poor range of motion, the possibility of bone grafting, persistent pain, wound healing complications, transfusions, medical complications and death.     We discussed surgical options including implant type and why I believe the implant selected is a good match for the patient's needs. The patient expressed understanding and agrees to proceed with the planned surgery.     Pre-operative planning will include the following:  A pre-surgical evaluation by will be arranged.  Pre-op orders will be placed.  We will make arrangements with the operating room for proper time and staffing.  We will make Social Service arrangements for the patient.      Implants:   Company: [employer]  System: Attune  Velys all poly tibia     DVT prophylaxis: ASA 81mg x1 @12hrs post op, Eliquis 2.5mg BID x30 days @24hrs post op    Dispo: outpatient PT     Admission status: Outpatient/23hr OBS    Location: [facility] TKA   1-2 night  PT for pre-hab to work on flexion contracture  Consider knee immobilizer at night  - but DVT hx so will likely avoid  No nsaids, No cbrex  HS stretches pre-op

## 2024-06-04 NOTE — ASSESSMENT & PLAN NOTE
History of DVT/PE- Patient unsure about the circumstances r/t DVT  Medical record states DVT in September '23 that resolved without AC. Unclear trigger and seemingly incidental as she never had RLE symptoms.   Had recent BLE US- negative for DVT  Interpretation Summary 12/7/24    There is no evidence of a right lower extremity DVT.    There is no evidence of a left lower extremity DVT.    No evidence of superficial venous reflux bilaterally.    A Baker's cyst measuring 1.3 cm x 1.6 cm  was seen in the right extremity.      DVT prophylaxis: compression stockings, sequential  compression devices and anti coagulation as per Ortho team

## 2024-06-04 NOTE — ASSESSMENT & PLAN NOTE
GFR 40.5 BUN 23 Cr 1.3  Deleterious effects NSAID's , Beneficial effects of Hydration discussed   Tylenol as needed for pain   I suggest monitoring renal function, in put and out put status randa-operatively. I  suggest avoiding nephrotoxic medication including NSAIDs, COX2 inhibitors, intravenous contrast agent,avoiding hypotension to prevent further renal impairment.

## 2024-06-04 NOTE — H&P (VIEW-ONLY)
CC:  Right knee pain    Nelia Littlejohn is a 84 y.o. female with history of Right knee pain. Pain is worse with activity and weight bearing.  Patient has experienced interference of activities of daily living due to decreased range of motion and an increase in joint pain and swelling.  Patient has failed non-operative treatment including NSAIDs, corticosteroid injections, viscosupplement injections, and activity modification.  Nelia Littlejohn currently ambulates using assistive device .     Relevant medical conditions of significance in perioperative period:  HTN- on medication managed by PCP  HLD- on medication managed by PCP  Hx of DVT- on medication managed by PCP  CKD 3b- monitored by PCP  ANUM- monitored by PCP       Given documented medical comorbidities including those listed above and based off of CMS criteria patient would meet inpatient admission status guidelines. This case has been approved as inpatient.     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 Vision is much better, can read.     High cholesterol     History of prediabetes 10/26/2017    HTN (hypertension) 09/25/2012    Hyperlipemia 09/11/2012    Hypertension     Interval gout 09/11/2012    Status post cataract extraction and insertion of intraocular lens 11/27/2012    sp Yag Cap OS      Stroke        Past Surgical History:   Procedure Laterality Date    CATARACT EXTRACTION Bilateral     EYE SURGERY      HYSTERECTOMY      JOINT REPLACEMENT      Yag capsulotomy left eye  10/10/2012       Family History   Problem Relation Name Age of Onset    Diabetes Father      Stroke Father      Cataracts Father      Diabetes Mother      Cancer Sister Katelyn         breast cancer    Diabetes Brother Lester     Heart  disease Brother Lester     No Known Problems Son Raj     No Known Problems Daughter Leydi     No Known Problems Maternal Grandmother      No Known Problems Maternal Grandfather      No Known Problems Paternal Grandmother      No Known Problems Paternal Grandfather      No Known Problems Brother Boris     No Known Problems Maternal Aunt      No Known Problems Maternal Uncle      No Known Problems Paternal Aunt      No Known Problems Paternal Uncle      Hypertension Brother Santi     Diabetes Brother Laila     Hypertension Son Garrick     Hypertension Son Wil     No Known Problems Son Dlajit     No Known Problems Son Austen     Amblyopia Neg Hx      Blindness Neg Hx      Glaucoma Neg Hx      Macular degeneration Neg Hx      Retinal detachment Neg Hx      Strabismus Neg Hx      Thyroid disease Neg Hx         Review of patient's allergies indicates:  No Known Allergies      Current Outpatient Medications:     allopurinoL (ZYLOPRIM) 300 MG tablet, Take 1 tablet (300 mg total) by mouth once daily., Disp: 90 tablet, Rfl: 3    amLODIPine-benazepriL (LOTREL) 10-40 mg per capsule, TAKE 1 TABLET BY MOUTH EVERY MORNING, Disp: 90 capsule, Rfl: 3    aspirin (ECOTRIN) 325 MG EC tablet, Take 1 tablet (325 mg total) by mouth once daily., Disp: 90 tablet, Rfl: 3    aspirin 81 MG Chew, Take 81 mg by mouth once daily. Patient instructed by Dr. Sharma on 4/18/24, Disp: , Rfl:     atorvastatin (LIPITOR) 80 MG tablet, TAKE 1 TABLET BY MOUTH EVERY DAY, Disp: 90 tablet, Rfl: 3    cetirizine (ZYRTEC) 10 MG tablet, Take 10 mg by mouth once daily., Disp: , Rfl:     hydrALAZINE (APRESOLINE) 100 MG tablet, TAKE 1 TABLET BY MOUTH TWICE DAILY FOR BLOOD PRESSURE, Disp: 180 tablet, Rfl: 3    metoprolol ta-hydrochlorothiaz (LOPRESSOR HCT) 100-25 mg per tablet, Take 1 tablet by mouth once daily., Disp: 90 tablet, Rfl: 3    MULTIVIT-IRON-MIN-FOLIC ACID 3,500-18-0.4 UNIT-MG-MG ORAL CHEW, Take by mouth once daily., Disp: , Rfl:      spironolactone (ALDACTONE) 25 MG tablet, Take 1 tablet (25 mg total) by mouth once daily., Disp: 90 tablet, Rfl: 2    Review of Systems:  Constitutional: no fever or chills  Eyes: no visual changes  ENT: no nasal congestion or sore throat  Respiratory: no cough or shortness of breath  Cardiovascular: no chest pain or palpitations  Gastrointestinal: no nausea or vomiting, tolerating diet  Genitourinary: no hematuria or dysuria  Integument/Breast: no rash or pruritis  Hematologic/Lymphatic: no easy bruising or lymphadenopathy  Musculoskeletal: positive for knee pain  Neurological: no seizures or tremors  Behavioral/Psych: no auditory or visual hallucinations  Endocrine: no heat or cold intolerance    PE:  There were no vitals taken for this visit.  General: Pleasant, cooperative, NAD   Gait: antalgic  HEENT: NCAT, sclera nonicteric   Lungs: Respirations clear bilaterally; equal and unlabored.   CV: S1S2; 2+ bilateral upper and lower extremity pulses.   Skin: Intact throughout with no rashes, erythema, or lesions  Extremities: No LE edema,  no erythema or warmth of the skin in either lower extremity.    Right knee exam:  Knee Range of Motion:  , pain with passive range of motion  Effusion:  Mild  Condition of skin:intact  Location of tenderness:Medial joint line and Lateral joint line   Strength:normal  Stability: stable to testing    Hip Examination: painless PROM of hip     Radiographs: Radiographs reveal advanced degenerative changes including subchondral cyst formation, subchondral sclerosis, osteophyte formation, joint space narrowing.     Knee Alignment: normal    Diagnosis: Primary osteoarthritis Right knee    Plan: Right total knee arthroplasty    Due to the serious nature of total joint infection and the high prevalence of community acquired MRSA, vancomycin will be used perioperatively.

## 2024-06-04 NOTE — PROGRESS NOTES
Nelia Littlejohn is a 84 y.o. year old here today for pre surgery optimization visit  in preparation for a Right total knee arthroplasty to be performed by Dr. Rubio  on 6/19/24.  she was last seen and treated in the clinic on 4/30/2024. she will be medically optimized by the pre op center. There has been no significant change in medical status since last visit. No fever, chills, malaise, or unexplained weight change.      Allergies, Medications, past medical and surgical history reviewed.    Focused examination performed.    Patient saw surgeon in clinic today. All questions answered. Patient encouraged to call with questions. Contact information given.     Pre, randa, and post operative procedures and expectations discussed. Goals of successful surgery reviewed and include manageable pain levels, surgical site free of infection, medication management, and ambulation with PT/OT assistance. Healthy weight management discussed with patient and caregiver who were receptive to eduction of healthy diet and activity. No other necessary lifestyle changes identified. Educated patient about signs and symptoms of infection, medication management, anticoagulation therapy, risk of tobacco and alcohol use, and self-care to promote healing. Surgical guide given for future reference. Hibiclens given to patient with instructions. All questions were answered.     Nelia Littlejohn verbalized an understanding to the education and goals. Patient has displayed readiness to engage in care and is ready to proceed with surgery.  Patient reports daughter is able and ready to provide assistance at home after discharge.    Surgical and blood consents signed.    DME will be arranged. The mobility limitation cannot be sufficiently resolved by the use of a cane. Patient's functional mobility deficit can be sufficiently resolved with the use of a (Rolling Walker or Walker). Patient's mobility limitation significantly impairs their ability to  "participate in one of more activities of daily living. The use of a (Rolling Walker or Walker) will significantly improve the patient's ability to participate in MRADLS and the patient will use it on regular basis in the home."     Nelia Littlejohn will contact us if there are any questions, concerns, or changes in medical status prior to surgery.       Joint class:  05/13/2024    Patient has discussed discharge planning with surgeon. Patient will be discharged to home following surgery.   patient will be scheduled with [facility]. PT will be done at the [facility].    30 minutes of time was spent on patient education, review of records, templating, H&P, , appointment scheduling and optimizing patient for surgery.      "

## 2024-06-04 NOTE — ASSESSMENT & PLAN NOTE
This client has a diagnosis of obstructive sleep apnea (ANUM)    Instructions were given to avoid the following: sleeping supine, weight gain ,alcoholic beverages , sedative medications, and CNS depressant use as these can all worsen ANUM.    Untreated sleep apnea has been shown to increase daytime sleepiness, hypertension, heart disease and stroke which were discussed with the patient at this time    Please use caution with medications that induce respiratory depression in the perioperative period

## 2024-06-04 NOTE — ASSESSMENT & PLAN NOTE
TSH 0.533    1/21/2019  TECHNIQUE:Ultrasound of the thyroid and cervical lymph nodes was performed.  COMPARISON:Ultrasound thyroid 12/30/2015  IMPRESSION:      Few nodules within thyroid lobes, none of which meet ACR criteria for FNA    FINDINGS:  The right lobe of the thyroid measures 5.8 x 3.2 x 2.5 cm and contains at least 3 nodules, none of which meet criteria for FNA or sonographic follow-up.  The largest nodule is mixed cystic and solid (TR 2) located at the inferior pole, measuring 0.8 x 0.9 x 0.5 cm.     The thyroid isthmus is unremarkable and measures 0.5 cm.     The left lobe of the thyroid measures 6.1 x 2.6 x 3.5 cm and contains at least 2 nodules, none of which meet criteria for FNA or sonographic follow-up.  The largest nodule appears mixed cystic and solid and isoechoic within the midpole, measuring 2.1 x 1.9 x 1.7 cm, not significantly changed in size or appearance from prior ultrasound in 2015.     Thyroid vascularity is within normal limits.     There are no abnormal lymph nodes within the visualized portions of the neck.

## 2024-06-04 NOTE — ASSESSMENT & PLAN NOTE
This patient has a history of Gout   Please consider continuing the current maintenance medication for gout and keep the patient adequately hydrated.  Dehydration and surgical stress  can precipitate an acute gout flare-up

## 2024-06-04 NOTE — ASSESSMENT & PLAN NOTE
Current /68  today.    Taking: amlodipine, benazepril, hydralazine, metoprolol, HCTZ, Spironolactone  Patient reports home BP readings of: 120s/60s      Lifestyle changes to reduce systolic BP:  exercise 30 minutes per day,  5 days per week or 150 minutes weekly; sodium reduction and avoidance of high salt foods such as processed meats, frozen meals and  fast foods.   Keeping a healthy weight/BMI can help with better BP control    BP acceptable for surgery. I recommend monitoring BP during perioperative period as uncontrolled pain can elevate blood pressure.

## 2024-06-04 NOTE — OUTPATIENT SUBJECTIVE & OBJECTIVE
Outpatient Subjective & Objective      Chief Complaint: Preoperative evaulation, perioperative medical management, and complication reduction plan.     Functional Capacity:  Able to climb a flight of stairs without CP or Syncope.  Reports she might get out of breath.  Had recent stress test negative for ischemia.   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 Vision is much better, can read.     High cholesterol     History of prediabetes 10/26/2017    HTN (hypertension) 09/25/2012    Hyperlipemia 09/11/2012    Hypertension     Interval gout 09/11/2012    Status post cataract extraction and insertion of intraocular lens 11/27/2012    sp Yag Cap OS      Stroke      Past Surgical History:   Procedure Laterality Date    CATARACT EXTRACTION Bilateral     EYE SURGERY      HYSTERECTOMY      JOINT REPLACEMENT      Yag capsulotomy left eye  10/10/2012     Review of Systems   Constitutional:  Negative for chills, fatigue and fever.   HENT:  Negative for trouble swallowing and voice change.    Eyes:  Negative for photophobia and visual disturbance.        No acute visual changes   Respiratory:  Negative for apnea, cough, chest tightness, shortness of breath and wheezing.         HAS ANUM WEARS CPAP   Cardiovascular:  Negative for chest pain, palpitations and leg swelling.   Gastrointestinal:  Negative for abdominal distention, abdominal pain, blood in stool, constipation, diarrhea, nausea and vomiting.        NO FLD, hepatitis, cirrhosis  No BRB or black  "tarry stool     Genitourinary:  Negative for difficulty urinating, dysuria, flank pain, frequency, hematuria and urgency.   Musculoskeletal:  Positive for arthralgias and gait problem. Negative for back pain, joint swelling, myalgias, neck pain and neck stiffness.   Neurological:  Negative for dizziness, tremors, seizures, syncope, weakness, light-headedness, numbness and headaches.   Psychiatric/Behavioral:  Negative for suicidal ideas.         VITALS  Visit Vitals  BP (!) 140/68 (BP Location: Left arm, Patient Position: Sitting)   Pulse 71   Temp 97.6 °F (36.4 °C) (Oral)   Ht 5' 6" (1.676 m)   Wt 101 kg (222 lb 10.6 oz)   SpO2 96%   BMI 35.94 kg/m²          Physical Exam  Constitutional:       General: She is not in acute distress.     Appearance: Normal appearance. She is well-developed. She is not diaphoretic.   HENT:      Head: Normocephalic.      Right Ear: Hearing normal.      Left Ear: Hearing normal.      Nose: Nose normal.      Mouth/Throat:      Lips: Pink.      Mouth: Mucous membranes are moist.      Pharynx: No oropharyngeal exudate.   Eyes:      General: Lids are normal.         Right eye: No discharge.         Left eye: No discharge.      Conjunctiva/sclera: Conjunctivae normal.      Pupils: Pupils are equal, round, and reactive to light.   Neck:      Thyroid: No thyromegaly.      Vascular: No carotid bruit or JVD.      Trachea: Trachea and phonation normal. No tracheal deviation.   Cardiovascular:      Rate and Rhythm: Normal rate and regular rhythm.      Pulses: Normal pulses.           Carotid pulses are 2+ on the right side and 2+ on the left side.       Radial pulses are 2+ on the right side and 2+ on the left side.        Posterior tibial pulses are 2+ on the right side and 2+ on the left side.      Heart sounds: Normal heart sounds. No murmur heard.     No friction rub. No gallop.   Pulmonary:      Effort: Pulmonary effort is normal. No respiratory distress.      Breath sounds: Normal breath " sounds. No stridor. No wheezing or rales.      Comments: Clear Anterior and Posterior BBS  Abdominal:      General: Abdomen is protuberant. Bowel sounds are normal. There is no distension.      Palpations: Abdomen is soft.      Tenderness: There is no abdominal tenderness. There is no guarding.   Musculoskeletal:         General: No tenderness or deformity. Normal range of motion.      Cervical back: Normal range of motion and neck supple. No rigidity.      Right lower leg: No edema.      Left lower leg: No edema.   Lymphadenopathy:      Head:      Right side of head: No submental, submandibular, tonsillar, preauricular, posterior auricular or occipital adenopathy.      Left side of head: No submental, submandibular, tonsillar, preauricular, posterior auricular or occipital adenopathy.      Cervical: No cervical adenopathy.      Right cervical: No superficial cervical adenopathy.     Left cervical: No superficial cervical adenopathy.   Skin:     General: Skin is warm and dry.      Capillary Refill: Capillary refill takes 2 to 3 seconds.      Coloration: Skin is not pale.      Findings: No erythema or rash.   Neurological:      Mental Status: She is alert and oriented to person, place, and time. Mental status is at baseline.      GCS: GCS eye subscore is 4. GCS verbal subscore is 5. GCS motor subscore is 6.      Motor: No abnormal muscle tone.      Coordination: Coordination normal.   Psychiatric:         Attention and Perception: Attention and perception normal.         Mood and Affect: Mood and affect normal.         Speech: Speech normal.         Behavior: Behavior normal. Behavior is cooperative.         Thought Content: Thought content normal.         Cognition and Memory: Cognition and memory normal.         Judgment: Judgment normal.          Significant Labs:  Lab Results   Component Value Date    WBC 8.86 06/04/2024    HGB 12.0 06/04/2024    HCT 37.7 06/04/2024     06/04/2024    CHOL 172 03/26/2024     TRIG 141 03/26/2024    HDL 42 03/26/2024    ALT 23 06/04/2024    AST 22 06/04/2024     06/04/2024    K 4.8 06/04/2024     06/04/2024    CREATININE 1.3 06/04/2024    BUN 23 06/04/2024    CO2 21 (L) 06/04/2024    TSH 0.533 06/04/2024    INR 0.9 06/04/2024    HGBA1C 5.4 03/26/2024       Diagnostic Studies: No relevant studies.    EKG:   Results for orders placed or performed during the hospital encounter of 06/04/24   EKG 12-lead    Collection Time: 06/04/24 10:25 AM   Result Value Ref Range    QRS Duration 82 ms    [facility] QTC Calculation 408 ms    Narrative    Test Reason : Z01.818,    Vent. Rate : 062 BPM     Atrial Rate : 062 BPM     P-R Int : 254 ms          QRS Dur : 082 ms      QT Int : 402 ms       P-R-T Axes : 051 -31 051 degrees     QTc Int : 408 ms    Sinus rhythm with sinus arrhythmia with 1st degree A-V block  Left axis deviation  Moderate voltage criteria for LVH, may be normal variant  Abnormal ECG  When compared with ECG of 30-NOV-2023 08:30,  No significant change was found  Confirmed by CHANDRA MCQUEEN MD ([id number]) on 6/4/2024 11:15:02 AM    Referred By: DESTINY KNIGHT           Confirmed By:CHANDRA MCQUEEN MD       2D ECHO:  TTE:  Results for orders placed or performed during the hospital encounter of 09/16/23   Echo   Result Value Ref Range    BSA 2.18 m2    LVOT stroke volume 89.33 cm3    LVIDd 4.26 3.5 - 6.0 cm    LV Systolic Volume 27.55 mL    LV Systolic Volume Index 13.1 mL/m2    LVIDs 2.72 2.1 - 4.0 cm    LV Diastolic Volume 81.28 mL    LV Diastolic Volume Index 38.52 mL/m2    IVS 1.14 (A) 0.6 - 1.1 cm    LVOT diameter 2.0 cm    LVOT area 3.1 cm2    FS 36 28 - 44 %    Left Ventricle Relative Wall Thickness 0.54 cm    Posterior Wall 1.16 (A) 0.6 - 1.1 cm    LV mass 171.15 g    LV Mass Index 81 g/m2    MV Peak E Edgardo 0.76 m/s    TDI LATERAL 0.10 m/s    TDI SEPTAL 0.09 m/s    E/E' ratio 8.00 m/s    MV Peak A Edgardo 0.83 m/s    TR Max Edgardo 2.74 m/s    E/A ratio 0.92     IVRT 121.79 msec    E wave  deceleration time 174.85 msec    LV SEPTAL E/E' RATIO 8.44 m/s    LA Volume Index 37.6 mL/m2    LV LATERAL E/E' RATIO 7.60 m/s    LA volume 79.27 cm3    LVOT peak cesia 1.13 m/s    LA size 4.14 cm    Left Atrium Minor Axis 5.80 cm    Left Atrium Major Axis 5.42 cm    LA volume (mod) 44.77 cm3    LA WIDTH 4.02 cm    LA Volume Index (Mod) 21.2 mL/m2    RVDD 3.12 cm    TAPSE 2.49 cm    RA Major Axis 4.30 cm    RA Width 3.60 cm    AV mean gradient 9 mmHg    AV peak gradient 17 mmHg    Ao peak cesia 2.06 m/s    Ao VTI 53.25 cm    LVOT peak VTI 28.45 cm    AV valve area 1.68 cm²    AV Velocity Ratio 0.55     AV index (prosthetic) 0.53     JONATHAN by Velocity Ratio 1.72 cm²    Triscuspid Valve Regurgitation Peak Gradient 30 mmHg    Sinus 2.62 cm    STJ 2.38 cm    Ascending aorta 3.15 cm    Mean e' 0.10 m/s    ZLVIDS -3.12     ZLVIDD -4.40     TV resting pulmonary artery pressure 33 mmHg    RV TB RVSP 6 mmHg    Est. RA pres 3 mmHg    Narrative      Left Ventricle: The left ventricle is normal in size. Normal wall   thickness. There is concentric remodeling. Normal wall motion. There is   normal systolic function with a visually estimated ejection fraction of 60   - 65%. There is normal diastolic function.    Right Ventricle: Normal right ventricular cavity size. Wall thickness   is normal. Right ventricle wall motion  is normal. Systolic function is   normal.    Left Atrium: Left atrium is mildly dilated.    Aortic Valve: The aortic valve is a trileaflet valve. There is mild   aortic valve sclerosis. Mildly restricted motion. There is mild stenosis.   Aortic valve area by VTI is 1.68 cm². Aortic valve peak velocity is 2.06   m/s. Mean gradient is 9 mmHg. The dimensionless index is 0.53.    Pulmonary Artery: The estimated pulmonary artery systolic pressure is   33 mmHg.    IVC/SVC: Normal venous pressure at 3 mmHg.         NOE:  No results found for this or any previous visit.     Imaging     Active Cardiac Conditions:  None      Revised Cardiac Risk Index   High -Risk Surgery  Intraperitoneal; Intrathoracic; suprainguinal vascular Yes- + 1 No- 0   History of Ischemic Heart Disease   (Hx of MI/positive exercise test/current chest pain due to ischemia/use of nitrate therapy/EKG with pathological Q waves) Yes- + 1 No- 0   History of CHF  (Pulmonary edema/bilateral rales or S3 gallop/PND/CXR showing pulmonary vascular redistribution) Yes- + 1 No- 0   History of CVA   (Prior stroke or TIA) Yes- + 1 No- 0   Pre-operative treatment with insulin Yes- + 1 No- 0   Pre-operative creatinine > 2mg/dl Yes- + 1 No- 0   Total:      Risk Status:  Estimated risk of cardiac complications after non-cardiac surgery using the Revised Cardiac Risk Index for Preoperative risk is 6.0 %      ARISCAT (Canet) risk index: Low: 1.6% risk of post-op pulmonary complications.    American Society of Anesthesiologists Physical Status classification (ASA): 3           No further cardiac workup needed prior to surgery.    Outpatient Subjective & Objective

## 2024-06-05 ENCOUNTER — CLINICAL SUPPORT (OUTPATIENT)
Dept: REHABILITATION | Facility: HOSPITAL | Age: 84
End: 2024-06-05
Payer: MEDICARE

## 2024-06-05 DIAGNOSIS — M25.661 DECREASED ROM OF RIGHT KNEE: ICD-10-CM

## 2024-06-05 DIAGNOSIS — Z74.09 IMPAIRED FUNCTIONAL MOBILITY, BALANCE, GAIT, AND ENDURANCE: ICD-10-CM

## 2024-06-05 DIAGNOSIS — M62.81 QUADRICEPS WEAKNESS: ICD-10-CM

## 2024-06-05 DIAGNOSIS — R29.898 WEAKNESS OF RIGHT HIP: Primary | ICD-10-CM

## 2024-06-05 PROCEDURE — 97110 THERAPEUTIC EXERCISES: CPT | Mod: PO | Performed by: PHYSICAL THERAPIST

## 2024-06-05 NOTE — PROGRESS NOTES
"              [facility]   Physical Therapy Treatment Note     Name: Nelia Littlejohn  Clinic Number: [id number]    Therapy Diagnosis:   No diagnosis found.        Physician: Ernie Rubio III, *    Visit Date: 6/5/2024    Physician Orders: PT Eval and Treat right knee  Medical Diagnosis from Referral:    Primary osteoarthritis of right knee [M17.11   Evaluation Date: 5/2/2024  Authorization Period Expiration: 4/30/2025  Plan of Care Expiration: 8/2/2024  Progress Note Due: 6/2/2024  Visit # / Visits authorized: 1/ 1, 4 /20  FOTO: -/ 3   PTA Visit #: -/5     Precautions: Standard    Time In: 910    arrived 908  Time Out:   Total Billable Time:  minutes      SUBJECTIVE     Pt reports: the knee is feeling pretty good. No pain at the present time    She was not issued a home exercise program at [facility]  Response to previous treatment: did fine   Functional change: ongoing    Pain: 0/10  Location: right knee        OBJECTIVE           TREATMENT        Nelia received the treatments listed below:     .BOLD INDICATES ACTIVITIES PERFORMED / DISCUSSED     Leg press   Recumbent bike 8'   Upright bike   UE ergometer  Treadmill   Elliptical          THERAPEUTIC EXERCISES to develop  strength, endurance, ROM, and flexibility for 55 minutes including   SUPINE  -passive knee extension  3'   -Quad sets hold 7" x 20 ea w/towel at ankle and behind knee   -SAQ x20  -heel slides x20  -knee to chest from hooklying x20  -bridge x20    SIDELYING  -clams x20 OTB   -reverse clams x20 OTB  -hydrant x20 OTB    PRONE  -  -    STANDING.  -heel raises x20    SITTING  -LAQ   x30     -SAQ   x40     NEUROMUSCULAR RE-EDUCATION activities to improve: Balance, Proprioception, motor control and stability  for - minutes. The following activities were included:  -     THERAPEUTIC ACTIVITIES to improve functional performance for -  minutes, including:  -     MANUAL THERAPY TECHNIQUES  were applied to - for - minutes.   "     MODALITY      PATIENT EDUCATION AND HOME EXERCISES     Home Exercises Provided and Patient Education Provided     Education provided:   --Findings of evaluation and examination, and affect of these on plan for treatment  -Prognosis and expectations  -Home exercise program and expectations of therapy     Written Home Exercises Provided: no  ASSESSMENT     Patient performed the exercise routine without difficulty. She did not exhibit any significant pain. Progressing with ROM and strengthening.      Nelia Is progressing towards her goals.   Pt prognosis is Good.     Pt will continue to benefit from skilled outpatient physical therapy to address the deficits listed in the problem list box on initial evaluation, provide pt/family education and to maximize pt's level of independence in the home and community environment.     Pt's spiritual, cultural and educational needs considered and pt agreeable to plan of care and goals.     Anticipated barriers to physical therapy: none    Goals:     Ankle / Lower extremity  Short Term Goals: 5 weeks   1. Pts pain decreased 20 - 40% for improved functional mobility and quality of life ONGOING  2. Pts PROM in knee flexion and extension increased by 10 to 15 degrees  in both flexion and extension to enhance AROM and functional mobility  ONGOING  3. Pts strength in the hip and thigh musculature increased by 1/2 grade to facilitate improved active mobility and functional stability ONGOING  4. Pt to exhibit correct return demonstration of exercises for self-management and independence with HEP ONGOING  5. Pt to demonstrate enhanced neuromuscular response  with single leg static balance for improved functional mobility and gait pattern  ONGOING     Long Term Goals : 10 weeks   1.  Pts pain level decreased to 75% or greater for with sitting for extended periods for restoring functional mobility and ADL. ONGOING  2. Pts AROM in knee flexion and extension increased by 10 to 15 degrees  to restore functional mobility and ADL  ONGOING  3. Pts strength in the thigh and hip musculature increased by one grade to facilitate improved active mobility and functional stability  ONGOING  4. Pt will be independent with HEP for self-management. ONGOING   5. Pt to exhibit dynamic stability on the RLE / LLE for stable functional mobility and gait. ONGOING   6. Pt to demonstrate symmetrical weight bearing w/o pain to improve gait pattern with assistive device    PLAN     Plan of care Certification: 5/2/2024 to 8/2/2024.     Outpatient Physical Therapy 2 times weekly for 10 weeks to include the following interventions: Manual Therapy, Neuromuscular Re-ed, Patient Education, Therapeutic Activities, and Therapeutic Exercise.     Sher Castro, PT

## 2024-06-07 ENCOUNTER — CLINICAL SUPPORT (OUTPATIENT)
Dept: REHABILITATION | Facility: HOSPITAL | Age: 84
End: 2024-06-07
Payer: MEDICARE

## 2024-06-07 DIAGNOSIS — M25.661 DECREASED ROM OF RIGHT KNEE: Primary | ICD-10-CM

## 2024-06-07 DIAGNOSIS — M62.81 QUADRICEPS WEAKNESS: ICD-10-CM

## 2024-06-07 DIAGNOSIS — R29.898 WEAKNESS OF RIGHT HIP: ICD-10-CM

## 2024-06-07 PROCEDURE — 97110 THERAPEUTIC EXERCISES: CPT | Mod: PO | Performed by: PHYSICAL THERAPIST

## 2024-06-07 PROCEDURE — 97112 NEUROMUSCULAR REEDUCATION: CPT | Mod: PO | Performed by: PHYSICAL THERAPIST

## 2024-06-07 PROCEDURE — 97530 THERAPEUTIC ACTIVITIES: CPT | Mod: PO | Performed by: PHYSICAL THERAPIST

## 2024-06-07 NOTE — PROGRESS NOTES
"              [facility]   Physical Therapy Treatment Note     Name: Nelia Littlejohn  Clinic Number: [id number]    Therapy Diagnosis:   Encounter Diagnoses   Name Primary?    Decreased ROM of right knee Yes    Quadriceps weakness     Weakness of right hip            Physician: Ernie Rubio III, *    Visit Date: 6/7/2024    Physician Orders: PT Eval and Treat right knee  Medical Diagnosis from Referral:    Primary osteoarthritis of right knee [M17.11   Evaluation Date: 5/2/2024  Authorization Period Expiration: 4/30/2025  Plan of Care Expiration: 8/2/2024  Progress Note Due: 6/2/2024  Visit # / Visits authorized: 1/ 1, 5 /20  FOTO: -/ 3   PTA Visit #: -/5     Precautions: Standard    Time In: 910    arrived 909  Time Out: 1005  Total Billable Time:  55 minutes      SUBJECTIVE     Pt reports: the knee is feeling pretty good. No pain.  She was not issued a home exercise program at [facility]  Response to previous treatment: did fine   Functional change: ongoing    Pain: 0/10  Location: right knee        OBJECTIVE           TREATMENT        Nelia received the treatments listed below:     .BOLD INDICATES ACTIVITIES PERFORMED / DISCUSSED     Leg press   Recumbent bike 8'   Upright bike   UE ergometer  Treadmill   Elliptical          THERAPEUTIC EXERCISES to develop  strength, endurance, ROM, and flexibility for 35 minutes including   SUPINE  -passive knee extension  3'   -Quad sets hold 7" x 20 ea w/towel at ankle and behind knee   -SAQ x30  -heel slides x20 3#  -knee to chest from hooklying x20 3#  -bridge x20    SIDELYING  -clams x20 OTB   -reverse clams x20 OTB  -hydrant x20 OTB    PRONE  -  -    STANDING.  -heel raises x30    SITTING  -LAQ   x30  3#    -SAQ   x40  3#      NEUROMUSCULAR RE-EDUCATION activities to improve: Balance, Proprioception, motor control and stability  for 8 minutes. The following activities were included:  -SL balance on airex 15" x 4 ea   -maria eugenia step overs x10    "   THERAPEUTIC ACTIVITIES to improve functional performance for 8  minutes, including:  - step ups x20  -partial squats 2x10  -ball squats     MANUAL THERAPY TECHNIQUES  were applied to - for - minutes.       MODALITY      PATIENT EDUCATION AND HOME EXERCISES     Home Exercises Provided and Patient Education Provided     Education provided:   --Findings of evaluation and examination, and affect of these on plan for treatment  -Prognosis and expectations  -Home exercise program and expectations of therapy     Written Home Exercises Provided: no  ASSESSMENT     Nelia was progressed to balance activities which she performed with adequate stability.  She encountered some discomfort with RLE unilateral standing but it was not limiting.  Remainder of activities performed without difficulty.    Nelia Is progressing towards her goals.   Pt prognosis is Good.     Pt will continue to benefit from skilled outpatient physical therapy to address the deficits listed in the problem list box on initial evaluation, provide pt/family education and to maximize pt's level of independence in the home and community environment.     Pt's spiritual, cultural and educational needs considered and pt agreeable to plan of care and goals.     Anticipated barriers to physical therapy: none    Goals:     Ankle / Lower extremity  Short Term Goals: 5 weeks   1. Pts pain decreased 20 - 40% for improved functional mobility and quality of life ONGOING  2. Pts PROM in knee flexion and extension increased by 10 to 15 degrees  in both flexion and extension to enhance AROM and functional mobility  ONGOING  3. Pts strength in the hip and thigh musculature increased by 1/2 grade to facilitate improved active mobility and functional stability ONGOING  4. Pt to exhibit correct return demonstration of exercises for self-management and independence with HEP ONGOING  5. Pt to demonstrate enhanced neuromuscular response  with single leg static balance for  improved functional mobility and gait pattern  ONGOING     Long Term Goals : 10 weeks   1.  Pts pain level decreased to 75% or greater for with sitting for extended periods for restoring functional mobility and ADL. ONGOING  2. Pts AROM in knee flexion and extension increased by 10 to 15 degrees to restore functional mobility and ADL  ONGOING  3. Pts strength in the thigh and hip musculature increased by one grade to facilitate improved active mobility and functional stability  ONGOING  4. Pt will be independent with HEP for self-management. ONGOING   5. Pt to exhibit dynamic stability on the RLE / LLE for stable functional mobility and gait. ONGOING   6. Pt to demonstrate symmetrical weight bearing w/o pain to improve gait pattern with assistive device    PLAN     Plan of care Certification: 5/2/2024 to 8/2/2024.     Outpatient Physical Therapy 2 times weekly for 10 weeks to include the following interventions: Manual Therapy, Neuromuscular Re-ed, Patient Education, Therapeutic Activities, and Therapeutic Exercise.     Sher Castro, PT

## 2024-06-10 ENCOUNTER — PATIENT MESSAGE (OUTPATIENT)
Dept: INTERNAL MEDICINE | Facility: CLINIC | Age: 84
End: 2024-06-10
Payer: MEDICARE

## 2024-06-11 ENCOUNTER — CLINICAL SUPPORT (OUTPATIENT)
Dept: REHABILITATION | Facility: HOSPITAL | Age: 84
End: 2024-06-11
Payer: MEDICARE

## 2024-06-11 DIAGNOSIS — Z74.09 IMPAIRED FUNCTIONAL MOBILITY, BALANCE, GAIT, AND ENDURANCE: ICD-10-CM

## 2024-06-11 DIAGNOSIS — M25.661 DECREASED ROM OF RIGHT KNEE: Primary | ICD-10-CM

## 2024-06-11 DIAGNOSIS — R29.898 WEAKNESS OF RIGHT HIP: ICD-10-CM

## 2024-06-11 DIAGNOSIS — M62.81 QUADRICEPS WEAKNESS: ICD-10-CM

## 2024-06-11 PROCEDURE — 97110 THERAPEUTIC EXERCISES: CPT | Mod: PO | Performed by: PHYSICAL THERAPIST

## 2024-06-11 PROCEDURE — 97530 THERAPEUTIC ACTIVITIES: CPT | Mod: PO | Performed by: PHYSICAL THERAPIST

## 2024-06-11 PROCEDURE — 97112 NEUROMUSCULAR REEDUCATION: CPT | Mod: PO | Performed by: PHYSICAL THERAPIST

## 2024-06-11 NOTE — PROGRESS NOTES
"              [facility]   Physical Therapy Treatment Note     Name: Nelia Littlejohn  Clinic Number: [id number]    Therapy Diagnosis:   Encounter Diagnoses   Name Primary?    Decreased ROM of right knee Yes    Impaired functional mobility, balance, gait, and endurance     Quadriceps weakness     Weakness of right hip              Physician: Ernie Rubio III, *    Visit Date: 6/11/2024    Physician Orders: PT Eval and Treat right knee  Medical Diagnosis from Referral:    Primary osteoarthritis of right knee [M17.11   Evaluation Date: 5/2/2024  Authorization Period Expiration: 4/30/2025  Plan of Care Expiration: 8/2/2024  Progress Note Due: 6/2/2024  Visit # / Visits authorized: 1/ 1, 6 /20  FOTO: -/ 3   PTA Visit #: -/5     Precautions: Standard    Time In: 905    Time Out: 1010  Total Billable Time: 65 minutes      SUBJECTIVE     Pt reports: the knee is just a little stiff . No pain.  She was not issued a home exercise program at [facility]  Response to previous treatment: did fine   Functional change: ongoing    Pain: 0/10  Location: right knee        OBJECTIVE           TREATMENT        Nelia received the treatments listed below:     .BOLD INDICATES ACTIVITIES PERFORMED / DISCUSSED     Leg press   Recumbent bike 8'   Upright bike   UE ergometer  Treadmill   Elliptical          THERAPEUTIC EXERCISES to develop  strength, endurance, ROM, and flexibility for 35 minutes including   SUPINE  -passive knee extension  3'   -Quad sets hold 7" x 20 ea w/towel at ankle and behind knee   -SAQ 2x20  -heel slides x20 3#  -knee to chest from hooklying x20 3#  -bridge x20    SIDELYING  -clams x20 OTB   -reverse clams x20 OTB  -hydrant x20 OTB    PRONE  -  -    STANDING.  -heel raises x30    SITTING  -LAQ   x30  3#    -SAQ   x40  3#      NEUROMUSCULAR RE-EDUCATION activities to improve: Balance, Proprioception, motor control and stability  for 8 minutes. The following activities were included:  -SL balance " "on airex 15" x 4 ea   -maria eugenia step overs x10      THERAPEUTIC ACTIVITIES to improve functional performance for 8  minutes, including:  - step ups x20  -partial squats 2x10  -ball squats     MANUAL THERAPY TECHNIQUES  were applied to - for - minutes.       MODALITY      PATIENT EDUCATION AND HOME EXERCISES     Home Exercises Provided and Patient Education Provided     Education provided:   --Findings of evaluation and examination, and affect of these on plan for treatment  -Prognosis and expectations  -Home exercise program and expectations of therapy     Written Home Exercises Provided: no  ASSESSMENT     The patient performed the activities noted. She demonstrated satisfactory stability with SL balance and maria eugenia step over. Pain was not a significant factor. She is progressing with CC activities.     Nelia Is progressing towards her goals.   Pt prognosis is Good.     Pt will continue to benefit from skilled outpatient physical therapy to address the deficits listed in the problem list box on initial evaluation, provide pt/family education and to maximize pt's level of independence in the home and community environment.     Pt's spiritual, cultural and educational needs considered and pt agreeable to plan of care and goals.     Anticipated barriers to physical therapy: none    Goals:     Ankle / Lower extremity  Short Term Goals: 5 weeks   1. Pts pain decreased 20 - 40% for improved functional mobility and quality of life ONGOING  2. Pts PROM in knee flexion and extension increased by 10 to 15 degrees  in both flexion and extension to enhance AROM and functional mobility  ONGOING  3. Pts strength in the hip and thigh musculature increased by 1/2 grade to facilitate improved active mobility and functional stability ONGOING  4. Pt to exhibit correct return demonstration of exercises for self-management and independence with HEP ONGOING  5. Pt to demonstrate enhanced neuromuscular response  with single leg static " balance for improved functional mobility and gait pattern  ONGOING     Long Term Goals : 10 weeks   1.  Pts pain level decreased to 75% or greater for with sitting for extended periods for restoring functional mobility and ADL. ONGOING  2. Pts AROM in knee flexion and extension increased by 10 to 15 degrees to restore functional mobility and ADL  ONGOING  3. Pts strength in the thigh and hip musculature increased by one grade to facilitate improved active mobility and functional stability  ONGOING  4. Pt will be independent with HEP for self-management. ONGOING   5. Pt to exhibit dynamic stability on the RLE / LLE for stable functional mobility and gait. ONGOING   6. Pt to demonstrate symmetrical weight bearing w/o pain to improve gait pattern with assistive device    PLAN     Plan of care Certification: 5/2/2024 to 8/2/2024.     Outpatient Physical Therapy 2 times weekly for 10 weeks to include the following interventions: Manual Therapy, Neuromuscular Re-ed, Patient Education, Therapeutic Activities, and Therapeutic Exercise.     Sher Castro, PT

## 2024-06-13 ENCOUNTER — CLINICAL SUPPORT (OUTPATIENT)
Dept: REHABILITATION | Facility: HOSPITAL | Age: 84
End: 2024-06-13
Payer: MEDICARE

## 2024-06-13 DIAGNOSIS — Z74.09 IMPAIRED FUNCTIONAL MOBILITY, BALANCE, GAIT, AND ENDURANCE: ICD-10-CM

## 2024-06-13 DIAGNOSIS — M25.661 DECREASED ROM OF RIGHT KNEE: Primary | ICD-10-CM

## 2024-06-13 DIAGNOSIS — R29.898 WEAKNESS OF RIGHT HIP: ICD-10-CM

## 2024-06-13 DIAGNOSIS — M62.81 QUADRICEPS WEAKNESS: ICD-10-CM

## 2024-06-13 PROCEDURE — 97530 THERAPEUTIC ACTIVITIES: CPT | Mod: PO,CQ

## 2024-06-13 PROCEDURE — 97112 NEUROMUSCULAR REEDUCATION: CPT | Mod: PO,CQ

## 2024-06-13 PROCEDURE — 97110 THERAPEUTIC EXERCISES: CPT | Mod: PO,CQ

## 2024-06-13 NOTE — PROGRESS NOTES
"  [facility]   Physical Therapy Treatment Note     Name: Nelia Littlejohn  Clinic Number: [id number]    Therapy Diagnosis:   Encounter Diagnoses   Name Primary?    Decreased ROM of right knee Yes    Quadriceps weakness     Weakness of right hip     Impaired functional mobility, balance, gait, and endurance         Physician: Ernie Rubio III, *    Visit Date: 6/13/2024    Physician Orders: PT Eval and Treat right knee  Medical Diagnosis from Referral:    Primary osteoarthritis of right knee [M17.11   Evaluation Date: 5/2/2024  Authorization Period Expiration: 4/30/2025  Plan of Care Expiration: 8/2/2024  Progress Note Due: 6/2/2024  Visit # / Visits authorized: 1/ 1, 7 /20  FOTO: -/ 3   PTA Visit #: 1/5     Precautions: Standard    Time In: 9:05    Time Out: 10:00  Total Billable Time: 55 minutes      SUBJECTIVE     Pt reports: she only has pain in her knee when she first gets up in the morning but after moving around a little it feels better.  She was not issued a home exercise program at   Response to previous treatment: did fine   Functional change: ongoing    Pain: 0/10  Location: right knee        OBJECTIVE           TREATMENT        Nelia received the treatments listed below:     .BOLD INDICATES ACTIVITIES PERFORMED / DISCUSSED     Leg press   Recumbent bike 8'   Upright bike   UE ergometer  Treadmill   Elliptical          THERAPEUTIC EXERCISES to develop  strength, endurance, ROM, and flexibility for 39 minutes including   SUPINE  -passive knee extension  3'   -Quad sets hold 7" x 20 ea w/towel at ankle and behind knee   -SAQ 2x20  -heel slides x20 3#  -knee to chest from hooklying x20 3#  -bridge x20    SIDELYING  -clams x20 OTB   -reverse clams x20 OTB  -hydrant x20 OTB    PRONE  -  -    STANDING.  -heel raises x30    SITTING  -LAQ   x30  3#    -SAQ   x40  3#      NEUROMUSCULAR RE-EDUCATION activities to improve: Balance, Proprioception, motor control and stability  for 8 " "minutes. The following activities were included:  -SL balance on airex 15" x 4 ea   -maria eugenia step overs x10      THERAPEUTIC ACTIVITIES to improve functional performance for 8  minutes, including:  - step ups x20  -partial squats 2x10  -ball squats     MANUAL THERAPY TECHNIQUES  were applied to - for - minutes.       MODALITY      PATIENT EDUCATION AND HOME EXERCISES     Home Exercises Provided and Patient Education Provided     Education provided:   --Findings of evaluation and examination, and affect of these on plan for treatment  -Prognosis and expectations  -Home exercise program and expectations of therapy     Written Home Exercises Provided: no  ASSESSMENT     Patient completed her therapy as noted above with no increase in symptoms prior to leaving the clinic. Patient is improving with balance exercises, only requiring fingertip touch. She is progressing with CC activities.     Nelia Is progressing towards her goals.   Pt prognosis is Good.     Pt will continue to benefit from skilled outpatient physical therapy to address the deficits listed in the problem list box on initial evaluation, provide pt/family education and to maximize pt's level of independence in the home and community environment.     Pt's spiritual, cultural and educational needs considered and pt agreeable to plan of care and goals.     Anticipated barriers to physical therapy: none    Goals:     Ankle / Lower extremity  Short Term Goals: 5 weeks   1. Pts pain decreased 20 - 40% for improved functional mobility and quality of life ONGOING  2. Pts PROM in knee flexion and extension increased by 10 to 15 degrees  in both flexion and extension to enhance AROM and functional mobility  ONGOING  3. Pts strength in the hip and thigh musculature increased by 1/2 grade to facilitate improved active mobility and functional stability ONGOING  4. Pt to exhibit correct return demonstration of exercises for self-management and independence with HEP " ONGOING  5. Pt to demonstrate enhanced neuromuscular response  with single leg static balance for improved functional mobility and gait pattern  ONGOING     Long Term Goals : 10 weeks   1.  Pts pain level decreased to 75% or greater for with sitting for extended periods for restoring functional mobility and ADL. ONGOING  2. Pts AROM in knee flexion and extension increased by 10 to 15 degrees to restore functional mobility and ADL  ONGOING  3. Pts strength in the thigh and hip musculature increased by one grade to facilitate improved active mobility and functional stability  ONGOING  4. Pt will be independent with HEP for self-management. ONGOING   5. Pt to exhibit dynamic stability on the RLE / LLE for stable functional mobility and gait. ONGOING   6. Pt to demonstrate symmetrical weight bearing w/o pain to improve gait pattern with assistive device    PLAN     Plan of care Certification: 5/2/2024 to 8/2/2024.     Outpatient Physical Therapy 2 times weekly for 10 weeks to include the following interventions: Manual Therapy, Neuromuscular Re-ed, Patient Education, Therapeutic Activities, and Therapeutic Exercise.     Bernardo Saldana, PTA

## 2024-06-14 ENCOUNTER — TELEPHONE (OUTPATIENT)
Dept: ORTHOPEDICS | Facility: CLINIC | Age: 84
End: 2024-06-14
Payer: MEDICARE

## 2024-06-14 NOTE — TELEPHONE ENCOUNTER
I spoke to the patient's daughter regarding the information below:     I called the patient today regarding surgery on 6/19/2024 with Dr. Ernie Rubio. I informed the patient that her surgery will be at  [facility] ([address] [place] [id number]). I informed the patient they must arrive at 9:00am and their surgery will start at 11:00am.     Per the [facility] COVID-19 Pre-Procedural Testing Policy (administered 10/26/2022), patients do not need tested for COVID-19 regardless of vaccination status.    I reminded the patient that they cannot eat or drink after midnight, the night before surgery.      I reminded the patient to be careful of their skin over the next few days to make sure they do not get any cuts, scratches or scrapes.    The patient verbalized that they have received their walker, bedside commode and shower chair from [facility].    The patient verbalized understanding and has no further questions.

## 2024-06-17 DIAGNOSIS — Z96.651 S/P TOTAL KNEE REPLACEMENT, RIGHT: Primary | ICD-10-CM

## 2024-06-17 RX ORDER — CEFADROXIL 500 MG/1
500 CAPSULE ORAL EVERY 12 HOURS
Qty: 14 CAPSULE | Refills: 0 | Status: SHIPPED | OUTPATIENT
Start: 2024-06-17 | End: 2024-06-27

## 2024-06-17 RX ORDER — OXYCODONE HYDROCHLORIDE 5 MG/1
TABLET ORAL
Qty: 50 TABLET | Refills: 0 | Status: SHIPPED | OUTPATIENT
Start: 2024-06-17

## 2024-06-17 RX ORDER — METHOCARBAMOL 750 MG/1
750 TABLET, FILM COATED ORAL 4 TIMES DAILY PRN
Qty: 40 TABLET | Refills: 0 | Status: SHIPPED | OUTPATIENT
Start: 2024-06-17

## 2024-06-17 RX ORDER — AMOXICILLIN 250 MG
1 CAPSULE ORAL DAILY
Qty: 30 TABLET | Refills: 0 | Status: SHIPPED | OUTPATIENT
Start: 2024-06-17

## 2024-06-17 RX ORDER — PANTOPRAZOLE SODIUM 40 MG/1
40 TABLET, DELAYED RELEASE ORAL DAILY
Qty: 30 TABLET | Refills: 0 | Status: SHIPPED | OUTPATIENT
Start: 2024-06-17

## 2024-06-17 RX ORDER — DEXTROMETHORPHAN HYDROBROMIDE, GUAIFENESIN 5; 100 MG/5ML; MG/5ML
650 LIQUID ORAL EVERY 8 HOURS
Qty: 120 TABLET | Refills: 0 | Status: SHIPPED | OUTPATIENT
Start: 2024-06-17

## 2024-06-19 ENCOUNTER — ANESTHESIA EVENT (OUTPATIENT)
Dept: SURGERY | Facility: HOSPITAL | Age: 84
End: 2024-06-19
Payer: MEDICARE

## 2024-06-19 ENCOUNTER — ANESTHESIA (OUTPATIENT)
Dept: SURGERY | Facility: HOSPITAL | Age: 84
End: 2024-06-19
Payer: MEDICARE

## 2024-06-19 ENCOUNTER — HOSPITAL ENCOUNTER (OUTPATIENT)
Facility: HOSPITAL | Age: 84
Discharge: HOME | End: 2024-06-20
Attending: ORTHOPAEDIC SURGERY | Admitting: ORTHOPAEDIC SURGERY
Payer: MEDICARE

## 2024-06-19 DIAGNOSIS — Z01.818 PRE-OP TESTING: Primary | ICD-10-CM

## 2024-06-19 DIAGNOSIS — M79.609 PAIN IN EXTREMITY, UNSPECIFIED EXTREMITY: ICD-10-CM

## 2024-06-19 DIAGNOSIS — M17.11 PRIMARY OSTEOARTHRITIS OF RIGHT KNEE: ICD-10-CM

## 2024-06-19 DIAGNOSIS — Z96.651 S/P TOTAL KNEE REPLACEMENT, RIGHT: ICD-10-CM

## 2024-06-19 PROCEDURE — 20985 CPTR-ASST DIR MS PX: CPT | Mod: ,,, | Performed by: ORTHOPAEDIC SURGERY

## 2024-06-19 PROCEDURE — 37000009 HC ANESTHESIA EA ADD 15 MINS: Performed by: ORTHOPAEDIC SURGERY

## 2024-06-19 PROCEDURE — 94761 N-INVAS EAR/PLS OXIMETRY MLT: CPT

## 2024-06-19 PROCEDURE — 94660 CPAP INITIATION&MGMT: CPT

## 2024-06-19 PROCEDURE — 63600175 PHARM REV CODE 636 W HCPCS: Performed by: SURGERY

## 2024-06-19 PROCEDURE — 97165 OT EVAL LOW COMPLEX 30 MIN: CPT

## 2024-06-19 PROCEDURE — 25000003 PHARM REV CODE 250

## 2024-06-19 PROCEDURE — 27447 TOTAL KNEE ARTHROPLASTY: CPT | Mod: 22,RT,, | Performed by: ORTHOPAEDIC SURGERY

## 2024-06-19 PROCEDURE — 36000713 HC OR TIME LEV V EA ADD 15 MIN: Performed by: ORTHOPAEDIC SURGERY

## 2024-06-19 PROCEDURE — 36000712 HC OR TIME LEV V 1ST 15 MIN: Performed by: ORTHOPAEDIC SURGERY

## 2024-06-19 PROCEDURE — 63600175 PHARM REV CODE 636 W HCPCS: Performed by: ORTHOPAEDIC SURGERY

## 2024-06-19 PROCEDURE — 27201423 OPTIME MED/SURG SUP & DEVICES STERILE SUPPLY: Performed by: ORTHOPAEDIC SURGERY

## 2024-06-19 PROCEDURE — 25000003 PHARM REV CODE 250: Performed by: ORTHOPAEDIC SURGERY

## 2024-06-19 PROCEDURE — 94799 UNLISTED PULMONARY SVC/PX: CPT

## 2024-06-19 PROCEDURE — 71000039 HC RECOVERY, EACH ADD'L HOUR: Performed by: ORTHOPAEDIC SURGERY

## 2024-06-19 PROCEDURE — 25000003 PHARM REV CODE 250: Performed by: NURSE ANESTHETIST, CERTIFIED REGISTERED

## 2024-06-19 PROCEDURE — 63600175 PHARM REV CODE 636 W HCPCS: Performed by: NURSE ANESTHETIST, CERTIFIED REGISTERED

## 2024-06-19 PROCEDURE — 63600175 PHARM REV CODE 636 W HCPCS

## 2024-06-19 PROCEDURE — C1776 JOINT DEVICE (IMPLANTABLE): HCPCS | Performed by: ORTHOPAEDIC SURGERY

## 2024-06-19 PROCEDURE — 99900035 HC TECH TIME PER 15 MIN (STAT)

## 2024-06-19 PROCEDURE — 97535 SELF CARE MNGMENT TRAINING: CPT

## 2024-06-19 PROCEDURE — C1713 ANCHOR/SCREW BN/BN,TIS/BN: HCPCS | Performed by: ORTHOPAEDIC SURGERY

## 2024-06-19 PROCEDURE — 27000190 HC CPAP FULL FACE MASK W/VALVE

## 2024-06-19 PROCEDURE — 71000033 HC RECOVERY, INTIAL HOUR: Performed by: ORTHOPAEDIC SURGERY

## 2024-06-19 PROCEDURE — 37000008 HC ANESTHESIA 1ST 15 MINUTES: Performed by: ORTHOPAEDIC SURGERY

## 2024-06-19 DEVICE — CEMENT BONE SIMPLEX HV RADPQ: Type: IMPLANTABLE DEVICE | Site: KNEE | Status: FUNCTIONAL

## 2024-06-19 DEVICE — ATTUNE KNEE SYSTEM FEMORAL POSTERIOR STABILIZED SIZE 5 RIGHT CEMENTED
Type: IMPLANTABLE DEVICE | Site: KNEE | Status: FUNCTIONAL
Brand: ATTUNE

## 2024-06-19 DEVICE — ATTUNE PATELLA MEDIALIZED DOME 35MM CEMENTED AOX
Type: IMPLANTABLE DEVICE | Site: KNEE | Status: FUNCTIONAL
Brand: ATTUNE

## 2024-06-19 RX ORDER — PREGABALIN 75 MG/1
75 CAPSULE ORAL
Status: COMPLETED | OUTPATIENT
Start: 2024-06-19 | End: 2024-06-19

## 2024-06-19 RX ORDER — OXYCODONE HYDROCHLORIDE 10 MG/1
10 TABLET ORAL
Status: DISCONTINUED | OUTPATIENT
Start: 2024-06-19 | End: 2024-06-20 | Stop reason: HOSPADM

## 2024-06-19 RX ORDER — ACETAMINOPHEN 500 MG
1000 TABLET ORAL
Status: COMPLETED | OUTPATIENT
Start: 2024-06-19 | End: 2024-06-19

## 2024-06-19 RX ORDER — VANCOMYCIN HYDROCHLORIDE 1 G/20ML
INJECTION, POWDER, LYOPHILIZED, FOR SOLUTION INTRAVENOUS
Status: DISCONTINUED | OUTPATIENT
Start: 2024-06-19 | End: 2024-06-19 | Stop reason: HOSPADM

## 2024-06-19 RX ORDER — NICARDIPINE HYDROCHLORIDE 2.5 MG/ML
INJECTION INTRAVENOUS
Status: DISCONTINUED | OUTPATIENT
Start: 2024-06-19 | End: 2024-06-19

## 2024-06-19 RX ORDER — SODIUM CHLORIDE 9 MG/ML
INJECTION, SOLUTION INTRAVENOUS
Status: COMPLETED | OUTPATIENT
Start: 2024-06-19 | End: 2024-06-19

## 2024-06-19 RX ORDER — MUPIROCIN 20 MG/G
1 OINTMENT TOPICAL
Status: COMPLETED | OUTPATIENT
Start: 2024-06-19 | End: 2024-06-19

## 2024-06-19 RX ORDER — ONDANSETRON HYDROCHLORIDE 2 MG/ML
4 INJECTION, SOLUTION INTRAVENOUS EVERY 8 HOURS PRN
Status: DISCONTINUED | OUTPATIENT
Start: 2024-06-19 | End: 2024-06-20 | Stop reason: HOSPADM

## 2024-06-19 RX ORDER — LIDOCAINE HYDROCHLORIDE 20 MG/ML
INJECTION INTRAVENOUS
Status: DISCONTINUED | OUTPATIENT
Start: 2024-06-19 | End: 2024-06-19

## 2024-06-19 RX ORDER — CARBOXYMETHYLCELLULOSE SODIUM 10 MG/ML
GEL OPHTHALMIC
Status: DISCONTINUED | OUTPATIENT
Start: 2024-06-19 | End: 2024-06-19

## 2024-06-19 RX ORDER — AMOXICILLIN 250 MG
1 CAPSULE ORAL 2 TIMES DAILY
Status: DISCONTINUED | OUTPATIENT
Start: 2024-06-19 | End: 2024-06-20 | Stop reason: HOSPADM

## 2024-06-19 RX ORDER — MORPHINE SULFATE 2 MG/ML
2 INJECTION, SOLUTION INTRAMUSCULAR; INTRAVENOUS
Status: DISCONTINUED | OUTPATIENT
Start: 2024-06-19 | End: 2024-06-20 | Stop reason: HOSPADM

## 2024-06-19 RX ORDER — MUPIROCIN 20 MG/G
1 OINTMENT TOPICAL 2 TIMES DAILY
Status: DISCONTINUED | OUTPATIENT
Start: 2024-06-19 | End: 2024-06-20 | Stop reason: HOSPADM

## 2024-06-19 RX ORDER — PROPOFOL 10 MG/ML
VIAL (ML) INTRAVENOUS
Status: DISCONTINUED | OUTPATIENT
Start: 2024-06-19 | End: 2024-06-19

## 2024-06-19 RX ORDER — NALOXONE HCL 0.4 MG/ML
0.02 VIAL (ML) INJECTION
Status: DISCONTINUED | OUTPATIENT
Start: 2024-06-19 | End: 2024-06-20 | Stop reason: HOSPADM

## 2024-06-19 RX ORDER — ALLOPURINOL 300 MG/1
300 TABLET ORAL DAILY
Status: DISCONTINUED | OUTPATIENT
Start: 2024-06-20 | End: 2024-06-20 | Stop reason: HOSPADM

## 2024-06-19 RX ORDER — AMLODIPINE AND BENAZEPRIL HYDROCHLORIDE 5; 20 MG/1; MG/1
2 CAPSULE ORAL EVERY MORNING
Status: DISCONTINUED | OUTPATIENT
Start: 2024-06-20 | End: 2024-06-20 | Stop reason: HOSPADM

## 2024-06-19 RX ORDER — SPIRONOLACTONE 25 MG/1
25 TABLET ORAL DAILY
Status: DISCONTINUED | OUTPATIENT
Start: 2024-06-20 | End: 2024-06-20 | Stop reason: HOSPADM

## 2024-06-19 RX ORDER — FAMOTIDINE 20 MG/1
20 TABLET, FILM COATED ORAL 2 TIMES DAILY
Status: DISCONTINUED | OUTPATIENT
Start: 2024-06-19 | End: 2024-06-20 | Stop reason: HOSPADM

## 2024-06-19 RX ORDER — CETIRIZINE HYDROCHLORIDE 10 MG/1
10 TABLET ORAL DAILY
Status: DISCONTINUED | OUTPATIENT
Start: 2024-06-20 | End: 2024-06-20 | Stop reason: HOSPADM

## 2024-06-19 RX ORDER — FENTANYL CITRATE 50 UG/ML
25 INJECTION, SOLUTION INTRAMUSCULAR; INTRAVENOUS EVERY 5 MIN PRN
Status: DISCONTINUED | OUTPATIENT
Start: 2024-06-19 | End: 2024-06-19 | Stop reason: HOSPADM

## 2024-06-19 RX ORDER — ONDANSETRON HYDROCHLORIDE 2 MG/ML
INJECTION, SOLUTION INTRAVENOUS
Status: DISCONTINUED | OUTPATIENT
Start: 2024-06-19 | End: 2024-06-19

## 2024-06-19 RX ORDER — HYDROCHLOROTHIAZIDE 25 MG/1
25 TABLET ORAL DAILY
Status: DISCONTINUED | OUTPATIENT
Start: 2024-06-20 | End: 2024-06-20 | Stop reason: HOSPADM

## 2024-06-19 RX ORDER — METOPROLOL TARTRATE 25 MG/1
100 TABLET, FILM COATED ORAL DAILY
Status: DISCONTINUED | OUTPATIENT
Start: 2024-06-20 | End: 2024-06-20 | Stop reason: HOSPADM

## 2024-06-19 RX ORDER — METHOCARBAMOL 750 MG/1
750 TABLET, FILM COATED ORAL 3 TIMES DAILY
Status: DISCONTINUED | OUTPATIENT
Start: 2024-06-19 | End: 2024-06-20 | Stop reason: HOSPADM

## 2024-06-19 RX ORDER — ASPIRIN 81 MG/1
81 TABLET ORAL ONCE
Status: COMPLETED | OUTPATIENT
Start: 2024-06-19 | End: 2024-06-19

## 2024-06-19 RX ORDER — ATORVASTATIN CALCIUM 20 MG/1
80 TABLET, FILM COATED ORAL DAILY
Status: DISCONTINUED | OUTPATIENT
Start: 2024-06-20 | End: 2024-06-20 | Stop reason: HOSPADM

## 2024-06-19 RX ORDER — SODIUM CHLORIDE 0.9 % (FLUSH) 0.9 %
10 SYRINGE (ML) INJECTION
Status: DISCONTINUED | OUTPATIENT
Start: 2024-06-19 | End: 2024-06-19 | Stop reason: HOSPADM

## 2024-06-19 RX ORDER — PREGABALIN 75 MG/1
75 CAPSULE ORAL NIGHTLY
Status: DISCONTINUED | OUTPATIENT
Start: 2024-06-19 | End: 2024-06-20 | Stop reason: HOSPADM

## 2024-06-19 RX ORDER — ACETAMINOPHEN 500 MG
1000 TABLET ORAL EVERY 6 HOURS
Status: DISCONTINUED | OUTPATIENT
Start: 2024-06-19 | End: 2024-06-20 | Stop reason: HOSPADM

## 2024-06-19 RX ORDER — HYDRALAZINE HYDROCHLORIDE 25 MG/1
100 TABLET, FILM COATED ORAL 2 TIMES DAILY
Status: DISCONTINUED | OUTPATIENT
Start: 2024-06-20 | End: 2024-06-20 | Stop reason: HOSPADM

## 2024-06-19 RX ORDER — MORPHINE SULFATE 1 MG/ML
INJECTION, SOLUTION EPIDURAL; INTRATHECAL; INTRAVENOUS
Status: DISCONTINUED | OUTPATIENT
Start: 2024-06-19 | End: 2024-06-19 | Stop reason: HOSPADM

## 2024-06-19 RX ORDER — POLYETHYLENE GLYCOL 3350 17 G/17G
17 POWDER, FOR SOLUTION ORAL DAILY
Status: DISCONTINUED | OUTPATIENT
Start: 2024-06-19 | End: 2024-06-20 | Stop reason: HOSPADM

## 2024-06-19 RX ORDER — LIDOCAINE HYDROCHLORIDE 10 MG/ML
1 INJECTION, SOLUTION EPIDURAL; INFILTRATION; INTRACAUDAL; PERINEURAL
Status: DISCONTINUED | OUTPATIENT
Start: 2024-06-19 | End: 2024-06-19 | Stop reason: HOSPADM

## 2024-06-19 RX ORDER — BISACODYL 10 MG/1
10 SUPPOSITORY RECTAL EVERY 12 HOURS PRN
Status: DISCONTINUED | OUTPATIENT
Start: 2024-06-19 | End: 2024-06-20 | Stop reason: HOSPADM

## 2024-06-19 RX ORDER — OXYCODONE HYDROCHLORIDE 5 MG/1
5 TABLET ORAL
Status: DISCONTINUED | OUTPATIENT
Start: 2024-06-19 | End: 2024-06-20 | Stop reason: HOSPADM

## 2024-06-19 RX ORDER — SODIUM CHLORIDE 9 MG/ML
INJECTION, SOLUTION INTRAVENOUS CONTINUOUS
Status: DISCONTINUED | OUTPATIENT
Start: 2024-06-19 | End: 2024-06-20 | Stop reason: HOSPADM

## 2024-06-19 RX ORDER — METHYLPREDNISOLONE ACETATE 40 MG/ML
INJECTION, SUSPENSION INTRA-ARTICULAR; INTRALESIONAL; INTRAMUSCULAR; SOFT TISSUE
Status: DISCONTINUED | OUTPATIENT
Start: 2024-06-19 | End: 2024-06-19 | Stop reason: HOSPADM

## 2024-06-19 RX ORDER — PROCHLORPERAZINE EDISYLATE 5 MG/ML
5 INJECTION INTRAMUSCULAR; INTRAVENOUS EVERY 6 HOURS PRN
Status: DISCONTINUED | OUTPATIENT
Start: 2024-06-19 | End: 2024-06-20 | Stop reason: HOSPADM

## 2024-06-19 RX ORDER — FENTANYL CITRATE 50 UG/ML
100 INJECTION, SOLUTION INTRAMUSCULAR; INTRAVENOUS
Status: DISCONTINUED | OUTPATIENT
Start: 2024-06-19 | End: 2024-06-19 | Stop reason: HOSPADM

## 2024-06-19 RX ORDER — MIDAZOLAM HYDROCHLORIDE 1 MG/ML
4 INJECTION, SOLUTION INTRAMUSCULAR; INTRAVENOUS
Status: DISCONTINUED | OUTPATIENT
Start: 2024-06-19 | End: 2024-06-19 | Stop reason: HOSPADM

## 2024-06-19 RX ORDER — DEXAMETHASONE SODIUM PHOSPHATE 4 MG/ML
INJECTION, SOLUTION INTRA-ARTICULAR; INTRALESIONAL; INTRAMUSCULAR; INTRAVENOUS; SOFT TISSUE
Status: DISCONTINUED | OUTPATIENT
Start: 2024-06-19 | End: 2024-06-19

## 2024-06-19 RX ORDER — FAMOTIDINE 10 MG/ML
INJECTION INTRAVENOUS
Status: DISCONTINUED | OUTPATIENT
Start: 2024-06-19 | End: 2024-06-19

## 2024-06-19 RX ORDER — TALC
6 POWDER (GRAM) TOPICAL NIGHTLY PRN
Status: DISCONTINUED | OUTPATIENT
Start: 2024-06-19 | End: 2024-06-19 | Stop reason: HOSPADM

## 2024-06-19 RX ORDER — ROPIVACAINE HYDROCHLORIDE 5 MG/ML
INJECTION, SOLUTION EPIDURAL; INFILTRATION; PERINEURAL
Status: DISCONTINUED | OUTPATIENT
Start: 2024-06-19 | End: 2024-06-19 | Stop reason: HOSPADM

## 2024-06-19 RX ORDER — NAPROXEN SODIUM 220 MG/1
81 TABLET, FILM COATED ORAL DAILY
Status: ON HOLD | COMMUNITY
End: 2024-06-19 | Stop reason: HOSPADM

## 2024-06-19 RX ADMIN — TRANEXAMIC ACID 1000 MG: 100 INJECTION INTRAVENOUS at 11:06

## 2024-06-19 RX ADMIN — NICARDIPINE HYDROCHLORIDE 0.2 MG: 25 INJECTION, SOLUTION INTRAVENOUS at 11:06

## 2024-06-19 RX ADMIN — METHOCARBAMOL 750 MG: 750 TABLET ORAL at 08:06

## 2024-06-19 RX ADMIN — SODIUM CHLORIDE: 9 INJECTION, SOLUTION INTRAVENOUS at 09:06

## 2024-06-19 RX ADMIN — CEFAZOLIN 2 G: 2 INJECTION, POWDER, FOR SOLUTION INTRAMUSCULAR; INTRAVENOUS at 11:06

## 2024-06-19 RX ADMIN — VANCOMYCIN HYDROCHLORIDE 1500 MG: 1.5 INJECTION, POWDER, LYOPHILIZED, FOR SOLUTION INTRAVENOUS at 09:06

## 2024-06-19 RX ADMIN — SODIUM CHLORIDE: 9 INJECTION, SOLUTION INTRAVENOUS at 02:06

## 2024-06-19 RX ADMIN — MUPIROCIN 1 G: 20 OINTMENT TOPICAL at 08:06

## 2024-06-19 RX ADMIN — LIDOCAINE HYDROCHLORIDE 60 MG: 20 INJECTION INTRAVENOUS at 11:06

## 2024-06-19 RX ADMIN — MEPIVACAINE HYDROCHLORIDE 3 ML: 20 INJECTION, SOLUTION EPIDURAL; INFILTRATION at 11:06

## 2024-06-19 RX ADMIN — TRANEXAMIC ACID 1000 MG: 100 INJECTION INTRAVENOUS at 12:06

## 2024-06-19 RX ADMIN — DOCUSATE SODIUM AND SENNOSIDES 1 TABLET: 8.6; 5 TABLET, FILM COATED ORAL at 08:06

## 2024-06-19 RX ADMIN — CEFAZOLIN 2 G: 2 INJECTION, POWDER, FOR SOLUTION INTRAMUSCULAR; INTRAVENOUS at 08:06

## 2024-06-19 RX ADMIN — MUPIROCIN 1 G: 20 OINTMENT TOPICAL at 09:06

## 2024-06-19 RX ADMIN — FENTANYL CITRATE 25 MCG: 50 INJECTION, SOLUTION INTRAMUSCULAR; INTRAVENOUS at 11:06

## 2024-06-19 RX ADMIN — FAMOTIDINE 20 MG: 10 INJECTION, SOLUTION INTRAVENOUS at 11:06

## 2024-06-19 RX ADMIN — FAMOTIDINE 20 MG: 20 TABLET ORAL at 08:06

## 2024-06-19 RX ADMIN — ACETAMINOPHEN 1000 MG: 500 TABLET ORAL at 09:06

## 2024-06-19 RX ADMIN — PROPOFOL 50 MCG/KG/MIN: 10 INJECTION, EMULSION INTRAVENOUS at 11:06

## 2024-06-19 RX ADMIN — PROPOFOL 20 MG: 10 INJECTION, EMULSION INTRAVENOUS at 11:06

## 2024-06-19 RX ADMIN — FENTANYL CITRATE 25 MCG: 50 INJECTION, SOLUTION INTRAMUSCULAR; INTRAVENOUS at 02:06

## 2024-06-19 RX ADMIN — DEXAMETHASONE SODIUM PHOSPHATE 8 MG: 4 INJECTION, SOLUTION INTRAMUSCULAR; INTRAVENOUS at 11:06

## 2024-06-19 RX ADMIN — SODIUM CHLORIDE, SODIUM GLUCONATE, SODIUM ACETATE, POTASSIUM CHLORIDE, MAGNESIUM CHLORIDE, SODIUM PHOSPHATE, DIBASIC, AND POTASSIUM PHOSPHATE: .53; .5; .37; .037; .03; .012; .00082 INJECTION, SOLUTION INTRAVENOUS at 11:06

## 2024-06-19 RX ADMIN — METHOCARBAMOL 750 MG: 750 TABLET ORAL at 02:06

## 2024-06-19 RX ADMIN — PREGABALIN 75 MG: 75 CAPSULE ORAL at 08:06

## 2024-06-19 RX ADMIN — OXYCODONE 5 MG: 5 TABLET ORAL at 02:06

## 2024-06-19 RX ADMIN — ASPIRIN 81 MG: 81 TABLET, COATED ORAL at 08:06

## 2024-06-19 RX ADMIN — CARBOXYMETHYLCELLULOSE SODIUM 4 DROP: 10 GEL OPHTHALMIC at 11:06

## 2024-06-19 RX ADMIN — ONDANSETRON 4 MG: 2 INJECTION INTRAMUSCULAR; INTRAVENOUS at 12:06

## 2024-06-19 RX ADMIN — SODIUM CHLORIDE, SODIUM GLUCONATE, SODIUM ACETATE, POTASSIUM CHLORIDE, MAGNESIUM CHLORIDE, SODIUM PHOSPHATE, DIBASIC, AND POTASSIUM PHOSPHATE: .53; .5; .37; .037; .03; .012; .00082 INJECTION, SOLUTION INTRAVENOUS at 12:06

## 2024-06-19 RX ADMIN — SODIUM CHLORIDE: 9 INJECTION, SOLUTION INTRAVENOUS at 08:06

## 2024-06-19 RX ADMIN — PREGABALIN 75 MG: 75 CAPSULE ORAL at 09:06

## 2024-06-19 RX ADMIN — ACETAMINOPHEN 1000 MG: 500 TABLET ORAL at 06:06

## 2024-06-19 NOTE — PT/OT/SLP PROGRESS
Physical Therapy      Patient Name:  Nelia Littlejohn   MRN:  [id number]    Patient not seen today secondary to nausea. Will follow-up again in AM.

## 2024-06-19 NOTE — TRANSFER OF CARE
"Anesthesia Transfer of Care Note    Patient: Nelia Littlejohn    Procedure(s) Performed: Procedure(s) (LRB):  ARTHROPLASTY, KNEE, TOTAL, USING COMPUTER-ASSISTED NAVIGATION: VELYS: RIGHT: ABHAY VILLA (Right)    Patient location: PACU    Anesthesia Type: MAC and spinal    Transport from OR: Transported from OR on 6-10 L/min O2 by face mask with adequate spontaneous ventilation    Post pain: adequate analgesia    Post assessment: no apparent anesthetic complications    Post vital signs: stable    Level of consciousness: alert, awake and oriented    Nausea/Vomiting: no nausea/vomiting    Complications: none    Transfer of care protocol was followed      Last vitals: Visit Vitals  BP (!) 151/67   Pulse (!) 54   Temp 36.4 °C (97.6 °F) (Oral)   Resp 12   Ht 5' 6" (1.676 m)   Wt 100.7 kg (222 lb)   SpO2 100%   Breastfeeding No   BMI 35.83 kg/m²     "

## 2024-06-19 NOTE — ANESTHESIA PREPROCEDURE EVALUATION
Pre-operative evaluation for Procedure(s) (LRB):  ARTHROPLASTY, KNEE, TOTAL, USING COMPUTER-ASSISTED NAVIGATION: VELYS: RIGHT: DEPUY - ATTUNE (Right)    Nelia Littlejohn is a 84 y.o. female     Patient Active Problem List   Diagnosis    Mixed hyperlipidemia    H/O: stroke, left eye, transient blindness, no residual,     Interval gout    Essential hypertension    Multinodular goiter    Primary osteoarthritis of both knees    Obstructive sleep apnea syndrome    History of prediabetes 9/2016    Status post total left knee replacement, 10-.    Stage 3b chronic kidney disease    Insomnia    Class 2 severe obesity with body mass index (BMI) of 35 to 39.9 with serious comorbidity    Impaired functional mobility, balance, gait, and endurance    TIA (transient ischemic attack), 6/29/2021    Aortic atherosclerosis (CT 2014)    Peripheral vascular disease    Coronary artery disease involving native coronary artery of native heart without angina pectoris    Stage 3a chronic kidney disease    History of DVT (deep vein thrombosis)    Decreased ROM of right knee    Weakness of right hip    Quadriceps weakness       Review of patient's allergies indicates:  No Known Allergies    No current facility-administered medications on file prior to encounter.     Current Outpatient Medications on File Prior to Encounter   Medication Sig Dispense Refill    allopurinoL (ZYLOPRIM) 300 MG tablet Take 1 tablet (300 mg total) by mouth once daily. 90 tablet 3    amLODIPine-benazepriL (LOTREL) 10-40 mg per capsule TAKE 1 TABLET BY MOUTH EVERY MORNING 90 capsule 3    aspirin (ECOTRIN) 325 MG EC tablet Take 1 tablet (325 mg total) by mouth once daily. 90 tablet 3    aspirin 81 MG Chew Take 81 mg by mouth once daily. Patient instructed by Dr. Sharma on 4/18/24      atorvastatin (LIPITOR) 80 MG tablet TAKE 1 TABLET BY MOUTH EVERY DAY 90 tablet 3    metoprolol ta-hydrochlorothiaz (LOPRESSOR HCT) 100-25 mg per tablet Take 1 tablet by mouth  once daily. 90 tablet 3    MULTIVIT-IRON-MIN-FOLIC ACID 3,500-18-0.4 UNIT-MG-MG ORAL CHEW Take by mouth once daily.      spironolactone (ALDACTONE) 25 MG tablet Take 1 tablet (25 mg total) by mouth once daily. 90 tablet 2    aspirin 81 MG Chew Take 81 mg by mouth once daily.         Past Surgical History:   Procedure Laterality Date    CATARACT EXTRACTION Bilateral     EYE SURGERY      HYSTERECTOMY      JOINT REPLACEMENT      Yag capsulotomy left eye  10/10/2012         CBC:  Lab Results   Component Value Date    WBC 8.86 06/04/2024    RBC 4.08 06/04/2024    HGB 12.0 06/04/2024    HCT 37.7 06/04/2024     06/04/2024    MCV 92 06/04/2024    MCH 29.4 06/04/2024    MCHC 31.8 (L) 06/04/2024       CMP:   Lab Results   Component Value Date     06/04/2024    K 4.8 06/04/2024     06/04/2024    CO2 21 (L) 06/04/2024    BUN 23 06/04/2024    CREATININE 1.3 06/04/2024    GLU 96 06/04/2024    CALCIUM 9.9 06/04/2024    ALBUMIN 3.9 06/04/2024    PROT 7.3 06/04/2024    ALKPHOS 92 06/04/2024    ALT 23 06/04/2024    AST 22 06/04/2024    BILITOT 0.9 06/04/2024       INR:  Lab Results   Component Value Date    INR 0.9 06/04/2024         Diagnostic Studies:      EKG:   Results for orders placed or performed during the hospital encounter of 06/04/24   EKG 12-lead    Collection Time: 06/04/24 10:25 AM   Result Value Ref Range    QRS Duration 82 ms    [facility] QTC Calculation 408 ms    Narrative    Test Reason : Z01.818,    Vent. Rate : 062 BPM     Atrial Rate : 062 BPM     P-R Int : 254 ms          QRS Dur : 082 ms      QT Int : 402 ms       P-R-T Axes : 051 -31 051 degrees     QTc Int : 408 ms    Sinus rhythm with sinus arrhythmia with 1st degree A-V block  Left axis deviation  Moderate voltage criteria for LVH, may be normal variant  Abnormal ECG  When compared with ECG of 30-NOV-2023 08:30,  No significant change was found  Confirmed by CHANDRA MCQUEEN MD ([id number]) on 6/4/2024 11:15:02 AM    Referred By: DESTINY KNIGHT          "  Confirmed By:CHANDRA MCQUEEN MD        2D Echo:  Results for orders placed or performed during the hospital encounter of 01/25/17   2D echo with color flow doppler   Result Value Ref Range    EF + QEF 60 55 - 65    Mitral Valve Regurgitation TRIVIAL     Diastolic Dysfunction No     Est. PA Systolic Pressure 25     Tricuspid Valve Regurgitation MILD        Stress Test:   No results found for this or any previous visit.      Pre-op Vitals [06/19/24 0939]   BP Pulse Resp Temp SpO2   (!) 150/70 74 16 36.4 °C (97.6 °F) 100 %      Height Weight BMI (Calculated)     5' 6" 222 lb 35.8         Pre-op Vitals [06/19/24 0939]   BP Pulse Resp Temp SpO2   (!) 150/70 74 16 36.4 °C (97.6 °F) 100 %      Height Weight BMI (Calculated)     5' 6" 222 lb 35.8            Pre-op Assessment    I have reviewed the Patient Summary Reports.     I have reviewed the Nursing Notes. I have reviewed the NPO Status.      Review of Systems  Anesthesia Hx:  No previous Anesthesia               Denies Personal Hx of Anesthesia complications.                    Social:  Non-Smoker       Hematology/Oncology:  Hematology Normal   Oncology Normal                                   Cardiovascular:     Hypertension   CAD          PVD                              Pulmonary:        Sleep Apnea                Renal/:  Chronic Renal Disease, CKD                Musculoskeletal:  Arthritis               Neurological:  TIA, CVA, no residual symptoms                                    Psych:  Psychiatric Normal                    Physical Exam  General: Well nourished, Cooperative, Alert and Oriented    Airway:  Mallampati: III / II  Mouth Opening: Normal  TM Distance: Normal  Tongue: Normal  Neck ROM: Normal ROM    Dental:  Dentures    Heart:  Rate: Normal  Rhythm: Regular Rhythm        Anesthesia Plan  Type of Anesthesia, risks & benefits discussed:    Anesthesia Type: Spinal, Gen ETT, CSE  Intra-op Monitoring Plan: Standard ASA Monitors  Post Op Pain Control " Plan: IV/PO Opioids PRN and multimodal analgesia  Induction:  IV  Informed Consent: Informed consent signed with the Patient and all parties understand the risks and agree with anesthesia plan.  All questions answered.   ASA Score: 3  Day of Surgery Review of History & Physical: H&P Update referred to the surgeon/provider.    Ready For Surgery From Anesthesia Perspective.     .

## 2024-06-19 NOTE — ASSESSMENT & PLAN NOTE
Nelia Littlejohn is a 84 y.o. female is s/p R TKA on 6/19/24.    Surgical dressing C/D/I  Pain control: multimodal, Anesthesia Surgical Home following  PT/OT: WBAT RLE  DVT PPx: 81mg aspirin x1 dose @ 2100 on POD#0 then Eliquis 2.5mg BID x30 days starting 0900 POD#1 , FCDs at all times when not ambulating  Abx: postop Ancef, cefadroxil at discharge  Drain: none  Guillaume: none

## 2024-06-19 NOTE — HOSPITAL COURSE
On 6/19/24, the patient arrived to the [facility] for proper pre-operative management.  Upon completion of pre-operative preparation, the patient was taken back to the operative theatre. R TKA was performed without complication and the patient was transported to the post anesthesia care unit in stable condition.  After appropriate recovery from the anaesthetic agents used during the surgery, the patient was then transported to the hospital inpatient floor.  The interim of the hospital stay from arrival on the floor up to discharge has been uncomplicated. The patient has tolerated regular diet.  The patient's pain has been controlled using a multimodal approach. Currently, the patient's pain is well controlled on an oral regimen.  The patient has been voiding without difficulty.  The patient began participation in physical therapy after surgery and has progressed throughout the entire hospital stay.  Currently, the patient's progress is sufficient to allow the them to be discharged to home safely.  The patient agrees with this assessment and desires a discharge today.

## 2024-06-19 NOTE — PT/OT/SLP EVAL
"Occupational Therapy Evaluation and Treatment    Name: Nelia Littlejohn  MRN: [id number]  Admitting Diagnosis: Primary osteoarthritis of right knee Day of Surgery  Recent Surgery: Procedure(s) (LRB):  ARTHROPLASTY, KNEE, TOTAL, USING COMPUTER-ASSISTED NAVIGATION: VELYS: RIGHT: DEPUY - ATTUNE (Right) Day of Surgery    Recommendations:     Discharge Recommendations: Low Intensity Therapy  Level of Assistance Recommended: 24 hours supervision  Discharge Equipment Recommendations: none  Barriers to discharge: None    Assessment:     Nelia Littlejohn is a 84 y.o. female with a medical diagnosis of Primary osteoarthritis of right knee. She presents with performance deficits affecting function including impaired self care skills, impaired functional mobility, orthopedic precautions. Pt was able to perform supine/sit, sit/stand, stand pivot to BSC over toilet, and stand pivot to chair c CGA and RW.  Able to walk from bed to bathroom and then from bathroom to chair in room c CGA and RW.  She had c/o wooziness and dizziness which she states started to get worse after she sat down in chair.  BP taken and found to be 166/78. RN notified.    Rehab Prognosis: Good; patient would benefit from acute OT services to address these deficits and reach maximum level of function.    Plan:     Patient to be seen daily to address the above listed problems via self-care/home management, therapeutic activities, therapeutic exercises  Plan of Care Expires: 06/20/24  Plan of Care Reviewed with: patient, family    Subjective     Chief Complaint: R TKA  Patient Comments/Goals: "I feel woozy."  Pain/Comfort:  Pain Rating 1: 4/10    Patients cultural, spiritual, Latter day conflicts given the current situation: no    Social History:  Living Environment: Patient lives with their daughter in a single story home with number of outside stair(s): 5 and tub-shower combo  Prior Level of Function: Prior to admission, patient was independent.  Roles and " Routines: Patient was driving and retired prior to admission.  Equipment Used at Home: walker, rolling, bedside commode, shower chair  DME owned (not currently used): rolling walker, bedside commode, and shower chair  Assistance Upon Discharge:  Daughter    Objective:     Communicated with RN prior to session. Patient found supine with cryotherapy, FCD, peripheral IV upon OT entry to room.    General Precautions: Standard, fall   Orthopedic Precautions: RLE weight bearing as tolerated   Braces: N/A    Respiratory Status: Room air    Occupational Performance    Gait belt applied - Yes    Bed Mobility:   Supine to sit from right side of bed with contact guard assistance    Functional Mobility/Transfers:  Sit <> Stand Transfer with contact guard assistance with rolling walker  Bed <> Chair Transfer using Stand Pivot technique with contact guard assistance with rolling walker  Toilet Transfer Stand Pivot technique with contact guard assistance with rolling walker and bedside commode  Functional Mobility: Pt was able to walk to bathroom c CGA and RW.    Activities of Daily Living:  Grooming: contact guard assistance to wash hands while standing at sink c RW.  Upper Body Dressing: minimum assistance to don hospital gown.  Toileting: contact guard assistance for toilet hygiene.      Physical Exam:  Upper Extremity Range of Motion:     -       Right Upper Extremity: WFL  -       Left Upper Extremity: WFL  Upper Extremity Strength:    -       Right Upper Extremity: WFL  -       Left Upper Extremity: WFL    AMPAC 6 Click ADL:  AMPAC Total Score: 16    Treatment & Education:  Educated on the importance of mobility to maximize recovery  Educated on the importance of OOB mobility within safe range in order to decrease adverse effects of prolonged bedrest  Educated on safety with functional mobility; hand placement to ensure safe transfers to various surfaces in prep for ADLs  Educated on performing functional mobility and ADLs  in adherence to orthopedic precautions  Educated on weight bearing status  Will continue to educate as needed      Patient clear to ambulate to/from bathroom with RN/PCT, assist x1 .    Patient left up in chair with all lines intact, call button in reach, and RN notified.    GOALS:   Multidisciplinary Problems       Occupational Therapy Goals          Problem: Occupational Therapy    Goal Priority Disciplines Outcome Interventions   Occupational Therapy Goal     OT, PT/OT Progressing    Description: Goals to be met by: 6/20/21     Patient will increase functional independence with ADLs by performing:    UE Dressing with Modified [place].  LE Dressing with Modified [place] and Assistive Devices as needed.  Grooming while standing at sink with Modified [place].  Toileting from bedside commode with Modified [place] for hygiene and clothing management.   Bathing from  shower chair/bench with Modified [place].  Toilet transfer to bedside commode with Modified [place].                         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 Vision is much better, can read.     High cholesterol     History of prediabetes 10/26/2017    HTN (hypertension) 09/25/2012    Hyperlipemia 09/11/2012    Hypertension     Interval gout 09/11/2012    Status post cataract extraction and insertion of intraocular lens 11/27/2012    sp Yag Cap OS      Stroke          Past Surgical History:   Procedure Laterality Date    CATARACT EXTRACTION Bilateral     EYE SURGERY      HYSTERECTOMY      JOINT REPLACEMENT      Yag capsulotomy left eye  10/10/2012       Time Tracking:     OT Date of Treatment: 06/19/24  OT Start Time: 1528  OT Stop Time: 1557  OT Total Time  (min): 29 min    Billable Minutes: Evaluation 15 and Self Care/Home Management 14    6/19/2024

## 2024-06-19 NOTE — PLAN OF CARE
Pre Op checklist complete. Pt resting in bed with call light in reach. All questions answered. Pt belongings at bedside and plan for family to hold. Family brought to bedside. Pt still needs anes consent.

## 2024-06-19 NOTE — PLAN OF CARE
Problem: Adult Inpatient Plan of Care  Goal: Plan of Care Review  Outcome: Progressing  Flowsheets (Taken 6/19/2024 1604)  Plan of Care Reviewed With:   patient   caregiver     Problem: Adult Inpatient Plan of Care  Goal: Patient-Specific Goal (Individualized)  Outcome: Progressing  Flowsheets (Taken 6/19/2024 1604)  Individualized Care Needs: manage pain, keep patient and family informed of plan     Problem: Adult Inpatient Plan of Care  Goal: Optimal Comfort and Wellbeing  Intervention: Monitor Pain and Promote Comfort  Flowsheets (Taken 6/19/2024 1604)  Pain Management Interventions:   care clustered   cold applied   pain management plan reviewed with patient/caregiver   pain pump in use   pillow support provided   quiet environment facilitated   warm blanket provided     Problem: Adult Inpatient Plan of Care  Goal: Optimal Comfort and Wellbeing  Intervention: Provide Person-Centered Care  Flowsheets (Taken 6/19/2024 1604)  Trust Relationship/Rapport: thoughts/feelings acknowledged     Problem: Pain Acute  Goal: Optimal Pain Control and Function  Intervention: Develop Pain Management Plan  Flowsheets (Taken 6/19/2024 1604)  Pain Management Interventions:   care clustered   cold applied   pain management plan reviewed with patient/caregiver   pain pump in use   pillow support provided   quiet environment facilitated   warm blanket provided     Problem: Pain Acute  Goal: Optimal Pain Control and Function  Intervention: Prevent or Manage Pain  Flowsheets (Taken 6/19/2024 1604)  Medication Review/Management: medications reviewed     Problem: Fall Injury Risk  Goal: Absence of Fall and Fall-Related Injury  Intervention: Identify and Manage Contributors  Flowsheets (Taken 6/19/2024 1604)  Self-Care Promotion: independence encouraged  Medication Review/Management: medications reviewed     Problem: Fall Injury Risk  Goal: Absence of Fall and Fall-Related Injury  Intervention: Promote Injury-Free Environment  Flowsheets  (Taken 6/19/2024 1604)  Safety Promotion/Fall Prevention:   assistive device/personal item within reach   Fall Risk reviewed with patient/family   diversional activities provided   in recliner, wheels locked   lighting adjusted   medications reviewed   muscle strengthening facilitated   nonskid shoes/socks when out of bed   /camera at bedside   instructed to call staff for mobility     Problem: Comorbidity Management  Goal: Blood Pressure in Desired Range  Intervention: Maintain Blood Pressure Management  Flowsheets (Taken 6/19/2024 1604)  Medication Review/Management: medications reviewed    Plan of care reviewed with patient; verbalized understanding.   Medications reviewed and administered as ordered.  Rounding for safety and patient care per policy.   Safety precautions maintained. Patient working appropriately with PT/OT.  DME at bedside.  Call light within reach, bed wheels locked, bed in lowest position, side rails ^x2, safety maintained. NADN, Will continue monitor.

## 2024-06-19 NOTE — NURSING TRANSFER
Nursing Transfer Note      6/19/2024   3:15 PM    Nurse giving handoff:kathrin spring    Nurse receiving handoff:saw rashid    Reason patient is being transferred: admit    Transfer To: 3rd floor 302    Transfer via bed    Transfer with nacl    Transported by lloyd pinon rn, rn    Medicines sent: nacl    Any special needs or follow-up needed: no    Patient belongings transferred with patient: Yes    Chart send with patient: Yes    Notified: daughter    Patient reassessed at: 6/19/24 1445     Upon arrival to floor: patient oriented to room, call bell in reach, and bed in lowest position

## 2024-06-19 NOTE — NURSING
Report received. Care assumed. Patient arrived to unit AAOx4 in hospital bed from PACU. VSS, IVF infusing. Dressing to right knee, CDI.  Pt lying supine in bed. Pt denies pain or any other concerns at this time. See assessment. Patient oriented to room. Bed in lowest position, side rails up x2, bed wheels locked and call light within reach.  Pt instructed to call for assistance, verbalized understanding. NADN. Will continue to monitor.    Nurses Note -- 4 Eyes      6/19/2024   3:33 PM      Skin assessed during: Admit      [x] No Altered Skin Integrity Present    []Prevention Measures Documented      [] Yes- Altered Skin Integrity Present or Discovered   [] LDA Added if Not in Epic (Describe Wound)   [] New Altered Skin Integrity was Present on Admit and Documented in LDA   [] Wound Image Taken    Wound Care Consulted? No    Attending Nurse:   Leydi PHILLIPS    Second RN/Staff Member:   Che ROSARIO

## 2024-06-19 NOTE — SUBJECTIVE & OBJECTIVE
"Principal Problem:Primary osteoarthritis of right knee    Principal Orthopedic Problem: same, s/p R TKA 6/19/24    Interval History: Pt seen and examined at bedside. NAEON, VSS, AF. Pain controlled. Denies fevers, chills, chest pain, SOB, N/V/D. Pending PT.       Review of patient's allergies indicates:  No Known Allergies    Current Facility-Administered Medications   Medication    0.9%  NaCl infusion    acetaminophen tablet 1,000 mg    [START ON 6/20/2024] allopurinoL tablet 300 mg    [START ON 6/20/2024] amlodipine-benazepril 5-20 mg per capsule 2 capsule    [START ON 6/20/2024] apixaban tablet 2.5 mg    aspirin EC tablet 81 mg    [START ON 6/20/2024] atorvastatin tablet 80 mg    bisacodyL suppository 10 mg    ceFAZolin 2 g in sodium chloride 0.9 % 50 mL IVPB (MB+)    [START ON 6/20/2024] cetirizine tablet 10 mg    famotidine tablet 20 mg    [START ON 6/20/2024] hydrALAZINE tablet 100 mg    [START ON 6/20/2024] hydroCHLOROthiazide tablet 25 mg    methocarbamoL tablet 750 mg    [START ON 6/20/2024] metoprolol tartrate (LOPRESSOR) tablet 100 mg    morphine injection 2 mg    mupirocin 2 % ointment 1 g    naloxone 0.4 mg/mL injection 0.02 mg    ondansetron injection 4 mg    oxyCODONE immediate release tablet 5 mg    oxyCODONE immediate release tablet Tab 10 mg    polyethylene glycol packet 17 g    pregabalin capsule 75 mg    prochlorperazine injection Soln 5 mg    senna-docusate 8.6-50 mg per tablet 1 tablet    [START ON 6/20/2024] spironolactone tablet 25 mg     Objective:     Vital Signs (Most Recent):  Temp: 97.2 °F (36.2 °C) (06/19/24 1510)  Pulse: 67 (06/19/24 1532)  Resp: 18 (06/19/24 1510)  BP: (!) 138/58 (06/19/24 1529)  SpO2: 95 % (06/19/24 1510) Vital Signs (24h Range):  Temp:  [97.2 °F (36.2 °C)-98 °F (36.7 °C)] 97.2 °F (36.2 °C)  Pulse:  [53-74] 67  Resp:  [9-21] 18  SpO2:  [95 %-100 %] 95 %  BP: (138-174)/(58-81) 138/58     Weight: 100.7 kg (222 lb)  Height: 5' 6" (167.6 cm)  Body mass index is 35.83 " kg/m².      Intake/Output Summary (Last 24 hours) at 6/19/2024 1628  Last data filed at 6/19/2024 1555  Gross per 24 hour   Intake 2000 ml   Output 200 ml   Net 1800 ml        Ortho/SPM Exam     AAOx4  NAD  Reg rate  No increased WOB    RLE:  Dressing c/d/i  SILT T/SP/DP/Cortez/Sa  Motor intact T/SP/DP  WWP extremities  FCDs in place and functioning    Significant Labs: All pertinent labs within the past 24 hours have been reviewed.    Significant Imaging: I have reviewed all pertinent imaging results/findings.

## 2024-06-19 NOTE — HPI
Nelia Littlejohn is a 84 y.o. female with history of Right knee pain. Pain is worse with activity and weight bearing.  Patient has experienced interference of activities of daily living due to decreased range of motion and an increase in joint pain and swelling.  Patient has failed non-operative treatment including NSAIDs, corticosteroid injections, viscosupplement injections, and activity modification.  Nelia Littlejohn currently ambulates using assistive device .      Relevant medical conditions of significance in perioperative period:  HTN- on medication managed by PCP  HLD- on medication managed by PCP  Hx of DVT- on medication managed by PCP  CKD 3b- monitored by PCP  ANUM- monitored by PCP

## 2024-06-19 NOTE — PLAN OF CARE
Problem: Occupational Therapy  Goal: Occupational Therapy Goal  Description: Goals to be met by: 6/20/21     Patient will increase functional independence with ADLs by performing:    UE Dressing with Modified [place].  LE Dressing with Modified [place] and Assistive Devices as needed.  Grooming while standing at sink with Modified [place].  Toileting from bedside commode with Modified [place] for hygiene and clothing management.   Bathing from  shower chair/bench with Modified [place].  Toilet transfer to bedside commode with Modified [place].    Outcome: Progressing

## 2024-06-20 VITALS
WEIGHT: 222 LBS | RESPIRATION RATE: 16 BRPM | HEART RATE: 60 BPM | DIASTOLIC BLOOD PRESSURE: 65 MMHG | SYSTOLIC BLOOD PRESSURE: 145 MMHG | BODY MASS INDEX: 35.68 KG/M2 | OXYGEN SATURATION: 99 % | HEIGHT: 66 IN | TEMPERATURE: 98 F

## 2024-06-20 PROCEDURE — 97110 THERAPEUTIC EXERCISES: CPT

## 2024-06-20 PROCEDURE — 97530 THERAPEUTIC ACTIVITIES: CPT

## 2024-06-20 PROCEDURE — 94660 CPAP INITIATION&MGMT: CPT

## 2024-06-20 PROCEDURE — 97162 PT EVAL MOD COMPLEX 30 MIN: CPT

## 2024-06-20 PROCEDURE — 25000003 PHARM REV CODE 250

## 2024-06-20 PROCEDURE — 94761 N-INVAS EAR/PLS OXIMETRY MLT: CPT

## 2024-06-20 PROCEDURE — 97116 GAIT TRAINING THERAPY: CPT

## 2024-06-20 PROCEDURE — 99900035 HC TECH TIME PER 15 MIN (STAT)

## 2024-06-20 PROCEDURE — 97535 SELF CARE MNGMENT TRAINING: CPT

## 2024-06-20 PROCEDURE — 63600175 PHARM REV CODE 636 W HCPCS

## 2024-06-20 RX ADMIN — MUPIROCIN 1 G: 20 OINTMENT TOPICAL at 09:06

## 2024-06-20 RX ADMIN — POLYETHYLENE GLYCOL 3350 17 G: 17 POWDER, FOR SOLUTION ORAL at 09:06

## 2024-06-20 RX ADMIN — ATORVASTATIN CALCIUM 80 MG: 20 TABLET, FILM COATED ORAL at 09:06

## 2024-06-20 RX ADMIN — HYDRALAZINE HYDROCHLORIDE 100 MG: 25 TABLET, FILM COATED ORAL at 09:06

## 2024-06-20 RX ADMIN — METOPROLOL TARTRATE 100 MG: 25 TABLET, FILM COATED ORAL at 09:06

## 2024-06-20 RX ADMIN — METHOCARBAMOL 750 MG: 750 TABLET ORAL at 09:06

## 2024-06-20 RX ADMIN — ACETAMINOPHEN 1000 MG: 500 TABLET ORAL at 12:06

## 2024-06-20 RX ADMIN — FAMOTIDINE 20 MG: 20 TABLET ORAL at 09:06

## 2024-06-20 RX ADMIN — CETIRIZINE HYDROCHLORIDE 10 MG: 10 TABLET, FILM COATED ORAL at 09:06

## 2024-06-20 RX ADMIN — CEFAZOLIN 2 G: 2 INJECTION, POWDER, FOR SOLUTION INTRAMUSCULAR; INTRAVENOUS at 03:06

## 2024-06-20 RX ADMIN — SPIRONOLACTONE 25 MG: 25 TABLET ORAL at 09:06

## 2024-06-20 RX ADMIN — ALLOPURINOL 300 MG: 300 TABLET ORAL at 09:06

## 2024-06-20 RX ADMIN — ACETAMINOPHEN 1000 MG: 500 TABLET ORAL at 06:06

## 2024-06-20 RX ADMIN — APIXABAN 2.5 MG: 2.5 TABLET, FILM COATED ORAL at 09:06

## 2024-06-20 RX ADMIN — AMLODIPINE BESYLATE AND BENAZEPRIL HYDROCHLORIDE 2 CAPSULE: 5; 20 CAPSULE ORAL at 09:06

## 2024-06-20 RX ADMIN — DOCUSATE SODIUM AND SENNOSIDES 1 TABLET: 8.6; 5 TABLET, FILM COATED ORAL at 09:06

## 2024-06-20 NOTE — PROGRESS NOTES
[facility])  Orthopedics  Progress Note    Patient Name: Nelia Littlejohn  MRN: [id number]  Admission Date: 6/19/2024  Hospital Length of Stay: 0 days  Attending Provider: Ernie Rubio III, MD  Primary Care Provider: Bernardo Gongora II, MD  Follow-up For: Procedure(s) (LRB):  ARTHROPLASTY, KNEE, TOTAL, USING COMPUTER-ASSISTED NAVIGATION: VELYS: RIGHT: DEPUY - ATTUNE (Right)    Post-Operative Day: 1 Day Post-Op  Subjective:     Principal Problem:Primary osteoarthritis of right knee    Principal Orthopedic Problem: same, s/p R TKA 6/19/24    Interval History: Pt seen and examined at bedside. NAEON, VSS, AF. Pain controlled. Denies fevers, chills, chest pain, SOB, N/V/D. Pending PT.       Review of patient's allergies indicates:  No Known Allergies    Current Facility-Administered Medications   Medication    0.9%  NaCl infusion    acetaminophen tablet 1,000 mg    [START ON 6/20/2024] allopurinoL tablet 300 mg    [START ON 6/20/2024] amlodipine-benazepril 5-20 mg per capsule 2 capsule    [START ON 6/20/2024] apixaban tablet 2.5 mg    aspirin EC tablet 81 mg    [START ON 6/20/2024] atorvastatin tablet 80 mg    bisacodyL suppository 10 mg    ceFAZolin 2 g in sodium chloride 0.9 % 50 mL IVPB (MB+)    [START ON 6/20/2024] cetirizine tablet 10 mg    famotidine tablet 20 mg    [START ON 6/20/2024] hydrALAZINE tablet 100 mg    [START ON 6/20/2024] hydroCHLOROthiazide tablet 25 mg    methocarbamoL tablet 750 mg    [START ON 6/20/2024] metoprolol tartrate (LOPRESSOR) tablet 100 mg    morphine injection 2 mg    mupirocin 2 % ointment 1 g    naloxone 0.4 mg/mL injection 0.02 mg    ondansetron injection 4 mg    oxyCODONE immediate release tablet 5 mg    oxyCODONE immediate release tablet Tab 10 mg    polyethylene glycol packet 17 g    pregabalin capsule 75 mg    prochlorperazine injection Soln 5 mg    senna-docusate 8.6-50 mg per tablet 1 tablet    [START ON 6/20/2024] spironolactone tablet 25 mg     Objective:  "    Vital Signs (Most Recent):  Temp: 97.2 °F (36.2 °C) (06/19/24 1510)  Pulse: 67 (06/19/24 1532)  Resp: 18 (06/19/24 1510)  BP: (!) 138/58 (06/19/24 1529)  SpO2: 95 % (06/19/24 1510) Vital Signs (24h Range):  Temp:  [97.2 °F (36.2 °C)-98 °F (36.7 °C)] 97.2 °F (36.2 °C)  Pulse:  [53-74] 67  Resp:  [9-21] 18  SpO2:  [95 %-100 %] 95 %  BP: (138-174)/(58-81) 138/58     Weight: 100.7 kg (222 lb)  Height: 5' 6" (167.6 cm)  Body mass index is 35.83 kg/m².      Intake/Output Summary (Last 24 hours) at 6/19/2024 1628  Last data filed at 6/19/2024 1555  Gross per 24 hour   Intake 2000 ml   Output 200 ml   Net 1800 ml        Ortho/SPM Exam     AAOx4  NAD  Reg rate  No increased WOB    RLE:  Dressing c/d/i  SILT T/SP/DP/[place]/Sa  Motor intact T/SP/DP  WWP extremities  FCDs in place and functioning    Significant Labs: All pertinent labs within the past 24 hours have been reviewed.    Significant Imaging: I have reviewed all pertinent imaging results/findings.  Assessment/Plan:     * Primary osteoarthritis of right knee  Nelia Littlejohn is a 84 y.o. female is s/p R TKA on 6/19/24.    Surgical dressing C/D/I  Pain control: multimodal, Anesthesia Surgical Home following  PT/OT: WBAT RLE  DVT PPx: 81mg aspirin x1 dose @ 2100 on POD#0 then Eliquis 2.5mg BID x30 days starting 0900 POD#1 , FCDs at all times when not ambulating  Abx: postop Ancef, cefadroxil at discharge  Drain: none  Guillaume: none        Class 2 severe obesity with body mass index (BMI) of 35 to 39.9 with serious comorbidity  - 2K calorie diet    Stage 3b chronic kidney disease  - MM pain control, no celebrex    Obstructive sleep apnea syndrome  - Monitor overnight O2 sats    Essential hypertension  - Continue home medications      Interval gout  - Continue home medications      Mixed hyperlipidemia  - Continue home medications            Daniel Bernardo MD  Orthopedics  [place] [facility] ([facility])    "

## 2024-06-20 NOTE — PLAN OF CARE
Problem: Physical Therapy  Goal: Physical Therapy Goal  Description: Pt met goals at [facility] to demonstrate safe level of mobility for d/c home with her dtr  to assist PRN and pt to begin OPPT 6/21/24.    Outcome: Met

## 2024-06-20 NOTE — PROGRESS NOTES
Acute Pain Service and Perioperative Surgical Home Progress Note    Interval history  Nelia Littlejohn is a 84 y.o., female,     Surgery:  Procedure(s) (LRB):  ARTHROPLASTY, KNEE, TOTAL, USING COMPUTER-ASSISTED NAVIGATION: VELYS: RIGHT: DEPUY - ATTUNE (Right)    Post Op Day #: 1     Ambulating with some assistance.  Lower extremities NV intact with good strength.  Pain well controlled on multimodal regimen.    Problem List:    Active Hospital Problems    Diagnosis  POA    *Primary osteoarthritis of right knee [M17.11]  Yes    Class 2 severe obesity with body mass index (BMI) of 35 to 39.9 with serious comorbidity [E66.01]  Yes    Stage 3b chronic kidney disease [N18.32]  Yes    Obstructive sleep apnea syndrome [G47.33]  Yes    Essential hypertension [I10]  Yes    Mixed hyperlipidemia [E78.2]  Yes    Interval gout [M10.9]  Yes      Resolved Hospital Problems   No resolved problems to display.       Subjective:       General appearance of alert, oriented, no complaints   Pain with rest: 5    Numbers   Pain with movement: 5    Numbers   Side Effects    1. Pruritis No    2. Nausea No    3. Motor Blockade No, 0=Ability to raise lower extremities off bed    4. Sedation Yes, 1=awake and alert    Schedule Medications:    acetaminophen  1,000 mg Oral Q6H    allopurinoL  300 mg Oral Daily    amlodipine-benazepril 5-20 mg  2 capsule Oral QAM    apixaban  2.5 mg Oral BID    atorvastatin  80 mg Oral Daily    cetirizine  10 mg Oral Daily    famotidine  20 mg Oral BID    hydrALAZINE  100 mg Oral BID    hydroCHLOROthiazide  25 mg Oral Daily    methocarbamoL  750 mg Oral TID    metoprolol tartrate  100 mg Oral Daily    mupirocin  1 g Nasal BID    polyethylene glycol  17 g Oral Daily    pregabalin  75 mg Oral QHS    senna-docusate 8.6-50 mg  1 tablet Oral BID    spironolactone  25 mg Oral Daily        Continuous Infusions:   sodium chloride 0.9%   Intravenous Continuous 150 mL/hr at 06/19/24 2041 New Bag at 06/19/24 2041        PRN  "Medications:    Current Facility-Administered Medications:     bisacodyL, 10 mg, Rectal, Q12H PRN    morphine, 2 mg, Intravenous, Q3H PRN    naloxone, 0.02 mg, Intravenous, PRN    ondansetron, 4 mg, Intravenous, Q8H PRN    oxyCODONE, 5 mg, Oral, Q3H PRN    oxyCODONE, 10 mg, Oral, Q3H PRN    prochlorperazine, 5 mg, Intravenous, Q6H PRN       Antibiotics:  Antibiotics (From admission, onward)      Start     Stop Route Frequency Ordered    06/19/24 2100  mupirocin 2 % ointment 1 g         06/24/24 2059 Nasl 2 times daily 06/19/24 1518               Objective:         Vital Signs (Most Recent):  Temp: 97.8 °F (36.6 °C) (06/20/24 0353)  Pulse: 64 (06/20/24 0353)  Resp: 18 (06/20/24 0353)  BP: (!) 152/70 (06/20/24 0353)  SpO2: 98 % (06/20/24 0353) Vital Signs Range (Last 24H):  Temp:  [97.2 °F (36.2 °C)-99.5 °F (37.5 °C)]   Pulse:  [53-74]   Resp:  [9-21]   BP: (138-174)/(58-81)   SpO2:  [95 %-100 %]          I & O (Last 24H):  Intake/Output Summary (Last 24 hours) at 6/20/2024 0520  Last data filed at 6/20/2024 0354  Gross per 24 hour   Intake 2000 ml   Output 1250 ml   Net 750 ml       Physical Exam:    GA: Alert, comfortable, no acute distress.   Pulmonary: Clear to auscultation . Normal respiratory ratei.  Cardiac: regular rate and rhythm.          Laboratory: reviewed in chart  CBC: No results for input(s): "WBC", "RBC", "HGB", "HCT", "PLT", "MCV", "MCH", "MCHC" in the last 72 hours.    BMP: No results for input(s): "NA", "K", "CO2", "CL", "BUN", "CREATININE", "GLU", "MG", "PHOS", "CALCIUM" in the last 72 hours.    No results for input(s): "PT", "INR", "PROTIME", "APTT" in the last 72 hours.          Assessment:         Pain control adequate    Plan:  1) Pain: Multimodal pain regimen ordered which includes acetaminophen, celecoxib, pregabalin, and prn oxycodone.  Well controlled on current regimen.  Will continue to monitor.    2) HTN, HLD, gout: continue home regimen   3) previous DVT: eliquis   4) FEN/GI: " Tolerating regular diet.     5) Dispo: Pt working well with PT/OT. Case management and SW following along for setting up home health and physical therapy. Plan to discharge home this am.              Evaluator Deysi Membreno PA-C

## 2024-06-20 NOTE — PT/OT/SLP EVAL
Physical Therapy Evaluation  , Treatment and Discharge Summary    Patient Name: Nelia Littlejohn   MRN: [id number]  Recent Surgery: Procedure(s) (LRB):  ARTHROPLASTY, KNEE, TOTAL, USING COMPUTER-ASSISTED NAVIGATION: VELYS: RIGHT: DEPUY - ATTUNE (Right) 1 Day Post-Op    Recommendations:     Discharge Recommendations: Low Intensity Therapy   Discharge Equipment Recommendations: walker, rolling, raised toilet, shower chair   Barriers to discharge:  4 ETHAN with B rails    Assessment:     Nelia Littlejohn is a 84 y.o. female admitted with a medical diagnosis of Primary osteoarthritis of right knee. She presents with the following impairments/functional limitations: weakness, impaired endurance, impaired self care skills, impaired functional mobility, gait instability, decreased lower extremity function, pain, decreased ROM, edema. Pt presents s/p R TKA with expected post-op deficits.  Pt did really well with PT today and was able to amb 80' and 150'  with RW and CGA/SBA .    She ascended/descended 4 steps with B rails and CGA Pt did well with no LOB and no dizziness.  Pt is ready for d/c home today from PT standpoint with her dtr  to assist PRN and  will benefit from OPPT for cont PT to maximize  functional recovery, strength and ROM.        Rehab Prognosis: Good; patient would benefit from cont PT services post d/c to address these deficits and reach maximum level of function.    Plan:     During this hospitalization, patient to be d/c home today with her dtr to assist PRN and OPPT to address the above listed problems via gait training, therapeutic activities, therapeutic exercises    Plan of Care Expires: 07/20/24    Subjective     Chief Complaint:  Pt reports she is feeling good today.   Patient Comments/Goals: to be able to do line dancing at the [facility] center  Pain/Comfort:  Pain Rating 1: 3/10  Location - Side 1: Right  Location 1: knee  Pain Addressed 1: Pre-medicate for activity, Reposition, Distraction  Pain Rating  Post-Intervention 1: 3/10    Social History:  Living Environment: Patient lives with their daughter in a single story home with number of outside stair(s): 4 ETHAN with B rails  Prior Level of Function: Prior to admission, patient ambulated community distances using rolling walker and household distances with QC  Equipment Used at Home: cane, quad, walker, rolling  DME owned (not currently used):  elevated toilet, rolling walker, quad cane, and shower chair  Assistance Upon Discharge:  her dtr    Objective:     Communicated with RN, Leydi, prior to session. Patient found HOB elevated with cryotherapy, FCD, peripheral IV upon PT entry to room.  Pt's dtr arrived during pt's session in the therapy gym.     General Precautions: Standard, fall   Orthopedic Precautions: RLE weight bearing as tolerated   Braces: N/A    Respiratory Status: Room air    Exams:  RLE ROM: WFL except decreased knee flex/ext due to pain and post op status  RLE Strength:  at least 3/5  LLE ROM: WNL  LLE Strength: WNL  Cognitive: Patient is oriented to Person, Place, Time, Situation  Gross Motor Coordination: WFL  Postural Exam: Patient presented with the following abnormalities:    -       Rounded shoulders  -       Forward head  Sensation:    -       Intact    Functional Mobility:  Gait belt applied - Yes  Bed Mobility  Supine to Sit: supervision for LE management  Sit to Supine: supervision for LE management  Transfers  Sit to Stand: stand by assistance with rolling walker and with cues for hand placement and foot placement  Gait  Patient ambulated 80',  30' and then 150'  with rolling walker and contact guard assistance for first 2 gait trials and then SBA for 3rd gait training trial. Patient required cues for heel strike, position in walker, sequencing, upright posture, and weight bearing status to increase independence and safety. Patient required cues ~ 25% of the time.  Balance  Sitting: GOOD: Maintains balance through MODERATE excursions  of active trunk movement  Standing: FAIR+: Needs CLOSE SUPERVISION during gait and is able to right self with minor LOB  Stairs: Pt ascended/descended 4 stair(s) with No Assistive Device with bilateral handrails with Contact Guard Assistance.     Therapeutic Activities and Exercises:  Patient educated on role of acute care PT and PT POC, safety while in hospital including calling nurse for mobility, and call light usage.  Educated about weightbearing as julio R LE and provided cuing for adherence as appropriate during session.  Educated about importance of OOB mobility and remaining up in chair most of the day.  Pt instructed on and performed therapeutic ex's for TKA HEP (QS, AP, GS, HS, Hip abd, SLR, SAQ, LAQ) x 10 reps x 3 sets each for muscle strengthening, endurance and increase knee ROM. Pt demonstrated good understanding back to PT. Patient required skilled PT for instruction of exercises and appropriate cues to perform exercises safely and appropriately.  Issued written handout of TKA HEP and reviewed with pt.  Pt ed on importance of elevating  LE above level of her heart 3-4 times a day and proper placement of pillows to keep knee extended and not flexed.  Pt and her caregiver verbalized good understanding back to PT.    Pt ed on mobility expectations at home after d/c and she verbalized good understanding back to PT  Pt ed on use of cryotherapy for edema and pain control, and on safety awareness with use of RW in the home and pt verbalized good understanding back to PT.   PT reviewed and demonstrated car transfers with pt and caregiver and answered all questions.  Pt and caregiver verbalized good understanding back to PT.   PT answered all questions for pt and her dtr    AM-PAC 6 CLICK MOBILITY  Total Score:18    Patient left up in chair with all lines intact, call button in reach, and RN notified.    GOALS:   Multidisciplinary Problems       Physical Therapy Goals       Not on file               Multidisciplinary Problems (Resolved)          Problem: Physical Therapy    Goal Priority Disciplines Outcome Goal Variances Interventions   Physical Therapy Goal   (Resolved)     PT, PT/OT Met     Description: Pt met goals at [facility] to demonstrate safe level of mobility for d/c home with her dtr  to assist PRN and pt to begin OPPT 6/21/24.                         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 Vision is much better, can read.     High cholesterol     History of prediabetes 10/26/2017    HTN (hypertension) 09/25/2012    Hyperlipemia 09/11/2012    Hypertension     Interval gout 09/11/2012    Status post cataract extraction and insertion of intraocular lens 11/27/2012    sp Yag Cap OS      Stroke        Past Surgical History:   Procedure Laterality Date    ARTHROPLASTY, KNEE, TOTAL, USING COMPUTER-ASSISTED NAVIGATION Right 6/19/2024    Procedure: ARTHROPLASTY, KNEE, TOTAL, USING COMPUTER-ASSISTED NAVIGATION: VELYS: RIGHT: DEPUY - ATTUNE;  Surgeon: Ernie Rubio III, MD;  Location: [facility];  Service: Orthopedics;  Laterality: Right;    CATARACT EXTRACTION Bilateral     EYE SURGERY      HYSTERECTOMY      JOINT REPLACEMENT      Yag capsulotomy left eye  10/10/2012       Time Tracking:     PT Received On: 06/20/24  PT Start Time: 0812  PT Stop Time: 0913  PT Total Time (min): 61 min     Billable Minutes: Evaluation 10, Gait Training 20, Therapeutic Activity 10, and Therapeutic Exercise 20    6/20/2024

## 2024-06-20 NOTE — PLAN OF CARE
Problem: Adult Inpatient Plan of Care  Goal: Plan of Care Review  Outcome: Adequate for Care Transition  Flowsheets (Taken 6/20/2024 0746)  Plan of Care Reviewed With:   patient   caregiver     Problem: Adult Inpatient Plan of Care  Goal: Patient-Specific Goal (Individualized)  Outcome: Adequate for Care Transition  Flowsheets (Taken 6/20/2024 0746)  Individualized Care Needs: manage pain, work with PT and OT, discharge teaching.     Problem: Adult Inpatient Plan of Care  Goal: Absence of Hospital-Acquired Illness or Injury  Intervention: Identify and Manage Fall Risk  Flowsheets (Taken 6/20/2024 0746)  Safety Promotion/Fall Prevention:   assistive device/personal item within reach   diversional activities provided   Fall Risk reviewed with patient/family   high risk medications identified   in recliner, wheels locked   lighting adjusted   medications reviewed   muscle strengthening facilitated   nonskid shoes/socks when out of bed   side rails raised x 2   instructed to call staff for mobility     Problem: Adult Inpatient Plan of Care  Goal: Absence of Hospital-Acquired Illness or Injury  Intervention: Prevent Skin Injury  Flowsheets (Taken 6/20/2024 0746)  Body Position: position changed independently     Problem: Adult Inpatient Plan of Care  Goal: Absence of Hospital-Acquired Illness or Injury  Intervention: Prevent and Manage VTE (Venous Thromboembolism) Risk  Flowsheets (Taken 6/20/2024 0746)  VTE Prevention/Management:   remove, assess skin, and reapply sequential compression device   ambulation promoted   bleeding precations maintained   bleeding risk assessed   bleeding risk factor(s) identified, provider notified   remove, assess skin, and reapply compression stockings   dorsiflexion/plantar flexion performed     Problem: Adult Inpatient Plan of Care  Goal: Optimal Comfort and Wellbeing  Intervention: Monitor Pain and Promote Comfort  Flowsheets (Taken 6/20/2024 0746)  Pain Management Interventions:    breathing exercises utilized   care clustered   cold applied   medication offered   pain management plan reviewed with patient/caregiver   pillow support provided   position adjusted   premedicated for activity   prescribed exercises encouraged   relaxation techniques promoted   warm blanket provided     Problem: Adult Inpatient Plan of Care  Goal: Optimal Comfort and Wellbeing  Intervention: Provide Person-Centered Care  Flowsheets (Taken 6/20/2024 0746)  Trust Relationship/Rapport:   care explained   empathic listening provided   thoughts/feelings acknowledged     Problem: Pain Acute  Goal: Optimal Pain Control and Function  Intervention: Develop Pain Management Plan  Flowsheets (Taken 6/20/2024 0746)  Pain Management Interventions:   breathing exercises utilized   care clustered   cold applied   medication offered   pain management plan reviewed with patient/caregiver   pillow support provided   position adjusted   premedicated for activity   prescribed exercises encouraged   relaxation techniques promoted   warm blanket provided     Problem: Pain Acute  Goal: Optimal Pain Control and Function  Intervention: Prevent or Manage Pain  Flowsheets (Taken 6/20/2024 0746)  Medication Review/Management:   medications reviewed   pharmacy consulted     Problem: Fall Injury Risk  Goal: Absence of Fall and Fall-Related Injury  Intervention: Identify and Manage Contributors  Flowsheets (Taken 6/20/2024 0746)  Self-Care Promotion: independence encouraged  Medication Review/Management:   medications reviewed   pharmacy consulted     Problem: Fall Injury Risk  Goal: Absence of Fall and Fall-Related Injury  Intervention: Promote Injury-Free Environment  Flowsheets (Taken 6/20/2024 0746)  Safety Promotion/Fall Prevention:   assistive device/personal item within reach   diversional activities provided   Fall Risk reviewed with patient/family   high risk medications identified   in recliner, wheels locked   lighting adjusted    medications reviewed   muscle strengthening facilitated   nonskid shoes/socks when out of bed   side rails raised x 2   instructed to call staff for mobility     Problem: Comorbidity Management  Goal: Blood Pressure in Desired Range  Intervention: Maintain Blood Pressure Management  Flowsheets (Taken 6/20/2024 8255)  Medication Review/Management:   medications reviewed   pharmacy consulted   Plan of care reviewed with patient; verbalized understanding.   Medications reviewed and administered as ordered.  Rounding for safety and patient care per policy.   Safety precautions maintained. Patient working appropriately with PT/OT.  DME at bedside.  Call light within reach, bed wheels locked, bed in lowest position, side rails ^x2, safety maintained. NADN, Will continue monitor.

## 2024-06-20 NOTE — DISCHARGE SUMMARY
[facility])  Orthopedics  Discharge Summary      Patient Name: Nelia Littlejohn  MRN: [id number]  Admission Date: 6/19/2024  Hospital Length of Stay: 0 days  Discharge Date and Time:  06/20/2024 12:47 PM  Attending Physician: Danya att. providers found   Discharging Provider: Daniel Bernardo MD  Primary Care Provider: Bernardo Gongora II, MD    HPI:   Nelia Littlejohn is a 84 y.o. female with history of Right knee pain. Pain is worse with activity and weight bearing.  Patient has experienced interference of activities of daily living due to decreased range of motion and an increase in joint pain and swelling.  Patient has failed non-operative treatment including NSAIDs, corticosteroid injections, viscosupplement injections, and activity modification.  Nelia Littlejohn currently ambulates using assistive device .      Relevant medical conditions of significance in perioperative period:  HTN- on medication managed by PCP  HLD- on medication managed by PCP  Hx of DVT- on medication managed by PCP  CKD 3b- monitored by PCP  ANUM- monitored by PCP    Procedure(s) (LRB):  ARTHROPLASTY, KNEE, TOTAL, USING COMPUTER-ASSISTED NAVIGATION: VELYS: RIGHT: DEPUY - ATTUNE (Right)      Hospital Course:  On 6/19/24, the patient arrived to the [facility] for proper pre-operative management.  Upon completion of pre-operative preparation, the patient was taken back to the operative theatre. R TKA was performed without complication and the patient was transported to the post anesthesia care unit in stable condition.  After appropriate recovery from the anaesthetic agents used during the surgery, the patient was then transported to the hospital inpatient floor.  The interim of the hospital stay from arrival on the floor up to discharge has been uncomplicated. The patient has tolerated regular diet.  The patient's pain has been controlled using a multimodal approach. Currently, the patient's pain is well  controlled on an oral regimen.  The patient has been voiding without difficulty.  The patient began participation in physical therapy after surgery and has progressed throughout the entire hospital stay.  Currently, the patient's progress is sufficient to allow the them to be discharged to home safely.  The patient agrees with this assessment and desires a discharge today.      Goals of Care Treatment Preferences:  Code Status: Full Code      Consults (From admission, onward)          Status Ordering Provider     Inpatient consult to Respiratory Care  Once        Provider:  (Not yet assigned)    Acknowledged KOTA MCINTOSH     Inpatient consult to Respiratory Care  Once        Provider:  (Not yet assigned)    Acknowledged KOTA MCINTOSH     Inpatient consult to Social Work  Once        Provider:  (Not yet assigned)    Acknowledged KOTA MCINTOSH     Inpatient consult to Pain Management  Once        Provider:  (Not yet assigned)    Acknowledged KOTA MCINTOSH     Inpatient consult to Respiratory Care  Once        Provider:  (Not yet assigned)    Acknowledged KOTA MCINTOSH            Significant Diagnostic Studies: Labs: All labs within the past 24 hours have been reviewed    Pending Diagnostic Studies:       None          Final Active Diagnoses:    Diagnosis Date Noted POA    PRINCIPAL PROBLEM:  Primary osteoarthritis of right knee [M17.11] 06/19/2024 Yes    Class 2 severe obesity with body mass index (BMI) of 35 to 39.9 with serious comorbidity [E66.01] 01/15/2020 Yes    Stage 3b chronic kidney disease [N18.32] 01/12/2019 Yes    Obstructive sleep apnea syndrome [G47.33] 03/01/2016 Yes    Essential hypertension [I10] 09/25/2012 Yes    Mixed hyperlipidemia [E78.2] 09/11/2012 Yes    Interval gout [M10.9] 09/11/2012 Yes      Problems Resolved During this Admission:      Discharged Condition: good    Disposition: Home or Self Care    Follow Up:   Follow-up Information       Ernie Rubio III, MD Follow up  in 2 week(s).    Specialty: Orthopedic Surgery  Why: For wound re-check  Contact information:  [address]  [facility] [address]  [phone]                           Patient Instructions:      CBC Auto Differential   Standing Status: Future Number of Occurrences: 1 Standing Exp. Date: 07/15/25     Comprehensive Metabolic Panel   Standing Status: Future Number of Occurrences: 1 Standing Exp. Date: 07/15/25     Protime-INR   Standing Status: Future Number of Occurrences: 1 Standing Exp. Date: 07/15/25     Activity as tolerated     Sponge bath only until clinic visit     Keep surgical extremity elevated     Lifting restrictions   Order Comments: No strenuous exercise or lifting of > 10 lbs     Weight bearing as tolerated     No driving, operating heavy equipment or signing legal documents while taking pain medication     Leave dressing on - Keep it clean, dry, and intact until clinic visit   Order Comments: Do not remove surgical dressing for 2 weeks post-op. This will be done only by MD at initial post-op visit. If dressing is completely saturated, replace with identical dressing - silver-impregnated hydrocolloid dressing.     Do not get dressings wet. Do not shower.     If dressing continues to be saturated or there are signs of infection, please call [facility] [phone] for further instructions and to make appt to be seen.     Call MD for:  temperature >100.4     Call MD for:  persistent nausea and vomiting     Call MD for:  severe uncontrolled pain     Call MD for:  difficulty breathing, headache or visual disturbances     Call MD for:  redness, tenderness, or signs of infection (pain, swelling, redness, odor or green/yellow discharge around incision site)     Call MD for:  hives     Call MD for:  persistent dizziness or light-headedness     Call MD for:  extreme fatigue     EKG 12-lead   Standing Status: Future Number of Occurrences: 1 Standing Exp. Date: 05/16/25     Medications:  Reconciled Home  Medications:      Medication List        CONTINUE taking these medications      acetaminophen 650 MG Tbsr  Commonly known as: TYLENOL  Take 1 tablet (650 mg total) by mouth every 8 (eight) hours.     allopurinoL 300 MG tablet  Commonly known as: ZYLOPRIM  Take 1 tablet (300 mg total) by mouth once daily.     amLODIPine-benazepriL 10-40 mg per capsule  Commonly known as: LOTREL  TAKE 1 TABLET BY MOUTH EVERY MORNING     atorvastatin 80 MG tablet  Commonly known as: LIPITOR  TAKE 1 TABLET BY MOUTH EVERY DAY     cefadroxil 500 MG Cap  Commonly known as: DURICEF  Take 1 capsule (500 mg total) by mouth every 12 (twelve) hours. for 7 days     cetirizine 10 MG tablet  Commonly known as: ZYRTEC  Take 10 mg by mouth once daily.     ELIQUIS 2.5 mg Tab  Generic drug: apixaban  Take 1 tablet (2.5 mg total) by mouth 2 (two) times daily.     hydrALAZINE 100 MG tablet  Commonly known as: APRESOLINE  TAKE 1 TABLET BY MOUTH TWICE DAILY FOR BLOOD PRESSURE     methocarbamoL 750 MG Tab  Commonly known as: ROBAXIN  Take 1 tablet (750 mg total) by mouth 4 (four) times daily as needed (for muscle spasms).     metoprolol ta-hydrochlorothiaz 100-25 mg per tablet  Commonly known as: LOPRESSOR HCT  Take 1 tablet by mouth once daily.     multivit-iron-min-folic acid 3,500-18-0.4 unit-mg-mg Chew  Take by mouth once daily.     oxyCODONE 5 MG immediate release tablet  Commonly known as: ROXICODONE  Take 1-2 tablets every 4-6 hours as needed for pain     pantoprazole 40 MG tablet  Commonly known as: PROTONIX  Take 1 tablet (40 mg total) by mouth once daily.     SENEXON-S 8.6-50 mg per tablet  Generic drug: senna-docusate 8.6-50 mg  Take 1 tablet by mouth once daily.     spironolactone 25 MG tablet  Commonly known as: ALDACTONE  Take 1 tablet (25 mg total) by mouth once daily.            STOP taking these medications      aspirin 325 MG EC tablet  Commonly known as: ECOTRIN     aspirin 81 MG Chew              Daniel Bernardo MD  Orthopedics  [place]  [facility] ([facility])

## 2024-06-20 NOTE — PT/OT/SLP PROGRESS
"Occupational Therapy   Treatment and Discharge Note    Name: Nelia Littlejohn  MRN: [id number]  Admitting Diagnosis:  Primary osteoarthritis of right knee  1 Day Post-Op    Recommendations:     Discharge Recommendations: Low Intensity Therapy  Discharge Equipment Recommendations:  none  Barriers to discharge:  None    Assessment:     Nelia Littlejohn is a 84 y.o. female with a medical diagnosis of Primary osteoarthritis of right knee.  She presents with R TKA. Performance deficits affecting function are impaired self care skills, impaired functional mobility, orthopedic precautions. Pt was able to perform Sit <> Stand Transfer with modified independence with rolling walker Bed <> Chair Transfer using Stand Pivot technique with modified independence with rolling walker Toilet Transfer Stand Pivot technique with modified independence with rolling walker and bedside commode.  Able to perform UB/LB dressing c modified independence  Educated pt on bathing, car transfers, and cryotherapy.       Rehab Prognosis:  Good; patient would benefit from acute skilled OT services to address these deficits and reach maximum level of function.       Plan:     Patient to be seen daily to address the above listed problems via self-care/home management, therapeutic activities, therapeutic exercises  Plan of Care Expires: 06/20/24  Plan of Care Reviewed with: patient, spouse    Subjective     Chief Complaint: R TKA  Patient/Family Comments/goals: "I am doing good."  Pain/Comfort:  Pain Rating 1: 3/10    Objective:     Communicated with: RN prior to session.  Patient found up in chair with cryotherapy, FCD upon OT entry to room.    General Precautions: Standard, fall    Orthopedic Precautions:RLE weight bearing as tolerated  Braces: N/A  Respiratory Status: Room air     Occupational Performance:     Functional Mobility/Transfers:  Patient completed Sit <> Stand Transfer with modified independence  with  rolling walker   Patient completed " Toilet Transfer Stand Pivot technique with modified independence with  rolling walker and bedside commode  Functional Mobility: Pt was able to walk to bathroom c CGA and RW.    Activities of Daily Living:  Grooming: modified independence to wash hands while standing at sink c RW.  Upper Body Dressing: modified independence to don dress.  Lower Body Dressing: modified independence to don underwear, socks, and shoes.  Toileting: modified independence for toilet hygiene.      [facility] 6 Click ADL: 24    Patient left up in chair with all lines intact, call button in reach, RN notified, and daughter present    GOALS:   Multidisciplinary Problems       Occupational Therapy Goals          Problem: Occupational Therapy    Goal Priority Disciplines Outcome Interventions   Occupational Therapy Goal     OT, PT/OT Progressing    Description: Goals to be met by: 6/20/21     Patient will increase functional independence with ADLs by performing:    UE Dressing with Modified [place].  LE Dressing with Modified [place] and Assistive Devices as needed.  Grooming while standing at sink with Modified [place].  Toileting from bedside commode with Modified [place] for hygiene and clothing management.   Bathing from  shower chair/bench with Modified [place].  Toilet transfer to bedside commode with Modified [place].                         Time Tracking:     OT Date of Treatment: 06/20/24  OT Start Time: 1002  OT Stop Time: 1028  OT Total Time (min): 26 min    Billable Minutes:Self Care/Home Management 13  Therapeutic Activity 13               6/20/2024

## 2024-06-20 NOTE — PLAN OF CARE
Plan of care reviewed with patient; verbalized understanding.   Medications reviewed and administered as ordered.  Rounding for safety and patient care per policy.   Safety precautions maintained. Patient working appropriately with PT/OT.  DME at bedside.  Call light within reach, bed wheels locked, bed in lowest position, side rails ^x2, safety maintained. NADN, Will continue monitor.      Problem: Adult Inpatient Plan of Care  Goal: Absence of Hospital-Acquired Illness or Injury  Outcome: Progressing  Intervention: Identify and Manage Fall Risk  Flowsheets (Taken 6/20/2024 0222)  Safety Promotion/Fall Prevention:   assistive device/personal item within reach   Fall Risk reviewed with patient/family   medications reviewed   nonskid shoes/socks when out of bed   side rails raised x 2   instructed to call staff for mobility  Intervention: Prevent and Manage VTE (Venous Thromboembolism) Risk  Flowsheets (Taken 6/20/2024 0222)  VTE Prevention/Management:   intravenous hydration   remove, assess skin, and reapply foot pump   dorsiflexion/plantar flexion performed   fluids promoted  Intervention: Prevent Infection  Flowsheets (Taken 6/20/2024 0222)  Infection Prevention:   hand hygiene promoted   single patient room provided   rest/sleep promoted

## 2024-06-20 NOTE — NURSING
Has met unit/department guidelines for discharge from each phase of the post procedure continuum. Patient discharged.  Instructions, placed in dc folder and Prescriptions given.  IV removed, tolerated well, w/ catheter intact, no redness or swelling to area. . Dressing to Right Knee remains CDI. Patient verbalized understanding instructions.  AAOx3, VSS, NADN, no complaints of pain noted at this time.  Wheelchair to private vehicle in care of daughter.  All personal belongings sent with pt including extra ice packs..  Blue Bracelet given applied to pts wrist and instructions given to call # on bracelet w/any surgery related issues.

## 2024-06-20 NOTE — PROGRESS NOTES
[facility]   Physical Therapy Initial Evaluation     Date: 6/21/2024   Name: Nelia Littlejohn  Clinic Number: [id number]    Therapy Diagnosis:   Encounter Diagnoses   Name Primary?    Impaired range of motion of right knee Yes    Impaired strength of hip muscles     Impaired functional mobility, balance, gait, and endurance     Quadriceps weakness     Post-op pain     Localized edema      Physician: Ernie Rubio III, *    Physician Orders: PT Eval and Treat right knee  Medical Diagnosis from Referral:    Primary osteoarthritis of right knee [M17.11]   Evaluation Date: 6/21/2024  Authorization Period Expiration: 12/31/2024  Plan of Care Expiration: 9/21/2024  Progress Note Due: 7/20/2024  Visit # / Visits authorized: 1/ 1   FOTO: -/ 3     Precautions: Standard    Time In: 911   arrived 909  Time Out: 1010  Total Appointment Time (timed & untimed codes): 59 minutes      SUBJECTIVE       History of current condition: Nelia reports the knee is hurting this AM. The past 2 days have been OK but it hurts this AM      Date of onset: S/P R-TKA 6/19/2024  Falls: 0  Pain:  Current 6/10, worst 6/10, best 3/10   Location: right knee  global distribution   Description: Aching  Aggravating Factors: Laying with knee straight  Easing Factors:  get up and move around.   Sleep: disturbed     Current Level of Function:   Personal - post-op limitations  Domestic - post-op limitations  Community - post-op limitations  Occupation - NA  Sports/recreation/fitness - post-op limitations  Prior Level of Function: independent   Prior Therapy: yes for Pre-hab of the right knee   Social History: single  lives with their daughter in a single family home.   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 Vision is much better, can read.     High cholesterol     History of prediabetes 10/26/2017    HTN (hypertension) 09/25/2012    Hyperlipemia 09/11/2012    Hypertension     Interval gout 09/11/2012    Status post cataract extraction and insertion of intraocular lens 11/27/2012    sp Yag Cap OS      Stroke        Surgical History:   Nelia Littlejohn  has a past surgical history that includes Hysterectomy; Yag capsulotomy left eye (10/10/2012); Cataract extraction (Bilateral); Eye surgery; Joint replacement; and arthroplasty, knee, total, using computer-assisted navigation (Right, 6/19/2024).    Medications:   Nelia has a current medication list which includes the following prescription(s): acetaminophen, allopurinol, amlodipine-benazepril, apixaban, atorvastatin, cefadroxil, cetirizine, hydralazine, methocarbamol, metoprolol ta-hydrochlorothiaz, multivit-iron-min-folic acid, oxycodone, pantoprazole, senna-docusate 8.6-50 mg, and spironolactone.    Allergies:   Review of patient's allergies indicates:  No Known Allergies       OBJECTIVE       Physical Appearance: the sop site is clean. Bandage free of seepage or drainage.     Sensation: Light Touch: Intact  Palpation:     Knee  Right  Pain/Dysfunction with Movement    AROM PROM MMT    Flexion 95 100 3-/5    Extension -40 -3 2+/5      MMT:                       R                    Hip flexors              2+/5                   Gluteus medius      3-/5                   Gluteus jase   2-/5                         Gait: With AD.  Device Used -  Front - wheeled walker  Analysis: demonstrates step thru gait pattern with   Bed Mobility:Assistance - to lift the RLE  Transfers: Independent        Limitation/Restriction for FOTO knee Survey- not offered    Therapist reviewed FOTO scores  "for Nelia Littlejohn on 6/21/2024.   FOTO documents entered into [employer] - see Media section.    Limitation Score: -%         TREATMENT     Total Treatment time (time-based codes) separate from Evaluation: 0 minutes      Nelia received the treatments listed below:      therapeutic exercises to develop strength, endurance, ROM, and flexibility for 0 minutes including:  SUPINE  -posterior knee stretch with ankle pumps 3'  -quad sets towel at ankle hold 7" x 20  -quad sets towel behind knee hold 7" x 20   -heel slides x15  -ball squeeze hold 7" x 20    manual therapy techniques:  were applied to the: - for - minutes, including:  -    neuromuscular re-education activities to improve: Balance, Proprioception, and stability for - minutes. The following activities were included:  -    therapeutic activities to improve functional performance for -  minutes, including:  -    gait training to improve functional mobility and safety for -  minutes, including:  -    PATIENT EDUCATION AND HOME EXERCISES     Education provided: Instruction for gait precautions were reviewed as well as sit to stand.   --Findings of evaluation and examination, and affect of these on plan for treatment  -Prognosis and expectations  -Home exercise program and expectations of therapy     Written Home Exercises Provided: yes.  Exercises were reviewed and Nelia was able to demonstrate them prior to the end of the session.  Nelia demonstrated good  understanding of the education provided.     See EMR under Patient Instructions for exercises provided 6/21/2024.  .     ASSESSMENT     Nelia is a 84 y.o. female referred to outpatient Physical Therapy with a medical diagnosis of Primary osteoarthritis of right knee [M17.11] . Patient presents with clinical findings of impaired right knee ROM and strength, gait with assistive device, post op pain and edema.     Patient prognosis is Good.   Patient will benefit from skilled outpatient Physical Therapy to address " the deficits stated above and in the chart below, provide patient /family education, and to maximize patientt's level of independence.     Plan of care discussed with patient: Yes  Patient's spiritual, cultural and educational needs considered and patient is agreeable to the plan of care and goals as stated below:     Anticipated Barriers for therapy: none    Medical Necessity is demonstrated by the following  History  Co-morbidities and personal factors that may impact the plan of care [x] LOW: no personal factors / co-morbidities  [] MODERATE: 1-2 personal factors / co-morbidities  [] HIGH: 3+ personal factors / co-morbidities    Moderate / High Support Documentation:   Co-morbidities affecting plan of care: none    Personal Factors:   no deficits     Examination  Body Structures and Functions, activity limitations and participation restrictions that may impact the plan of care [x] LOW: addressing 1-2 elements  [] MODERATE: 3+ elements  [] HIGH: 4+ elements (please support below)    Moderate / High Support Documentation: -     Clinical Presentation [x] LOW: stable  [] MODERATE: Evolving  [] HIGH: Unstable     Decision Making/ Complexity Score: low       Goals:  Post-op Knee   Short Term Goals ( 3 weeks )   1. Pt will report reduced pain by 20 to 40% or greater for improved mobility, sleep  and ambulation.   2. Patients knee edema reduced by 1/4 to 1/2 inch or greater to enhance flexion ROM and knee comfort.   3. Pt to report minimal to no pain to palpation for improved level of comfort.   4. Pt to demonstrate symmetrical weight bearing w/o increased pain for stability and functional ambulation .  5. Pts neuromuscular response will be enhanced for unilateral standing stability and balance   6. Pt to achieve 115 degrees of passive knee flexion for restoring functional knee mobility, ambulating with proper gait pattern and sit to stand ability.   7 Pt to report understanding of all instruction pertinent to patient  safety , gait instruction and HEP.    8. Pt to exhibit correct return demonstration of exercises for self-management and independence with HEP      Long Term Goals: 6 weeks  1. Pt will demonstrate increased knee flexion AROM to 120 degrees or greater for return to functional activity  2. Pt will demonstrate increased knee extension AROM to -10 to 0 degrees for standing stability   3. Pt will demonstrate increased RLE strength by 1/2 to 1 grade for improved functional stability  4. Pt to demonstrate standing stability unsupported on dynamic surfaces for functional return to ADL and recreational activities.   5.The patient will be independent amb with no assistive device on all surfaces for community distances.  6..Pt to demonstrate independence with HEP for self management          Plan     Plan of care certification: 6/21/2024 to 9/21/2024.    Outpatient Physical Therapy 2 times weekly for 10 weeks to include the following interventions: Gait Training, Manual Therapy, Neuromuscular Re-ed, Patient Education, Therapeutic Activities, and Therapeutic Exercise.     Sher Castro, PT

## 2024-06-21 ENCOUNTER — CLINICAL SUPPORT (OUTPATIENT)
Dept: REHABILITATION | Facility: HOSPITAL | Age: 84
End: 2024-06-21
Payer: MEDICARE

## 2024-06-21 ENCOUNTER — CLINICAL SUPPORT (OUTPATIENT)
Dept: ORTHOPEDICS | Facility: CLINIC | Age: 84
End: 2024-06-21
Payer: MEDICARE

## 2024-06-21 DIAGNOSIS — R60.0 LOCALIZED EDEMA: ICD-10-CM

## 2024-06-21 DIAGNOSIS — M25.661 IMPAIRED RANGE OF MOTION OF RIGHT KNEE: Primary | ICD-10-CM

## 2024-06-21 DIAGNOSIS — R29.898 IMPAIRED STRENGTH OF HIP MUSCLES: ICD-10-CM

## 2024-06-21 DIAGNOSIS — Z74.09 IMPAIRED FUNCTIONAL MOBILITY, BALANCE, GAIT, AND ENDURANCE: ICD-10-CM

## 2024-06-21 DIAGNOSIS — M62.81 QUADRICEPS WEAKNESS: ICD-10-CM

## 2024-06-21 DIAGNOSIS — Z96.651 S/P TOTAL KNEE REPLACEMENT, RIGHT: Primary | ICD-10-CM

## 2024-06-21 DIAGNOSIS — G89.18 POST-OP PAIN: ICD-10-CM

## 2024-06-21 PROCEDURE — 97110 THERAPEUTIC EXERCISES: CPT | Mod: PO | Performed by: PHYSICAL THERAPIST

## 2024-06-21 PROCEDURE — 97161 PT EVAL LOW COMPLEX 20 MIN: CPT | Mod: PO | Performed by: PHYSICAL THERAPIST

## 2024-06-21 NOTE — PROGRESS NOTES
Pain Scale: 0/10     Any issues with Fever: no    Any issues with medications (specifically DVT prophylaxis): no- taking Eliquis as prescribed     Passing gas: no   Urination: no   Constipation: no BM yet since surgery; taking stool softener as prescribed; suggested that she add Miralax to current regimen     Completing at home exercises: yes     Any concerns regarding their dressing/bandage:  no    First PT appointment:   today at 0900    Post op appointment: 7/2 at 1000     Any other concerns: none at this time           The patient was informed that if they have any urgent issues with their bandage, medications or any other health concerns regarding their surgery to call the 24/7 Orthopedic Post-op Hot Line at [phone]. The patient was reminded that if they have any chest pain or shortness of breath to call 911 or go to the ER.     The patient verbalized understanding and does not have any other questions.

## 2024-06-21 NOTE — ANESTHESIA POSTPROCEDURE EVALUATION
Anesthesia Post Evaluation    Patient: Nelia Littlejohn    Procedure(s) Performed: Procedure(s) (LRB):  ARTHROPLASTY, KNEE, TOTAL, USING COMPUTER-ASSISTED NAVIGATION: VELYS: RIGHT: DEPUY - ALLEN (Right)    Final Anesthesia Type: spinal      Patient location during evaluation: PACU  Patient participation: Yes- Able to Participate  Level of consciousness: awake and alert and oriented  Post-procedure vital signs: reviewed and stable  Pain management: adequate  Airway patency: patent  ANUM mitigation strategies: Multimodal analgesia  PONV status at discharge: No PONV  Anesthetic complications: no      Cardiovascular status: blood pressure returned to baseline and hemodynamically stable  Respiratory status: unassisted, spontaneous ventilation and room air  Hydration status: euvolemic  Follow-up not needed.              Vitals Value Taken Time   /65 06/20/24 1053   Temp 36.8 °C (98.2 °F) 06/20/24 1053   Pulse 60 06/20/24 1053   Resp 16 06/20/24 1053   SpO2 99 % 06/20/24 1053         Event Time   Out of Recovery 06/19/2024 14:52:00         Pain/Paulie Score: Pain Rating Prior to Med Admin: 3 (6/20/2024  6:31 AM)  Pain Rating Post Med Admin: 2 (6/20/2024  7:31 AM)

## 2024-06-21 NOTE — ANESTHESIA PROCEDURE NOTES
Spinal    Diagnosis: OA  Patient location during procedure: OR  Start time: 6/19/2024 11:18 AM  Timeout: 6/19/2024 11:18 AM  End time: 6/19/2024 11:23 AM    Staffing  Authorizing Provider: Yamil Macdonald MD  Performing Provider: Yamil Macdonald MD    Staffing  Performed by: Yamil Macdonald MD  Authorized by: Yamil Macdonald MD    Preanesthetic Checklist  Completed: patient identified, IV checked, site marked, risks and benefits discussed, surgical consent, monitors and equipment checked, pre-op evaluation and timeout performed  Spinal Block  Patient position: sitting  Prep: ChloraPrep  Patient monitoring: heart rate, continuous pulse ox and frequent blood pressure checks  Location: L3-4  Injection technique: single shot  CSF Fluid: blood-tinged free-flowing CSF  Needle  Needle type: Samra   Needle gauge: 25 G  Needle length: 3.5 in  Additional Documentation: incremental injection and negative aspiration for heme  Needle localization: anatomical landmarks  Assessment  Ease of block: easy  Patient's tolerance of the procedure: comfortable throughout block and no complaints  Medications:    Medications: mepivacaine (CARBOCAINE) injection 20 mg/mL (2%) - Intrathecal   3 mL - 6/19/2024 11:23:00 AM

## 2024-06-21 NOTE — PLAN OF CARE
[facility]   Physical Therapy Initial Evaluation     Date: 6/21/2024   Name: Nelia Littlejohn  Clinic Number: [id number]    Therapy Diagnosis:   Encounter Diagnoses   Name Primary?    Impaired range of motion of right knee Yes    Impaired strength of hip muscles     Impaired functional mobility, balance, gait, and endurance     Quadriceps weakness     Post-op pain     Localized edema      Physician: Ernie Rubio III, *    Physician Orders: PT Eval and Treat right knee  Medical Diagnosis from Referral:    Primary osteoarthritis of right knee [M17.11]   Evaluation Date: 6/21/2024  Authorization Period Expiration: 12/31/2024  Plan of Care Expiration: 9/21/2024  Progress Note Due: 7/20/2024  Visit # / Visits authorized: 1/ 1   FOTO: -/ 3     Precautions: Standard    Time In: 911   arrived 909  Time Out: 1010  Total Appointment Time (timed & untimed codes): 59 minutes      SUBJECTIVE       History of current condition: Nelia reports the knee is hurting this AM. The past 2 days have been OK but it hurts this AM      Date of onset: S/P R-TKA 6/19/2024  Falls: 0  Pain:  Current 6/10, worst 6/10, best 3/10   Location: right knee  global distribution   Description: Aching  Aggravating Factors: Laying with knee straight  Easing Factors: get up and move around.   Sleep: disturbed     Current Level of Function:   Personal - post-op limitations  Domestic - post-op limitations  Community - post-op limitations  Occupation - NA  Sports/recreation/fitness - post-op limitations  Prior Level of Function: independent   Prior Therapy: yes for Pre-hab of the right knee   Social History: single  lives with their daughter in a single family home.   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 Vision is much better, can read.     High cholesterol     History of prediabetes 10/26/2017    HTN (hypertension) 09/25/2012    Hyperlipemia 09/11/2012    Hypertension     Interval gout 09/11/2012    Status post cataract extraction and insertion of intraocular lens 11/27/2012    sp Yag Cap OS      Stroke        Surgical History:   Nelia Littlejohn  has a past surgical history that includes Hysterectomy; Yag capsulotomy left eye (10/10/2012); Cataract extraction (Bilateral); Eye surgery; Joint replacement; and arthroplasty, knee, total, using computer-assisted navigation (Right, 6/19/2024).    Medications:   Nelia has a current medication list which includes the following prescription(s): acetaminophen, allopurinol, amlodipine-benazepril, apixaban, atorvastatin, cefadroxil, cetirizine, hydralazine, methocarbamol, metoprolol ta-hydrochlorothiaz, multivit-iron-min-folic acid, oxycodone, pantoprazole, senna-docusate 8.6-50 mg, and spironolactone.    Allergies:   Review of patient's allergies indicates:  No Known Allergies       OBJECTIVE       Physical Appearance: the sop site is clean. Bandage free of seepage or drainage.     Sensation: Light Touch: Intact  Palpation:     Knee  Right  Pain/Dysfunction with Movement    AROM PROM MMT    Flexion 95 100 3-/5    Extension -40 -3 2+/5      MMT:                       R                    Hip flexors              2+/5                   Gluteus medius      3-/5                   Gluteus jase   2-/5                         Gait: With AD.  Device Used -  Front - wheeled walker  Analysis: demonstrates step thru gait pattern with   Bed Mobility:Assistance - to lift the RLE  Transfers: Independent        Limitation/Restriction for FOTO knee Survey- not offered    Therapist reviewed FOTO scores  "for Nelia Littlejohn on 6/21/2024.   FOTO documents entered into [employer] - see Media section.    Limitation Score: -%         TREATMENT     Total Treatment time (time-based codes) separate from Evaluation: 0 minutes      Nelia received the treatments listed below:      therapeutic exercises to develop strength, endurance, ROM, and flexibility for 0 minutes including:  SUPINE  -posterior knee stretch with ankle pumps 3'  -quad sets towel at ankle hold 7" x 20  -quad sets towel behind knee hold 7" x 20   -heel slides x15  -ball squeeze hold 7" x 20    manual therapy techniques:  were applied to the: - for - minutes, including:  -    neuromuscular re-education activities to improve: Balance, Proprioception, and stability for - minutes. The following activities were included:  -    therapeutic activities to improve functional performance for -  minutes, including:  -    gait training to improve functional mobility and safety for -  minutes, including:  -    PATIENT EDUCATION AND HOME EXERCISES     Education provided: Instruction for gait precautions were reviewed as well as sit to stand.   --Findings of evaluation and examination, and affect of these on plan for treatment  -Prognosis and expectations  -Home exercise program and expectations of therapy     Written Home Exercises Provided: yes.  Exercises were reviewed and Nelia was able to demonstrate them prior to the end of the session.  Nelia demonstrated good  understanding of the education provided.     See EMR under Patient Instructions for exercises provided 6/21/2024.  .     ASSESSMENT     Nelia is a 84 y.o. female referred to outpatient Physical Therapy with a medical diagnosis of Primary osteoarthritis of right knee [M17.11] . Patient presents with clinical findings of impaired right knee ROM and strength, gait with assistive device, post op pain and edema.     Patient prognosis is Good.   Patient will benefit from skilled outpatient Physical Therapy to address " the deficits stated above and in the chart below, provide patient /family education, and to maximize patientt's level of independence.     Plan of care discussed with patient: Yes  Patient's spiritual, cultural and educational needs considered and patient is agreeable to the plan of care and goals as stated below:     Anticipated Barriers for therapy: none    Medical Necessity is demonstrated by the following  History  Co-morbidities and personal factors that may impact the plan of care [x] LOW: no personal factors / co-morbidities  [] MODERATE: 1-2 personal factors / co-morbidities  [] HIGH: 3+ personal factors / co-morbidities    Moderate / High Support Documentation:   Co-morbidities affecting plan of care: none    Personal Factors:   no deficits     Examination  Body Structures and Functions, activity limitations and participation restrictions that may impact the plan of care [x] LOW: addressing 1-2 elements  [] MODERATE: 3+ elements  [] HIGH: 4+ elements (please support below)    Moderate / High Support Documentation: -     Clinical Presentation [x] LOW: stable  [] MODERATE: Evolving  [] HIGH: Unstable     Decision Making/ Complexity Score: low       Goals:  Post-op Knee   Short Term Goals ( 3 weeks )   1. Pt will report reduced pain by 20 to 40% or greater for improved mobility, sleep  and ambulation.   2. Patients knee edema reduced by 1/4 to 1/2 inch or greater to enhance flexion ROM and knee comfort.   3. Pt to report minimal to no pain to palpation for improved level of comfort.   4. Pt to demonstrate symmetrical weight bearing w/o increased pain for stability and functional ambulation .  5. Pts neuromuscular response will be enhanced for unilateral standing stability and balance   6. Pt to achieve 115 degrees of passive knee flexion for restoring functional knee mobility, ambulating with proper gait pattern and sit to stand ability.   7 Pt to report understanding of all instruction pertinent to patient  safety , gait instruction and HEP.    8. Pt to exhibit correct return demonstration of exercises for self-management and independence with HEP      Long Term Goals: 6 weeks  1. Pt will demonstrate increased knee flexion AROM to 120 degrees or greater for return to functional activity  2. Pt will demonstrate increased knee extension AROM to -10 to 0 degrees for standing stability   3. Pt will demonstrate increased RLE strength by 1/2 to 1 grade for improved functional stability  4. Pt to demonstrate standing stability unsupported on dynamic surfaces for functional return to ADL and recreational activities.   5.The patient will be independent amb with no assistive device on all surfaces for community distances.  6..Pt to demonstrate independence with HEP for self management          Plan     Plan of care certification: 6/21/2024 to 9/21/2024.    Outpatient Physical Therapy 2 times weekly for 10 weeks to include the following interventions: Gait Training, Manual Therapy, Neuromuscular Re-ed, Patient Education, Therapeutic Activities, and Therapeutic Exercise.     Sher Castro, PT

## 2024-06-24 ENCOUNTER — CLINICAL SUPPORT (OUTPATIENT)
Dept: REHABILITATION | Facility: HOSPITAL | Age: 84
End: 2024-06-24
Attending: ORTHOPAEDIC SURGERY
Payer: MEDICARE

## 2024-06-24 DIAGNOSIS — G89.18 POST-OP PAIN: ICD-10-CM

## 2024-06-24 DIAGNOSIS — M25.661 DECREASED ROM OF RIGHT KNEE: ICD-10-CM

## 2024-06-24 DIAGNOSIS — Z74.09 IMPAIRED FUNCTIONAL MOBILITY, BALANCE, GAIT, AND ENDURANCE: ICD-10-CM

## 2024-06-24 DIAGNOSIS — M62.81 QUADRICEPS WEAKNESS: ICD-10-CM

## 2024-06-24 DIAGNOSIS — R29.898 IMPAIRED STRENGTH OF HIP MUSCLES: ICD-10-CM

## 2024-06-24 DIAGNOSIS — M25.661 IMPAIRED RANGE OF MOTION OF RIGHT KNEE: Primary | ICD-10-CM

## 2024-06-24 DIAGNOSIS — R60.0 LOCALIZED EDEMA: ICD-10-CM

## 2024-06-24 PROCEDURE — 97110 THERAPEUTIC EXERCISES: CPT | Mod: PO | Performed by: PHYSICAL THERAPIST

## 2024-06-24 NOTE — PROGRESS NOTES
"              [facility]   Physical Therapy Treatment Note     Name: Nelia Littlejohn  Clinic Number: [id number]    Therapy Diagnosis:   Encounter Diagnoses   Name Primary?    Impaired range of motion of right knee Yes    Impaired strength of hip muscles     Localized edema     Quadriceps weakness     Post-op pain     Impaired functional mobility, balance, gait, and endurance     Decreased ROM of right knee      Physician: Ernie Rubio III, *    Visit Date: 6/24/2024    Physician Orders: PT Eval and Treat right knee  Medical Diagnosis from Referral:    Primary osteoarthritis of right knee [M17.11]   Evaluation Date: 6/21/2024  Authorization Period Expiration: 12/31/2024  Plan of Care Expiration: 9/21/2024  Progress Note Due: 7/20/2024  Visit # / Visits authorized: 1/ 1   FOTO: -/ 3   PTA Visit #: -/5     Precautions: Standard    Time In: 1210  Time Out: 1310  Total Billable Time: 35 minutes  S/P R-TKA 6/19/2024     SUBJECTIVE     Pt reports: there is some pain this AM. It comes and goes. Mostly feel the pain walking .  She was not issued a home exercise program at [facility]  Response to previous treatment: [facility] performed   Functional change: ongoing    Pain: 5/10  Location: right knee        OBJECTIVE           TREATMENT        Nelia received the treatments listed below:     .BOLD INDICATES ACTIVITIES PERFORMED / DISCUSSED     Leg press   Recumbent bike  Upright bike   UE ergometer  Treadmill   Elliptical          THERAPEUTIC EXERCISES to develop  strength, endurance, ROM, and flexibility for 45 minutes including   SUPINE  -ankle DF MWM with belt   -posterior knee stretch with ankle pumps 3'  -quad sets towel at ankle hold 7" x 20  -quad sets towel behind knee hold 7" x 20   -heel slides x15  -ball squeeze hold 7" x 20  -Iso ring hold 5" x 20   -SAQ x30       manual therapy techniques:  were applied to the: - for - minutes, including:  -     neuromuscular re-education activities to improve: " Balance, Proprioception, and stability for - minutes. The following activities were included:  -     therapeutic activities to improve functional performance for -  minutes, including:  -     gait training to improve functional mobility and safety for -  minutes, including:      PATIENT EDUCATION AND HOME EXERCISES     Home Exercises Provided and Patient Education Provided     Education provided:   --Findings of evaluation and examination, and affect of these on plan for treatment  -Prognosis and expectations  -Home exercise program and expectations of therapy     Written Home Exercises Provided: no  ASSESSMENT     The patient completed the routine. She is day 5 post-op. Demonstrating some improvement in quad sets. ROM is satisfactory at this time. Good tolerance to the exercises performed . Heel slides are challenging. Progress with CC and ROM.      Nelia Is progressing towards her goals.   Pt prognosis is Good.     Pt will continue to benefit from skilled outpatient physical therapy to address the deficits listed in the problem list box on initial evaluation, provide pt/family education and to maximize pt's level of independence in the home and community environment.     Pt's spiritual, cultural and educational needs considered and pt agreeable to plan of care and goals.     Anticipated barriers to physical therapy:  none    Goals:   Post-op Knee   Short Term Goals ( 3 weeks )   1. Pt will report reduced pain by 20 to 40% or greater for improved mobility, sleep  and ambulation.   2. Patients knee edema reduced by 1/4 to 1/2 inch or greater to enhance flexion ROM and knee comfort.   3. Pt to report minimal to no pain to palpation for improved level of comfort.   4. Pt to demonstrate symmetrical weight bearing w/o increased pain for stability and functional ambulation .  5. Pts neuromuscular response will be enhanced for unilateral standing stability and balance   6. Pt to achieve 115 degrees of passive knee  flexion for restoring functional knee mobility, ambulating with proper gait pattern and sit to stand ability.   7 Pt to report understanding of all instruction pertinent to patient safety , gait instruction and HEP.    8. Pt to exhibit correct return demonstration of exercises for self-management and independence with HEP        Long Term Goals: 6 weeks  1. Pt will demonstrate increased knee flexion AROM to 120 degrees or greater for return to functional activity  2. Pt will demonstrate increased knee extension AROM to -10 to 0 degrees for standing stability   3. Pt will demonstrate increased RLE strength by 1/2 to 1 grade for improved functional stability  4. Pt to demonstrate standing stability unsupported on dynamic surfaces for functional return to ADL and recreational activities.   5.The patient will be independent amb with no assistive device on all surfaces for community distances.  6..Pt to demonstrate independence with HEP for self management       PLAN     Plan of care certification: 6/21/2024 to 9/21/2024.     Outpatient Physical Therapy 2 times weekly for 10 weeks to include the following interventions: Gait Training, Manual Therapy, Neuromuscular Re-ed, Patient Education, Therapeutic Activities, and Therapeutic Exercise.     Sher Castro, PT

## 2024-06-25 DIAGNOSIS — Z96.651 STATUS POST RIGHT KNEE REPLACEMENT: Primary | ICD-10-CM

## 2024-06-25 RX ORDER — PREGABALIN 75 MG/1
75 CAPSULE ORAL 2 TIMES DAILY
Qty: 60 CAPSULE | Refills: 1 | Status: SHIPPED | OUTPATIENT
Start: 2024-06-25 | End: 2024-12-24

## 2024-06-25 NOTE — PROGRESS NOTES
"Pt called the joint hotline last night with c/o "burning in the feet". Lyrica rx sent to the pharmacy this morning. She will let us know if that doesn't help with the pain.    "

## 2024-06-27 ENCOUNTER — CLINICAL SUPPORT (OUTPATIENT)
Dept: REHABILITATION | Facility: HOSPITAL | Age: 84
End: 2024-06-27
Payer: MEDICARE

## 2024-06-27 DIAGNOSIS — R60.0 LOCALIZED EDEMA: ICD-10-CM

## 2024-06-27 DIAGNOSIS — M25.661 IMPAIRED RANGE OF MOTION OF RIGHT KNEE: Primary | ICD-10-CM

## 2024-06-27 DIAGNOSIS — R29.898 IMPAIRED STRENGTH OF HIP MUSCLES: ICD-10-CM

## 2024-06-27 PROCEDURE — 97110 THERAPEUTIC EXERCISES: CPT | Mod: PO | Performed by: PHYSICAL THERAPIST

## 2024-06-27 PROCEDURE — 97112 NEUROMUSCULAR REEDUCATION: CPT | Mod: PO | Performed by: PHYSICAL THERAPIST

## 2024-06-27 NOTE — PROGRESS NOTES
"              [facility]   Physical Therapy Treatment Note     Name: Nelia Littlejohn  Clinic Number: [id number]    Therapy Diagnosis:   Encounter Diagnoses   Name Primary?    Impaired range of motion of right knee Yes    Impaired strength of hip muscles     Localized edema      Physician: Ernie Rubio III, *    Visit Date: 6/27/2024    Physician Orders: PT Eval and Treat right knee  Medical Diagnosis from Referral:    Primary osteoarthritis of right knee [M17.11]   Evaluation Date: 6/21/2024  Authorization Period Expiration: 12/31/2024  Plan of Care Expiration: 9/21/2024  Progress Note Due: 7/20/2024  Visit # / Visits authorized: 1/ 1, 9 / 20  FOTO: -/ 3   PTA Visit #: -/5     Precautions: Standard    Time In: 918     arrived 916  Time Out: 1012  Total Billable Time: 54 minutes  S/P R-TKA 6/19/2024     SUBJECTIVE     Pt reports: the knee is not really hurting but just feels stiff.       She was issued a home exercise program at   Response to previous treatment: felt fine  Functional change: ongoing    Pain: 1 /10  Location: right knee        OBJECTIVE           TREATMENT        Nelia received the treatments listed below:     .BOLD INDICATES ACTIVITIES PERFORMED / DISCUSSED     Leg press   Recumbent bike  Upright bike   UE ergometer  Treadmill   Elliptical          THERAPEUTIC EXERCISES to develop  strength, endurance, ROM, and flexibility for 12  minutes including   SUPINE   -ankle DF MWM with belt   -posterior knee stretch with ankle pumps 3'  -heel slides x20    SITTING   -foot slides x30 with disc   -sustained flexion 3'     neuromuscular re-education activities to improve: Balance, Proprioception, and stability for 40 minutes. The following activities were included:    SUPINE  -quad sets towel at ankle hold 7" x 20  -quad sets towel behind knee hold 7" x 20   -ball squeeze hold 5" x 20  -Iso ring hold 5" x 20   -SAQ x30      SITTING   -SAQ= not able to initiate    STANDING "   -weight shift x20     manual therapy techniques:  were applied to the: - for - minutes, including:  -       -     therapeutic activities to improve functional performance for -  minutes, including:  -     gait training to improve functional mobility and safety for -  minutes, including:  Reinforced gait pattern with knee flexion at toe off and during swing thru phase of the gait cycle.     PATIENT EDUCATION AND HOME EXERCISES     Home Exercises Provided and Patient Education Provided     Education provided:   --Findings of evaluation and examination, and affect of these on plan for treatment  -Prognosis and expectations  -Home exercise program and expectations of therapy     Written Home Exercises Provided: no  ASSESSMENT     Nelia completed the exercises noted. She is not able to actively extend in a short arc range. Also noted ws the lack of stability in the knee with weight shifting evident by intermittent moments of quad inhibition. Pain was not a factor. She does exhibit very good knee flexion at this time. Also demonstrated improvement in her gait pattern with identifiable knee flexion at toe off and swing thru phase of her gait.     Nelia Is progressing towards her goals.   Pt prognosis is Good.     Pt will continue to benefit from skilled outpatient physical therapy to address the deficits listed in the problem list box on initial evaluation, provide pt/family education and to maximize pt's level of independence in the home and community environment.     Pt's spiritual, cultural and educational needs considered and pt agreeable to plan of care and goals.     Anticipated barriers to physical therapy:  none    Goals:   Post-op Knee   Short Term Goals ( 3 weeks )   1. Pt will report reduced pain by 20 to 40% or greater for improved mobility, sleep  and ambulation.   2. Patients knee edema reduced by 1/4 to 1/2 inch or greater to enhance flexion ROM and knee comfort.   3. Pt to report minimal to no pain to  palpation for improved level of comfort.   4. Pt to demonstrate symmetrical weight bearing w/o increased pain for stability and functional ambulation .  5. Pts neuromuscular response will be enhanced for unilateral standing stability and balance   6. Pt to achieve 115 degrees of passive knee flexion for restoring functional knee mobility, ambulating with proper gait pattern and sit to stand ability.   7 Pt to report understanding of all instruction pertinent to patient safety , gait instruction and HEP.    8. Pt to exhibit correct return demonstration of exercises for self-management and independence with HEP        Long Term Goals: 6 weeks  1. Pt will demonstrate increased knee flexion AROM to 120 degrees or greater for return to functional activity  2. Pt will demonstrate increased knee extension AROM to -10 to 0 degrees for standing stability   3. Pt will demonstrate increased RLE strength by 1/2 to 1 grade for improved functional stability  4. Pt to demonstrate standing stability unsupported on dynamic surfaces for functional return to ADL and recreational activities.   5.The patient will be independent amb with no assistive device on all surfaces for community distances.  6..Pt to demonstrate independence with HEP for self management       PLAN     Plan of care certification: 6/21/2024 to 9/21/2024.     Outpatient Physical Therapy 2 times weekly for 10 weeks to include the following interventions: Gait Training, Manual Therapy, Neuromuscular Re-ed, Patient Education, Therapeutic Activities, and Therapeutic Exercise.     Sher Castro, PT

## 2024-07-02 ENCOUNTER — OFFICE VISIT (OUTPATIENT)
Dept: ORTHOPEDICS | Facility: CLINIC | Age: 84
End: 2024-07-02
Payer: MEDICARE

## 2024-07-02 VITALS — HEIGHT: 66 IN | BODY MASS INDEX: 35.68 KG/M2 | WEIGHT: 222 LBS

## 2024-07-02 DIAGNOSIS — Z96.651 S/P TOTAL KNEE REPLACEMENT, RIGHT: ICD-10-CM

## 2024-07-02 PROCEDURE — 99024 POSTOP FOLLOW-UP VISIT: CPT | Mod: S$GLB,,,

## 2024-07-02 PROCEDURE — 3288F FALL RISK ASSESSMENT DOCD: CPT | Mod: CPTII,S$GLB,,

## 2024-07-02 PROCEDURE — 1159F MED LIST DOCD IN RCRD: CPT | Mod: CPTII,S$GLB,,

## 2024-07-02 PROCEDURE — 1160F RVW MEDS BY RX/DR IN RCRD: CPT | Mod: CPTII,S$GLB,,

## 2024-07-02 PROCEDURE — 1101F PT FALLS ASSESS-DOCD LE1/YR: CPT | Mod: CPTII,S$GLB,,

## 2024-07-02 PROCEDURE — 99999 PR PBB SHADOW E&M-EST. PATIENT-LVL III: CPT | Mod: PBBFAC,,,

## 2024-07-02 PROCEDURE — 1125F AMNT PAIN NOTED PAIN PRSNT: CPT | Mod: CPTII,S$GLB,,

## 2024-07-02 RX ORDER — METHOCARBAMOL 750 MG/1
750 TABLET, FILM COATED ORAL 4 TIMES DAILY PRN
Qty: 40 TABLET | Refills: 0 | Status: SHIPPED | OUTPATIENT
Start: 2024-07-02

## 2024-07-02 RX ORDER — OXYCODONE HYDROCHLORIDE 5 MG/1
TABLET ORAL
Qty: 50 TABLET | Refills: 0 | Status: SHIPPED | OUTPATIENT
Start: 2024-07-02

## 2024-07-02 NOTE — PROGRESS NOTES
"Nelia Littlejohn presents for initial post-operative visit following a right total knee arthroplasty performed by Dr. Rubio on 6/19/2024.     Exam:   Height 5' 6" (1.676 m), weight 100.7 kg (222 lb).   Ambulating well with assistive device.  Incision is clean and dry without drainage or erythema.   ROM:  0-90    Initial post-operative radiographs reviewed today revealing a well fixed and aligned prosthesis.    A/P:  2 weeks s/p right total knee arthroplasty    - The patient was advised to keep the incision clean and dry for the next 24 hours after which she may wash the area with antibacterial soap in the shower. Will not submerge incision until one month post op.  - Outpatient PT:  [facility]  - Continue Eliquis for 1 month post op.  - Pain medication:  Refilled along with methocarbamol  - Reviewed antibiotic prophylaxis   - Follow up in 4 weeks with surgeon. Pt will call clinic with problems/concerns.       "

## 2024-07-03 ENCOUNTER — CLINICAL SUPPORT (OUTPATIENT)
Dept: REHABILITATION | Facility: HOSPITAL | Age: 84
End: 2024-07-03
Payer: MEDICARE

## 2024-07-03 DIAGNOSIS — R60.0 LOCALIZED EDEMA: ICD-10-CM

## 2024-07-03 DIAGNOSIS — R29.898 IMPAIRED STRENGTH OF HIP MUSCLES: ICD-10-CM

## 2024-07-03 DIAGNOSIS — M25.661 IMPAIRED RANGE OF MOTION OF RIGHT KNEE: Primary | ICD-10-CM

## 2024-07-03 PROCEDURE — 97112 NEUROMUSCULAR REEDUCATION: CPT | Mod: PO | Performed by: PHYSICAL THERAPIST

## 2024-07-03 PROCEDURE — 97110 THERAPEUTIC EXERCISES: CPT | Mod: PO | Performed by: PHYSICAL THERAPIST

## 2024-07-03 NOTE — PROGRESS NOTES
"              [facility]   Physical Therapy Treatment Note     Name: Nelia Littlejohn  Clinic Number: [id number]    Therapy Diagnosis:   Encounter Diagnoses   Name Primary?    Impaired range of motion of right knee Yes    Impaired strength of hip muscles     Localized edema      Physician: Ernie Rubio III, *    Visit Date: 7/3/2024    Physician Orders: PT Eval and Treat right knee  Medical Diagnosis from Referral:    Primary osteoarthritis of right knee [M17.11]   Evaluation Date: 6/21/2024  Authorization Period Expiration: 12/31/2024  Plan of Care Expiration: 9/21/2024  Progress Note Due: 7/20/2024  Visit # / Visits authorized: 1/ 1, 10 / 20- 4/20 since surgery   FOTO: -/ 3   PTA Visit #: -/5     Precautions: Standard    Time In: 910       Time Out: 1010  Total Billable Time: 60 minutes  S/P R-TKA 6/19/2024     SUBJECTIVE     Pt reports: no pain.     She was issued a [facility] program at [facility]  Response to previous treatment: did fine  Functional change: ongoing    Pain: 0 /10  Location: right knee        OBJECTIVE           TREATMENT        Nelia received the treatments listed below:     .BOLD INDICATES ACTIVITIES PERFORMED / DISCUSSED     Leg press   Recumbent bike  Upright bike   UE ergometer  Treadmill   Elliptical          THERAPEUTIC EXERCISES to develop  strength, endurance, ROM, and flexibility for 12  minutes including   SUPINE   -ankle DF MWM with belt   -posterior knee stretch with ankle pumps 3'  -heel slides x20    SITTING   -foot slides x30 with disc   -sustained flexion 3'     NEUROMUSCULAR RE-EDUCATION  activities to improve: Balance, Proprioception, and stability for 40 minutes. The following activities were included:  SUPINE  -quad sets towel at ankle hold 7" x 20  -quad sets towel behind knee hold 7" x 20   -ball squeeze hold 5" x 20  -Iso ring hold 5" x 20   -SAQ x30 with HS activation (reciprocal inhibition) hold 3"     SIDE LYING   -hydrant x20      SITTING " "  -SAQ= with HS activation hold 3" 2x10     STANDING   -weight shift x20   +TKE   +step ups on 3 in box.     manual therapy techniques:  were applied to the: - for - minutes, including:     therapeutic activities to improve functional performance for -  minutes, including:  -     gait training to improve functional mobility and safety for -  minutes, including:  Reinforced gait pattern with knee flexion at toe off and during swing thru phase of the gait cycle.     PATIENT EDUCATION AND HOME EXERCISES     Home Exercises Provided and Patient Education Provided     Education provided:   --Findings of evaluation and examination, and affect of these on plan for treatment  -Prognosis and expectations  -Home exercise program and expectations of therapy     Written Home Exercises Provided: no  ASSESSMENT     The patient completed the routine as noted. She maintains quad weakness evident by challenge to attempt knee extension. She has some difficulty with maintaining the knee in extension during weight shifting. Pain is not a factor. Gait pattern is improved. Will progress to a cane when she is able to demonstrate enhanced knee stability. Progressing with CC knee extension.     Nelia Is progressing towards her goals.   Pt prognosis is Good.     Pt will continue to benefit from skilled outpatient physical therapy to address the deficits listed in the problem list box on initial evaluation, provide pt/family education and to maximize pt's level of independence in the home and community environment.     Pt's spiritual, cultural and educational needs considered and pt agreeable to plan of care and goals.     Anticipated barriers to physical therapy:  none    Goals:   Post-op Knee   Short Term Goals ( 3 weeks )   1. Pt will report reduced pain by 20 to 40% or greater for improved mobility, sleep  and ambulation.   2. Patients knee edema reduced by 1/4 to 1/2 inch or greater to enhance flexion ROM and knee comfort.   3. Pt to " report minimal to no pain to palpation for improved level of comfort.   4. Pt to demonstrate symmetrical weight bearing w/o increased pain for stability and functional ambulation .  5. Pts neuromuscular response will be enhanced for unilateral standing stability and balance   6. Pt to achieve 115 degrees of passive knee flexion for restoring functional knee mobility, ambulating with proper gait pattern and sit to stand ability.   7 Pt to report understanding of all instruction pertinent to patient safety , gait instruction and HEP.    8. Pt to exhibit correct return demonstration of exercises for self-management and independence with HEP        Long Term Goals: 6 weeks  1. Pt will demonstrate increased knee flexion AROM to 120 degrees or greater for return to functional activity  2. Pt will demonstrate increased knee extension AROM to -10 to 0 degrees for standing stability   3. Pt will demonstrate increased RLE strength by 1/2 to 1 grade for improved functional stability  4. Pt to demonstrate standing stability unsupported on dynamic surfaces for functional return to ADL and recreational activities.   5.The patient will be independent amb with no assistive device on all surfaces for community distances.  6..Pt to demonstrate independence with HEP for self management       PLAN     Plan of care certification: 6/21/2024 to 9/21/2024.     Outpatient Physical Therapy 2 times weekly for 10 weeks to include the following interventions: Gait Training, Manual Therapy, Neuromuscular Re-ed, Patient Education, Therapeutic Activities, and Therapeutic Exercise.     Sher Castro, PT

## 2024-07-05 ENCOUNTER — PATIENT MESSAGE (OUTPATIENT)
Dept: ORTHOPEDICS | Facility: CLINIC | Age: 84
End: 2024-07-05
Payer: MEDICARE

## 2024-07-05 DIAGNOSIS — Z96.651 S/P TOTAL KNEE REPLACEMENT, RIGHT: Primary | ICD-10-CM

## 2024-07-10 NOTE — PROGRESS NOTES
"              [facility]   Physical Therapy Treatment Note     Name: Nelia Littlejohn  Clinic Number: [id number]    Therapy Diagnosis:   Encounter Diagnoses   Name Primary?    Impaired range of motion of right knee Yes    Impaired strength of hip muscles     Localized edema        Physician: Ernie Rubio III, *    Visit Date: 7/11/2024    Physician Orders: PT Eval and Treat right knee  Medical Diagnosis from Referral:    Primary osteoarthritis of right knee [M17.11]   Evaluation Date: 6/21/2024  Authorization Period Expiration: 12/31/2024  Plan of Care Expiration: 9/21/2024  Progress Note Due: 7/20/2024  Visit # / Visits authorized: 1/ 1, 10 / 20- 4/20 since surgery   FOTO: -/ 3   PTA Visit #: -/5     Precautions: Standard    Time In: 910      Time Out: 1010  Total Billable Time: 60 minutes  S/P R-TKA 6/19/2024     SUBJECTIVE     Pt reports: the knee is feeling pretty good. Sleeping well. She says that there is not any pain this AM.     She was issued a home exercise program at   Response to previous treatment: did fine  Functional change: ongoing    Pain: 0 /10  Location: right knee        OBJECTIVE           TREATMENT        Nelia received the treatments listed below:     .BOLD INDICATES ACTIVITIES PERFORMED / DISCUSSED     Leg press   Recumbent bike  Upright bike 12'   UE ergometer  Treadmill   Elliptical          THERAPEUTIC EXERCISES to develop  strength, endurance, ROM, and flexibility for  - minutes including   SUPINE   -ankle DF MWM with belt   -posterior knee stretch with ankle pumps 3'  -heel slides x20    SITTING   -foot slides x30 with disc   -sustained flexion 3'     NEUROMUSCULAR RE-EDUCATION  activities to improve: Balance, Proprioception, and stability for 45 minutes. The following activities were included:  SUPINE  -quad sets towel at ankle hold 7" x 20  -quad sets towel behind knee hold 7" x 20   -ball squeeze hold 5" x 20  -Iso ring hold 5" x 20     SIDE LYING " "  -hydrant x20      SITTING   -SAQ= with HS activation hold 3" 2x10   -LAQ x 30     STANDING   -weight shift x20   -TKE ball at wall hold 5" x 15   -step ups on 3 in box. X15 = exhibits compromised knee extension with step up. Displays vaulting.       manual therapy techniques:  were applied to the: - for - minutes, including:     therapeutic activities to improve functional performance for 10 minutes, including:  -squat x15  -SL TKE - contralateral leg support. x15     gait training to improve functional mobility and safety for -  minutes, including:  Reinforced gait pattern with knee flexion at toe off and during swing thru phase of the gait cycle.     PATIENT EDUCATION AND HOME EXERCISES     Home Exercises Provided and Patient Education Provided     Education provided:   --Findings of evaluation and examination, and affect of these on plan for treatment  -Prognosis and expectations  -Home exercise program and expectations of therapy     Written Home Exercises Provided: no  ASSESSMENT     The patients routine emphasized CC knee extension. She exhibits an atonic quad that is most notable with her inability to control knee extension in a closed chain position. Active lag persists in knee extension.     Nelia Is progressing towards her goals.   Pt prognosis is Good.     Pt will continue to benefit from skilled outpatient physical therapy to address the deficits listed in the problem list box on initial evaluation, provide pt/family education and to maximize pt's level of independence in the home and community environment.     Pt's spiritual, cultural and educational needs considered and pt agreeable to plan of care and goals.     Anticipated barriers to physical therapy:  none    Goals:   Post-op Knee   Short Term Goals ( 3 weeks )   1. Pt will report reduced pain by 20 to 40% or greater for improved mobility, sleep  and ambulation.   2. Patients knee edema reduced by 1/4 to 1/2 inch or greater to enhance flexion " ROM and knee comfort.   3. Pt to report minimal to no pain to palpation for improved level of comfort.   4. Pt to demonstrate symmetrical weight bearing w/o increased pain for stability and functional ambulation .  5. Pts neuromuscular response will be enhanced for unilateral standing stability and balance   6. Pt to achieve 115 degrees of passive knee flexion for restoring functional knee mobility, ambulating with proper gait pattern and sit to stand ability.   7 Pt to report understanding of all instruction pertinent to patient safety , gait instruction and HEP.    8. Pt to exhibit correct return demonstration of exercises for self-management and independence with HEP        Long Term Goals: 6 weeks  1. Pt will demonstrate increased knee flexion AROM to 120 degrees or greater for return to functional activity  2. Pt will demonstrate increased knee extension AROM to -10 to 0 degrees for standing stability   3. Pt will demonstrate increased RLE strength by 1/2 to 1 grade for improved functional stability  4. Pt to demonstrate standing stability unsupported on dynamic surfaces for functional return to ADL and recreational activities.   5.The patient will be independent amb with no assistive device on all surfaces for community distances.  6..Pt to demonstrate independence with HEP for self management       PLAN     Plan of care certification: 6/21/2024 to 9/21/2024.     Outpatient Physical Therapy 2 times weekly for 10 weeks to include the following interventions: Gait Training, Manual Therapy, Neuromuscular Re-ed, Patient Education, Therapeutic Activities, and Therapeutic Exercise.     Sher Castro, PT

## 2024-07-11 ENCOUNTER — CLINICAL SUPPORT (OUTPATIENT)
Dept: REHABILITATION | Facility: HOSPITAL | Age: 84
End: 2024-07-11
Payer: MEDICARE

## 2024-07-11 DIAGNOSIS — R60.0 LOCALIZED EDEMA: ICD-10-CM

## 2024-07-11 DIAGNOSIS — R29.898 IMPAIRED STRENGTH OF HIP MUSCLES: ICD-10-CM

## 2024-07-11 DIAGNOSIS — M25.661 IMPAIRED RANGE OF MOTION OF RIGHT KNEE: Primary | ICD-10-CM

## 2024-07-11 PROCEDURE — 97112 NEUROMUSCULAR REEDUCATION: CPT | Mod: PO | Performed by: PHYSICAL THERAPIST

## 2024-07-11 PROCEDURE — 97110 THERAPEUTIC EXERCISES: CPT | Mod: PO | Performed by: PHYSICAL THERAPIST

## 2024-07-16 ENCOUNTER — CLINICAL SUPPORT (OUTPATIENT)
Dept: REHABILITATION | Facility: HOSPITAL | Age: 84
End: 2024-07-16
Payer: MEDICARE

## 2024-07-16 DIAGNOSIS — R60.0 LOCALIZED EDEMA: ICD-10-CM

## 2024-07-16 DIAGNOSIS — R29.898 IMPAIRED STRENGTH OF HIP MUSCLES: ICD-10-CM

## 2024-07-16 DIAGNOSIS — M25.661 IMPAIRED RANGE OF MOTION OF RIGHT KNEE: Primary | ICD-10-CM

## 2024-07-16 PROCEDURE — 97110 THERAPEUTIC EXERCISES: CPT | Mod: PO | Performed by: PHYSICAL THERAPIST

## 2024-07-16 PROCEDURE — 97112 NEUROMUSCULAR REEDUCATION: CPT | Mod: PO | Performed by: PHYSICAL THERAPIST

## 2024-07-16 PROCEDURE — 97530 THERAPEUTIC ACTIVITIES: CPT | Mod: PO | Performed by: PHYSICAL THERAPIST

## 2024-07-16 NOTE — PROGRESS NOTES
"              [facility]   Physical Therapy Treatment Note     Name: Nelia Littlejohn  Clinic Number: [id number]    Therapy Diagnosis:   Encounter Diagnoses   Name Primary?    Impaired range of motion of right knee Yes    Impaired strength of hip muscles     Localized edema        Physician: Ernie Rubio III, *    Visit Date: 7/16/2024    Physician Orders: PT Eval and Treat right knee  Medical Diagnosis from Referral:    Primary osteoarthritis of right knee [M17.11]   Evaluation Date: 6/21/2024  Authorization Period Expiration: 12/31/2024  Plan of Care Expiration: 9/21/2024  Progress Note Due: 7/20/2024  Visit # / Visits authorized: 1/ 1, 12 / 20- 5 /20 since surgery   FOTO: -/ 3   PTA Visit #: -/5     Precautions: Standard    Time In: 910      Time Out: 1010  Total Billable Time: 60 minutes  S/P R-TKA 6/19/2024     SUBJECTIVE     Pt reports: the knee is feeling pretty good and there is not any pain this AM.     She was issued a home exercise program at   Response to previous treatment: did fine  Functional change: ongoing    Pain: 0 /10  Location: right knee        OBJECTIVE           TREATMENT        Nelia received the treatments listed below:     .BOLD INDICATES ACTIVITIES PERFORMED / DISCUSSED     Leg press   Recumbent bike  Upright bike 6'   UE ergometer  Treadmill   Elliptical          THERAPEUTIC EXERCISES to develop  strength, endurance, ROM, and flexibility for  10 minutes including   SUPINE   -heel slides x40    SITTING   -foot slides x30 with disc   -sustained flexion 3'     NEUROMUSCULAR RE-EDUCATION  activities to improve: Balance, Proprioception, and stability for 35  minutes. The following activities were included:  SUPINE  -quad sets towel at ankle hold 7" x 20  -quad sets towel behind knee hold 7" x 20   -ball squeeze hold 5" x 20  -Iso ring hold 5" x 20     SIDE LYING   -hydrant x20      SITTING   -SAQ= with HS activation hold 3" 2x10   -LAQ x 30     STANDING " "  -weight shift x20   -TKE ball at wall hold 5" x 15     manual therapy techniques:  were applied to the: - for - minutes, including:     therapeutic activities to improve functional performance for 10 minutes, including:  -squat x15  -SL TKE - contralateral leg support. X15  -step ups on 3 in box. X15 = exhibits compromised knee extension with step up. Displays vaulting.      gait training to improve functional mobility and safety for -  minutes, including:  Reinforced gait pattern with knee flexion at toe off and during swing thru phase of the gait cycle.     PATIENT EDUCATION AND HOME EXERCISES     Home Exercises Provided and Patient Education Provided     Education provided:   --Findings of evaluation and examination, and affect of these on plan for treatment  -Prognosis and expectations  -Home exercise program and expectations of therapy     Written Home Exercises Provided: no  ASSESSMENT     The patient completed the routine noted. She exhibited improved quad control today most noticeable with standing weight shift, TKE at the wall and SL squat. She also demonstrated a favorable squat without experiencing increased pain.  Active knee extension also increased as demonstrated with LAQ.  Progress with CC and hamstring activities.     Nelia Is progressing towards her goals.   Pt prognosis is Good.     Pt will continue to benefit from skilled outpatient physical therapy to address the deficits listed in the problem list box on initial evaluation, provide pt/family education and to maximize pt's level of independence in the home and community environment.     Pt's spiritual, cultural and educational needs considered and pt agreeable to plan of care and goals.     Anticipated barriers to physical therapy:  none    Goals:   Post-op Knee   Short Term Goals ( 3 weeks )   1. Pt will report reduced pain by 20 to 40% or greater for improved mobility, sleep  and ambulation.   2. Patients knee edema reduced by 1/4 to 1/2 " inch or greater to enhance flexion ROM and knee comfort.   3. Pt to report minimal to no pain to palpation for improved level of comfort.   4. Pt to demonstrate symmetrical weight bearing w/o increased pain for stability and functional ambulation .  5. Pts neuromuscular response will be enhanced for unilateral standing stability and balance   6. Pt to achieve 115 degrees of passive knee flexion for restoring functional knee mobility, ambulating with proper gait pattern and sit to stand ability.   7 Pt to report understanding of all instruction pertinent to patient safety , gait instruction and HEP.    8. Pt to exhibit correct return demonstration of exercises for self-management and independence with HEP        Long Term Goals: 6 weeks  1. Pt will demonstrate increased knee flexion AROM to 120 degrees or greater for return to functional activity  2. Pt will demonstrate increased knee extension AROM to -10 to 0 degrees for standing stability   3. Pt will demonstrate increased RLE strength by 1/2 to 1 grade for improved functional stability  4. Pt to demonstrate standing stability unsupported on dynamic surfaces for functional return to ADL and recreational activities.   5.The patient will be independent amb with no assistive device on all surfaces for community distances.  6..Pt to demonstrate independence with HEP for self management       PLAN     Plan of care certification: 6/21/2024 to 9/21/2024.     Outpatient Physical Therapy 2 times weekly for 10 weeks to include the following interventions: Gait Training, Manual Therapy, Neuromuscular Re-ed, Patient Education, Therapeutic Activities, and Therapeutic Exercise.     Sher Castro, PT

## 2024-07-18 ENCOUNTER — CLINICAL SUPPORT (OUTPATIENT)
Dept: REHABILITATION | Facility: HOSPITAL | Age: 84
End: 2024-07-18
Payer: MEDICARE

## 2024-07-18 DIAGNOSIS — R60.0 LOCALIZED EDEMA: ICD-10-CM

## 2024-07-18 DIAGNOSIS — M25.661 IMPAIRED RANGE OF MOTION OF RIGHT KNEE: Primary | ICD-10-CM

## 2024-07-18 DIAGNOSIS — R29.898 IMPAIRED STRENGTH OF HIP MUSCLES: ICD-10-CM

## 2024-07-18 PROCEDURE — 97110 THERAPEUTIC EXERCISES: CPT | Mod: PO | Performed by: PHYSICAL THERAPIST

## 2024-07-18 PROCEDURE — 97112 NEUROMUSCULAR REEDUCATION: CPT | Mod: PO | Performed by: PHYSICAL THERAPIST

## 2024-07-18 NOTE — PROGRESS NOTES
"              [facility]   Physical Therapy Treatment Note     Name: Nelia Littlejohn  Clinic Number: [id number]    Therapy Diagnosis:   Encounter Diagnoses   Name Primary?    Impaired range of motion of right knee Yes    Impaired strength of hip muscles     Localized edema        Physician: Ernie Rubio III, *    Visit Date: 7/18/2024    Physician Orders: PT Eval and Treat right knee  Medical Diagnosis from Referral:    Primary osteoarthritis of right knee [M17.11]   Evaluation Date: 6/21/2024  Authorization Period Expiration: 12/31/2024  Plan of Care Expiration: 9/21/2024  Progress Note Due: 7/20/2024  Visit # / Visits authorized: 1/ 1, 12 / 20- 5 /20 since surgery   FOTO: -/ 3   PTA Visit #: -/5     Precautions: Standard    Time In: 910      Time Out: 1012  Total Billable Time:62 minutes  S/P R-TKA 6/19/2024     SUBJECTIVE     Pt reports: the knee is feeling pretty good and there is not any pain this AM.     She was issued a home exercise program at   Response to previous treatment: did fine  Functional change: ongoing    Pain: 0 /10  Location: right knee        OBJECTIVE           TREATMENT        Nelia received the treatments listed below:     .BOLD INDICATES ACTIVITIES PERFORMED / DISCUSSED     Leg press   Recumbent bike  Upright bike 8'   UE ergometer  Treadmill   Elliptical          THERAPEUTIC EXERCISES to develop  strength, endurance, ROM, and flexibility for  10 minutes including   SUPINE   -heel slides x40    SITTING   -foot slides x30 with disc   -sustained flexion 3'     NEUROMUSCULAR RE-EDUCATION  activities to improve: Balance, Proprioception, and stability for 25 minutes. The following activities were included:  SUPINE  -quad sets towel at ankle hold 7" x 20  -quad sets towel behind knee hold 7" x 20   -ball squeeze hold 5" x 20  -Iso ring hold 5" x 20     SIDE LYING   -hydrant x20      SITTING   -SAQ= with HS activation hold 3" 2x10   -LAQ x 30     STANDING " "  -weight shift x20 on green oval   -TKE ball at wall hold 5" x 15   -knee flexion    manual therapy techniques:  were applied to the: - for - minutes, including:     therapeutic activities to improve functional performance for 15 minutes, including:  -squat x15  -SL TKE - contralateral leg support on green oval X15  -step ups on 4 in box. X20 =      gait training to improve functional mobility and safety for -  minutes, including:  Reinforced gait pattern with knee flexion at toe off and during swing thru phase of the gait cycle.     PATIENT EDUCATION AND HOME EXERCISES     Home Exercises Provided and Patient Education Provided     Education provided:   --Findings of evaluation and examination, and affect of these on plan for treatment  -Prognosis and expectations  -Home exercise program and expectations of therapy     Written Home Exercises Provided: no  ASSESSMENT     Nelia is gaining more strength in the quad. ROM is progressing. Improved NM control with standing CC activities.  Progress with balance and CC challenges. Also encourage progression to ambulation with a cane.     Nelia Is progressing towards her goals.   Pt prognosis is Good.     Pt will continue to benefit from skilled outpatient physical therapy to address the deficits listed in the problem list box on initial evaluation, provide pt/family education and to maximize pt's level of independence in the home and community environment.     Pt's spiritual, cultural and educational needs considered and pt agreeable to plan of care and goals.     Anticipated barriers to physical therapy:  none    Goals:   Post-op Knee   Short Term Goals ( 3 weeks )   1. Pt will report reduced pain by 20 to 40% or greater for improved mobility, sleep  and ambulation.   2. Patients knee edema reduced by 1/4 to 1/2 inch or greater to enhance flexion ROM and knee comfort.   3. Pt to report minimal to no pain to palpation for improved level of comfort.   4. Pt to demonstrate " symmetrical weight bearing w/o increased pain for stability and functional ambulation .  5. Pts neuromuscular response will be enhanced for unilateral standing stability and balance   6. Pt to achieve 115 degrees of passive knee flexion for restoring functional knee mobility, ambulating with proper gait pattern and sit to stand ability.   7 Pt to report understanding of all instruction pertinent to patient safety , gait instruction and HEP.    8. Pt to exhibit correct return demonstration of exercises for self-management and independence with HEP        Long Term Goals: 6 weeks  1. Pt will demonstrate increased knee flexion AROM to 120 degrees or greater for return to functional activity  2. Pt will demonstrate increased knee extension AROM to -10 to 0 degrees for standing stability   3. Pt will demonstrate increased RLE strength by 1/2 to 1 grade for improved functional stability  4. Pt to demonstrate standing stability unsupported on dynamic surfaces for functional return to ADL and recreational activities.   5.The patient will be independent amb with no assistive device on all surfaces for community distances.  6..Pt to demonstrate independence with HEP for self management       PLAN     Plan of care certification: 6/21/2024 to 9/21/2024.     Outpatient Physical Therapy 2 times weekly for 10 weeks to include the following interventions: Gait Training, Manual Therapy, Neuromuscular Re-ed, Patient Education, Therapeutic Activities, and Therapeutic Exercise.     Sher Castro, PT

## 2024-07-23 ENCOUNTER — CLINICAL SUPPORT (OUTPATIENT)
Dept: REHABILITATION | Facility: HOSPITAL | Age: 84
End: 2024-07-23
Payer: MEDICARE

## 2024-07-23 DIAGNOSIS — R60.0 LOCALIZED EDEMA: ICD-10-CM

## 2024-07-23 DIAGNOSIS — R29.898 IMPAIRED STRENGTH OF HIP MUSCLES: ICD-10-CM

## 2024-07-23 DIAGNOSIS — M25.661 IMPAIRED RANGE OF MOTION OF RIGHT KNEE: Primary | ICD-10-CM

## 2024-07-23 PROCEDURE — 97530 THERAPEUTIC ACTIVITIES: CPT | Mod: PO | Performed by: PHYSICAL THERAPIST

## 2024-07-23 PROCEDURE — 97112 NEUROMUSCULAR REEDUCATION: CPT | Mod: PO | Performed by: PHYSICAL THERAPIST

## 2024-07-23 NOTE — PROGRESS NOTES
"              [facility]   Physical Therapy Treatment Note     Name: Nelia Littlejohn  Clinic Number: [id number]    Therapy Diagnosis:   Encounter Diagnoses   Name Primary?    Impaired range of motion of right knee Yes    Impaired strength of hip muscles     Localized edema        Physician: Ernie Rubio III, *    Visit Date: 7/23/2024    Physician Orders: PT Eval and Treat right knee  Medical Diagnosis from Referral:    Primary osteoarthritis of right knee [M17.11]   Evaluation Date: 6/21/2024  Authorization Period Expiration: 12/31/2024  Plan of Care Expiration: 9/21/2024  Progress Note Due: 7/20/2024  Visit # / Visits authorized: 1/ 1, 12 / 20- 5 /20 since surgery   FOTO: -/ 3   PTA Visit #: -/5     Precautions: Standard    Time In: 910      Time Out: 1000 = patient had to leave early.   Total Billable Time:  minutes  S/P R-TKA 6/19/2024     SUBJECTIVE     Pt reports: the knee is feeling pretty good and there is not any pain this AM. Only some stiffness.     She was issued a home exercise program at   Response to previous treatment: did fine  Functional change: ongoing    Pain: 0 /10  Location: right knee        OBJECTIVE           TREATMENT        Nelia received the treatments listed below:     .BOLD INDICATES ACTIVITIES PERFORMED / DISCUSSED     Leg press   Recumbent bike  Upright bike 6'   UE ergometer  Treadmill   Elliptical          THERAPEUTIC EXERCISES to develop  strength, endurance, ROM, and flexibility for  0 minutes including   SUPINE   -heel slides x40    SITTING   -foot slides x30 with disc   -sustained flexion 3'     NEUROMUSCULAR RE-EDUCATION  activities to improve: Balance, Proprioception, and stability for  minutes. The following activities were included:  SUPINE  -quad sets towel at ankle hold 7" x 20  -quad sets towel behind knee hold 7" x 20   -ball squeeze hold 5" x 20  -Iso ring hold 5" x 20     SIDE LYING   -hydrant x20      SITTING   -SAQ = with HS " "activation hold 3" 2x10   -LAQ x 30     STANDING   -weight shift x20 on green oval   -TKE ball at wall hold 5" x 15   -knee flexion   x20    manual therapy techniques:  were applied to the: - for - minutes, including:     therapeutic activities to improve functional performance for 15 minutes, including:  -squat x15  -SL TKE - contralateral leg support on green oval X15  -step ups on 4 in box. X20 =   -Gait cycle of left step thru with right knee locked in extension and step back of the LLE again with the right knee locked in extension.     gait training to improve functional mobility and safety for -  minutes, including:  Reinforced gait pattern with knee flexion at toe off and during swing thru phase of the gait cycle.     PATIENT EDUCATION AND HOME EXERCISES     Home Exercises Provided and Patient Education Provided     Education provided:   --Findings of evaluation and examination, and affect of these on plan for treatment  -Prognosis and expectations  -Home exercise program and expectations of therapy     Written Home Exercises Provided: no  ASSESSMENT     The patient performed the routine noted.  She continues to ambulate wit a  walker. Her apprehension is evident for progression to a cane. Emphasizing CC activities. Will progress to unilateral standing and tandem standing. Focus on activities to strengthen and enhance quad activity as well as standing stability with full knee extension.     Nelia Is progressing towards her goals.   Pt prognosis is Good.     Pt will continue to benefit from skilled outpatient physical therapy to address the deficits listed in the problem list box on initial evaluation, provide pt/family education and to maximize pt's level of independence in the home and community environment.     Pt's spiritual, cultural and educational needs considered and pt agreeable to plan of care and goals.     Anticipated barriers to physical therapy:  none    Goals:   Post-op Knee   Short Term Goals " ( 3 weeks )   1. Pt will report reduced pain by 20 to 40% or greater for improved mobility, sleep  and ambulation.   2. Patients knee edema reduced by 1/4 to 1/2 inch or greater to enhance flexion ROM and knee comfort.   3. Pt to report minimal to no pain to palpation for improved level of comfort.   4. Pt to demonstrate symmetrical weight bearing w/o increased pain for stability and functional ambulation .  5. Pts neuromuscular response will be enhanced for unilateral standing stability and balance   6. Pt to achieve 115 degrees of passive knee flexion for restoring functional knee mobility, ambulating with proper gait pattern and sit to stand ability.   7 Pt to report understanding of all instruction pertinent to patient safety , gait instruction and HEP.    8. Pt to exhibit correct return demonstration of exercises for self-management and independence with HEP        Long Term Goals: 6 weeks  1. Pt will demonstrate increased knee flexion AROM to 120 degrees or greater for return to functional activity  2. Pt will demonstrate increased knee extension AROM to -10 to 0 degrees for standing stability   3. Pt will demonstrate increased RLE strength by 1/2 to 1 grade for improved functional stability  4. Pt to demonstrate standing stability unsupported on dynamic surfaces for functional return to ADL and recreational activities.   5.The patient will be independent amb with no assistive device on all surfaces for community distances.  6..Pt to demonstrate independence with HEP for self management       PLAN     Plan of care certification: 6/21/2024 to 9/21/2024.     Outpatient Physical Therapy 2 times weekly for 10 weeks to include the following interventions: Gait Training, Manual Therapy, Neuromuscular Re-ed, Patient Education, Therapeutic Activities, and Therapeutic Exercise.     Sher Castro, PT

## 2024-07-30 ENCOUNTER — HOSPITAL ENCOUNTER (OUTPATIENT)
Dept: RADIOLOGY | Facility: HOSPITAL | Age: 84
Discharge: HOME | End: 2024-07-30
Attending: ORTHOPAEDIC SURGERY
Payer: MEDICARE

## 2024-07-30 ENCOUNTER — OFFICE VISIT (OUTPATIENT)
Dept: ORTHOPEDICS | Facility: CLINIC | Age: 84
End: 2024-07-30
Payer: MEDICARE

## 2024-07-30 ENCOUNTER — CLINICAL SUPPORT (OUTPATIENT)
Dept: REHABILITATION | Facility: HOSPITAL | Age: 84
End: 2024-07-30
Payer: MEDICARE

## 2024-07-30 VITALS — HEIGHT: 66 IN | BODY MASS INDEX: 35.68 KG/M2 | WEIGHT: 222 LBS

## 2024-07-30 DIAGNOSIS — R60.0 LOCALIZED EDEMA: ICD-10-CM

## 2024-07-30 DIAGNOSIS — Z96.651 S/P TOTAL KNEE REPLACEMENT, RIGHT: Primary | ICD-10-CM

## 2024-07-30 DIAGNOSIS — R29.898 IMPAIRED STRENGTH OF HIP MUSCLES: ICD-10-CM

## 2024-07-30 DIAGNOSIS — M25.661 IMPAIRED RANGE OF MOTION OF RIGHT KNEE: Primary | ICD-10-CM

## 2024-07-30 DIAGNOSIS — Z96.651 S/P TOTAL KNEE REPLACEMENT, RIGHT: ICD-10-CM

## 2024-07-30 PROCEDURE — 97112 NEUROMUSCULAR REEDUCATION: CPT | Mod: PO | Performed by: PHYSICAL THERAPIST

## 2024-07-30 PROCEDURE — 99024 POSTOP FOLLOW-UP VISIT: CPT | Mod: S$GLB,,, | Performed by: ORTHOPAEDIC SURGERY

## 2024-07-30 PROCEDURE — 1101F PT FALLS ASSESS-DOCD LE1/YR: CPT | Mod: CPTII,S$GLB,, | Performed by: ORTHOPAEDIC SURGERY

## 2024-07-30 PROCEDURE — 1126F AMNT PAIN NOTED NONE PRSNT: CPT | Mod: CPTII,S$GLB,, | Performed by: ORTHOPAEDIC SURGERY

## 2024-07-30 PROCEDURE — 1159F MED LIST DOCD IN RCRD: CPT | Mod: CPTII,S$GLB,, | Performed by: ORTHOPAEDIC SURGERY

## 2024-07-30 PROCEDURE — 3288F FALL RISK ASSESSMENT DOCD: CPT | Mod: CPTII,S$GLB,, | Performed by: ORTHOPAEDIC SURGERY

## 2024-07-30 PROCEDURE — 97116 GAIT TRAINING THERAPY: CPT | Mod: PO | Performed by: PHYSICAL THERAPIST

## 2024-07-30 PROCEDURE — 99999 PR PBB SHADOW E&M-EST. PATIENT-LVL III: CPT | Mod: PBBFAC,,, | Performed by: ORTHOPAEDIC SURGERY

## 2024-07-30 PROCEDURE — 73562 X-RAY EXAM OF KNEE 3: CPT | Mod: 26,RT,, | Performed by: RADIOLOGY

## 2024-07-30 PROCEDURE — 73562 X-RAY EXAM OF KNEE 3: CPT | Mod: TC,RT

## 2024-07-30 RX ORDER — METHYLPREDNISOLONE 4 MG/1
TABLET ORAL
Qty: 1 EACH | Refills: 0 | Status: SHIPPED | OUTPATIENT
Start: 2024-07-30

## 2024-07-30 NOTE — PROGRESS NOTES
"              [facility]   Physical Therapy Treatment Note     Name: Nelia Littlejohn  Clinic Number: [id number]    Therapy Diagnosis:   Encounter Diagnoses   Name Primary?    Impaired range of motion of right knee Yes    Impaired strength of hip muscles     Localized edema        Physician: Ernie Rubio III, *    Visit Date: 7/30/2024    Physician Orders: PT Eval and Treat right knee  Medical Diagnosis from Referral:    Primary osteoarthritis of right knee [M17.11]   Evaluation Date: 6/21/2024  Authorization Period Expiration: 12/31/2024  Plan of Care Expiration: 9/21/2024  Progress Note Due: 7/20/2024  Visit # / Visits authorized: 1/ 1, 15 / 20- 9 / 20 since surgery   FOTO: -/ 3   PTA Visit #: -/5     Precautions: Standard    Time In: 910      Time Out: 1015   Total Billable Time:  65 minutes  S/P R-TKA 6/19/2024     SUBJECTIVE     Pt reports: the knee is feeling pretty good and there is not any pain this AM.   She was issued a home exercise program at   Response to previous treatment: did good  Functional change: ongoing    Pain: 0 /10  Location: right knee        OBJECTIVE           TREATMENT        Nelia received the treatments listed below:     .BOLD INDICATES ACTIVITIES PERFORMED / DISCUSSED     Leg press   Recumbent bike  Upright bike 6'   UE ergometer  Treadmill   Elliptical          THERAPEUTIC EXERCISES to develop  strength, endurance, ROM, and flexibility for  0 minutes including   SUPINE   -heel slides x40  -posterior knee stretch 3'    SITTING   -foot slides x30 with disc   -sustained flexion 3'     NEUROMUSCULAR RE-EDUCATION  activities to improve: Balance, Proprioception, and stability for 45 minutes. The following activities were included:  SUPINE  -SLR x 15   -SAQ over bolster 2x20     -quad sets towel at ankle hold 7" x 20  -quad sets towel behind knee hold 7" x 20   -ball squeeze hold 5" x 20  -Iso ring hold 5" x 20     SIDE LYING   -hydrant x20      SITTING " "  -SAQ = with HS activation hold 3" 2x20   -LAQ x 30     STANDING   -weight shift x20 on green oval   -TKE ball at wall hold 5" x 15   -knee flexion   x20    manual therapy techniques:  were applied to the: right knee for 3 minutes, including:  -posterior PA with external tibial rotation.       therapeutic activities to improve functional performance for 4 minutes, including:  -squat x15  -SL TKE - contralateral leg support on green oval X15  -step ups on 4 in box. X20     gait training to improve functional mobility and safety for 10 minutes, including:  -Gait cycle of left step thru with right knee locked in extension and step back of the LLE again with the right knee locked in extension.   -Reinforced gait pattern with knee flexion at toe off and during swing thru phase of the gait cycle.   -Ambulation with a single point cane. Instructed in proper gait pattern   -Ambulation in // bars with heel to toe walking and simulated ambulation with a cane using one hand support.     PATIENT EDUCATION AND HOME EXERCISES     Home Exercises Provided and Patient Education Provided     Education provided:   --Findings of evaluation and examination, and affect of these on plan for treatment  -Prognosis and expectations  -Home exercise program and expectations of therapy     Written Home Exercises Provided: no  ASSESSMENT     The patient has not yet achieved full passive knee extension. She maintains the knee in a partially flexed position. SLR  initiated and performed with cueing verbal and tactile to activate the quad in order to maintain full knee extension. Demonstrated step ups with adequate extension of the knee. She also exhibited satisfactory stability ambulating with a single tip cane with a proper gait pattern.         Nelia Is progressing towards her goals.   Pt prognosis is Good.     Pt will continue to benefit from skilled outpatient physical therapy to address the deficits listed in the problem list box on initial " evaluation, provide pt/family education and to maximize pt's level of independence in the home and community environment.     Pt's spiritual, cultural and educational needs considered and pt agreeable to plan of care and goals.     Anticipated barriers to physical therapy:  none    Goals:   Post-op Knee   Short Term Goals ( 3 weeks )   1. Pt will report reduced pain by 20 to 40% or greater for improved mobility, sleep  and ambulation.   2. Patients knee edema reduced by 1/4 to 1/2 inch or greater to enhance flexion ROM and knee comfort.   3. Pt to report minimal to no pain to palpation for improved level of comfort.   4. Pt to demonstrate symmetrical weight bearing w/o increased pain for stability and functional ambulation .  5. Pts neuromuscular response will be enhanced for unilateral standing stability and balance   6. Pt to achieve 115 degrees of passive knee flexion for restoring functional knee mobility, ambulating with proper gait pattern and sit to stand ability.   7 Pt to report understanding of all instruction pertinent to patient safety , gait instruction and HEP.    8. Pt to exhibit correct return demonstration of exercises for self-management and independence with HEP        Long Term Goals: 6 weeks  1. Pt will demonstrate increased knee flexion AROM to 120 degrees or greater for return to functional activity  2. Pt will demonstrate increased knee extension AROM to -10 to 0 degrees for standing stability   3. Pt will demonstrate increased RLE strength by 1/2 to 1 grade for improved functional stability  4. Pt to demonstrate standing stability unsupported on dynamic surfaces for functional return to ADL and recreational activities.   5.The patient will be independent amb with no assistive device on all surfaces for community distances.  6..Pt to demonstrate independence with HEP for self management       PLAN     Plan of care certification: 6/21/2024 to 9/21/2024.     Outpatient Physical Therapy 2 times  weekly for 10 weeks to include the following interventions: Gait Training, Manual Therapy, Neuromuscular Re-ed, Patient Education, Therapeutic Activities, and Therapeutic Exercise.     Sher Castro, PT

## 2024-07-30 NOTE — PROGRESS NOTES
"Subjective:     HPI:   Nelia Littlejohn is a 84 y.o. female who presents 6 weeks out from right TKA    Date of surgery: R VTKA 6/19/24 all poly    History of Present Illness  The patient presents for evaluation of right knee pain.    She reports an overall improvement in her condition. Pain management includes Tylenol as needed and a muscle relaxer before bedtime. Oxycodone was used two days ago to alleviate severe pain. Due to her kidney condition, she is unable to take Advil or Aleve.    During her therapy session today, she was able to walk with the aid of a cane. Her therapy sessions are scheduled twice a week until 10/01/2024, with a total of 18 visits planned. Her last recorded range of motion was 102 degrees.    She experiences occasional sleep disturbances due to muscle spasms in her back. She does not require any medication refills at this time.    Medications:   Tylenol 1-2x/day PRN  Robaxin at night  Oxy took some 2 days ago, rarely prn     Assistive Devices: walker today, cane first day with PT today    PT: ongoing 2x/week have scheduled until october    "Overall everything going good"  No major issues/concerns  Sometimes m spasms at night post knee/leg       Objective:   Body mass index is 35.83 kg/m².  Exam:    Gait: limp/antalgic none    Incision: healed    Stability:  Knee stable anterior-posterior varus and valgus stresses, no extensor lag    Extension: 10    Flexion: 110    Valgus angle: 5    Pre-op   PT no ROM documented since 2 week visit     Physical Exam  Right knee incision appears healthy. Stability of the right knee is satisfactory.      Imaging:  Indication:  Exam status post right total knee arthroplasty  Exam Ordered: Radiographs of the right knee include a standing anteroposterior view, a lateral view in full flexion, and a sunrise view  Details of Examination: Todays exam show a well fixed, well positioned total knee arthroplasty with no evidence of wear, osteolysis, or " loosening.  Impression:  Status post right total knee arthroplasty, implant in good position with no abnormality      Assessment:       ICD-10-CM ICD-9-CM   1. S/P total knee replacement, right  Z96.651 V43.65        Doing well     Plan:       Patient is doing very well with their total knee arthroplasty.  They will continue with their routine care of the knee replacement and see me back for their follow-up at the routine interval.  If there are problems in the interim they will see me back sooner.    Assessment & Plan  1. Postoperative right knee pain.  The postoperative right knee pain is being managed with a combination of medication and physical therapy. He is currently taking Tylenol as needed and a muscle relaxer at night. A prescription for Medrol Dosepak has been provided to manage soft tissue inflammation. A topical cream has also been prescribed for application on the knee, which will be mailed by the pharmacy. He is advised to perform hamstring stretches three times daily for 10 to 15 minutes each session. Physical therapy sessions are to be continued twice weekly for an additional four weeks. A new order for physical therapy has been placed.    Follow-up  The patient will follow up in 6 weeks.    Continue tylenol (can't take nsaids, CKD)  Continue robaxin  Rx medrol dose pack  Rx compound cream    HS stretching  Continue PT 2x/week x4 weeks then HEP    6 week follow up     This note was generated with the assistance of ambient listening technology. Verbal consent was obtained by the patient and accompanying visitor(s) for the recording of patient appointment to facilitate this note. I attest to having reviewed and edited the generated note for accuracy, though some syntax or spelling errors may persist. Please contact the author of this note for any clarification.      No orders of the defined types were placed in this encounter.            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 Vision is much better, can read.     High cholesterol     History of prediabetes 10/26/2017    HTN (hypertension) 09/25/2012    Hyperlipemia 09/11/2012    Hypertension     Interval gout 09/11/2012    Status post cataract extraction and insertion of intraocular lens 11/27/2012    sp Yag Cap OS      Stroke        Past Surgical History:   Procedure Laterality Date    ARTHROPLASTY, KNEE, TOTAL, USING COMPUTER-ASSISTED NAVIGATION Right 6/19/2024    Procedure: ARTHROPLASTY, KNEE, TOTAL, USING COMPUTER-ASSISTED NAVIGATION: VELYS: RIGHT: [employer];  Surgeon: Ernie Rubio III, MD;  Location: [facility];  Service: Orthopedics;  Laterality: Right;    CATARACT EXTRACTION Bilateral     EYE SURGERY      HYSTERECTOMY      JOINT REPLACEMENT      Yag capsulotomy left eye  10/10/2012       Family History   Problem Relation Name Age of Onset    Diabetes Father      Stroke Father      Cataracts Father      Diabetes Mother      Cancer Sister Katelyn         breast cancer    Diabetes Brother Lester     Heart disease Brother Lester     No Known Problems Son Raj     No Known Problems Daughter Leydi     No Known Problems Maternal Grandmother      No Known Problems Maternal Grandfather      No Known Problems Paternal Grandmother      No Known Problems Paternal Grandfather      No Known Problems Brother Boris     No Known Problems Maternal Aunt      No Known Problems Maternal Uncle      No Known Problems Paternal Aunt      No Known Problems Paternal Uncle      Hypertension Brother Santi     Diabetes Brother Laila     Hypertension Son Garrick     Hypertension Son Wil     No Known Problems Son Daljit     No Known Problems Son Austen     Amblyopia Neg Hx      Blindness Neg Hx      Glaucoma Neg Hx       Macular degeneration Neg Hx      Retinal detachment Neg Hx      Strabismus Neg Hx      Thyroid disease Neg Hx         Social History     Socioeconomic History    Marital status:     Number of children: 6   Occupational History    Occupation:  at [employer]     Comment: [employer]ARNIE; now retired   Tobacco Use    Smoking status: Never    Smokeless tobacco: Never   Substance and Sexual Activity    Alcohol use: No    Drug use: No    Sexual activity: Not Currently   Social History Narrative        She has 5 sons.  3 live in [place] and one son is here in [place].  She has a daughter who lives here in [place].     Social Determinants of Health     Financial Resource Strain: Low Risk  (9/17/2023)    Overall Financial Resource Strain (CARDIA)     Difficulty of Paying Living Expenses: Not very hard   Food Insecurity: No Food Insecurity (9/17/2023)    Hunger Vital Sign     Worried About Running Out of Food in the Last Year: Never true     Ran Out of Food in the Last Year: Never true   Transportation Needs: No Transportation Needs (9/17/2023)    PRAPARE - Transportation     Lack of Transportation (Medical): No     Lack of Transportation (Non-Medical): No   Physical Activity: Inactive (9/17/2023)    Exercise Vital Sign     Days of Exercise per Week: 0 days     Minutes of Exercise per Session: 0 min   Stress: No Stress Concern Present (9/17/2023)    [place] [employer] - Occupational Stress Questionnaire     Feeling of Stress : Only a little   Housing Stability: Low Risk  (9/17/2023)    Housing Stability Vital Sign     Unable to Pay for Housing in the Last Year: No     Number of Places Lived in the Last Year: 1     Unstable Housing in the Last Year: No

## 2024-08-01 ENCOUNTER — CLINICAL SUPPORT (OUTPATIENT)
Dept: REHABILITATION | Facility: HOSPITAL | Age: 84
End: 2024-08-01
Payer: MEDICARE

## 2024-08-01 DIAGNOSIS — Z96.651 S/P TOTAL KNEE REPLACEMENT, RIGHT: ICD-10-CM

## 2024-08-01 DIAGNOSIS — R29.898 IMPAIRED STRENGTH OF HIP MUSCLES: ICD-10-CM

## 2024-08-01 DIAGNOSIS — R60.0 LOCALIZED EDEMA: ICD-10-CM

## 2024-08-01 DIAGNOSIS — M25.661 IMPAIRED RANGE OF MOTION OF RIGHT KNEE: Primary | ICD-10-CM

## 2024-08-01 PROCEDURE — 97116 GAIT TRAINING THERAPY: CPT | Mod: PO | Performed by: PHYSICAL THERAPIST

## 2024-08-01 PROCEDURE — 97112 NEUROMUSCULAR REEDUCATION: CPT | Mod: PO | Performed by: PHYSICAL THERAPIST

## 2024-08-01 NOTE — PROGRESS NOTES
[facility]   Physical Therapy Treatment Note     Name: Nelia Littlejohn  Clinic Number: [id number]    Therapy Diagnosis:   Encounter Diagnoses   Name Primary?    S/P total knee replacement, right     Impaired range of motion of right knee Yes    Impaired strength of hip muscles     Localized edema        Physician: Ernie Rubio III, *    Visit Date: 8/1/2024    Physician Orders: PT Eval and Treat right knee  Medical Diagnosis from Referral:    Primary osteoarthritis of right knee [M17.11]   Evaluation Date: 6/21/2024  Authorization Period Expiration: 12/31/2024  Plan of Care Expiration: 9/21/2024  Progress Note Due: 7/20/2024  Visit # / Visits authorized: 1/ 1, 16 / 20- 10 / 20 since surgery   FOTO: -/ 3   PTA Visit #: -/5     Precautions: Standard    Time In: 910      Time Out: 1015  Total Billable Time:  65 minutes  S/P R-TKA 6/19/2024     SUBJECTIVE     Pt reports: there has been some pain in the calf area and back of the knee since Monday night and some Tuesday. She says she went to the MD on Tuesday after therapy. He felt it was a muscular problem. Today it still hurts. No pain in the  knee. She says she decided to abandon the walker.   She was issued a home exercise program at   Response to previous treatment: did good  Functional change: ongoing    Pain: 5 /10   Location: right calf and behind the knee.     OBJECTIVE     Gait - patient ambulating with a single tip cane.   Palpation -pain in the posterior popliteal area of the knee. Increased tissue density identified in the popliteal space.       TREATMENT        Nelia received the treatments listed below:     .BOLD INDICATES ACTIVITIES PERFORMED / DISCUSSED     Leg press   Recumbent bike  Upright bike 6'   UE ergometer  Treadmill   Elliptical          THERAPEUTIC EXERCISES to develop  strength, endurance, ROM, and flexibility for  0 minutes including   SUPINE   -heel slides x40  -posterior knee stretch  "3'    SITTING   -foot slides x30 with disc   -sustained flexion 3'     NEUROMUSCULAR RE-EDUCATION  activities to improve: Balance, Proprioception, and stability for 45 minutes. The following activities were included:  Rock Tape application over the posterior knee to decompress the tissue and promote pain reduction.   SUPINE  -SLR x 15   -SAQ over bolster 2x20   -quad sets towel at ankle hold 7" x 20  -quad sets towel behind knee hold 7" x 20   -ball squeeze hold 5" x 20  -Iso ring hold 5" x 20     SIDE LYING   -hydrant x15     SITTING   -SAQ = with HS activation hold 3" x20   -LAQ x 30     STANDING   -weight shift x20 on green oval   -TKE ball at wall hold 5" x 15   -knee flexion   x20    manual therapy techniques:  were applied to the: right knee for 3 minutes, including:  -ST mobs to the posterior knee / popliteal area.   -posterior PA with external tibial rotation.       therapeutic activities to improve functional performance for 4 minutes, including:  -squat x15  -SL TKE - contralateral leg support on green oval X15  -step ups on 4 in box. X20     gait training to improve functional mobility and safety for 10 minutes, including:  -Patient ambulating with a single tip cane. Stand by asst provided for ambulation around the clinic. Instruction provided regarding knee extension of the knee during swing thru phase of the LLE.   -Gait cycle of left step thru with right knee locked in extension and step back of the LLE again with the right knee locked in extension.   -Reinforced gait pattern with knee flexion at toe off and during swing thru phase of the gait cycle.   -Ambulation with a single point cane. Instructed in proper gait pattern   -Ambulation in // bars with heel to toe walking and simulated ambulation with a cane using one hand support.     PATIENT EDUCATION AND HOME EXERCISES     Home Exercises Provided and Patient Education Provided     Education provided:   --Findings of evaluation and examination, and " affect of these on plan for treatment  -Prognosis and expectations  -Home exercise program and expectations of therapy     Written Home Exercises Provided: no  ASSESSMENT     Nelia arrived today ambulating with a cane. She demonstrated favorable stability ambulating with the cane at this session.  She also responded very well to manual intervention at the posterior knee with taping added as a follow-up. There was improved passive knee extension. The manual joint mobs and tissue stretching on Monday was seemingly effective and likely the source of her current knee discomfort.     Nelia Is progressing towards her goals.   Pt prognosis is Good.     Pt will continue to benefit from skilled outpatient physical therapy to address the deficits listed in the problem list box on initial evaluation, provide pt/family education and to maximize pt's level of independence in the home and community environment.     Pt's spiritual, cultural and educational needs considered and pt agreeable to plan of care and goals.     Anticipated barriers to physical therapy:  none    Goals:   Post-op Knee   Short Term Goals ( 3 weeks )   1. Pt will report reduced pain by 20 to 40% or greater for improved mobility, sleep  and ambulation.   2. Patients knee edema reduced by 1/4 to 1/2 inch or greater to enhance flexion ROM and knee comfort.   3. Pt to report minimal to no pain to palpation for improved level of comfort.   4. Pt to demonstrate symmetrical weight bearing w/o increased pain for stability and functional ambulation .  5. Pts neuromuscular response will be enhanced for unilateral standing stability and balance   6. Pt to achieve 115 degrees of passive knee flexion for restoring functional knee mobility, ambulating with proper gait pattern and sit to stand ability.   7 Pt to report understanding of all instruction pertinent to patient safety , gait instruction and HEP.    8. Pt to exhibit correct return demonstration of exercises for  self-management and independence with HEP        Long Term Goals: 6 weeks  1. Pt will demonstrate increased knee flexion AROM to 120 degrees or greater for return to functional activity  2. Pt will demonstrate increased knee extension AROM to -10 to 0 degrees for standing stability   3. Pt will demonstrate increased RLE strength by 1/2 to 1 grade for improved functional stability  4. Pt to demonstrate standing stability unsupported on dynamic surfaces for functional return to ADL and recreational activities.   5.The patient will be independent amb with no assistive device on all surfaces for community distances.  6..Pt to demonstrate independence with HEP for self management       PLAN     Plan of care certification: 6/21/2024 to 9/21/2024.     Outpatient Physical Therapy 2 times weekly for 10 weeks to include the following interventions: Gait Training, Manual Therapy, Neuromuscular Re-ed, Patient Education, Therapeutic Activities, and Therapeutic Exercise.     Sher Castro, PT

## 2024-08-02 ENCOUNTER — PATIENT MESSAGE (OUTPATIENT)
Dept: ORTHOPEDICS | Facility: CLINIC | Age: 84
End: 2024-08-02
Payer: MEDICARE

## 2024-08-06 ENCOUNTER — CLINICAL SUPPORT (OUTPATIENT)
Dept: REHABILITATION | Facility: HOSPITAL | Age: 84
End: 2024-08-06
Payer: MEDICARE

## 2024-08-06 ENCOUNTER — PATIENT MESSAGE (OUTPATIENT)
Dept: ORTHOPEDICS | Facility: CLINIC | Age: 84
End: 2024-08-06
Payer: MEDICARE

## 2024-08-06 DIAGNOSIS — R29.898 IMPAIRED STRENGTH OF HIP MUSCLES: ICD-10-CM

## 2024-08-06 DIAGNOSIS — M25.661 IMPAIRED RANGE OF MOTION OF RIGHT KNEE: Primary | ICD-10-CM

## 2024-08-06 DIAGNOSIS — R60.0 LOCALIZED EDEMA: ICD-10-CM

## 2024-08-06 PROCEDURE — 97112 NEUROMUSCULAR REEDUCATION: CPT | Mod: PO | Performed by: PHYSICAL THERAPIST

## 2024-08-08 ENCOUNTER — CLINICAL SUPPORT (OUTPATIENT)
Dept: REHABILITATION | Facility: HOSPITAL | Age: 84
End: 2024-08-08
Payer: MEDICARE

## 2024-08-08 DIAGNOSIS — R60.0 LOCALIZED EDEMA: ICD-10-CM

## 2024-08-08 DIAGNOSIS — M25.661 IMPAIRED RANGE OF MOTION OF RIGHT KNEE: Primary | ICD-10-CM

## 2024-08-08 DIAGNOSIS — R29.898 IMPAIRED STRENGTH OF HIP MUSCLES: ICD-10-CM

## 2024-08-08 PROCEDURE — 97112 NEUROMUSCULAR REEDUCATION: CPT | Mod: PO | Performed by: PHYSICAL THERAPIST

## 2024-08-08 PROCEDURE — 97530 THERAPEUTIC ACTIVITIES: CPT | Mod: PO | Performed by: PHYSICAL THERAPIST

## 2024-08-20 ENCOUNTER — CLINICAL SUPPORT (OUTPATIENT)
Dept: REHABILITATION | Facility: HOSPITAL | Age: 84
End: 2024-08-20
Payer: MEDICARE

## 2024-08-20 DIAGNOSIS — M25.661 IMPAIRED RANGE OF MOTION OF RIGHT KNEE: Primary | ICD-10-CM

## 2024-08-20 DIAGNOSIS — R60.0 LOCALIZED EDEMA: ICD-10-CM

## 2024-08-20 DIAGNOSIS — R29.898 IMPAIRED STRENGTH OF HIP MUSCLES: ICD-10-CM

## 2024-08-20 PROCEDURE — 97112 NEUROMUSCULAR REEDUCATION: CPT | Mod: PO | Performed by: PHYSICAL THERAPIST

## 2024-08-20 NOTE — PROGRESS NOTES
[facility]   Physical Therapy Treatment Note     Name: Nelia Littlejohn  [facility] Number: [id number]    Therapy Diagnosis:   Encounter Diagnoses   Name Primary?    Impaired range of motion of right knee Yes    Impaired strength of hip muscles     Localized edema        Physician: Ernie Rubio III, *    Visit Date: 8/20/2024    Physician Orders: PT Eval and Treat right knee  Medical Diagnosis from Referral:    Primary osteoarthritis of right knee [M17.11]   Evaluation Date: 6/21/2024  Authorization Period Expiration: 12/31/2024  Plan of Care Expiration: 9/21/2024  Progress Note Due: 7/20/2024  Visit # / Visits authorized: 1/ 1, 19 / 20- 13 / 20 since surgery   FOTO: -/ 3   PTA Visit #: -/5     Precautions: Standard    Time In: 1010    Time Out: 1110  Total Billable Time: 60 minutes  S/P R-TKA 6/19/2024     SUBJECTIVE     Pt reports: the knee is feeling pretty good. No pain.       She was issued a home exercise program at   Response to previous treatment: did good  Functional change: ongoing    Pain: 0 /10   Location: right calf and behind the knee.     OBJECTIVE     Gait - patient ambulating with a single tip cane.   Palpation -pain in the posterior popliteal area of the knee. Increased tissue density identified in the popliteal space.       TREATMENT        Nelia received the treatments listed below:     .BOLD INDICATES ACTIVITIES PERFORMED / DISCUSSED     Leg press   Recumbent bike  Upright bike 7'   UE ergometer  Treadmill   Elliptical          THERAPEUTIC EXERCISES to develop  strength, endurance, ROM, and flexibility for - minutes including   SUPINE   -heel slides x40  -posterior knee stretch 3'    SITTING   -foot slides x30 with disc   -sustained flexion 3'     NEUROMUSCULAR RE-EDUCATION  activities to improve: Balance, Proprioception, and stability for 45 minutes. The following activities were included:  Rock Tape application over the posterior knee to decompress  "the tissue and promote pain reduction.     SUPINE  -SLR x 20   -SAQ over bolster 2x20 2#   -HS cheri over roll hold 6" x 15   -bridge x20   -quad sets towel at ankle hold 7" x 20  -quad sets towel behind knee hold 7" x 20   -ball squeeze hold 5" x 20  -Iso ring hold 5" x 20     SIDE LYING   -hydrant x20     SITTING   -SAQ = with HS activation hold 3" x20   -LAQ x 30   -HS curls Free Motion 7#  3x15     STANDING   -weight shift x20 on green oval   -TKE ball at wall hold 5" x 15   -knee flexion   x20  -SL stand on airex 15" x 4   -Tandem stand 20" x 3     manual therapy techniques:  were applied to the: right knee for 3 minutes, including:  -ST mobs to the posterior knee / popliteal area.   -posterior PA with external tibial rotation.     therapeutic activities to improve functional performance for 8 minutes, including:  -squat x15  -SL TKE - contralateral leg support on green oval X15  -step ups on step X20     gait training to improve functional mobility and safety for 0 minutes, including:  -Patient ambulating with a single tip cane. Stand by asst provided for ambulation around the clinic. Instruction provided regarding knee extension of the knee during swing thru phase of the LLE.   -Gait cycle of left step thru with right knee locked in extension and step back of the LLE again with the right knee locked in extension.   -Reinforced gait pattern with knee flexion at toe off and during swing thru phase of the gait cycle.   -Ambulation with a single point cane. Instructed in proper gait pattern   -Ambulation in // bars with heel to toe walking and simulated ambulation with a cane using one hand support.     MODALITY: ice to the knee for 10'     PATIENT EDUCATION AND HOME EXERCISES     Home Exercises Provided and Patient Education Provided     Education provided:   --Findings of evaluation and examination, and affect of these on plan for treatment  -Prognosis and expectations  -Home exercise program and expectations of " therapy     Written Home Exercises Provided: no  ASSESSMENT     The patient performed the routine noted without significant difficulty. She is progressing with knee extension.  Exhibiting satisfactory step up capabilities along with knee flexion associated with the Free Motion HS curls. Progressing with balance and stability activities. Patients progression slightly behind due to BP issues last week.      Nelia Is progressing towards her goals.   Pt prognosis is Good.     Pt will continue to benefit from skilled outpatient physical therapy to address the deficits listed in the problem list box on initial evaluation, provide pt/family education and to maximize pt's level of independence in the home and community environment.     Pt's spiritual, cultural and educational needs considered and pt agreeable to plan of care and goals.     Anticipated barriers to physical therapy:  none    Goals:   Post-op Knee   Short Term Goals ( 3 weeks )   1. Pt will report reduced pain by 20 to 40% or greater for improved mobility, sleep  and ambulation. ONGOING   2. Patients knee edema reduced by 1/4 to 1/2 inch or greater to enhance flexion ROM and knee comfort. ONGOING   3. Pt to report minimal to no pain to palpation for improved level of comfort. ONGOING   4. Pt to demonstrate symmetrical weight bearing w/o increased pain for stability and functional ambulation .ONGOING   5. Pts neuromuscular response will be enhanced for unilateral standing stability and balance ONGOING   6. Pt to achieve 115 degrees of passive knee flexion for restoring functional knee mobility, ambulating with proper gait pattern and sit to stand ability. ONGOING   7 Pt to report understanding of all instruction pertinent to patient safety , gait instruction and HEP.  ONGOING   8. Pt to exhibit correct return demonstration of exercises for self-management and independence with HEP. ONGOING         Long Term Goals: 6 weeks  1. Pt will demonstrate increased knee  flexion AROM to 120 degrees or greater for return to functional activity. ONGOING   2. Pt will demonstrate increased knee extension AROM to -10 to 0 degrees for standing stability . ONGOING   3. Pt will demonstrate increased RLE strength by 1/2 to 1 grade for improved functional stability. ONGOING   4. Pt to demonstrate standing stability unsupported on dynamic surfaces for functional return to ADL and recreational activities. ONGOING   5.The patient will be independent amb with no assistive device on all surfaces for community distances.ONGOING   6..Pt to demonstrate independence with HEP for self management.    ONGOING     PLAN     Plan of care certification: 6/21/2024 to 9/21/2024.     Outpatient Physical Therapy 2 times weekly for 10 weeks to include the following interventions: Gait Training, Manual Therapy, Neuromuscular Re-ed, Patient Education, Therapeutic Activities, and Therapeutic Exercise.     Sher Castro, PT

## 2024-08-22 ENCOUNTER — CLINICAL SUPPORT (OUTPATIENT)
Dept: REHABILITATION | Facility: HOSPITAL | Age: 84
End: 2024-08-22
Payer: MEDICARE

## 2024-08-22 DIAGNOSIS — R60.0 LOCALIZED EDEMA: ICD-10-CM

## 2024-08-22 DIAGNOSIS — M25.661 IMPAIRED RANGE OF MOTION OF RIGHT KNEE: Primary | ICD-10-CM

## 2024-08-22 DIAGNOSIS — R29.898 IMPAIRED STRENGTH OF HIP MUSCLES: ICD-10-CM

## 2024-08-22 PROCEDURE — 97112 NEUROMUSCULAR REEDUCATION: CPT | Mod: PO | Performed by: PHYSICAL THERAPIST

## 2024-08-22 NOTE — PROGRESS NOTES
[facility]   Physical Therapy Treatment Note     Name: Nelia Littlejohn  [facility] Number: [id number]    Therapy Diagnosis:   Encounter Diagnoses   Name Primary?    Impaired range of motion of right knee Yes    Impaired strength of hip muscles     Localized edema        Physician: Ernie Rubio III, *    Visit Date: 8/22/2024    Physician Orders: PT Eval and Treat right knee  Medical Diagnosis from Referral:    Primary osteoarthritis of right knee [M17.11]   Evaluation Date: 6/21/2024  Authorization Period Expiration: 12/31/2024  Plan of Care Expiration: 9/21/2024  Progress Note Due: 7/20/2024  Visit # / Visits authorized: 1/ 1, 19 / 20- 13 / 20 since surgery   FOTO: -/ 3   PTA Visit #: -/5     Precautions: Standard    Time In: 910   Time Out: 1020  Total Billable Time: 65 minutes  S/P R-TKA 6/19/2024     SUBJECTIVE     Pt reports: the knee is feeling pretty good. No pain.       She was issued a home exercise program at   Response to previous treatment: did good  Functional change: ongoing    Pain: 0 /10   Location: right calf and behind the knee.     OBJECTIVE     Gait - patient ambulating with a single tip cane.   Palpation -pain in the posterior popliteal area of the knee. Increased tissue density identified in the popliteal space.       TREATMENT        Nelia received the treatments listed below:     .BOLD INDICATES ACTIVITIES PERFORMED / DISCUSSED     Leg press   Recumbent bike  Upright bike 7'   UE ergometer  Treadmill   Elliptical          THERAPEUTIC EXERCISES to develop  strength, endurance, ROM, and flexibility for - minutes including   SUPINE   -heel slides x40  -posterior knee stretch 3'    SITTING   -foot slides x30 with disc   -sustained flexion 3'     NEUROMUSCULAR RE-EDUCATION  activities to improve: Balance, Proprioception, and stability for 45 minutes. The following activities were included:  Rock Tape application over the posterior knee to decompress the  "tissue and promote pain reduction.     SUPINE  -SLR x 20   -SAQ over bolster 2x20 2#   -HS cheri over roll hold 6" x 15   -bridge x20   -quad sets towel at ankle hold 7" x 20  -quad sets towel behind knee hold 7" x 20   -ball squeeze hold 5" x 20  -Iso ring hold 5" x 20     SIDE LYING   -hydrant x20     SITTING   -SAQ = with HS activation hold 3" x20   -LAQ x 30   -HS curls Free Motion 7#  3x15     STANDING   -weight shift x20 on green oval   -TKE ball at wall hold 5" x 15   -knee flexion   x20  -SL stand on airex 15" x 4   -Tandem stand 20" x 3     manual therapy techniques:  were applied to the: right knee for 0 minutes, including:  -ST mobs to the posterior knee / popliteal area.   -posterior PA with external tibial rotation.     therapeutic activities to improve functional performance for 8 minutes, including:  -squat x15  -SL TKE - contralateral leg support on green oval X15  -step ups on step X20     gait training to improve functional mobility and safety for 0 minutes, including:  -Patient ambulating with a single tip cane. Stand by asst provided for ambulation around the clinic. Instruction provided regarding knee extension of the knee during swing thru phase of the LLE.   -Gait cycle of left step thru with right knee locked in extension and step back of the LLE again with the right knee locked in extension.   -Reinforced gait pattern with knee flexion at toe off and during swing thru phase of the gait cycle.   -Ambulation with a single point cane. Instructed in proper gait pattern   -Ambulation in // bars with heel to toe walking and simulated ambulation with a cane using one hand support.     MODALITY: ice to the knee for 10'     PATIENT EDUCATION AND HOME EXERCISES     Home Exercises Provided and Patient Education Provided     Education provided:   --Findings of evaluation and examination, and affect of these on plan for treatment  -Prognosis and expectations  -Home exercise program and expectations of " therapy     Written Home Exercises Provided: no  ASSESSMENT     Ms. Pickens performed the routine noted.  She did not encounter any significant problems regarding pain and demonstrated good technique with the activities performed. Progress with CC activities. She is ambulating with good stability and very good knee mobility.        Nelia Is progressing towards her goals.   Pt prognosis is Good.     Pt will continue to benefit from skilled outpatient physical therapy to address the deficits listed in the problem list box on initial evaluation, provide pt/family education and to maximize pt's level of independence in the home and community environment.     Pt's spiritual, cultural and educational needs considered and pt agreeable to plan of care and goals.     Anticipated barriers to physical therapy:  none    Goals:   Post-op Knee   Short Term Goals ( 3 weeks )   1. Pt will report reduced pain by 20 to 40% or greater for improved mobility, sleep  and ambulation. ONGOING   2. Patients knee edema reduced by 1/4 to 1/2 inch or greater to enhance flexion ROM and knee comfort. ONGOING   3. Pt to report minimal to no pain to palpation for improved level of comfort. ONGOING   4. Pt to demonstrate symmetrical weight bearing w/o increased pain for stability and functional ambulation .ONGOING   5. Pts neuromuscular response will be enhanced for unilateral standing stability and balance ONGOING   6. Pt to achieve 115 degrees of passive knee flexion for restoring functional knee mobility, ambulating with proper gait pattern and sit to stand ability. ONGOING   7 Pt to report understanding of all instruction pertinent to patient safety , gait instruction and HEP.  ONGOING   8. Pt to exhibit correct return demonstration of exercises for self-management and independence with HEP. ONGOING         Long Term Goals: 6 weeks  1. Pt will demonstrate increased knee flexion AROM to 120 degrees or greater for return to functional activity.  ONGOING   2. Pt will demonstrate increased knee extension AROM to -10 to 0 degrees for standing stability . ONGOING   3. Pt will demonstrate increased RLE strength by 1/2 to 1 grade for improved functional stability. ONGOING   4. Pt to demonstrate standing stability unsupported on dynamic surfaces for functional return to ADL and recreational activities. ONGOING   5.The patient will be independent amb with no assistive device on all surfaces for community distances.ONGOING   6..Pt to demonstrate independence with HEP for self management.    ONGOING     PLAN     Plan of care certification: 6/21/2024 to 9/21/2024.     Outpatient Physical Therapy 2 times weekly for 10 weeks to include the following interventions: Gait Training, Manual Therapy, Neuromuscular Re-ed, Patient Education, Therapeutic Activities, and Therapeutic Exercise.     Sher Castro, PT

## 2024-09-12 ENCOUNTER — OFFICE VISIT (OUTPATIENT)
Dept: ORTHOPEDICS | Facility: CLINIC | Age: 84
End: 2024-09-12
Payer: MEDICARE

## 2024-09-12 VITALS — WEIGHT: 213.94 LBS | BODY MASS INDEX: 34.38 KG/M2 | HEIGHT: 66 IN

## 2024-09-12 DIAGNOSIS — Z96.651 S/P TOTAL KNEE REPLACEMENT, RIGHT: Primary | ICD-10-CM

## 2024-09-12 PROCEDURE — 1159F MED LIST DOCD IN RCRD: CPT | Mod: CPTII,S$GLB,, | Performed by: ORTHOPAEDIC SURGERY

## 2024-09-12 PROCEDURE — 1101F PT FALLS ASSESS-DOCD LE1/YR: CPT | Mod: CPTII,S$GLB,, | Performed by: ORTHOPAEDIC SURGERY

## 2024-09-12 PROCEDURE — 3288F FALL RISK ASSESSMENT DOCD: CPT | Mod: CPTII,S$GLB,, | Performed by: ORTHOPAEDIC SURGERY

## 2024-09-12 PROCEDURE — 99024 POSTOP FOLLOW-UP VISIT: CPT | Mod: S$GLB,,, | Performed by: ORTHOPAEDIC SURGERY

## 2024-09-12 PROCEDURE — 1126F AMNT PAIN NOTED NONE PRSNT: CPT | Mod: CPTII,S$GLB,, | Performed by: ORTHOPAEDIC SURGERY

## 2024-09-12 PROCEDURE — 99999 PR PBB SHADOW E&M-EST. PATIENT-LVL III: CPT | Mod: PBBFAC,,, | Performed by: ORTHOPAEDIC SURGERY

## 2024-09-12 NOTE — PROGRESS NOTES
Subjective:     HPI:   Nelia Littlejohn is a 84 y.o. female who presents 12 weeks out from right TKA    Date of surgery:   R VTKA 6/19/24 all poly  L TKA 10/11/16 George Bello    History of Present Illness  The patient presents for a follow-up visit.    She reports an improvement in her condition, with no longer experiencing muscle spasms at night. She is not currently on any medication and does not require the use of assistive devices such as a cane, walker, or crutches. She has completed her therapy and is satisfied with the progress of her knee. She engages in daily seated exercises at the [facility] and continues to perform stretches at home. She notes that both knees feel similar in terms of mobility and comfort.    Medications: none regularly for knee    Assistive Devices: none    PT: finished    Overall doing well happy with the knee          Objective:   Body mass index is 34.53 kg/m².  Exam:    Gait: limp/antalgic none    Incision: healed    Stability:  Knee stable anterior-posterior varus and valgus stresses, no extensor lag    Extension: 10    Flexion: 125    Pre-op    6 week     Physical Exam      Imaging:  None today    Results        Assessment:       ICD-10-CM ICD-9-CM   1. S/P total knee replacement, right  Z96.651 V43.65        Doing well     Plan:       Patient is doing very well with their total knee arthroplasty.  They will continue with their routine care of the knee replacement and see me back for their follow-up at the routine interval.  If there are problems in the interim they will see me back sooner. Prophylactic antibiotic protocol given and explained to patient.     Assessment & Plan  1. Post-knee surgery status.  Her knee flexion contracture has improved from 20 degrees pre-surgery to approximately 10 degrees currently. Continued improvement is anticipated with ongoing hamstring stretches and regular movement. She is advised to maintain mobility and avoid falls.  Overexertion should be avoided, and rest, over-the-counter pain relievers (Advil, Aleve, Tylenol), and ice or heat application are recommended for discomfort. Kneeling may cause discomfort, so a soft pad is suggested for cushioning, and something to hold on to for getting back up. A handout was provided for reference. Antibiotics will be administered during any future surgeries to prevent bacterial infection in her joint replacements. For dental procedures, particularly those involving bleeding (e.g., root canal, tooth extraction), prophylactic antibiotics should be taken an hour before the procedure. No additional x-rays are needed until her 1-year post-surgery anniversary, after which they will be conducted every 5 years.      Continue HS stretches    9 month follow up for annual xrays     This note was generated with the assistance of ambient listening technology. Verbal consent was obtained by the patient and accompanying visitor(s) for the recording of patient appointment to facilitate this note. I attest to having reviewed and edited the generated note for accuracy, though some syntax or spelling errors may persist. Please contact the author of this note for any clarification.      No orders of the defined types were placed in this encounter.            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 Vision is much better, can read.     High cholesterol     History of prediabetes 10/26/2017    HTN (hypertension) 09/25/2012    Hyperlipemia 09/11/2012    Hypertension     Interval gout 09/11/2012    Status post cataract extraction and insertion of intraocular lens 11/27/2012    sp Yag Cap OS      Stroke        Past Surgical History:   Procedure Laterality Date     ARTHROPLASTY, KNEE, TOTAL, USING COMPUTER-ASSISTED NAVIGATION Right 6/19/2024    Procedure: ARTHROPLASTY, KNEE, TOTAL, USING COMPUTER-ASSISTED NAVIGATION: VELYS: RIGHT: DEPUY - ATTUNE;  Surgeon: Ernie Rubio III, MD;  Location: [facility];  Service: Orthopedics;  Laterality: Right;    CATARACT EXTRACTION Bilateral     EYE SURGERY      HYSTERECTOMY      JOINT REPLACEMENT      Yag capsulotomy left eye  10/10/2012       Family History   Problem Relation Name Age of Onset    Diabetes Father      Stroke Father      Cataracts Father      Diabetes Mother      Cancer Sister Katelyn         breast cancer    Diabetes Brother Lester     Heart disease Brother Lester     No Known Problems Son Raj     No Known Problems Daughter Leydi     No Known Problems Maternal Grandmother      No Known Problems Maternal Grandfather      No Known Problems Paternal Grandmother      No Known Problems Paternal Grandfather      No Known Problems Brother Boris     No Known Problems Maternal Aunt      No Known Problems Maternal Uncle      No Known Problems Paternal Aunt      No Known Problems Paternal Uncle      Hypertension Brother Santi     Diabetes Brother Laila     Hypertension Son Garrick     Hypertension Son Wil     No Known Problems Son Daljit     No Known Problems Son Austen     Amblyopia Neg Hx      Blindness Neg Hx      Glaucoma Neg Hx      Macular degeneration Neg Hx      Retinal detachment Neg Hx      Strabismus Neg Hx      Thyroid disease Neg Hx         Social History     Socioeconomic History    Marital status:     Number of children: 6   Occupational History    Occupation:  at [employer]     Comment: [employer]; now retired   Tobacco Use    Smoking status: Never    Smokeless tobacco: Never   Substance and Sexual Activity    Alcohol use: No    Drug use: No    Sexual activity: Not Currently   Social History Narrative        She has 5 sons.  3 live in [place] and one son is here in [place].  She has a  daughter who lives here in [place].     Social Determinants of Health     Financial Resource Strain: Low Risk  (9/17/2023)    Overall Financial Resource Strain (CARDIA)     Difficulty of Paying Living Expenses: Not very hard   Food Insecurity: No Food Insecurity (9/17/2023)    Hunger Vital Sign     Worried About Running Out of Food in the Last Year: Never true     Ran Out of Food in the Last Year: Never true   Transportation Needs: No Transportation Needs (9/17/2023)    PRAPARE - Transportation     Lack of Transportation (Medical): No     Lack of Transportation (Non-Medical): No   Physical Activity: Inactive (9/17/2023)    Exercise Vital Sign     Days of Exercise per Week: 0 days     Minutes of Exercise per Session: 0 min   Stress: No Stress Concern Present (9/17/2023)    [employer] - Occupational Stress Questionnaire     Feeling of Stress : Only a little   Housing Stability: Low Risk  (9/17/2023)    Housing Stability Vital Sign     Unable to Pay for Housing in the Last Year: No     Number of Places Lived in the Last Year: 1     Unstable Housing in the Last Year: No

## 2024-09-23 PROBLEM — G89.18 POST-OP PAIN: Status: RESOLVED | Noted: 2024-06-21 | Resolved: 2024-09-23

## 2024-10-24 ENCOUNTER — OFFICE VISIT (OUTPATIENT)
Dept: INTERNAL MEDICINE | Facility: CLINIC | Age: 84
End: 2024-10-24
Payer: MEDICARE

## 2024-10-24 ENCOUNTER — IMMUNIZATION (OUTPATIENT)
Dept: INTERNAL MEDICINE | Facility: CLINIC | Age: 84
End: 2024-10-24
Payer: MEDICARE

## 2024-10-24 ENCOUNTER — OFFICE VISIT (OUTPATIENT)
Dept: CARDIOLOGY | Facility: CLINIC | Age: 84
End: 2024-10-24
Payer: MEDICARE

## 2024-10-24 VITALS
HEIGHT: 66 IN | WEIGHT: 226.88 LBS | OXYGEN SATURATION: 97 % | BODY MASS INDEX: 36.46 KG/M2 | DIASTOLIC BLOOD PRESSURE: 60 MMHG | HEART RATE: 72 BPM | SYSTOLIC BLOOD PRESSURE: 104 MMHG

## 2024-10-24 VITALS
BODY MASS INDEX: 36.33 KG/M2 | WEIGHT: 225.06 LBS | SYSTOLIC BLOOD PRESSURE: 120 MMHG | DIASTOLIC BLOOD PRESSURE: 66 MMHG | HEART RATE: 76 BPM

## 2024-10-24 DIAGNOSIS — E78.2 MIXED HYPERLIPIDEMIA: ICD-10-CM

## 2024-10-24 DIAGNOSIS — M17.0 PRIMARY OSTEOARTHRITIS OF BOTH KNEES: ICD-10-CM

## 2024-10-24 DIAGNOSIS — N18.31 STAGE 3A CHRONIC KIDNEY DISEASE: ICD-10-CM

## 2024-10-24 DIAGNOSIS — I70.0 AORTIC ATHEROSCLEROSIS: ICD-10-CM

## 2024-10-24 DIAGNOSIS — N18.32 STAGE 3B CHRONIC KIDNEY DISEASE: ICD-10-CM

## 2024-10-24 DIAGNOSIS — E66.812 CLASS 2 SEVERE OBESITY WITH BODY MASS INDEX (BMI) OF 35 TO 39.9 WITH SERIOUS COMORBIDITY: ICD-10-CM

## 2024-10-24 DIAGNOSIS — I10 ESSENTIAL HYPERTENSION: ICD-10-CM

## 2024-10-24 DIAGNOSIS — I73.9 PERIPHERAL VASCULAR DISEASE: ICD-10-CM

## 2024-10-24 DIAGNOSIS — I10 ESSENTIAL HYPERTENSION: Primary | ICD-10-CM

## 2024-10-24 DIAGNOSIS — Z23 NEED FOR VACCINATION: Primary | ICD-10-CM

## 2024-10-24 DIAGNOSIS — I25.10 CORONARY ARTERY DISEASE INVOLVING NATIVE CORONARY ARTERY OF NATIVE HEART WITHOUT ANGINA PECTORIS: Primary | ICD-10-CM

## 2024-10-24 DIAGNOSIS — E66.01 CLASS 2 SEVERE OBESITY WITH BODY MASS INDEX (BMI) OF 35 TO 39.9 WITH SERIOUS COMORBIDITY: ICD-10-CM

## 2024-10-24 DIAGNOSIS — Z86.718 HISTORY OF DVT (DEEP VEIN THROMBOSIS): ICD-10-CM

## 2024-10-24 PROCEDURE — 99999 PR PBB SHADOW E&M-EST. PATIENT-LVL III: CPT | Mod: PBBFAC,,, | Performed by: INTERNAL MEDICINE

## 2024-10-24 PROCEDURE — 1160F RVW MEDS BY RX/DR IN RCRD: CPT | Mod: CPTII,S$GLB,, | Performed by: INTERNAL MEDICINE

## 2024-10-24 PROCEDURE — 99999 PR PBB SHADOW E&M-EST. PATIENT-LVL IV: CPT | Mod: PBBFAC,,, | Performed by: INTERNAL MEDICINE

## 2024-10-24 PROCEDURE — 1101F PT FALLS ASSESS-DOCD LE1/YR: CPT | Mod: CPTII,S$GLB,, | Performed by: INTERNAL MEDICINE

## 2024-10-24 PROCEDURE — 3078F DIAST BP <80 MM HG: CPT | Mod: CPTII,S$GLB,, | Performed by: INTERNAL MEDICINE

## 2024-10-24 PROCEDURE — 99214 OFFICE O/P EST MOD 30 MIN: CPT | Mod: S$GLB,,, | Performed by: INTERNAL MEDICINE

## 2024-10-24 PROCEDURE — 1159F MED LIST DOCD IN RCRD: CPT | Mod: CPTII,S$GLB,, | Performed by: INTERNAL MEDICINE

## 2024-10-24 PROCEDURE — 3074F SYST BP LT 130 MM HG: CPT | Mod: CPTII,S$GLB,, | Performed by: INTERNAL MEDICINE

## 2024-10-24 PROCEDURE — G0008 ADMIN INFLUENZA VIRUS VAC: HCPCS | Mod: S$GLB,,, | Performed by: INTERNAL MEDICINE

## 2024-10-24 PROCEDURE — 99213 OFFICE O/P EST LOW 20 MIN: CPT | Mod: S$GLB,,, | Performed by: INTERNAL MEDICINE

## 2024-10-24 PROCEDURE — 90653 IIV ADJUVANT VACCINE IM: CPT | Mod: S$GLB,,, | Performed by: INTERNAL MEDICINE

## 2024-10-24 PROCEDURE — 1126F AMNT PAIN NOTED NONE PRSNT: CPT | Mod: CPTII,S$GLB,, | Performed by: INTERNAL MEDICINE

## 2024-10-24 PROCEDURE — 3288F FALL RISK ASSESSMENT DOCD: CPT | Mod: CPTII,S$GLB,, | Performed by: INTERNAL MEDICINE

## 2024-10-24 NOTE — PROGRESS NOTES
Chief Complaint  Chief Complaint   Patient presents with    Follow-up       HPI  Nelia Littlejohn is a 84 y.o. female with multiple medical diagnoses as listed in the medical history and problem list who presents for 6-month follow up.  Her last appointment with primary care was 3/26/24.      She is here with her daughter today. Doing well, no complaints. Here with her daughter. Had right TKA in June with Dr. Rubio. She is doing great since surgery, no longer requiring cane or walker for ambulation. She now attends [employer] daily, 30 minutes of exercise every morning, goes with groups on outings. Recently walked 5-6 blocks for cancer walk.    History of resistant hypertension, on 6 medications; CVA in 2021 on max dose lipitor; unprovoked DVT in 2023 resolved without anticoagulation. She saw cardiology, Dr. Sharma, this morning.    ROS  Review of Systems   Constitutional: Negative.    HENT: Negative.     Eyes: Negative.    Respiratory: Negative.     Cardiovascular:  Negative for chest pain and palpitations.   Gastrointestinal: Negative.    Genitourinary: Negative.    Musculoskeletal: Negative.    Neurological: Negative.        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 Vision is much better, can read.     High cholesterol     History of prediabetes 10/26/2017    HTN (hypertension) 09/25/2012    Hyperlipemia 09/11/2012    Hypertension     Interval gout 09/11/2012    Status post cataract extraction and insertion of intraocular lens 11/27/2012    sp Yag Cap OS      Stroke        PAST SURGICAL HISTORY:  Past Surgical History:   Procedure Laterality Date    ARTHROPLASTY, KNEE, TOTAL, USING COMPUTER-ASSISTED NAVIGATION Right 6/19/2024    Procedure:  ARTHROPLASTY, KNEE, TOTAL, USING COMPUTER-ASSISTED NAVIGATION: VELYS: RIGHT: DEPUY - ATTUNE;  Surgeon: Ernie Rubio III, MD;  Location: [facility];  Service: Orthopedics;  Laterality: Right;    CATARACT EXTRACTION Bilateral     EYE SURGERY      HYSTERECTOMY      JOINT REPLACEMENT      Yag capsulotomy left eye  10/10/2012       SOCIAL HISTORY:  Social History     Socioeconomic History    Marital status:     Number of children: 6   Occupational History    Occupation:  at [employer]     Comment: [employer]; now retired   Tobacco Use    Smoking status: Never     Passive exposure: Never    Smokeless tobacco: Never   Substance and Sexual Activity    Alcohol use: No    Drug use: No    Sexual activity: Not Currently   Social History Narrative        She has 5 sons.  3 live in [place] and one son is here in [place].  She has a daughter who lives here in [place].       FAMILY HISTORY:  Family History   Problem Relation Name Age of Onset    Diabetes Father      Stroke Father      Cataracts Father      Diabetes Mother      Cancer Sister Katelyn         breast cancer    Diabetes Brother Lester     Heart disease Brother Lester     Hypertension Brother Santi     Diabetes Brother Laila     Hypertension Son Garrick     Hypertension Son Wil        ALLERGIES AND MEDICATIONS: updated and reviewed.  Review of patient's allergies indicates:  No Known Allergies  Current Outpatient Medications   Medication Sig Dispense Refill    allopurinoL (ZYLOPRIM) 300 MG tablet Take 1 tablet (300 mg total) by mouth once daily. 90 tablet 3    amLODIPine-benazepriL (LOTREL) 10-40 mg per capsule TAKE 1 TABLET BY MOUTH EVERY MORNING 90 capsule 3    atorvastatin (LIPITOR) 80 MG tablet TAKE 1 TABLET BY MOUTH EVERY DAY 90 tablet 3    cetirizine (ZYRTEC) 10 MG tablet Take 10 mg by mouth once daily.      hydrALAZINE (APRESOLINE) 100 MG tablet TAKE 1 TABLET BY MOUTH TWICE DAILY FOR BLOOD PRESSURE 180 tablet 3    metoprolol  "ta-hydrochlorothiaz (LOPRESSOR HCT) 100-25 mg per tablet Take 1 tablet by mouth once daily. 90 tablet 3    MULTIVIT-IRON-MIN-FOLIC ACID 3,500-18-0.4 UNIT-MG-MG ORAL CHEW Take by mouth once daily.      spironolactone (ALDACTONE) 25 MG tablet Take 1 tablet (25 mg total) by mouth once daily. 90 tablet 2     No current facility-administered medications for this visit.         Physical Exam  Vitals:    10/24/24 0947 10/24/24 0957   BP: (!) 108/50 104/60   BP Location: Left arm Left arm   Patient Position: Sitting Sitting   Pulse: 72    SpO2: 97%    Weight: 102.9 kg (226 lb 13.7 oz)    Height: 5' 6" (1.676 m)     Body mass index is 36.62 kg/m².  Weight: 102.9 kg (226 lb 13.7 oz)   Height: 5' 6" (167.6 cm)   Physical Exam  Constitutional:       Appearance: Normal appearance.   HENT:      Head: Normocephalic and atraumatic.   Cardiovascular:      Rate and Rhythm: Normal rate and regular rhythm.      Pulses: Normal pulses.      Heart sounds: Normal heart sounds.   Pulmonary:      Effort: Pulmonary effort is normal.      Breath sounds: Normal breath sounds.   Abdominal:      General: Abdomen is flat. Bowel sounds are normal. There is no distension.      Palpations: Abdomen is soft.      Tenderness: There is no abdominal tenderness.   Musculoskeletal:         General: Normal range of motion.   Skin:     General: Skin is warm and dry.   Neurological:      General: No focal deficit present.      Mental Status: She is alert and oriented to person, place, and time.           Health Maintenance         Date Due Completion Date    COVID-19 Vaccine (7 - 2024-25 season) 09/01/2024 10/25/2022    Lipid Panel 03/26/2029 3/26/2024    TETANUS VACCINE 04/09/2029 4/9/2019              Assessment and Plan:  Nelia Littlejohn is a 84 y.o. female who presents for follow up of HTN, HLD, CKD.    Essential hypertension  At goal. Continue medications as prescribed.    Mixed hyperlipidemia  Controlled on max dose statin. Continue as " prescribed.    Primary osteoarthritis of both knees  Improved s/p R TKA.    Stage 3b chronic kidney disease  Stable.    Flu and covid vaccines today    Cynthia Burch, MS4  [employer]    I hereby acknowledge that I am relying upon documentation authored by a medical student working under my supervision and further I hereby attest that I have verified the student documentation or findings by personally re-performing the physical exam and medical decision making activities of the Evaluation and Management service to be billed.    RTC 6 months or sooner carol Gongora MD MPH  Staff Internist

## 2024-10-24 NOTE — PROGRESS NOTES
HISTORY:    84-year-old female with a history of hypertension, hyperlipidemia, CVA '21, RLE DVT September 2023, ANUM on CPAP, and osteoarthritis status post B TKR presenting for follow-up.      Reports feeling well at this time.     No CP, SOB at rest. Chronic, mild LIND. No leg pain.    Tolerated RLE TKR without issue. Completed rehab.     Activity levels mild to moderate.     Tolerates aspirin 325 x 1, amlodipine-benazepril 10-40 x1, hydralazine 100 x 2, metoprolol-hydrochlorothiazide 100-25 x 1, spironolactone 25 x 1, atorvastatin 80 x 1. Blood pressures controlled at home.     PHYSICAL EXAM:    Vitals:    10/24/24 0849   BP: 120/66   Pulse: 76         NAD, A+Ox3.  No jvd, no bruit.  RRR nml s1,s2. No murmurs.  CTA B no wheezes or crackles.  No edema.    LABS/STUDIES (imaging reviewed during clinic visit):    June 2024 hemoglobin 12.0/MCV 92.  Creatinine 1.3/BUN 23/GFR 40.  Albumin 3.9.  /HDL 42//TG 41.  A1c and TSH normal.    ECG June 2024 demonstrates sinus rhythm with no Q-waves or ST changes.  Holter 2021 sinus rhythm with no evidence of significant ectopy or arrhythmia.  Event monitor 2021 sinus rhythm with an average heart rate of 65 beats per minute.  Six symptomatic episodes of near symptoms all correlating with sinus rhythm.  No evidence of arrhythmia.  TTE September 2023 normal LV size and function with EF 60 65%.  Normal diastology.  Mild aortic valve stenosis with a peak velocity 2.06 m/sec.  CVP 3.    DSE September 2023 no evidence of ischemia.  Venous ultrasound December 2023 no evidence of DVT bilaterally.  No evidence of superficial venous reflux bilaterally.  September 2023 right distal femoral vein DVT, nonocclusive.  CTA chest September 2023 no evidence of pulmonary embolism.  Aortic and coronary calcifications noted.      ASSESSMENT & PLAN:    1. Coronary artery disease involving native coronary artery of native heart without angina pectoris    2. Aortic atherosclerosis (CT 2014)     3. Essential hypertension    4. Mixed hyperlipidemia    5. Peripheral vascular disease    6. Stage 3a chronic kidney disease    7. History of DVT (deep vein thrombosis)    8. Class 2 severe obesity with body mass index (BMI) of 35 to 39.9 with serious comorbidity                    DVT in September '23 that resolved without AC. Unclear trigger and seemingly incidental as she never had RLE symptoms. On asa, okay to switch to 81x1.     Bps controlled on amlodipine-benazepril 10-40 x1, hydralazine 100 x 2, metoprolol-hydrochlorothiazide 100-25 x 1, spironolactone 25 x 1.     on atorvastatin 80x1. Asymptomatic CAD evidenced on CT chest w negative DSE.    We discussed the importance of diet modifications and instituting an exercise regimen.    Follow up in about 1 year (around 10/24/2025).      Macy Sharma MD

## 2024-11-07 ENCOUNTER — TELEPHONE (OUTPATIENT)
Dept: ORTHOPEDICS | Facility: CLINIC | Age: 84
End: 2024-11-07
Payer: MEDICARE

## 2024-11-07 NOTE — TELEPHONE ENCOUNTER
Attempted to contact pt regarding outstanding patientIQ questionnaires that will be expiring soon. No answer.

## 2024-12-03 DIAGNOSIS — I10 ESSENTIAL HYPERTENSION: ICD-10-CM

## 2024-12-03 NOTE — TELEPHONE ENCOUNTER
Care Due:                  Date            Visit Type   Department     Provider  --------------------------------------------------------------------------------                                EP -                              PRIMARY      [facility]  Last Visit: 10-      CARE ([facility])   BRIAN Gongora                              EP -                              PRIMARY      [facility]  Next Visit: 04-      CARE ([facility])   BRIAN Gongora                                                            Last  Test          Frequency    Reason                     Performed    Due Date  --------------------------------------------------------------------------------    Uric Acid...  12 months..  allopurinoL..............  Not Found    Overdue    [employer] Embedded Care Due Messages. Reference number: [id number].   12/03/2024 10:50:09 AM CST

## 2024-12-04 RX ORDER — SPIRONOLACTONE 25 MG/1
25 TABLET ORAL DAILY
Qty: 90 TABLET | Refills: 1 | Status: SHIPPED | OUTPATIENT
Start: 2024-12-04

## 2024-12-04 NOTE — TELEPHONE ENCOUNTER
Provider Staff:  Action required for this patient    Requires labs      Please see care gap opportunities below in Care Due Message.    Thanks!  [facility]     Appointments      Date Provider   Last Visit   10/24/2024 Bernardo Gongora II, MD   Next Visit   4/24/2025 Bernardo Gongora II, MD     Refill Decision Note   Nelia Littlejohn  is requesting a refill authorization.  Brief Assessment and Rationale for Refill:  Approve     Medication Therapy Plan:         Comments:     Note composed:12:15 AM 12/04/2024

## 2024-12-16 RX ORDER — ATORVASTATIN CALCIUM 80 MG/1
80 TABLET, FILM COATED ORAL
Qty: 90 TABLET | Refills: 1 | Status: SHIPPED | OUTPATIENT
Start: 2024-12-16

## 2024-12-16 RX ORDER — AMLODIPINE AND BENAZEPRIL HYDROCHLORIDE 10; 40 MG/1; MG/1
1 CAPSULE ORAL EVERY MORNING
Qty: 90 CAPSULE | Refills: 1 | Status: SHIPPED | OUTPATIENT
Start: 2024-12-16

## 2024-12-16 NOTE — TELEPHONE ENCOUNTER
No care due was identified.  [facility] Embedded Care Due Messages. Reference number: [id number].   12/15/2024 11:45:16 PM CST

## 2024-12-17 NOTE — TELEPHONE ENCOUNTER
Refill Decision Note   Nelia Littlejohn  is requesting a refill authorization.  Brief Assessment and Rationale for Refill:  Approve     Medication Therapy Plan:        Comments:     Note composed:6:08 PM 12/16/2024

## 2024-12-19 ENCOUNTER — APPOINTMENT (OUTPATIENT)
Dept: RADIOLOGY | Facility: OTHER | Age: 84
End: 2024-12-19
Attending: PODIATRIST
Payer: MEDICARE

## 2024-12-19 ENCOUNTER — OFFICE VISIT (OUTPATIENT)
Dept: INTERNAL MEDICINE | Facility: CLINIC | Age: 84
End: 2024-12-19
Payer: MEDICARE

## 2024-12-19 ENCOUNTER — OFFICE VISIT (OUTPATIENT)
Dept: PODIATRY | Facility: CLINIC | Age: 84
End: 2024-12-19
Payer: MEDICARE

## 2024-12-19 VITALS
WEIGHT: 223.13 LBS | DIASTOLIC BLOOD PRESSURE: 56 MMHG | SYSTOLIC BLOOD PRESSURE: 120 MMHG | OXYGEN SATURATION: 100 % | HEIGHT: 66 IN | BODY MASS INDEX: 35.86 KG/M2 | HEART RATE: 70 BPM

## 2024-12-19 VITALS
SYSTOLIC BLOOD PRESSURE: 144 MMHG | HEIGHT: 66 IN | BODY MASS INDEX: 36.46 KG/M2 | WEIGHT: 226.88 LBS | DIASTOLIC BLOOD PRESSURE: 75 MMHG | HEART RATE: 65 BPM

## 2024-12-19 DIAGNOSIS — N18.32 STAGE 3B CHRONIC KIDNEY DISEASE: ICD-10-CM

## 2024-12-19 DIAGNOSIS — Z74.09 OTHER REDUCED MOBILITY: ICD-10-CM

## 2024-12-19 DIAGNOSIS — E78.2 MIXED HYPERLIPIDEMIA: ICD-10-CM

## 2024-12-19 DIAGNOSIS — Z00.00 ENCOUNTER FOR PREVENTIVE HEALTH EXAMINATION: Primary | ICD-10-CM

## 2024-12-19 DIAGNOSIS — M77.9 CAPSULITIS: ICD-10-CM

## 2024-12-19 DIAGNOSIS — M79.672 FOOT PAIN, BILATERAL: ICD-10-CM

## 2024-12-19 DIAGNOSIS — M20.5X9 HALLUX LIMITUS, UNSPECIFIED LATERALITY: ICD-10-CM

## 2024-12-19 DIAGNOSIS — M79.671 FOOT PAIN, BILATERAL: ICD-10-CM

## 2024-12-19 DIAGNOSIS — M79.672 FOOT PAIN, BILATERAL: Primary | ICD-10-CM

## 2024-12-19 DIAGNOSIS — M79.671 FOOT PAIN, BILATERAL: Primary | ICD-10-CM

## 2024-12-19 DIAGNOSIS — I70.0 AORTIC ATHEROSCLEROSIS: ICD-10-CM

## 2024-12-19 DIAGNOSIS — S96.912A STRAIN OF ANKLE AND FOOT, LEFT, INITIAL ENCOUNTER: ICD-10-CM

## 2024-12-19 DIAGNOSIS — Z87.898 HISTORY OF PREDIABETES: ICD-10-CM

## 2024-12-19 DIAGNOSIS — E66.812 CLASS 2 SEVERE OBESITY WITH BODY MASS INDEX (BMI) OF 35 TO 39.9 WITH SERIOUS COMORBIDITY: ICD-10-CM

## 2024-12-19 DIAGNOSIS — I73.9 PERIPHERAL VASCULAR DISEASE: ICD-10-CM

## 2024-12-19 DIAGNOSIS — S96.911A STRAIN OF FOOT, RIGHT, INITIAL ENCOUNTER: ICD-10-CM

## 2024-12-19 DIAGNOSIS — E66.01 CLASS 2 SEVERE OBESITY WITH BODY MASS INDEX (BMI) OF 35 TO 39.9 WITH SERIOUS COMORBIDITY: ICD-10-CM

## 2024-12-19 PROBLEM — N18.31 STAGE 3A CHRONIC KIDNEY DISEASE: Status: RESOLVED | Noted: 2024-03-26 | Resolved: 2024-12-19

## 2024-12-19 PROBLEM — R29.898 WEAKNESS OF RIGHT HIP: Status: RESOLVED | Noted: 2024-05-02 | Resolved: 2024-12-19

## 2024-12-19 PROBLEM — M62.81 QUADRICEPS WEAKNESS: Status: RESOLVED | Noted: 2024-05-02 | Resolved: 2024-12-19

## 2024-12-19 PROBLEM — M25.661 DECREASED ROM OF RIGHT KNEE: Status: RESOLVED | Noted: 2024-05-02 | Resolved: 2024-12-19

## 2024-12-19 PROBLEM — R60.0 LOCALIZED EDEMA: Status: RESOLVED | Noted: 2024-06-21 | Resolved: 2024-12-19

## 2024-12-19 PROCEDURE — 3078F DIAST BP <80 MM HG: CPT | Mod: CPTII,S$GLB,, | Performed by: PODIATRIST

## 2024-12-19 PROCEDURE — 1126F AMNT PAIN NOTED NONE PRSNT: CPT | Mod: CPTII,S$GLB,, | Performed by: PODIATRIST

## 2024-12-19 PROCEDURE — 1159F MED LIST DOCD IN RCRD: CPT | Mod: CPTII,S$GLB,, | Performed by: PODIATRIST

## 2024-12-19 PROCEDURE — 99999 PR PBB SHADOW E&M-EST. PATIENT-LVL III: CPT | Mod: PBBFAC,,, | Performed by: PODIATRIST

## 2024-12-19 PROCEDURE — 3077F SYST BP >= 140 MM HG: CPT | Mod: CPTII,S$GLB,, | Performed by: PODIATRIST

## 2024-12-19 PROCEDURE — 1101F PT FALLS ASSESS-DOCD LE1/YR: CPT | Mod: CPTII,S$GLB,, | Performed by: PODIATRIST

## 2024-12-19 PROCEDURE — 73630 X-RAY EXAM OF FOOT: CPT | Mod: TC,50,PN

## 2024-12-19 PROCEDURE — 99214 OFFICE O/P EST MOD 30 MIN: CPT | Mod: S$GLB,,, | Performed by: PODIATRIST

## 2024-12-19 PROCEDURE — 99999 PR PBB SHADOW E&M-EST. PATIENT-LVL IV: CPT | Mod: PBBFAC,,, | Performed by: NURSE PRACTITIONER

## 2024-12-19 PROCEDURE — 73630 X-RAY EXAM OF FOOT: CPT | Mod: 26,50,, | Performed by: RADIOLOGY

## 2024-12-19 PROCEDURE — 3288F FALL RISK ASSESSMENT DOCD: CPT | Mod: CPTII,S$GLB,, | Performed by: PODIATRIST

## 2024-12-19 RX ORDER — DICLOFENAC SODIUM 10 MG/G
2 GEL TOPICAL 4 TIMES DAILY
Qty: 100 G | Refills: 2 | Status: SHIPPED | OUTPATIENT
Start: 2024-12-19

## 2024-12-19 NOTE — PROGRESS NOTES
Subjective:      Patient ID: Nelia Littlejohn is a 84 y.o. female.    Chief Complaint: Foot Problem (Feel like walking on rocks/Feet feel hot)    Discomfort, pain, feeling like walking on rocks in the bottom of the forefoot right and left with the toes attached.  Chronic condition present on and off for several years now.  This exacerbations been gradual onset, worsening over the past few weeks.  Aggravated with increased weight-bearing prolonged standing some shoes.  No prior medical treatment.  No self-treatment.  Denies trauma and surgery both feet.    Review of Systems   Constitutional: Negative for chills, diaphoresis, fever, malaise/fatigue and night sweats.   Cardiovascular:  Negative for claudication, cyanosis, leg swelling and syncope.   Skin:  Negative for color change, dry skin, nail changes, rash, suspicious lesions and unusual hair distribution.   Musculoskeletal:  Positive for joint pain. Negative for falls, joint swelling, muscle cramps, muscle weakness and stiffness.   Gastrointestinal:  Negative for constipation, diarrhea, nausea and vomiting.   Neurological:  Negative for brief paralysis, disturbances in coordination, focal weakness, numbness, paresthesias, sensory change and tremors.         Objective:      Physical Exam  Musculoskeletal:      Comments: Pain to palpation inferior mtpj 2 through 5 bilateral without evidence of trauma or infection.    No appreciable motion left and right 1st mtpj with visible and palpable periarticular exostoses    Ankle dorsiflexion decreased at <10 degrees bilateral with moderate increase with knee flexion bilateral.    Mild hypermobility of the subtalar and midtarsal joints bilateral without symptom, fully reducible.   Skin:     Comments: Skin is normal age and health appropriate color, turgor, texture, and temperature bilateral lower extremities without ulceration, hyperpigmentation, discoloration, masses nodules or cords palpated.  No ecchymosis, erythema,  edema, or cardinal signs of infection bilateral lower extremities.    Neurological:      Comments: Negative tinel sign to percussion sural, superficial peroneal, deep peroneal, saphenous, and posterior tibial nerves right and left ankles and feet.    Negative allodynia both feet           Assessment:       Encounter Diagnoses   Name Primary?    Foot pain, bilateral Yes    Capsulitis     Hallux limitus, unspecified laterality          Plan:       Nelia was seen today for foot problem.    Diagnoses and all orders for this visit:    Foot pain, bilateral  -     X-Ray Foot Complete Bilateral; Future    Capsulitis  -     X-Ray Foot Complete Bilateral; Future    Hallux limitus, unspecified laterality  -     X-Ray Foot Complete Bilateral; Future    Other orders  -     diclofenac sodium (VOLTAREN) 1 % Gel; Apply 2 g topically 4 (four) times daily.      I counseled the patient on her conditions, their implications and medical management.        Weight-bearing x-rays both feet.      Topical Voltaren gel.      The patient was advised that NSAID-type medications have two very important potential side effects: gastrointestinal irritation including hemorrhage and renal injuries. She was asked to take the medication with food and to stop if she experiences any GI upset. I asked her to call for vomiting, abdominal pain or black/bloody stools. The patient expresses understanding of these issues and questions were answered.    Patient will stretch the tendo achilles complex three times daily as demonstrated in the office.  Literature was dispensed illustrating proper stretching technique.    I applied a plantar rest strapping to the patient's right and left foot to offload symptomatic area, support the arch, and relieve pain.    Patient will obtain over the counter arch supports and wear them in shoes whenever possible.  Athletic shoes intended for walking or running are usually best.    The patient was advised that NSAID-type  medications have two very important potential side effects: gastrointestinal irritation including hemorrhage and renal injuries. She was asked to take the medication with food and to stop if she experiences any GI upset. I asked her to call for vomiting, abdominal pain or black/bloody stools. The patient expresses understanding of these issues and questions were answered.    Discussed conservative treatment with shoes of adequate dimensions, material, and style to alleviate symptoms and delay or prevent surgical intervention.              Follow up if symptoms worsen or fail to improve.

## 2024-12-19 NOTE — PROGRESS NOTES
"  Nelia Littlejohn presented for a follow-up Medicare AWV today. The following components were reviewed and updated:    Medical history  Family History  Social history  Allergies and Current Medications  Health Risk Assessment  Health Maintenance  Care Team    **See Completed Assessments for Annual Wellness visit with in the encounter summary    The following assessments were completed:  Depression Screening  Cognitive function Screening  Timed Get Up Test  Whisper Test      Opioid documentation:      Patient does not have a current opioid prescription.          Vitals:    12/19/24 1053   BP: (!) 120/56   BP Location: Right arm   Patient Position: Sitting   Pulse: 70   SpO2: 100%   Weight: 101.2 kg (223 lb 1.7 oz)   Height: 5' 6" (1.676 m)     Body mass index is 36.01 kg/m².       Physical Exam      Diagnoses and health risks identified today and associated recommendations/orders:  1. Encounter for preventive health examination  Here for Health Risk Assessment/Annual Wellness Visit. Health maintenance reviewed and updated. Follow up in one year.      2. Class 2 severe obesity with body mass index (BMI) of 35 to 39.9 with serious comorbidity  Problem is stable. Continue dietary and lifestyle modifications aimed at weight reduction. Follow up with PCP      3. Peripheral vascular disease  Problem is stable. Continue BP control and low salt intake. Will continue current management. Follow up with PCP      4. Stage 3b chronic kidney disease  Problem is stable. Continue BP control. Will continue current management. Follow up with PCP      5. Mixed hyperlipidemia  Problem is stable, on lipitor. Will continue current management. Follow up with PCP      6. Aortic atherosclerosis (CT 2014)  Problem is stable, on statin. Will continue current management. Follow up with PCP      7. History of prediabetes 9/2016  Problem is stable/diet controlled. Will continue current management. Follow up with PCP      8. Other reduced " mobility  Problem is stable, s/p bilateral knee replacement. Will continue current management. Follow up with PCP/orthopedics         Provided Nelia with a 5-10 year written screening schedule and personal prevention plan. Recommendations were developed using the USPSTF age appropriate recommendations. Education, counseling, and referrals were provided as needed.  After Visit Summary printed and given to patient which includes a list of additional screenings\tests needed.      Garry Fitzpatrick, SAVANNA  I offered to discuss advanced care planning, including how to pick a person who would make decisions for you if you were unable to make them for yourself, called a health care power of , and what kind of decisions you might make such as use of life sustaining treatments such as ventilators and tube feeding when faced with a life limiting illness recorded on a living will that they will need to know. (How you want to be cared for as you near the end of your natural life)     X Patient is interested in learning more about how to make advanced directives.  I provided them paperwork and offered to discuss this with them.

## 2025-05-02 ENCOUNTER — OFFICE VISIT (OUTPATIENT)
Dept: INTERNAL MEDICINE | Facility: CLINIC | Age: 85
End: 2025-05-02
Payer: MEDICARE

## 2025-05-02 ENCOUNTER — LAB VISIT (OUTPATIENT)
Dept: LAB | Facility: HOSPITAL | Age: 85
End: 2025-05-02
Attending: INTERNAL MEDICINE
Payer: MEDICARE

## 2025-05-02 VITALS
HEART RATE: 73 BPM | DIASTOLIC BLOOD PRESSURE: 56 MMHG | HEIGHT: 66 IN | BODY MASS INDEX: 36.77 KG/M2 | WEIGHT: 228.81 LBS | SYSTOLIC BLOOD PRESSURE: 138 MMHG

## 2025-05-02 DIAGNOSIS — E78.2 MIXED HYPERLIPIDEMIA: ICD-10-CM

## 2025-05-02 DIAGNOSIS — Z96.652 STATUS POST TOTAL LEFT KNEE REPLACEMENT: ICD-10-CM

## 2025-05-02 DIAGNOSIS — E66.01 CLASS 2 SEVERE OBESITY WITH BODY MASS INDEX (BMI) OF 35 TO 39.9 WITH SERIOUS COMORBIDITY: ICD-10-CM

## 2025-05-02 DIAGNOSIS — I10 ESSENTIAL HYPERTENSION: Primary | ICD-10-CM

## 2025-05-02 DIAGNOSIS — M10.9 INTERVAL GOUT: ICD-10-CM

## 2025-05-02 DIAGNOSIS — E66.812 CLASS 2 SEVERE OBESITY WITH BODY MASS INDEX (BMI) OF 35 TO 39.9 WITH SERIOUS COMORBIDITY: ICD-10-CM

## 2025-05-02 DIAGNOSIS — N18.32 STAGE 3B CHRONIC KIDNEY DISEASE: ICD-10-CM

## 2025-05-02 DIAGNOSIS — I10 ESSENTIAL HYPERTENSION: ICD-10-CM

## 2025-05-02 PROBLEM — R29.898 IMPAIRED STRENGTH OF HIP MUSCLES: Status: RESOLVED | Noted: 2024-06-21 | Resolved: 2025-05-02

## 2025-05-02 PROBLEM — Z74.09 IMPAIRED FUNCTIONAL MOBILITY, BALANCE, GAIT, AND ENDURANCE: Status: RESOLVED | Noted: 2021-05-07 | Resolved: 2025-05-02

## 2025-05-02 PROBLEM — M25.661 IMPAIRED RANGE OF MOTION OF RIGHT KNEE: Status: RESOLVED | Noted: 2024-06-21 | Resolved: 2025-05-02

## 2025-05-02 LAB
ALBUMIN SERPL BCP-MCNC: 3.9 G/DL (ref 3.5–5.2)
ALP SERPL-CCNC: 104 UNIT/L (ref 40–150)
ALT SERPL W/O P-5'-P-CCNC: 14 UNIT/L (ref 10–44)
ANION GAP (OHS): 12 MMOL/L (ref 8–16)
AST SERPL-CCNC: 18 UNIT/L (ref 11–45)
BILIRUB SERPL-MCNC: 1.1 MG/DL (ref 0.1–1)
BUN SERPL-MCNC: 34 MG/DL (ref 8–23)
CALCIUM SERPL-MCNC: 9.8 MG/DL (ref 8.7–10.5)
CHLORIDE SERPL-SCNC: 113 MMOL/L (ref 95–110)
CHOLEST SERPL-MCNC: 160 MG/DL (ref 120–199)
CHOLEST/HDLC SERPL: 3.6 {RATIO} (ref 2–5)
CO2 SERPL-SCNC: 19 MMOL/L (ref 23–29)
CREAT SERPL-MCNC: 1.7 MG/DL (ref 0.5–1.4)
GFR SERPLBLD CREATININE-BSD FMLA CKD-EPI: 29 ML/MIN/1.73/M2
GLUCOSE SERPL-MCNC: 95 MG/DL (ref 70–110)
HDLC SERPL-MCNC: 44 MG/DL (ref 40–75)
HDLC SERPL: 27.5 % (ref 20–50)
LDLC SERPL CALC-MCNC: 92.2 MG/DL (ref 63–159)
NONHDLC SERPL-MCNC: 116 MG/DL
POTASSIUM SERPL-SCNC: 5.8 MMOL/L (ref 3.5–5.1)
PROT SERPL-MCNC: 7.8 GM/DL (ref 6–8.4)
SODIUM SERPL-SCNC: 144 MMOL/L (ref 136–145)
TRIGL SERPL-MCNC: 119 MG/DL (ref 30–150)

## 2025-05-02 PROCEDURE — 36415 COLL VENOUS BLD VENIPUNCTURE: CPT

## 2025-05-02 PROCEDURE — 80061 LIPID PANEL: CPT

## 2025-05-02 PROCEDURE — 99999 PR PBB SHADOW E&M-EST. PATIENT-LVL III: CPT | Mod: PBBFAC,,, | Performed by: INTERNAL MEDICINE

## 2025-05-02 PROCEDURE — 80053 COMPREHEN METABOLIC PANEL: CPT

## 2025-05-02 RX ORDER — ATORVASTATIN CALCIUM 80 MG/1
80 TABLET, FILM COATED ORAL DAILY
Qty: 90 TABLET | Refills: 3 | Status: SHIPPED | OUTPATIENT
Start: 2025-05-02

## 2025-05-02 RX ORDER — METOPROLOL TARTRATE AND HYDROCHLOROTHIAZIDE 100; 25 MG/1; MG/1
1 TABLET ORAL DAILY
Qty: 90 TABLET | Refills: 3 | Status: SHIPPED | OUTPATIENT
Start: 2025-05-02

## 2025-05-02 RX ORDER — ALLOPURINOL 300 MG/1
300 TABLET ORAL DAILY
Qty: 90 TABLET | Refills: 3 | Status: SHIPPED | OUTPATIENT
Start: 2025-05-02

## 2025-05-02 RX ORDER — AMLODIPINE AND BENAZEPRIL HYDROCHLORIDE 10; 40 MG/1; MG/1
1 CAPSULE ORAL EVERY MORNING
Qty: 90 CAPSULE | Refills: 3 | Status: SHIPPED | OUTPATIENT
Start: 2025-05-02

## 2025-05-02 RX ORDER — SPIRONOLACTONE 25 MG/1
25 TABLET ORAL DAILY
Qty: 90 TABLET | Refills: 3 | Status: SHIPPED | OUTPATIENT
Start: 2025-05-02

## 2025-05-02 RX ORDER — HYDRALAZINE HYDROCHLORIDE 100 MG/1
100 TABLET, FILM COATED ORAL 2 TIMES DAILY
Qty: 180 TABLET | Refills: 3 | Status: SHIPPED | OUTPATIENT
Start: 2025-05-02

## 2025-05-02 NOTE — PROGRESS NOTES
Subjective:       Patient ID: Nelia Littlejohn is a 85 y.o. female.    Chief Complaint:   Follow-up    HPI - Ms Littlejohn feels well today.  She is here with her daughter.  She says [facility] and exercises.  She is taking medications as prescribed and needs refills.  She is not using a cane or a walker for ambulation anymore.    PMH:  Resistant HTN, controlled on 5 meds  DVT Sept 2023  HLP  CVA 2021  OA, s/p L TKR  ANUM on cpap  Pre-DM  Left TKR 2016     Meds:  Reviewed and reconciled in EPIC with patient during visit today.     Review of Systems   Constitutional:  Negative for fever.   HENT:  Negative for congestion.    Respiratory:  Negative for shortness of breath.    Cardiovascular:  Negative for chest pain.   Gastrointestinal:  Negative for abdominal pain.   Genitourinary:  Negative for difficulty urinating.   Musculoskeletal:  Negative for arthralgias.   Skin:  Negative for rash.   Neurological:  Negative for headaches.   Psychiatric/Behavioral:  Negative for sleep disturbance.        Objective:      Physical Exam  Vitals reviewed.   Constitutional:       Appearance: Normal appearance. She is well-developed. She is obese.      Comments: Friendly, well-appearing woman in no distress.   HENT:      Head: Normocephalic and atraumatic.   Cardiovascular:      Rate and Rhythm: Normal rate and regular rhythm.      Heart sounds: Normal heart sounds. No murmur heard.     No friction rub. No gallop.   Pulmonary:      Effort: Pulmonary effort is normal. No respiratory distress.      Breath sounds: Normal breath sounds. No wheezing or rales.   Chest:      Chest wall: No tenderness.   Skin:     General: Skin is warm and dry.      Findings: No erythema.   Neurological:      General: No focal deficit present.      Mental Status: She is alert.   Psychiatric:         Mood and Affect: Mood normal.         Assessment:       1. Essential hypertension    2. Mixed hyperlipidemia    3. Stage 3b chronic kidney  disease    4. Interval gout    5. Status post total left knee replacement, 10-.    6. Class 2 severe obesity with body mass index (BMI) of 35 to 39.9 with serious comorbidity        Plan:       Nelia was seen today for follow-up.    Diagnoses and all orders for this visit:    Essential hypertension - at goal; continue present care.  Refills provided.  Labs today  -     spironolactone (ALDACTONE) 25 MG tablet; Take 1 tablet (25 mg total) by mouth once daily.  -     amLODIPine-benazepriL (LOTREL) 10-40 mg per capsule; Take 1 capsule by mouth every morning.  -     hydrALAZINE (APRESOLINE) 100 MG tablet; Take 1 tablet (100 mg total) by mouth 2 (two) times daily.  -     metoprolol ta-hydrochlorothiaz (LOPRESSOR HCT) 100-25 mg per tablet; Take 1 tablet by mouth once daily.  -     Comprehensive Metabolic Panel; Future    Mixed hyperlipidemia - stable on a statin.  Refills provided.  Labs today  -     atorvastatin (LIPITOR) 80 MG tablet; Take 1 tablet (80 mg total) by mouth once daily.  -     Lipid panel; Future    Stage 3b chronic kidney disease - has been stable in the past; probably due to longstanding uncontrolled hypertension.  Monitor with labs today    Interval gout - stable.  Refills provided  -     allopurinoL (ZYLOPRIM) 300 MG tablet; Take 1 tablet (300 mg total) by mouth once daily.    Status post total left knee replacement, 10-. - doing well.  Monitor    Class 2 severe obesity with body mass index (BMI) of 35 to 39.9 with serious comorbidity - stable.  Encouraged weight loss    RTC p.r.n., or in 6 months    FLAQUITA Gongora MD MPH  Staff Internist

## 2025-05-04 ENCOUNTER — PATIENT MESSAGE (OUTPATIENT)
Dept: INTERNAL MEDICINE | Facility: CLINIC | Age: 85
End: 2025-05-04
Payer: MEDICARE

## 2025-06-10 ENCOUNTER — HOSPITAL ENCOUNTER (INPATIENT)
Facility: HOSPITAL | Age: 85
LOS: 2 days | Discharge: HOME | DRG: 641 | End: 2025-06-13
Attending: STUDENT IN AN ORGANIZED HEALTH CARE EDUCATION/TRAINING PROGRAM | Admitting: STUDENT IN AN ORGANIZED HEALTH CARE EDUCATION/TRAINING PROGRAM
Payer: MEDICARE

## 2025-06-10 DIAGNOSIS — R55 SYNCOPE: ICD-10-CM

## 2025-06-10 DIAGNOSIS — E87.5 HYPERKALEMIA: Primary | ICD-10-CM

## 2025-06-10 DIAGNOSIS — R07.9 CHEST PAIN: ICD-10-CM

## 2025-06-10 LAB
ABSOLUTE EOSINOPHIL (OHS): 0.02 K/UL
ABSOLUTE MONOCYTE (OHS): 0.47 K/UL (ref 0.3–1)
ABSOLUTE NEUTROPHIL COUNT (OHS): 9.21 K/UL (ref 1.8–7.7)
ALBUMIN SERPL BCP-MCNC: 4.1 G/DL (ref 3.5–5.2)
ALP SERPL-CCNC: 107 UNIT/L (ref 40–150)
ALT SERPL W/O P-5'-P-CCNC: 14 UNIT/L (ref 10–44)
ANION GAP (OHS): 9 MMOL/L (ref 8–16)
AST SERPL-CCNC: 16 UNIT/L (ref 11–45)
BASOPHILS # BLD AUTO: 0.03 K/UL
BASOPHILS NFR BLD AUTO: 0.3 %
BILIRUB SERPL-MCNC: 0.8 MG/DL (ref 0.1–1)
BILIRUB UR QL STRIP.AUTO: NEGATIVE
BIPAP: 0
BUN SERPL-MCNC: 33 MG/DL (ref 8–23)
CALCIUM SERPL-MCNC: 9.1 MG/DL (ref 8.7–10.5)
CHLORIDE SERPL-SCNC: 111 MMOL/L (ref 95–110)
CHLORIDE UR-SCNC: 84 MMOL/L (ref 25–200)
CLARITY UR: CLEAR
CO2 SERPL-SCNC: 17 MMOL/L (ref 23–29)
COLOR UR AUTO: YELLOW
CORRECTED TEMPERATURE (PCO2): 35.2 MMHG
CORRECTED TEMPERATURE (PH): 7.36
CORRECTED TEMPERATURE (PO2): 45.4 MMHG
CREAT SERPL-MCNC: 1.7 MG/DL (ref 0.5–1.4)
CREAT UR-MCNC: 86 MG/DL (ref 15–325)
ERYTHROCYTE [DISTWIDTH] IN BLOOD BY AUTOMATED COUNT: 15.1 % (ref 11.5–14.5)
FIO2: 21 %
GFR SERPLBLD CREATININE-BSD FMLA CKD-EPI: 29 ML/MIN/1.73/M2
GLUCOSE SERPL-MCNC: 153 MG/DL (ref 70–110)
GLUCOSE UR QL STRIP: NEGATIVE
HCT VFR BLD AUTO: 33.2 % (ref 37–48.5)
HCT VFR BLD CALC: 34.5 % (ref 36–54)
HCV AB SERPL QL IA: NORMAL
HGB BLD-MCNC: 10.7 GM/DL (ref 12–16)
HGB UR QL STRIP: NEGATIVE
HIV 1+2 AB+HIV1 P24 AG SERPL QL IA: NORMAL
HOLD SPECIMEN: NORMAL
HOLD SPECIMEN: NORMAL
IMM GRANULOCYTES # BLD AUTO: 0.15 K/UL (ref 0–0.04)
IMM GRANULOCYTES NFR BLD AUTO: 1.3 % (ref 0–0.5)
KETONES UR QL STRIP: NEGATIVE
LEUKOCYTE ESTERASE UR QL STRIP: NEGATIVE
LYMPHOCYTES # BLD AUTO: 1.47 K/UL (ref 1–4.8)
MAGNESIUM SERPL-MCNC: 2.1 MG/DL (ref 1.6–2.6)
MCH RBC QN AUTO: 29.5 PG (ref 27–31)
MCHC RBC AUTO-ENTMCNC: 32.2 G/DL (ref 32–36)
MCV RBC AUTO: 92 FL (ref 82–98)
NITRITE UR QL STRIP: NEGATIVE
NUCLEATED RBC (/100WBC) (OHS): 0 /100 WBC
OHS QRS DURATION: 112 MS
OHS QRS DURATION: 86 MS
OHS QTC CALCULATION: 450 MS
OHS QTC CALCULATION: 462 MS
PCO2 BLDA: 35.2 MMHG (ref 35–45)
PH SMN: 7.36 [PH] (ref 7.35–7.45)
PH UR STRIP: 5 [PH]
PLATELET # BLD AUTO: 271 K/UL (ref 150–450)
PMV BLD AUTO: 10.3 FL (ref 9.2–12.9)
PO2 BLDA: 45.4 MMHG (ref 40–60)
POC BASE DEFICIT: -5.1 MMOL/L
POC HCO3: 19.9 MMOL/L (ref 24–28)
POC IONIZED CALCIUM: 1.16 MMOL/L (ref 1.06–1.42)
POC PERFORMED BY: ABNORMAL
POC TEMPERATURE: 37 C
POTASSIUM BLD-SCNC: 5.4 MMOL/L (ref 3.5–5.1)
POTASSIUM SERPL-SCNC: 5.6 MMOL/L (ref 3.5–5.1)
POTASSIUM UR-SCNC: 41 MMOL/L (ref 15–95)
PROT SERPL-MCNC: 7.6 GM/DL (ref 6–8.4)
PROT UR QL STRIP: NEGATIVE
RBC # BLD AUTO: 3.63 M/UL (ref 4–5.4)
RELATIVE EOSINOPHIL (OHS): 0.2 %
RELATIVE LYMPHOCYTE (OHS): 13 % (ref 18–48)
RELATIVE MONOCYTE (OHS): 4.1 % (ref 4–15)
RELATIVE NEUTROPHIL (OHS): 81.1 % (ref 38–73)
SODIUM BLD-SCNC: 140 MMOL/L (ref 136–145)
SODIUM SERPL-SCNC: 137 MMOL/L (ref 136–145)
SODIUM UR-SCNC: 86 MMOL/L (ref 20–250)
SP GR UR STRIP: 1.01
SPECIMEN SOURCE: ABNORMAL
TROPONIN I SERPL HS-MCNC: 4 NG/L
UROBILINOGEN UR STRIP-ACNC: NEGATIVE EU/DL
UUN UR-MCNC: 573 MG/DL (ref 140–1050)
WBC # BLD AUTO: 11.35 K/UL (ref 3.9–12.7)

## 2025-06-10 PROCEDURE — 82330 ASSAY OF CALCIUM: CPT

## 2025-06-10 PROCEDURE — 82436 ASSAY OF URINE CHLORIDE: CPT | Performed by: HOSPITALIST

## 2025-06-10 PROCEDURE — 84133 ASSAY OF URINE POTASSIUM: CPT | Performed by: HOSPITALIST

## 2025-06-10 PROCEDURE — 99900035 HC TECH TIME PER 15 MIN (STAT)

## 2025-06-10 PROCEDURE — 84300 ASSAY OF URINE SODIUM: CPT | Performed by: HOSPITALIST

## 2025-06-10 PROCEDURE — 84484 ASSAY OF TROPONIN QUANT: CPT | Performed by: EMERGENCY MEDICINE

## 2025-06-10 PROCEDURE — 93010 ELECTROCARDIOGRAM REPORT: CPT | Mod: 76,,, | Performed by: INTERNAL MEDICINE

## 2025-06-10 PROCEDURE — 80053 COMPREHEN METABOLIC PANEL: CPT | Performed by: EMERGENCY MEDICINE

## 2025-06-10 PROCEDURE — 93005 ELECTROCARDIOGRAM TRACING: CPT

## 2025-06-10 PROCEDURE — 85014 HEMATOCRIT: CPT

## 2025-06-10 PROCEDURE — 84295 ASSAY OF SERUM SODIUM: CPT

## 2025-06-10 PROCEDURE — 99285 EMERGENCY DEPT VISIT HI MDM: CPT | Mod: 25

## 2025-06-10 PROCEDURE — 96360 HYDRATION IV INFUSION INIT: CPT

## 2025-06-10 PROCEDURE — 84132 ASSAY OF SERUM POTASSIUM: CPT

## 2025-06-10 PROCEDURE — 84540 ASSAY OF URINE/UREA-N: CPT | Performed by: HOSPITALIST

## 2025-06-10 PROCEDURE — 86803 HEPATITIS C AB TEST: CPT | Performed by: PHYSICIAN ASSISTANT

## 2025-06-10 PROCEDURE — 96361 HYDRATE IV INFUSION ADD-ON: CPT

## 2025-06-10 PROCEDURE — 87389 HIV-1 AG W/HIV-1&-2 AB AG IA: CPT | Performed by: PHYSICIAN ASSISTANT

## 2025-06-10 PROCEDURE — 83735 ASSAY OF MAGNESIUM: CPT | Performed by: EMERGENCY MEDICINE

## 2025-06-10 PROCEDURE — 82803 BLOOD GASES ANY COMBINATION: CPT

## 2025-06-10 PROCEDURE — 81003 URINALYSIS AUTO W/O SCOPE: CPT | Performed by: EMERGENCY MEDICINE

## 2025-06-10 PROCEDURE — 93010 ELECTROCARDIOGRAM REPORT: CPT | Mod: ,,, | Performed by: INTERNAL MEDICINE

## 2025-06-10 PROCEDURE — 82570 ASSAY OF URINE CREATININE: CPT | Performed by: HOSPITALIST

## 2025-06-10 PROCEDURE — 25000003 PHARM REV CODE 250: Performed by: HOSPITALIST

## 2025-06-10 PROCEDURE — G0378 HOSPITAL OBSERVATION PER HR: HCPCS

## 2025-06-10 PROCEDURE — 85025 COMPLETE CBC W/AUTO DIFF WBC: CPT | Performed by: EMERGENCY MEDICINE

## 2025-06-10 RX ORDER — PROCHLORPERAZINE EDISYLATE 5 MG/ML
5 INJECTION INTRAMUSCULAR; INTRAVENOUS EVERY 6 HOURS PRN
Status: DISCONTINUED | OUTPATIENT
Start: 2025-06-10 | End: 2025-06-13 | Stop reason: HOSPADM

## 2025-06-10 RX ORDER — ALLOPURINOL 300 MG/1
300 TABLET ORAL DAILY
Status: DISCONTINUED | OUTPATIENT
Start: 2025-06-11 | End: 2025-06-13 | Stop reason: HOSPADM

## 2025-06-10 RX ORDER — POLYETHYLENE GLYCOL 3350 17 G/17G
17 POWDER, FOR SOLUTION ORAL DAILY PRN
Status: DISCONTINUED | OUTPATIENT
Start: 2025-06-10 | End: 2025-06-13 | Stop reason: HOSPADM

## 2025-06-10 RX ORDER — NALOXONE HCL 0.4 MG/ML
0.02 VIAL (ML) INJECTION
Status: DISCONTINUED | OUTPATIENT
Start: 2025-06-10 | End: 2025-06-13 | Stop reason: HOSPADM

## 2025-06-10 RX ORDER — SODIUM CHLORIDE 0.9 % (FLUSH) 0.9 %
10 SYRINGE (ML) INJECTION EVERY 6 HOURS PRN
Status: DISCONTINUED | OUTPATIENT
Start: 2025-06-10 | End: 2025-06-13 | Stop reason: HOSPADM

## 2025-06-10 RX ORDER — HEPARIN SODIUM 5000 [USP'U]/ML
5000 INJECTION, SOLUTION INTRAVENOUS; SUBCUTANEOUS EVERY 8 HOURS
Status: DISCONTINUED | OUTPATIENT
Start: 2025-06-11 | End: 2025-06-13 | Stop reason: HOSPADM

## 2025-06-10 RX ORDER — TALC
6 POWDER (GRAM) TOPICAL NIGHTLY PRN
Status: DISCONTINUED | OUTPATIENT
Start: 2025-06-10 | End: 2025-06-13 | Stop reason: HOSPADM

## 2025-06-10 RX ORDER — ATORVASTATIN CALCIUM 40 MG/1
80 TABLET, FILM COATED ORAL DAILY
Status: DISCONTINUED | OUTPATIENT
Start: 2025-06-11 | End: 2025-06-13 | Stop reason: HOSPADM

## 2025-06-10 RX ORDER — AMOXICILLIN 250 MG
1 CAPSULE ORAL 2 TIMES DAILY PRN
Status: DISCONTINUED | OUTPATIENT
Start: 2025-06-10 | End: 2025-06-13 | Stop reason: HOSPADM

## 2025-06-10 RX ORDER — ACETAMINOPHEN 325 MG/1
650 TABLET ORAL EVERY 4 HOURS PRN
Status: DISCONTINUED | OUTPATIENT
Start: 2025-06-10 | End: 2025-06-13 | Stop reason: HOSPADM

## 2025-06-10 RX ORDER — CETIRIZINE HYDROCHLORIDE 10 MG/1
10 TABLET ORAL DAILY
Status: DISCONTINUED | OUTPATIENT
Start: 2025-06-11 | End: 2025-06-13 | Stop reason: HOSPADM

## 2025-06-10 RX ORDER — METOPROLOL TARTRATE 50 MG/1
50 TABLET ORAL 2 TIMES DAILY
Status: DISCONTINUED | OUTPATIENT
Start: 2025-06-10 | End: 2025-06-13 | Stop reason: HOSPADM

## 2025-06-10 RX ORDER — ONDANSETRON HYDROCHLORIDE 2 MG/ML
4 INJECTION, SOLUTION INTRAVENOUS EVERY 8 HOURS PRN
Status: DISCONTINUED | OUTPATIENT
Start: 2025-06-10 | End: 2025-06-13 | Stop reason: HOSPADM

## 2025-06-10 RX ORDER — ALUMINUM HYDROXIDE, MAGNESIUM HYDROXIDE, AND SIMETHICONE 1200; 120; 1200 MG/30ML; MG/30ML; MG/30ML
30 SUSPENSION ORAL 4 TIMES DAILY PRN
Status: DISCONTINUED | OUTPATIENT
Start: 2025-06-10 | End: 2025-06-13 | Stop reason: HOSPADM

## 2025-06-10 RX ADMIN — METOPROLOL TARTRATE 50 MG: 50 TABLET, FILM COATED ORAL at 09:06

## 2025-06-10 RX ADMIN — SODIUM ZIRCONIUM CYCLOSILICATE 10 G: 10 POWDER, FOR SUSPENSION ORAL at 09:06

## 2025-06-10 RX ADMIN — SODIUM CHLORIDE 500 ML: 9 INJECTION, SOLUTION INTRAVENOUS at 09:06

## 2025-06-10 NOTE — ED NOTES
Assumed care of the patient. Report received from ALAN Gomes. Pt on continuous cardiac monitoring, continuous pulse oximetry, and automatic BP cuff cycling Q15min. Pt in hospital gown, side rails up X2, bed low and locked, and call light is placed within reach. No family/visitors at bedside at this time. Pt denies any complaints or needs.     Nelia Littlejohn, a 85 y.o. female presents to the ED w/ complaint of LOC    Triage note:  Chief Complaint   Patient presents with    Loss of Consciousness     Was on the bus when she felt nauseated and had a syncopal episode. +orthostatics. -hitting head or blood thinners.      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 Vision is much better, can read.     High cholesterol     History of prediabetes 10/26/2017    HTN (hypertension) 09/25/2012    Hyperlipemia 09/11/2012    Hypertension     Interval gout 09/11/2012    Status post cataract extraction and insertion of intraocular lens 11/27/2012    sp Yag Cap OS      Stroke

## 2025-06-10 NOTE — FIRST PROVIDER EVALUATION
"Medical screening examination initiated.  I have conducted a focused provider triage encounter, findings are as follows:    Brief history of present illness:  85 F hx of HTN, ANUM, TIA, CAD brought in by EMS for syncope that occurred on the bus.  Felt lightheaded and weak, passed out. No urinary or bowel incontinence.  Per EMS, orthostatics positive- treated with 400 cc IV and zofran given.      Vitals:    06/10/25 1336   BP: (!) 106/55   BP Location: Right arm   Pulse: 98   Resp: 18   Temp: 98.9 °F (37.2 °C)   TempSrc: Oral   SpO2: 99%   Weight: 99.8 kg (220 lb)   Height: 5' 6" (1.676 m)       Pertinent physical exam:  well appearing, answering questions. Strength equal    Brief workup plan:  labs, EKG    Preliminary workup initiated; this workup will be continued and followed by the physician or advanced practice provider that is assigned to the patient when roomed.  "

## 2025-06-10 NOTE — ED PROVIDER NOTES
Encounter Date: 6/10/2025       History     Chief Complaint   Patient presents with    Loss of Consciousness     Was on the bus when she felt nauseated and had a syncopal episode. +orthostatics. -hitting head or blood thinners.      HPI    Patient is an 85-year-old female with a history of hypertension, DVT, CKD presenting to the ED after syncopal episode.  Patient was sitting on a bus for approximately 10-15 minutes when she felt lightheaded and proceeded to have a syncopal episode.  No other prodromal symptoms including chest pain, shortness of breath, palpitations, nausea, vomiting, abdominal pain.  She denies any of the symptoms now as well.  No infectious symptoms including fever or dysuria or hematuria.    Patient was at an event this morning, but reports having been sitting for the majority of the morning.  Denies prolonged wait for the bus for being picked up.    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 Vision is much better, can read.     High cholesterol     History of prediabetes 10/26/2017    HTN (hypertension) 09/25/2012    Hyperlipemia 09/11/2012    Hypertension     Interval gout 09/11/2012    Status post cataract extraction and insertion of intraocular lens 11/27/2012    sp Yag Cap OS      Stroke      Past Surgical History:   Procedure Laterality Date    ARTHROPLASTY, KNEE, TOTAL, USING COMPUTER-ASSISTED NAVIGATION Right 6/19/2024    Procedure: ARTHROPLASTY, KNEE, TOTAL, USING COMPUTER-ASSISTED NAVIGATION: VELYS: RIGHT: DEPUY - ATTUNE;  Surgeon: Ernie Rubio III, MD;  Location: [facility];  Service: Orthopedics;  Laterality: Right;    CATARACT EXTRACTION Bilateral     EYE SURGERY       HYSTERECTOMY      JOINT REPLACEMENT      Yag capsulotomy left eye  10/10/2012     Family History   Problem Relation Name Age of Onset    Diabetes Father      Stroke Father      Cataracts Father      Diabetes Mother      Cancer Sister Katelyn         breast cancer    Diabetes Brother Lester     Heart disease Brother Lester     No Known Problems Son Raj     No Known Problems Daughter Leydi     No Known Problems Maternal Grandmother      No Known Problems Maternal Grandfather      No Known Problems Paternal Grandmother      No Known Problems Paternal Grandfather      No Known Problems Brother Boris     No Known Problems Maternal Aunt      No Known Problems Maternal Uncle      No Known Problems Paternal Aunt      No Known Problems Paternal Uncle      Hypertension Brother Santi     Diabetes Brother Laila     Hypertension Son Garrick     Hypertension Son Wil     No Known Problems Son Daljit     No Known Problems Son Austen     Amblyopia Neg Hx      Blindness Neg Hx      Glaucoma Neg Hx      Macular degeneration Neg Hx      Retinal detachment Neg Hx      Strabismus Neg Hx      Thyroid disease Neg Hx       Social History[1]    Physical Exam     Initial Vitals [06/10/25 1336]   BP Pulse Resp Temp SpO2   (!) 106/55 98 18 98.9 °F (37.2 °C) 99 %      MAP       --         Physical Exam    Constitutional: She appears well-developed and well-nourished. She is not diaphoretic. No distress.   HENT:   Head: Normocephalic and atraumatic.   Eyes: Conjunctivae and EOM are normal.   Cardiovascular:  Normal rate and regular rhythm.           Pulmonary/Chest: Breath sounds normal. No respiratory distress. She has no wheezes. She has no rales.   Abdominal: Abdomen is soft. She exhibits no distension. There is no abdominal tenderness.   Musculoskeletal:         General: No tenderness or edema. Normal range of motion.     Neurological: She is alert. She has normal strength. No cranial nerve deficit or sensory deficit.   Skin:  Skin is warm and dry.   Psychiatric: She has a normal mood and affect. Thought content normal.         ED Course   Procedures  Labs Reviewed   COMPREHENSIVE METABOLIC PANEL - Abnormal       Result Value    Sodium 137      Potassium 5.6 (*)     Chloride 111 (*)     CO2 17 (*)     Glucose 153 (*)     BUN 33 (*)     Creatinine 1.7 (*)     Calcium 9.1      Protein Total 7.6      Albumin 4.1      Bilirubin Total 0.8            AST 16      ALT 14      Anion Gap 9      eGFR 29 (*)    CBC WITH DIFFERENTIAL - Abnormal    WBC 11.35      RBC 3.63 (*)     HGB 10.7 (*)     HCT 33.2 (*)     MCV 92      MCH 29.5      MCHC 32.2      RDW 15.1 (*)     Platelet Count 271      MPV 10.3      Nucleated RBC 0      Neut % 81.1 (*)     Lymph % 13.0 (*)     Mono % 4.1      Eos % 0.2      Basophil % 0.3      Imm Grans % 1.3 (*)     Neut # 9.21 (*)     Lymph # 1.47      Mono # 0.47      Eos # 0.02      Baso # 0.03      Imm Grans # 0.15 (*)    HEPATITIS C ANTIBODY - Normal    Hep C Ab Interp Non-Reactive     HIV 1 / 2 ANTIBODY - Normal    HIV 1/2 Ag/Ab Non-Reactive     TROPONIN I HIGH SENSITIVITY - Normal    Troponin High Sensitive 4     MAGNESIUM - Normal    Magnesium  2.1     URINALYSIS, REFLEX TO URINE CULTURE - Normal    Color, UA Yellow      Appearance, UA Clear      pH, UA 5.0      Spec Grav UA 1.015      Protein, UA Negative      Glucose, UA Negative      Ketones, UA Negative      Bilirubin, UA Negative      Blood, UA Negative      Nitrites, UA Negative      Urobilinogen, UA Negative      Leukocyte Esterase, UA Negative     HEP C VIRUS HOLD SPECIMEN    Extra Tube Hold for add-ons.     CBC W/ AUTO DIFFERENTIAL    Narrative:     The following orders were created for panel order CBC auto differential.  Procedure                               Abnormality         Status                     ---------                               -----------         ------                     CBC with Differential[[id number]]       Abnormal             Final result                 Please view results for these tests on the individual orders.   GREY TOP URINE HOLD    Extra Tube Hold for add-ons.     CREATININE, URINE, RANDOM   UREA NITROGEN, URINE, RANDOM   CHLORIDE, URINE, RANDOM   SODIUM, URINE, RANDOM   POTASSIUM, URINE, RANDOM     EKG Readings: (Independently Interpreted)   Initial Reading: No STEMI. Heart Rate: 93. Ectopy: No Ectopy. Conduction: Normal and 1st Degree AV Block. T Waves Flipped: III, AVR, AVF and V1. Axis: Left Axis Deviation. Clinical Impression: Normal Sinus Rhythm and AV Block - 1st Degree     ECG Results              Repeat EKG 12-lead (Final result)        Collection Time Result Time QRS Duration OHS QTC Calculation    06/10/25 14:58:55 06/10/25 15:15:39 112 462                     Final result by Interface, Lab In [facility] (06/10/25 15:15:44)                   Narrative:    Test Reason : R55,    Vent. Rate :  96 BPM     Atrial Rate :  96 BPM     P-R Int : 252 ms          QRS Dur : 112 ms      QT Int : 366 ms       P-R-T Axes :  45 -23  69 degrees    QTcB Int : 462 ms    Sinus rhythm with 1st degree A-V block  Voltage criteria for left ventricular hypertrophy  Leftward axis  Abnormal R wave progression in the precordial leads  Abnormal ECG  When compared with ECG of 10-Rodrigue-2025 13:50,  Nonspecific ST and/or T wave abnormalities Improved  Confirmed by Ludin Sheldon ([id number]) on 6/10/2025 3:15:37 PM    Referred By:            Confirmed By: Ludin Sheldon                                     EKG 12-lead (Final result)        Collection Time Result Time QRS Duration OHS QTC Calculation    06/10/25 13:50:10 06/10/25 14:00:39 86 450                     Final result by Interface, Lab In [facility] (06/10/25 14:00:46)                   Narrative:    Test Reason : R55,    Vent. Rate :  93 BPM     Atrial Rate :  93 BPM     P-R Int : 232 ms          QRS Dur :  86 ms      QT Int : 362 ms       P-R-T Axes :  54 -22  -6 degrees    QTcB Int : 450 ms    Sinus  rhythm with 1st degree A-V block  Moderate voltage criteria for LVH, may be normal variant ( R in aVL ,  [place] product )  Nonspecific ST and/or T wave abnormalities  Abnormal ECG  When compared with ECG of 04-Jun-2024 10:25,  Vent. rate has increased by  31 bpm  Confirmed by Ludin Sheldon ([id number]) on 6/10/2025 2:00:38 PM    Referred By:            Confirmed By: Ludin Sheldon                                  Imaging Results    None          Medications   sodium chloride 0.9% bolus 500 mL 500 mL (500 mLs Intravenous New Bag 6/10/25 2131)   allopurinoL tablet 300 mg (has no administration in time range)   atorvastatin tablet 80 mg (has no administration in time range)   cetirizine tablet 10 mg (has no administration in time range)   multivitamin tablet (has no administration in time range)   metoprolol tartrate (LOPRESSOR) tablet 50 mg (50 mg Oral Given 6/10/25 2132)   sodium chloride 0.9% flush 10 mL (has no administration in time range)   melatonin tablet 6 mg (has no administration in time range)   ondansetron injection 4 mg (has no administration in time range)   prochlorperazine injection Soln 5 mg (has no administration in time range)   polyethylene glycol packet 17 g (has no administration in time range)   senna-docusate 8.6-50 mg per tablet 1 tablet (has no administration in time range)   aluminum-magnesium hydroxide-simethicone 200-200-20 mg/5 mL suspension 30 mL (has no administration in time range)   acetaminophen tablet 650 mg (has no administration in time range)   naloxone 0.4 mg/mL injection 0.02 mg (has no administration in time range)   heparin (porcine) injection 5,000 Units (has no administration in time range)   sodium zirconium cyclosilicate packet 10 g (10 g Oral Given 6/10/25 2135)     Medical Decision Making  Amount and/or Complexity of Data Reviewed  Labs:  Decision-making details documented in ED Course.    Patient is an 85-year-old female with a history of hypertension, DVT, CKD presenting  to the ED after syncopal episode.    On presentation, she is well-appearing and hemodynamically stable.  Physical exam nonfocal.    Labs were ordered to assess for etiology of her syncope.  Possibly due to dehydration or heat, although no known significant exposure.    EKG with some concerning changes, including T-wave inversions in the inferior leads.  Repeat EKG showed improvement in these changes.    CBC revealing some mild hypokalemia with a potassium of 5.6, and some progressive CKD.    She remained while in the emergency department.  Given her EKG changes, however and history of syncope, she was admitted for syncope workup.           ED Course as of 06/10/25 2217   Tue Rodrigue 10, 2025   1352 EKG 12-lead  No STEMI [AC]   1514 CBC auto differential(!) [DR]   1627 Potassium(!): 5.6 [DR]      ED Course User Index  [AC] Francois Sears DO  [DR] Tova Gagnon DO                           Clinical Impression:  Final diagnoses:  [R55] Syncope  [E87.5] Hyperkalemia (Primary)          ED Disposition Condition    Observation                     Tova Gagnon DO  Resident  06/10/25 2216         [1]   Social History  Tobacco Use    Smoking status: Never     Passive exposure: Never    Smokeless tobacco: Never   Substance Use Topics    Alcohol use: No    Drug use: No        Tova Gagnon DO  Resident  06/10/25 2217

## 2025-06-10 NOTE — ED TRIAGE NOTES
PT arrives to ED  via EMS with complaints of of LOC. Pt states she was on the bus and she felt lightheaded/faint and passed out.  PT denies dizziness at this time , Denies Chest pain , shortness of breath. Endorses weakness .

## 2025-06-11 LAB
ABSOLUTE EOSINOPHIL (OHS): 0.08 K/UL
ABSOLUTE EOSINOPHIL (OHS): 0.13 K/UL
ABSOLUTE MONOCYTE (OHS): 0.47 K/UL (ref 0.3–1)
ABSOLUTE MONOCYTE (OHS): 0.55 K/UL (ref 0.3–1)
ABSOLUTE NEUTROPHIL COUNT (OHS): 4.93 K/UL (ref 1.8–7.7)
ABSOLUTE NEUTROPHIL COUNT (OHS): 5.97 K/UL (ref 1.8–7.7)
ALBUMIN SERPL BCP-MCNC: 3.6 G/DL (ref 3.5–5.2)
ALBUMIN SERPL BCP-MCNC: 3.6 G/DL (ref 3.5–5.2)
ALBUMIN SERPL BCP-MCNC: 3.8 G/DL (ref 3.5–5.2)
ANION GAP (OHS): 7 MMOL/L (ref 8–16)
ANION GAP (OHS): 8 MMOL/L (ref 8–16)
ANION GAP (OHS): 8 MMOL/L (ref 8–16)
AORTIC SIZE INDEX (SOV): 1.4 CM/M2
AORTIC SIZE INDEX: 1.4 CM/M2
ASCENDING AORTA: 2.9 CM
AV AREA BY CONTINUOUS VTI: 2.3 CM2
AV INDEX (PROSTH): 0.69
AV LVOT MEAN GRADIENT: 2 MMHG
AV LVOT PEAK GRADIENT: 4 MMHG
AV MEAN GRADIENT: 5 MMHG
AV PEAK GRADIENT: 9 MMHG
AV VALVE AREA BY VELOCITY RATIO: 2.1 CM²
AV VALVE AREA: 2.2 CM2
AV VELOCITY RATIO: 0.67
BASOPHILS # BLD AUTO: 0.03 K/UL
BASOPHILS # BLD AUTO: 0.03 K/UL
BASOPHILS NFR BLD AUTO: 0.3 %
BASOPHILS NFR BLD AUTO: 0.3 %
BNP SERPL-MCNC: 49 PG/ML (ref 0–99)
BSA FOR ECHO PROCEDURE: 2.15 M2
BUN SERPL-MCNC: 29 MG/DL (ref 8–23)
BUN SERPL-MCNC: 31 MG/DL (ref 8–23)
BUN SERPL-MCNC: 32 MG/DL (ref 8–23)
CALCIUM SERPL-MCNC: 8.5 MG/DL (ref 8.7–10.5)
CALCIUM SERPL-MCNC: 8.5 MG/DL (ref 8.7–10.5)
CALCIUM SERPL-MCNC: 8.6 MG/DL (ref 8.7–10.5)
CHLORIDE SERPL-SCNC: 112 MMOL/L (ref 95–110)
CHLORIDE SERPL-SCNC: 113 MMOL/L (ref 95–110)
CHLORIDE SERPL-SCNC: 113 MMOL/L (ref 95–110)
CK SERPL-CCNC: 94 U/L (ref 20–180)
CO2 SERPL-SCNC: 16 MMOL/L (ref 23–29)
CO2 SERPL-SCNC: 16 MMOL/L (ref 23–29)
CO2 SERPL-SCNC: 17 MMOL/L (ref 23–29)
CREAT SERPL-MCNC: 1.5 MG/DL (ref 0.5–1.4)
CREAT SERPL-MCNC: 1.5 MG/DL (ref 0.5–1.4)
CREAT SERPL-MCNC: 1.8 MG/DL (ref 0.5–1.4)
CV ECHO LV RWT: 0.43 CM
DOP CALC AO PEAK VEL: 1.5 M/S
DOP CALC AO VTI: 34.2 CM
DOP CALC LVOT AREA: 3.1 CM2
DOP CALC LVOT DIAMETER: 2 CM
DOP CALC LVOT PEAK VEL: 1 M/S
DOP CALC LVOT STROKE VOLUME: 73.8 CM3
DOP CALCLVOT PEAK VEL VTI: 23.5 CM
E WAVE DECELERATION TIME: 169 MS
E/A RATIO: 0.88
E/E' RATIO: 11 M/S
ECHO EF ESTIMATED: 60 %
ECHO LV POSTERIOR WALL: 0.9 CM (ref 0.6–1.1)
ERYTHROCYTE [DISTWIDTH] IN BLOOD BY AUTOMATED COUNT: 15.2 % (ref 11.5–14.5)
ERYTHROCYTE [DISTWIDTH] IN BLOOD BY AUTOMATED COUNT: 15.2 % (ref 11.5–14.5)
FRACTIONAL SHORTENING: 31 % (ref 28–44)
GFR SERPLBLD CREATININE-BSD FMLA CKD-EPI: 27 ML/MIN/1.73/M2
GFR SERPLBLD CREATININE-BSD FMLA CKD-EPI: 34 ML/MIN/1.73/M2
GFR SERPLBLD CREATININE-BSD FMLA CKD-EPI: 34 ML/MIN/1.73/M2
GLOBAL LONGITUIDAL STRAIN: 14.4 %
GLUCOSE SERPL-MCNC: 75 MG/DL (ref 70–110)
GLUCOSE SERPL-MCNC: 75 MG/DL (ref 70–110)
GLUCOSE SERPL-MCNC: 96 MG/DL (ref 70–110)
HCT VFR BLD AUTO: 30.6 % (ref 37–48.5)
HCT VFR BLD AUTO: 30.9 % (ref 37–48.5)
HGB BLD-MCNC: 10 GM/DL (ref 12–16)
HGB BLD-MCNC: 9.9 GM/DL (ref 12–16)
IMM GRANULOCYTES # BLD AUTO: 0.02 K/UL (ref 0–0.04)
IMM GRANULOCYTES # BLD AUTO: 0.02 K/UL (ref 0–0.04)
IMM GRANULOCYTES NFR BLD AUTO: 0.2 % (ref 0–0.5)
IMM GRANULOCYTES NFR BLD AUTO: 0.2 % (ref 0–0.5)
INTERVENTRICULAR SEPTUM: 0.9 CM (ref 0.6–1.1)
LA MAJOR: 5.1 CM
LA MINOR: 5 CM
LA WIDTH: 4.2 CM
LEFT ATRIUM SIZE: 3.9 CM
LEFT ATRIUM VOLUME INDEX MOD: 28 ML/M2
LEFT ATRIUM VOLUME INDEX: 34 ML/M2
LEFT ATRIUM VOLUME MOD: 59 ML
LEFT ATRIUM VOLUME: 70 CM3
LEFT INTERNAL DIMENSION IN SYSTOLE: 2.9 CM (ref 2.1–4)
LEFT VENTRICLE DIASTOLIC VOLUME INDEX: 38.46 ML/M2
LEFT VENTRICLE DIASTOLIC VOLUME: 80 ML
LEFT VENTRICLE MASS INDEX: 57.1 G/M2
LEFT VENTRICLE SYSTOLIC VOLUME INDEX: 15.4 ML/M2
LEFT VENTRICLE SYSTOLIC VOLUME: 32 ML
LEFT VENTRICULAR INTERNAL DIMENSION IN DIASTOLE: 4.2 CM (ref 3.5–6)
LEFT VENTRICULAR MASS: 118.7 G
LV LATERAL E/E' RATIO: 11.9
LV SEPTAL E/E' RATIO: 9.5
LYMPHOCYTES # BLD AUTO: 2.9 K/UL (ref 1–4.8)
LYMPHOCYTES # BLD AUTO: 3.16 K/UL (ref 1–4.8)
MAGNESIUM SERPL-MCNC: 2 MG/DL (ref 1.6–2.6)
MAGNESIUM SERPL-MCNC: 2.1 MG/DL (ref 1.6–2.6)
MCH RBC QN AUTO: 29.3 PG (ref 27–31)
MCH RBC QN AUTO: 29.5 PG (ref 27–31)
MCHC RBC AUTO-ENTMCNC: 32.4 G/DL (ref 32–36)
MCHC RBC AUTO-ENTMCNC: 32.4 G/DL (ref 32–36)
MCV RBC AUTO: 91 FL (ref 82–98)
MCV RBC AUTO: 91 FL (ref 82–98)
MV PEAK A VEL: 1.08 M/S
MV PEAK E VEL: 0.95 M/S
NUCLEATED RBC (/100WBC) (OHS): 0 /100 WBC
NUCLEATED RBC (/100WBC) (OHS): 0 /100 WBC
OHS CV RV/LV RATIO: 0.83 CM
OHS LV EJECTION FRACTION SIMPSONS BIPLANE MOD: 53 %
PHOSPHATE SERPL-MCNC: 3.7 MG/DL (ref 2.7–4.5)
PHOSPHATE SERPL-MCNC: 3.9 MG/DL (ref 2.7–4.5)
PHOSPHATE SERPL-MCNC: 4.3 MG/DL (ref 2.7–4.5)
PISA TR MAX VEL: 2.6 M/S
PLATELET # BLD AUTO: 246 K/UL (ref 150–450)
PLATELET # BLD AUTO: 250 K/UL (ref 150–450)
PMV BLD AUTO: 10.2 FL (ref 9.2–12.9)
PMV BLD AUTO: 9.8 FL (ref 9.2–12.9)
POTASSIUM SERPL-SCNC: 5.3 MMOL/L (ref 3.5–5.1)
POTASSIUM SERPL-SCNC: 5.4 MMOL/L (ref 3.5–5.1)
POTASSIUM SERPL-SCNC: 5.5 MMOL/L (ref 3.5–5.1)
PV PEAK S VEL: 0.45 M/S
RA MAJOR: 4.68 CM
RA PRESSURE ESTIMATED: 8 MMHG
RA WIDTH: 2.68 CM
RBC # BLD AUTO: 3.38 M/UL (ref 4–5.4)
RBC # BLD AUTO: 3.39 M/UL (ref 4–5.4)
RELATIVE EOSINOPHIL (OHS): 0.8 %
RELATIVE EOSINOPHIL (OHS): 1.5 %
RELATIVE LYMPHOCYTE (OHS): 30.4 % (ref 18–48)
RELATIVE LYMPHOCYTE (OHS): 36.2 % (ref 18–48)
RELATIVE MONOCYTE (OHS): 5.4 % (ref 4–15)
RELATIVE MONOCYTE (OHS): 5.8 % (ref 4–15)
RELATIVE NEUTROPHIL (OHS): 56.4 % (ref 38–73)
RELATIVE NEUTROPHIL (OHS): 62.5 % (ref 38–73)
RIGHT VENTRICLE DIASTOLIC BASEL DIMENSION: 3.5 CM
RV TB RVSP: 11 MMHG
SINUS: 3.01 CM
SODIUM SERPL-SCNC: 136 MMOL/L (ref 136–145)
SODIUM SERPL-SCNC: 137 MMOL/L (ref 136–145)
SODIUM SERPL-SCNC: 137 MMOL/L (ref 136–145)
STJ: 2.3 CM
TDI LATERAL: 0.08 M/S
TDI SEPTAL: 0.1 M/S
TDI: 0.09 M/S
TRICUSPID ANNULAR PLANE SYSTOLIC EXCURSION: 1.6 CM
TV PEAK GRADIENT: 26 MMHG
TV REST PULMONARY ARTERY PRESSURE: 35 MMHG
WBC # BLD AUTO: 8.74 K/UL (ref 3.9–12.7)
WBC # BLD AUTO: 9.55 K/UL (ref 3.9–12.7)
Z-SCORE OF LEFT VENTRICULAR DIMENSION IN END DIASTOLE: -4.16
Z-SCORE OF LEFT VENTRICULAR DIMENSION IN END SYSTOLE: -2.33

## 2025-06-11 PROCEDURE — 96361 HYDRATE IV INFUSION ADD-ON: CPT

## 2025-06-11 PROCEDURE — 82550 ASSAY OF CK (CPK): CPT | Performed by: STUDENT IN AN ORGANIZED HEALTH CARE EDUCATION/TRAINING PROGRAM

## 2025-06-11 PROCEDURE — 97161 PT EVAL LOW COMPLEX 20 MIN: CPT

## 2025-06-11 PROCEDURE — 5A09357 ASSISTANCE WITH RESPIRATORY VENTILATION, LESS THAN 24 CONSECUTIVE HOURS, CONTINUOUS POSITIVE AIRWAY PRESSURE: ICD-10-PCS | Performed by: STUDENT IN AN ORGANIZED HEALTH CARE EDUCATION/TRAINING PROGRAM

## 2025-06-11 PROCEDURE — 25000003 PHARM REV CODE 250: Performed by: STUDENT IN AN ORGANIZED HEALTH CARE EDUCATION/TRAINING PROGRAM

## 2025-06-11 PROCEDURE — 27100171 HC OXYGEN HIGH FLOW UP TO 24 HOURS

## 2025-06-11 PROCEDURE — 82374 ASSAY BLOOD CARBON DIOXIDE: CPT | Performed by: STUDENT IN AN ORGANIZED HEALTH CARE EDUCATION/TRAINING PROGRAM

## 2025-06-11 PROCEDURE — 83735 ASSAY OF MAGNESIUM: CPT | Mod: 91 | Performed by: HOSPITALIST

## 2025-06-11 PROCEDURE — 25000003 PHARM REV CODE 250: Performed by: HOSPITALIST

## 2025-06-11 PROCEDURE — 97165 OT EVAL LOW COMPLEX 30 MIN: CPT

## 2025-06-11 PROCEDURE — 63600175 PHARM REV CODE 636 W HCPCS: Performed by: STUDENT IN AN ORGANIZED HEALTH CARE EDUCATION/TRAINING PROGRAM

## 2025-06-11 PROCEDURE — 83880 ASSAY OF NATRIURETIC PEPTIDE: CPT | Performed by: HOSPITALIST

## 2025-06-11 PROCEDURE — 99900035 HC TECH TIME PER 15 MIN (STAT)

## 2025-06-11 PROCEDURE — 80069 RENAL FUNCTION PANEL: CPT | Performed by: HOSPITALIST

## 2025-06-11 PROCEDURE — 27000190 HC CPAP FULL FACE MASK W/VALVE

## 2025-06-11 PROCEDURE — 21400001 HC TELEMETRY ROOM

## 2025-06-11 PROCEDURE — 85025 COMPLETE CBC W/AUTO DIFF WBC: CPT | Performed by: HOSPITALIST

## 2025-06-11 PROCEDURE — 63600175 PHARM REV CODE 636 W HCPCS: Performed by: HOSPITALIST

## 2025-06-11 RX ORDER — ACETAMINOPHEN 500 MG
500 TABLET ORAL NIGHTLY PRN
COMMUNITY

## 2025-06-11 RX ORDER — ASPIRIN 81 MG/1
81 TABLET ORAL DAILY
COMMUNITY

## 2025-06-11 RX ORDER — SODIUM CHLORIDE 9 MG/ML
INJECTION, SOLUTION INTRAVENOUS CONTINUOUS
Status: ACTIVE | OUTPATIENT
Start: 2025-06-11 | End: 2025-06-12

## 2025-06-11 RX ORDER — AMLODIPINE BESYLATE 5 MG/1
2.5 TABLET ORAL DAILY
Qty: 30 TABLET | Refills: 0 | OUTPATIENT
Start: 2025-06-11

## 2025-06-11 RX ORDER — METOPROLOL TARTRATE 50 MG/1
50 TABLET ORAL 2 TIMES DAILY
Qty: 60 TABLET | Refills: 0 | OUTPATIENT
Start: 2025-06-11 | End: 2025-07-11

## 2025-06-11 RX ORDER — ASPIRIN 81 MG/1
81 TABLET ORAL DAILY
Status: DISCONTINUED | OUTPATIENT
Start: 2025-06-11 | End: 2025-06-13 | Stop reason: HOSPADM

## 2025-06-11 RX ADMIN — SODIUM CHLORIDE: 9 INJECTION, SOLUTION INTRAVENOUS at 05:06

## 2025-06-11 RX ADMIN — SODIUM ZIRCONIUM CYCLOSILICATE 10 G: 5 POWDER, FOR SUSPENSION ORAL at 05:06

## 2025-06-11 RX ADMIN — ATORVASTATIN CALCIUM 80 MG: 40 TABLET, FILM COATED ORAL at 08:06

## 2025-06-11 RX ADMIN — HEPARIN SODIUM 5000 UNITS: 5000 INJECTION INTRAVENOUS; SUBCUTANEOUS at 09:06

## 2025-06-11 RX ADMIN — SODIUM CHLORIDE, POTASSIUM CHLORIDE, SODIUM LACTATE AND CALCIUM CHLORIDE 500 ML: 600; 310; 30; 20 INJECTION, SOLUTION INTRAVENOUS at 10:06

## 2025-06-11 RX ADMIN — METOPROLOL TARTRATE 50 MG: 50 TABLET, FILM COATED ORAL at 09:06

## 2025-06-11 RX ADMIN — CETIRIZINE HYDROCHLORIDE 10 MG: 10 TABLET, FILM COATED ORAL at 08:06

## 2025-06-11 RX ADMIN — METOPROLOL TARTRATE 50 MG: 50 TABLET, FILM COATED ORAL at 08:06

## 2025-06-11 RX ADMIN — SODIUM ZIRCONIUM CYCLOSILICATE 10 G: 5 POWDER, FOR SUSPENSION ORAL at 10:06

## 2025-06-11 RX ADMIN — THERA TABS 1 TABLET: TAB at 08:06

## 2025-06-11 RX ADMIN — ASPIRIN 81 MG: 81 TABLET, COATED ORAL at 08:06

## 2025-06-11 RX ADMIN — ALLOPURINOL 300 MG: 100 TABLET ORAL at 08:06

## 2025-06-11 NOTE — ASSESSMENT & PLAN NOTE
Body mass index is 35.23 kg/m². Morbid obesity complicates all aspects of disease management from diagnostic modalities to treatment. Weight loss encouraged and health benefits explained to patient.

## 2025-06-11 NOTE — PHARMACY MED REC
"Admission Medication History     The home medication history was taken by Sandie Carrasco.    You may go to "Admission" then "Reconcile Home Medications" tabs to review and/or act upon these items.     The home medication list has been updated by the Pharmacy department.   Please read ALL comments highlighted in yellow.   Please address this information as you see fit.    Feel free to contact us if you have any questions or require assistance.      Medications listed below were obtained from: Patient/family and Analytic software- [employer]    Current Outpatient Medications on File Prior to Encounter   Medication Sig    acetaminophen (TYLENOL) 500 MG tablet   Take 500 mg by mouth nightly as needed for Pain.    allopurinoL (ZYLOPRIM) 300 MG tablet   Take 1 tablet (300 mg total) by mouth once daily.    amLODIPine-benazepriL (LOTREL) 10-40 mg per capsule   Take 1 capsule by mouth every morning.    aspirin (ECOTRIN) 81 MG EC tablet   Take 81 mg by mouth once daily.    atorvastatin (LIPITOR) 80 MG tablet   Take 1 tablet (80 mg total) by mouth once daily.    cetirizine (ZYRTEC) 10 MG tablet   Take 10 mg by mouth once daily.    hydrALAZINE (APRESOLINE) 100 MG tablet   Take 1 tablet (100 mg total) by mouth 2 (two) times daily.    metoprolol ta-hydrochlorothiaz (LOPRESSOR HCT) 100-25 mg per tablet   Take 1 tablet by mouth once daily.    MULTIVIT-IRON-MIN-FOLIC ACID 3,500-18-0.4 UNIT-MG-MG ORAL CHEW   Take 1 tablet by mouth once daily.    spironolactone (ALDACTONE) 25 MG tablet Take 1 tablet (25 mg total) by mouth once daily.     Potential issues to be addressed PRIOR TO DISCHARGE  Patient requested refills for the following medications: (LOPRESSOR HCT)            Sandie Carrasco  EXT [phone]                  .          "

## 2025-06-11 NOTE — ASSESSMENT & PLAN NOTE
Patient's blood pressure range in the last 24 hours was: BP  Min: 117/69  Max: 170/74.The patient's inpatient anti-hypertensive regimen is listed below:  Current Antihypertensives  metoprolol tartrate (LOPRESSOR) tablet 50 mg, 2 times daily, Oral    Plan  Resume metoprolol  - see SYNCOPE problem for further comments.

## 2025-06-11 NOTE — ASSESSMENT & PLAN NOTE
-admit to obs med tele unit  -orthostatic BP in ED negative.   -cardiac monitoring  -check ECHO given htn, ckd to rule out structural abnormalities with c/o of LIND rule out HF signs.   Obtain CXR and BNP     Based on HPI and med history - suspect with multiple anti-hypertensive medications that acute hypotension led to her syncope - pt with increased creatinine and hyperkalemia.  She may need anti-hypertensives added back intermittently  or she may need to alter dosing times of home medications - taking some in AM and some in evening.   Dose reduction of hydralazine may be first step.  She presented with borderline low BP but has HTN noted now.

## 2025-06-11 NOTE — PLAN OF CARE
Inpatient Upgrade Note    Nelia Littlejohn has warranted treatment spanning two or more midnights of hospital level care for the management of Hyperkalemia and up-trending creatinine. She continues to require IV fluids, daily labs, monitoring of vital signs, and medication adjustments. Her condition is also complicated by the following comorbidities: Hypertension, Chronic kidney disease, and Obesity

## 2025-06-11 NOTE — ASSESSMENT & PLAN NOTE
-likely due to elevated creatinine and use of lisionpril/spironolactone.   -give lokelma  -EKG w/o peaked t-wave changes   -Trend potassium until resolution

## 2025-06-11 NOTE — ED NOTES
Care assumed from ALAN Means    APPEARANCE: awake, alert, and appears to be in NAD. Pain score 0/10. Bed in lowest and locked position w/ side rails up x2. Call light w/n pt reach and instructed on how to use.   SKIN: warm, dry and intact. No visible breakdown or bruising noted to extremities.   MUSCULOSKELETAL: Patient moving all extremities spontaneously, no obvious swelling or deformities noted. Ambulates independently.  RESPIRATORY: Airway open and patent. Denies shortness of breath. Respirations unlabored.   CARDIAC: Denies CP, 2+ distal pulses; no peripheral edema  ABDOMEN: S/ND/NT, Denies N/V/D. Last BM today, 6/11/25  : Pt voids spontaneously, denies difficulty  NEUROLOGIC: AAO x 4; speaking and following commands appropriately. Equal strength in all extremities; denies numbness/tingling. Denies dizziness, visual changes, or HA.    Placed on telebox #[id number] - HR 65

## 2025-06-11 NOTE — PROGRESS NOTES
[facility] Emergency Dept  [facility] Medicine  Progress Note    Patient Name: Nelia Littlejohn  MRN: [id number]  Patient Class: OP- Observation   Admission Date: 6/10/2025  Length of Stay: 0 days  Attending Physician: Minna Li MD  Primary Care Provider: Bernardo Gongora II, MD        Subjective     Principal Problem:Syncope        HPI:  84 y/o AAF w/ HTN, CKD, hx or provoked DVT s/p knee replacement, presents to the ED due to syncope.     She reports following normal routing, took anti-hypetensive medications in the morning, went to [facility] at approx 9am-12pm, breakfast served there @ 10:45 and she reports having ice cream/cake after social function around noon.  She was riding public bus home, accompanied by friend, she noted to her friend she felt dizzy getting on the bus and during ride, she felt overheated/sweaty and light headed, friend reported to her that patients eyes rolled to back of the head and she slumped over with LOC.  She was in seated position.   She denies any prodrome symptoms - such as chest pain, nausea/vomiting, palpitations, no vision changes.   She reports 2 prior bouts of syncope, last one was 8 months ago. She has not been hospitalized with prior bouts.     She does note on questioning that for recent weeks she has noted dyspnea on exertion, which now seems to be occurring after ambulating shorter distances, no LE edema, no orthopnea complaints.      She lives with her daughter, frequently goes to [facility] during the week, no reported tobacco/drug use, very rare alcohol use.   She is on multiple antihypertensives - amlodipine-benazepril combo, hydralazine, metoprolol-hctz combo, and spironolactone.  Denies any recent dose uptitration has been on this regimen for long time. PCP Dr. Bernardo Gongora.    She does not have a nephrologist.  ON lab w/u today she has increased creatining 1.7 and mild hyperkalemia to 5.6     ED treatment: none.     Overview/Hospital Course:  Admitted to  Hospital Medicine for evaluation of syncopal episode.  Combination of dehydration and robust antihypertensive regimen leading to hypotension suspected as etiology.  Patient endorsing poor hydration day of with activity and travel during very hot and humid weather.  Orthostatics unremarkable.  Echo with normal EF and normal diastolic function; right ventricle systolic function reduced, mild aortic valve sclerosis.     Interval History:  Patient seen and examined at bedside.    No acute events overnight.  Patient has no acute complaints this morning.  Denies any chest pain, palpitations, diaphoresis, nausea around her syncopal episode.  However, per her friend who is sitting beside her on the bus, she did appear clammy.  Denies any symptoms at present.  Patient updated regarding care plan.      Review of Systems   Constitutional:  Negative for activity change, appetite change, chills and fever.   HENT:  Negative for congestion, sore throat and trouble swallowing.    Eyes:  Negative for visual disturbance.   Respiratory:  Negative for cough, shortness of breath and wheezing.    Cardiovascular:  Negative for chest pain, palpitations and leg swelling.   Gastrointestinal:  Negative for constipation, diarrhea and nausea.   Genitourinary:  Negative for difficulty urinating and dysuria.   Musculoskeletal:  Negative for arthralgias and myalgias.   Skin:  Negative for wound.   Neurological:  Negative for dizziness, light-headedness and headaches.   Psychiatric/Behavioral:  Negative for behavioral problems, confusion and decreased concentration.        Objective:    Temp: 97.9 °F (36.6 °C) (06/11/25 1214)  Pulse: 81 (06/11/25 1214)  Resp: 18 (06/11/25 1214)  BP: 134/71 (06/11/25 1214)  SpO2: 95 % (06/11/25 1214)    Weight: 99 kg (218 lb 4.1 oz) (06/11/25 0717)    Body mass index is 35.23 kg/m².      Intake/Output Summary (Last 24 hours) at 6/11/2025 1442  Last data filed at 6/11/2025 1115  Gross per 24 hour   Intake 500 ml    Output --   Net 500 ml       Physical Exam  Vitals and nursing note reviewed.   Constitutional:       General: She is not in acute distress.     Appearance: Normal appearance. She is not toxic-appearing or diaphoretic.   HENT:      Head: Normocephalic and atraumatic.      Mouth/Throat:      Pharynx: Oropharynx is clear.   Eyes:      General: No scleral icterus.  Cardiovascular:      Rate and Rhythm: Normal rate and regular rhythm.      Pulses: Normal pulses.   Pulmonary:      Effort: Pulmonary effort is normal. No respiratory distress.      Breath sounds: No stridor. No wheezing or rales.   Abdominal:      General: Bowel sounds are normal. There is no distension.      Palpations: Abdomen is soft.      Tenderness: There is no abdominal tenderness. There is no guarding.   Musculoskeletal:      Cervical back: Normal range of motion and neck supple.      Right lower leg: No edema.      Left lower leg: No edema.   Skin:     General: Skin is warm and dry.   Neurological:      General: No focal deficit present.      Mental Status: She is alert and oriented to person, place, and time.   Psychiatric:         Behavior: Behavior normal.         Significant Labs: All pertinent labs within the past 24 hours have been reviewed.    Recent Results (from the past 24 hours)   Hepatitis C Antibody    Collection Time: 06/10/25  2:52 PM   Result Value Ref Range    Hep C Ab Interp Non-Reactive Non-Reactive   HCV Virus Hold Specimen    Collection Time: 06/10/25  2:52 PM   Result Value Ref Range    Extra Tube Hold for add-ons.    HIV 1/2 Ag/Ab (4th Gen)    Collection Time: 06/10/25  2:52 PM   Result Value Ref Range    HIV 1/2 Ag/Ab Non-Reactive Non-Reactive   Comprehensive metabolic panel    Collection Time: 06/10/25  2:52 PM   Result Value Ref Range    Sodium 137 136 - 145 mmol/L    Potassium 5.6 (H) 3.5 - 5.1 mmol/L    Chloride 111 (H) 95 - 110 mmol/L    CO2 17 (L) 23 - 29 mmol/L    Glucose 153 (H) 70 - 110 mg/dL    BUN 33 (H) 8 - 23  mg/dL    Creatinine 1.7 (H) 0.5 - 1.4 mg/dL    Calcium 9.1 8.7 - 10.5 mg/dL    Protein Total 7.6 6.0 - 8.4 gm/dL    Albumin 4.1 3.5 - 5.2 g/dL    Bilirubin Total 0.8 0.1 - 1.0 mg/dL     40 - 150 unit/L    AST 16 11 - 45 unit/L    ALT 14 10 - 44 unit/L    Anion Gap 9 8 - 16 mmol/L    eGFR 29 (L) >60 mL/min/1.73/m2   Troponin I High Sensitivity    Collection Time: 06/10/25  2:52 PM   Result Value Ref Range    Troponin High Sensitive 4 <=14 ng/L   Magnesium    Collection Time: 06/10/25  2:52 PM   Result Value Ref Range    Magnesium  2.1 1.6 - 2.6 mg/dL   CBC with Differential    Collection Time: 06/10/25  2:52 PM   Result Value Ref Range    WBC 11.35 3.90 - 12.70 K/uL    RBC 3.63 (L) 4.00 - 5.40 M/uL    HGB 10.7 (L) 12.0 - 16.0 gm/dL    HCT 33.2 (L) 37.0 - 48.5 %    MCV 92 82 - 98 fL    MCH 29.5 27.0 - 31.0 pg    MCHC 32.2 32.0 - 36.0 g/dL    RDW 15.1 (H) 11.5 - 14.5 %    Platelet Count 271 150 - 450 K/uL    MPV 10.3 9.2 - 12.9 fL    Nucleated RBC 0 <=0 /100 WBC    Neut % 81.1 (H) 38 - 73 %    Lymph % 13.0 (L) 18 - 48 %    Mono % 4.1 4 - 15 %    Eos % 0.2 <=8 %    Basophil % 0.3 <=1.9 %    Imm Grans % 1.3 (H) 0.0 - 0.5 %    Neut # 9.21 (H) 1.8 - 7.7 K/uL    Lymph # 1.47 1 - 4.8 K/uL    Mono # 0.47 0.3 - 1 K/uL    Eos # 0.02 <=0.5 K/uL    Baso # 0.03 <=0.2 K/uL    Imm Grans # 0.15 (H) 0.00 - 0.04 K/uL   Repeat EKG 12-lead    Collection Time: 06/10/25  2:58 PM   Result Value Ref Range    QRS Duration 112 ms    [facility]HS QTC Calculation 462 ms   Urinalysis, Reflex to Urine Culture Urine, Clean Catch    Collection Time: 06/10/25  3:24 PM    Specimen: Urine   Result Value Ref Range    Color, UA Yellow Straw, Maritza, Yellow, Light-Orange    Appearance, UA Clear Clear    pH, UA 5.0 5.0 - 8.0    Spec Grav UA 1.015 1.005 - 1.030    Protein, UA Negative Negative    Glucose, UA Negative Negative    Ketones, UA Negative Negative    Bilirubin, UA Negative Negative    Blood, UA Negative Negative    Nitrites, UA Negative Negative     Urobilinogen, UA Negative <2.0 EU/dL    Leukocyte Esterase, UA Negative Negative   GREY TOP URINE HOLD    Collection Time: 06/10/25  3:24 PM   Result Value Ref Range    Extra Tube Hold for add-ons.    POCT Venous Blood Gas    Collection Time: 06/10/25  9:31 PM   Result Value Ref Range    POC PH 7.358 7.350 - 7.450    POC PCO2 35.2 35.0 - 45.0 mmHg    POC PO2 45.4 40.0 - 60.0 mmHg    POC Potassium 5.4 (H) 3.5 - 5.1 mmol/L    POC Sodium 140 136 - 145 mmol/L    POC Ionized Calcium 1.16 1.06 - 1.42 mmol/L    POC Hematocrit 34.5 36.0 - 54.0 %    Correct Temperature (PH) 7.358     Corrected Temperature (pCO2) 35.2 mmHg    Corrected Temperature (pO2) 45.4 mmHg    POC HCO3 19.9 24.0 - 28.0 mmol/l    Base Deficit -5.1 mmol/l    POC Temp 37.0 C    Specimen source Venous     Performed By: Cvo     FiO2 21.0 %    BIPAP 0    Creatinine, Random Urine    Collection Time: 06/10/25  9:40 PM   Result Value Ref Range    Urine Creatinine 86.0 15.0 - 325.0 mg/dL   Urea Nitrogen, Random Urine    Collection Time: 06/10/25  9:40 PM   Result Value Ref Range    Urine Urea Nitrogen 573 140 - 1,050 mg/dL   Chloride, Random Urine    Collection Time: 06/10/25  9:40 PM   Result Value Ref Range    Urine Chloride 84 25 - 200 mmol/L   Sodium, Random Urine    Collection Time: 06/10/25  9:40 PM   Result Value Ref Range    Urine Sodium 86 20 - 250 mmol/L   Potassium, Random Urine    Collection Time: 06/10/25  9:40 PM   Result Value Ref Range    Urine Potassium 41 15 - 95 mmol/L   CBC with Differential    Collection Time: 06/11/25  1:29 AM   Result Value Ref Range    WBC 9.55 3.90 - 12.70 K/uL    RBC 3.38 (L) 4.00 - 5.40 M/uL    HGB 9.9 (L) 12.0 - 16.0 gm/dL    HCT 30.6 (L) 37.0 - 48.5 %    MCV 91 82 - 98 fL    MCH 29.3 27.0 - 31.0 pg    MCHC 32.4 32.0 - 36.0 g/dL    RDW 15.2 (H) 11.5 - 14.5 %    Platelet Count 250 150 - 450 K/uL    MPV 10.2 9.2 - 12.9 fL    Nucleated RBC 0 <=0 /100 WBC    Neut % 62.5 38 - 73 %    Lymph % 30.4 18 - 48 %    Mono % 5.8 4 -  15 %    Eos % 0.8 <=8 %    Basophil % 0.3 <=1.9 %    Imm Grans % 0.2 0.0 - 0.5 %    Neut # 5.97 1.8 - 7.7 K/uL    Lymph # 2.90 1 - 4.8 K/uL    Mono # 0.55 0.3 - 1 K/uL    Eos # 0.08 <=0.5 K/uL    Baso # 0.03 <=0.2 K/uL    Imm Grans # 0.02 0.00 - 0.04 K/uL   Renal Function Panel    Collection Time: 06/11/25  1:45 AM   Result Value Ref Range    Sodium 137 136 - 145 mmol/L    Potassium 5.3 (H) 3.5 - 5.1 mmol/L    Chloride 113 (H) 95 - 110 mmol/L    CO2 16 (L) 23 - 29 mmol/L    Glucose 75 70 - 110 mg/dL    BUN 32 (H) 8 - 23 mg/dL    Creatinine 1.5 (H) 0.5 - 1.4 mg/dL    Calcium 8.5 (L) 8.7 - 10.5 mg/dL    Albumin 3.6 3.5 - 5.2 g/dL    Phosphorus Level 4.3 2.7 - 4.5 mg/dL    Anion Gap 8 8 - 16 mmol/L    eGFR 34 (L) >60 mL/min/1.73/m2   Magnesium    Collection Time: 06/11/25  1:45 AM   Result Value Ref Range    Magnesium  2.0 1.6 - 2.6 mg/dL   BNP    Collection Time: 06/11/25  5:03 AM   Result Value Ref Range    BNP 49 0 - 99 pg/mL   Magnesium    Collection Time: 06/11/25  5:24 AM   Result Value Ref Range    Magnesium  2.1 1.6 - 2.6 mg/dL   Renal Function Panel    Collection Time: 06/11/25  5:24 AM   Result Value Ref Range    Sodium 137 136 - 145 mmol/L    Potassium 5.5 (H) 3.5 - 5.1 mmol/L    Chloride 113 (H) 95 - 110 mmol/L    CO2 16 (L) 23 - 29 mmol/L    Glucose 75 70 - 110 mg/dL    BUN 31 (H) 8 - 23 mg/dL    Creatinine 1.5 (H) 0.5 - 1.4 mg/dL    Calcium 8.5 (L) 8.7 - 10.5 mg/dL    Albumin 3.6 3.5 - 5.2 g/dL    Phosphorus Level 3.9 2.7 - 4.5 mg/dL    Anion Gap 8 8 - 16 mmol/L    eGFR 34 (L) >60 mL/min/1.73/m2   CBC with Differential    Collection Time: 06/11/25  5:24 AM   Result Value Ref Range    WBC 8.74 3.90 - 12.70 K/uL    RBC 3.39 (L) 4.00 - 5.40 M/uL    HGB 10.0 (L) 12.0 - 16.0 gm/dL    HCT 30.9 (L) 37.0 - 48.5 %    MCV 91 82 - 98 fL    MCH 29.5 27.0 - 31.0 pg    MCHC 32.4 32.0 - 36.0 g/dL    RDW 15.2 (H) 11.5 - 14.5 %    Platelet Count 246 150 - 450 K/uL    MPV 9.8 9.2 - 12.9 fL    Nucleated RBC 0 <=0 /100 WBC     Neut % 56.4 38 - 73 %    Lymph % 36.2 18 - 48 %    Mono % 5.4 4 - 15 %    Eos % 1.5 <=8 %    Basophil % 0.3 <=1.9 %    Imm Grans % 0.2 0.0 - 0.5 %    Neut # 4.93 1.8 - 7.7 K/uL    Lymph # 3.16 1 - 4.8 K/uL    Mono # 0.47 0.3 - 1 K/uL    Eos # 0.13 <=0.5 K/uL    Baso # 0.03 <=0.2 K/uL    Imm Grans # 0.02 0.00 - 0.04 K/uL   Echo    Collection Time: 06/11/25  7:43 AM   Result Value Ref Range    LV Diastolic Volume 80 mL    Echo EF Estimated 60 %    LV Systolic Volume 32 mL    IVS 0.9 0.6 - 1.1 cm    LVIDd 4.2 3.5 - 6.0 cm    LVIDs 2.9 2.1 - 4.0 cm    LVOT diameter 2.0 cm    PW 0.9 0.6 - 1.1 cm    AV LVOT peak gradient 4 mmHg    LVOT mn grad 2 mmHg    LVOT peak edgardo 1.0 m/s    LVOT peak VTI 23.5 cm    RV- leal basal diam 3.5 cm    LA size 3.9 cm    Left Atrium Major Axis 5.1 cm    Left Atrium Minor Axis 5.0 cm    LA Vol (MOD) 59 mL    RA Major Prospect 4.68 cm    AV valve area 2.2 cm2    AV area by cont VTI 2.3 cm2    AV peak gradient 9 mmHg    AV mean gradient 5 mmHg    Ao peak edgardo 1.5 m/s    Ao VTI 34.2 cm    MV Peak A Edgardo 1.08 m/s    E wave deceleration time 169 ms    MV Peak E Edgardo 0.95 m/s    E/A ratio 0.88     LV LATERAL E/E' RATIO 11.9     LV SEPTAL E/E' RATIO 9.5     TDI LATERAL 0.08 m/s    TDI SEPTAL 0.10 m/s    TV peak gradient 26 mmHg    TR Max Edgardo 2.6 m/s    Ascending aorta 2.9 cm    STJ 2.3 cm    PV Peak S Edgardo 0.45 m/s    Sinus 3.01 cm    LA WIDTH 4.2 cm    RA Width 2.68 cm    TAPSE 1.6 cm    BSA 2.15 m2    LVOT stroke volume 73.8 cm3    LV Systolic Volume Index 15.4 mL/m2    LV Diastolic Volume Index 38.46 mL/m2    LVOT area 3.1 cm2    FS 31.0 28 - 44 %    Left Ventricle Relative Wall Thickness 0.43 cm    LV mass 118.7 g    LV Mass Index 57.1 g/m2    E/E' ratio 11 m/s    CINDY 34 mL/m2    LA Vol 70 cm3    RV/LV Ratio 0.83 cm    CINDY (MOD) 28 mL/m2    AV Velocity Ratio 0.67     AV index (prosthetic) 0.69     JONATHAN by Velocity Ratio 2.1 cm²    ASI 1.4 cm/m2    ASI 1.4 cm/m2    Mean e' 0.09 m/s    ZLVIDS -2.33      ZLVIDD -4.16     Lai's Biplane MOD Ejection Fraction 53 %    GLS 14.4 %    TV resting pulmonary artery pressure 35 mmHg    RV TB RVSP 11 mmHg    Est. RA pres 8 mmHg       Significant Imaging: I have reviewed all pertinent imaging results/findings within the past 24 hours.    Imaging Results              X-Ray Chest AP Portable (Final result)  Result time 06/11/25 12:50:16      Final result by Jignesh Watson MD (06/11/25 12:50:16)                   Impression:      See above      Electronically signed by: Jignesh Watson MD  Date:    06/11/2025  Time:    12:50               Narrative:    EXAMINATION:  XR CHEST AP PORTABLE    CLINICAL HISTORY:  dyspnea on exertion;    TECHNIQUE:  Single frontal view of the chest was performed.    COMPARISON:  None 09/16/2023    FINDINGS:  Heart size normal.  The lungs are clear.  No pleural effusion                                          Assessment & Plan  Syncope  -admit to obs med tele unit  -orthostatic BP in ED negative.   -cardiac monitoring  -chest x-ray and BNP unremarkable  -Echo with normal EF and normal diastolic function; right ventricle systolic function reduced, mild aortic valve sclerosis.     Based on HPI and med history - suspect with multiple anti-hypertensive medications that acute hypotension led to her syncope - pt with increased creatinine and hyperkalemia.  She may need anti-hypertensives added back intermittently  or she may need to alter dosing times of home medications - taking some in AM and some in evening.   Dose reduction of hydralazine may be first step.  She presented with borderline low BP but has HTN noted now.   Plan for acute care home for continued monitoring and titration needs in addition if close PCP follow-up     Coronary artery disease involving native coronary artery of native heart without angina pectoris  Continue on aspirin and metoprolol.   Essential hypertension  Patient's blood pressure range in the last 24 hours was: BP  Min: 117/69   Max: 170/74.The patient's inpatient anti-hypertensive regimen is listed below:  Current Antihypertensives  metoprolol tartrate (LOPRESSOR) tablet 50 mg, 2 times daily, Oral    Plan  Resume metoprolol  - see SYNCOPE problem for further comments.   Hyperkalemia  -likely due to elevated creatinine and use of lisionpril/spironolactone.   -give lokelma  -EKG w/o peaked t-wave changes   -Trend potassium until resolution ; repeat lab pending    Stage 3b chronic kidney disease  UA w/o acute findings - check urine electrolytes   -no protein on UA     Obstructive sleep apnea syndrome  Uses CPAP believes setting is 6 cm h20    History of DVT (deep vein thrombosis)  Reports hx of provoked DVT s/p knee replacement surgery - reports possibly being on blood thinners but reported repeat imaging that showed resolution of clot.     Class 2 severe obesity with body mass index (BMI) of 35 to 39.9 with serious comorbidity  Body mass index is 35.23 kg/m². Morbid obesity complicates all aspects of disease management from diagnostic modalities to treatment. Weight loss encouraged and health benefits explained to patient.       VTE Risk Mitigation (From admission, onward)           Ordered     heparin (porcine) injection 5,000 Units  Every 8 hours         06/10/25 2101     IP VTE HIGH RISK PATIENT  Once         06/10/25 2101     Place sequential compression device  Until discontinued         06/10/25 2101                    Discharge Planning   BAIRON: 6/11/2025     Code Status: Full Code   Medical Readiness for Discharge Date:   Discharge Plan A: Home, Home with family                Please place Justification for DME        Minna Li MD  Department of Hospital Medicine   [facility] - Emergency Dept

## 2025-06-11 NOTE — H&P
[facility] - Emergency Dept  [facility] Medicine  History & Physical    Patient Name: Nelia Littlejohn  MRN: [id number]  Patient Class: OP- Observation  Admission Date: 6/10/2025  Attending Physician: Minna Li MD [facility]   Admitting Physician: Morris Sapp MD  Primary Care Provider: Bernardo Gongora II, MD         Patient information was obtained from patient, past medical records, and ER records.     Subjective:     Principal Problem:Syncope    Chief Complaint:   Chief Complaint   Patient presents with    Loss of Consciousness     Was on the bus when she felt nauseated and had a syncopal episode. +orthostatics. -hitting head or blood thinners.         HPI: 86 y/o AAF w/ HTN, CKD, hx or provoked DVT s/p knee replacement, presents to the ED due to syncope.     She reports following normal routing, took anti-hypetensive medications in the morning, went to [facility] at approx 9am-12pm, breakfast served there @ 10:45 and she reports having ice cream/cake after social function around noon.  She was riding public bus home, accompanied by friend, she noted to her friend she felt dizzy getting on the bus and during ride, she felt overheated/sweaty and light headed, friend reported to her that patients eyes rolled to back of the head and she slumped over with LOC.  She was in seated position.   She denies any prodrome symptoms - such as chest pain, nausea/vomiting, palpitations, no vision changes.   She reports 2 prior bouts of syncope, last one was 8 months ago. She has not been hospitalized with prior bouts.     She does note on questioning that for recent weeks she has noted dyspnea on exertion, which now seems to be occurring after ambulating shorter distances, no LE edema, no orthopnea complaints.      She lives with her daughter, frequently goes to [facility] during the week, no reported tobacco/drug use, very rare alcohol use.   She is on multiple antihypertensives - amlodipine-benazepril combo,  hydralazine, metoprolol-hctz combo, and spironolactone.  Denies any recent dose uptitration has been on this regimen for long time. PCP Dr. Bernardo Gongora.    She does not have a nephrologist.  ON lab w/u today she has increased creatining 1.7 and mild hyperkalemia to 5.6     ED treatment: none.     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 Vision is much better, can read.     High cholesterol     History of prediabetes 10/26/2017    HTN (hypertension) 09/25/2012    Hyperlipemia 09/11/2012    Hypertension     Interval gout 09/11/2012    Status post cataract extraction and insertion of intraocular lens 11/27/2012    sp Yag Cap OS      Stroke        Past Surgical History:   Procedure Laterality Date    ARTHROPLASTY, KNEE, TOTAL, USING COMPUTER-ASSISTED NAVIGATION Right 6/19/2024    Procedure: ARTHROPLASTY, KNEE, TOTAL, USING COMPUTER-ASSISTED NAVIGATION: VELYS: RIGHT: DEPUY - ATTUNE;  Surgeon: Ernie Rubio III, MD;  Location: [facility];  Service: Orthopedics;  Laterality: Right;    CATARACT EXTRACTION Bilateral     EYE SURGERY      HYSTERECTOMY      JOINT REPLACEMENT      Yag capsulotomy left eye  10/10/2012       Review of patient's allergies indicates:  No Known Allergies    No current facility-administered medications on file prior to encounter.     Current Outpatient Medications on File Prior to Encounter   Medication Sig    allopurinoL (ZYLOPRIM) 300 MG tablet Take 1 tablet (300 mg total) by mouth once daily.    amLODIPine-benazepriL (LOTREL) 10-40 mg per capsule Take 1 capsule by mouth every morning.    atorvastatin (LIPITOR) 80 MG tablet Take 1 tablet (80 mg total) by mouth once daily.    cetirizine (ZYRTEC) 10 MG tablet Take 10 mg by mouth once daily.     hydrALAZINE (APRESOLINE) 100 MG tablet Take 1 tablet (100 mg total) by mouth 2 (two) times daily.    metoprolol ta-hydrochlorothiaz (LOPRESSOR HCT) 100-25 mg per tablet Take 1 tablet by mouth once daily.    MULTIVIT-IRON-MIN-FOLIC ACID 3,500-18-0.4 UNIT-MG-MG ORAL CHEW Take by mouth once daily.    spironolactone (ALDACTONE) 25 MG tablet Take 1 tablet (25 mg total) by mouth once daily.     Family History       Problem Relation (Age of Onset)    Cancer Sister    Cataracts Father    Diabetes Father, Mother, Brother, Brother    Heart disease Brother    Hypertension Brother, Son, Son    No Known Problems Son, Daughter, Maternal Grandmother, Maternal Grandfather, Paternal Grandmother, Paternal Grandfather, Brother, Maternal Aunt, Maternal Uncle, Paternal Aunt, Paternal Uncle, Son, Son    Stroke Father          Tobacco Use    Smoking status: Never     Passive exposure: Never    Smokeless tobacco: Never   Substance and Sexual Activity    Alcohol use: No    Drug use: No    Sexual activity: Not Currently     Review of Systems   Constitutional:  Positive for diaphoresis. Negative for activity change and appetite change.   Cardiovascular:  Negative for chest pain and palpitations.   Gastrointestinal: Negative.  Negative for nausea and vomiting.   Genitourinary: Negative.    Musculoskeletal: Negative.    Neurological:  Positive for light-headedness.   Psychiatric/Behavioral: Negative.       Objective:     Vital Signs (Most Recent):  Temp: 98.6 °F (37 °C) (06/11/25 0035)  Pulse: 85 (06/11/25 0035)  Resp: 17 (06/10/25 2135)  BP: 117/69 (06/11/25 0035)  SpO2: 96 % (06/11/25 0035) Vital Signs (24h Range):  Temp:  [98.6 °F (37 °C)-98.9 °F (37.2 °C)] 98.6 °F (37 °C)  Pulse:  [] 85  Resp:  [16-18] 17  SpO2:  [94 %-100 %] 96 %  BP: (106-170)/(55-74) 117/69     Weight: 99.8 kg (220 lb)  Body mass index is 35.51 kg/m².     Physical Exam  Vitals and nursing note reviewed.   Constitutional:       General: She is not in acute  "distress.     Appearance: Normal appearance. She is obese.   HENT:      Head: Atraumatic.      Mouth/Throat:      Mouth: Mucous membranes are moist.      Pharynx: Oropharynx is clear.   Eyes:      General: No scleral icterus.     Extraocular Movements: Extraocular movements intact.      Pupils: Pupils are equal, round, and reactive to light.   Cardiovascular:      Rate and Rhythm: Normal rate and regular rhythm.      Pulses: Normal pulses.   Pulmonary:      Effort: Pulmonary effort is normal. No respiratory distress.      Breath sounds: No stridor. No wheezing or rales.   Abdominal:      General: Bowel sounds are normal. There is no distension.      Palpations: Abdomen is soft.      Tenderness: There is no abdominal tenderness. There is no guarding.   Musculoskeletal:      Cervical back: Neck supple.   Neurological:      Mental Status: She is alert.              CRANIAL NERVES     CN III, IV, VI   Pupils are equal, round, and reactive to light.       Significant Labs: All pertinent labs within the past 24 hours have been reviewed.  CBC:   Recent Labs   Lab 06/10/25  1452 06/10/25  2131 06/11/25  0129   WBC 11.35  --  9.55   HGB 10.7*  --  9.9*   HCT 33.2* 34.5 30.6*     --  250     CMP:   Recent Labs   Lab 06/10/25  1452 06/11/25  0145    137   K 5.6* 5.3*   * 113*   CO2 17* 16*   * 75   BUN 33* 32*   CREATININE 1.7* 1.5*   CALCIUM 9.1 8.5*   PROT 7.6  --    ALBUMIN 4.1 3.6   BILITOT 0.8  --    ALKPHOS 107  --    AST 16  --    ALT 14  --    ANIONGAP 9 8     Cardiac Markers: No results for input(s): "CKMB", "MYOGLOBIN", "BNP", "TROPISTAT" in the last 48 hours.  Coagulation: No results for input(s): "PT", "INR", "APTT" in the last 48 hours.  Lactic Acid: No results for input(s): "LACTATE" in the last 48 hours.  Urine Studies:   Recent Labs   Lab 06/10/25  1524   COLORU Yellow   APPEARANCEUA Clear   PHUR 5.0   SPECGRAV 1.015   PROTEINUA Negative   GLUCUA Negative   BILIRUBINUA Negative "   OCCULTUA Negative   NITRITE Negative   UROBILINOGEN Negative   LEUKOCYTESUR Negative       Significant Imaging: I have reviewed all pertinent imaging results/findings within the past 24 hours.      Assessment/Plan:     Assessment & Plan  Syncope  -admit to obs med tele unit  -orthostatic BP in ED negative.   -cardiac monitoring  -check ECHO given htn, ckd to rule out structural abnormalities with c/o of LIND rule out HF signs.   Obtain CXR and BNP     Based on HPI and med history - suspect with multiple anti-hypertensive medications that acute hypotension led to her syncope - pt with increased creatinine and hyperkalemia.  She may need anti-hypertensives added back intermittently  or she may need to alter dosing times of home medications - taking some in AM and some in evening.   Dose reduction of hydralazine may be first step.  She presented with borderline low BP but has HTN noted now.     Coronary artery disease involving native coronary artery of native heart without angina pectoris  Continue on aspirin and metoprolol.   Essential hypertension  Patient's blood pressure range in the last 24 hours was: BP  Min: 106/55  Max: 170/74.The patient's inpatient anti-hypertensive regimen is listed below:  Current Antihypertensives  metoprolol tartrate (LOPRESSOR) tablet 50 mg, 2 times daily, Oral    Plan  Resume metoprolol  - see SYNCOPE problem for further comments.   Hyperkalemia  -likely due to elevated creatinine and use of lisionpril/spironolactone.   -give lokelma  -EKG w/o peaked t-wave changes   -Trend potassium until resolution     Stage 3b chronic kidney disease  UA w/o acute findings - check urine electrolytes   -no protein on UA     Obstructive sleep apnea syndrome  Uses CPAP believes setting is 6 cm h20    History of DVT (deep vein thrombosis)  Reports hx of provoked DVT s/p knee replacement surgery - reports possibly being on blood thinners but reported repeat imaging that showed resolution of clot.      Class 2 severe obesity with body mass index (BMI) of 35 to 39.9 with serious comorbidity  Body mass index is 35.51 kg/m². Morbid obesity complicates all aspects of disease management from diagnostic modalities to treatment. Weight loss encouraged and health benefits explained to patient.       VTE Risk Mitigation (From admission, onward)           Ordered     heparin (porcine) injection 5,000 Units  Every 8 hours         06/10/25 2101     IP VTE HIGH RISK PATIENT  Once         06/10/25 2101     Place sequential compression device  Until discontinued         06/10/25 2101                       On 06/10/2025, patient should be placed in hospital observation services under my care.             Morris Sapp MD  Department of Hospital Medicine  [facility] - Emergency Dept

## 2025-06-11 NOTE — ED NOTES
Report received from ALAN Childress. Assume care of pt. Pt resting comfortably and independently repositioned in stretcher with bed locked in lowest position for safety. NAD noted at this time. Respirations even and unlabored and visible chest rise noted.  Patient offered bathroom assistance and denies need at this time. Pt instructed to call if assistance is needed. Call light within reach. No needs at this time. Will continue plan of care.

## 2025-06-11 NOTE — PT/OT/SLP EVAL
"Physical Therapy Co-Evaluation and Discharge Note    Patient Name:  Nelia Littlejohn   MRN:  [id number]    Recommendations:     Discharge Recommendations: No Therapy Indicated  Discharge Equipment Recommendations: none   Barriers to discharge: None    Assessment:     Nelia Littlejohn is a 85 y.o. female admitted with a medical diagnosis of Syncope. .  At this time, patient is functioning at their prior level of function and does not require further acute PT services.     Recent Surgery: * No surgery found *      Plan:     During this hospitalization, patient does not require further acute PT services.  Please re-consult if situation changes.      Subjective     "I go to the senior center everyday; they always have fun activities for us to do"    Pain/Comfort:  Pain Rating 1: 0/10    Patients cultural, spiritual, Yarsanism conflicts given the current situation: no    Living Environment:  Per pt: pt, daughter, and son-in-law reside in [facility] with 3 ETHAN B HR. Pt has tub/shower combo with shower chair.  Prior to admission, patients level of function was I.  Equipment used at home: cane, straight, walker, rolling, shower chair.  DME owned (not currently used): rolling walker and single point cane.  Upon discharge, patient will have assistance from family.    Objective:     Communicated with RN prior to session.  Patient found HOB elevated with telemetry upon PT entry to room.    General Precautions: Standard, fall    Orthopedic Precautions:N/A   Braces: N/A  Respiratory Status: Room air    Exams:  RLE ROM: WFL  RLE Strength: WFL  LLE ROM: WFL  LLE Strength: WFL    Functional Mobility:  Bed Mobility:     Scooting: supervision  Supine to Sit: supervision  Transfers:     Sit to Stand:  supervision with no AD  Bed to Chair: supervision with  no AD  using  Step Transfer  Gait: pt amb ~150' no AD supvn. Pt presented with reciprocal gait pattern, arm swing present, good lainey.  Balance:   Good sitting balance  Good standing " balance    AM-PAC 6 CLICK MOBILITY  Total Score:22       Treatment and Education:  Discussed sitting upright in chair >1hr, pt agreeable  Discussed sitting up EOB and/or UIC with RN/staff outside of therapy services    Pt is at functional baseline with no acute needs. PT to sign off. Please reconsult if status changes.    Educated pt on PT role/POC  Educated pt on importance of OOB activity and daily ambulation   Pt educated on proper body mechanics, safety techniques, and energy conservation with PT facilitation and cueing throughout session   Pt verbalized understanding      AM-PAC 6 CLICK MOBILITY  Total Score:22     Patient left up in chair with all lines intact, call button in reach, RN notified, and OT present.    GOALS:   Multidisciplinary Problems       Physical Therapy Goals       Not on file              Multidisciplinary Problems (Resolved)          Problem: Physical Therapy    Goal Priority Disciplines Outcome Interventions   Physical Therapy Goal   (Resolved)     PT, PT/OT Met    Description: Pt is at functional baseline with no acute needs. PT to sign off. Please reconsult if status changes.                         DME Justifications:  No DME recommended requiring DME justifications    History:     Past Medical History:   Diagnosis Date    Cataract     Deep vein thrombosis     Disorder of kidney and ureter     Elevated alkaline phosphatase level 01/29/2013    Gout, joint     H/O: stroke, left eye, transient blindness, no residual,  09/11/2012 09- Vision has returned. Vision is much better, can read.     High cholesterol     History of prediabetes 10/26/2017    HTN (hypertension) 09/25/2012    Hyperlipemia 09/11/2012    Hypertension     Interval gout 09/11/2012    Status post cataract extraction and insertion of intraocular lens 11/27/2012    sp Yag Cap OS      Stroke        Past Surgical History:   Procedure Laterality Date    ARTHROPLASTY, KNEE, TOTAL, USING COMPUTER-ASSISTED  NAVIGATION Right 6/19/2024    Procedure: ARTHROPLASTY, KNEE, TOTAL, USING COMPUTER-ASSISTED NAVIGATION: VELYS: RIGHT: DEPUY - [employer];  Surgeon: Ernie Rubio III, MD;  Location: [facility];  Service: Orthopedics;  Laterality: Right;    CATARACT EXTRACTION Bilateral     EYE SURGERY      HYSTERECTOMY      JOINT REPLACEMENT      Yag capsulotomy left eye  10/10/2012       Time Tracking:     PT Received On: 06/11/25  PT Start Time: 1113     PT Stop Time: 1127  PT Total Time (min): 14 min     Billable Minutes: Evaluation 7    Co-treatment performed due to patient's multiple deficits requiring two skilled therapists to appropriately and safely assess patient's strength and endurance while facilitating functional tasks in addition to accommodating for patient's activity tolerance.            06/11/2025

## 2025-06-11 NOTE — CARE UPDATE
[facility] Care Support Interaction      I have reviewed the chart of Nelia Littlejohn who is hospitalized for Syncope. The patient is currently located in the following unit: ED     Readmission Reduction - Acute Care at Home Referral       Francois Mckeon PA-C  [facility]

## 2025-06-11 NOTE — ASSESSMENT & PLAN NOTE
Patient's blood pressure range in the last 24 hours was: BP  Min: 106/55  Max: 170/74.The patient's inpatient anti-hypertensive regimen is listed below:  Current Antihypertensives  metoprolol tartrate (LOPRESSOR) tablet 50 mg, 2 times daily, Oral    Plan  Resume metoprolol  - see SYNCOPE problem for further comments.

## 2025-06-11 NOTE — PT/OT/SLP EVAL
"Occupational Therapy   CO-Evaluation and Discharge Note  Co-evaluation/treatment performed due to patient's multiple deficits requiring two skilled therapists to appropriately and safely assess patient's strength, endurance, functional mobility, and ADL performance while facilitating functional tasks in addition to accommodating for patient's activity tolerance and medical acuity.   Name: Nelia Littlejohn  MRN: [id number]  Admitting Diagnosis: Syncope  Recent Surgery: * No surgery found *      Recommendations:     Discharge Recommendations: No Therapy Indicated  Discharge Equipment Recommendations: none  Barriers to discharge:  None    Assessment:     Nelia Littlejohn is a 85 y.o. female with a medical diagnosis of Syncope. At this time, patient is functioning at their prior level of function and does not require further acute OT services.     Plan:     During this hospitalization, patient does not require further acute OT services.  Please re-consult if situation changes.    Plan of Care Reviewed with: patient    Subjective     Chief Complaint: none stated  Patient/Family Comments/goals: "we did a lot of line dancing I guess that's what happened!"    Occupational Profile:  Living Environment: pt, daughter, and son-in-law reside in [facility] with 3 [employer] B HR. Pt has tub/shower combo with shower chair.   Previous level of function: ind ADL/IADLs/functional mobility   Roles and Routines: pt goes to the senior center to engage in community activities everyday  Equipment Used at home: cane, straight, walker, rolling, shower chair  Assistance upon Discharge: children    Pain/Comfort:  Pain Rating 1: 0/10  Pain Rating Post-Intervention 1: 0/10    Patients cultural, spiritual, Samaritan conflicts given the current situation: no    Objective:     Communicated with: RN prior to session.  Patient found supine with telemetry upon OT entry to room.    General Precautions: Standard, fall  Orthopedic Precautions: N/A  Braces: " N/A  Respiratory Status: Room air     Occupational Performance:    Bed Mobility:    Patient completed Supine to Sit with modified independence    Functional Mobility/Transfers:  Patient completed Sit <> Stand Transfer with independence  with  no assistive device   Functional Mobility: Pt engaging in functional mobility to simulate household/community distances approx 150 ft with supv and utilizing no AD in order to maximize functional activity tolerance and standing balance required for engagement in occupations of choice.     Activities of Daily Living:  Upper Body Dressing: independence    Lower Body Dressing: independence      Cognitive/Visual Perceptual:  Cognitive/Psychosocial Skills:     -       Oriented to: Person, Place, Time, and Situation   -       Follows Commands/attention:Follows multistep  commands  -       Communication: clear/fluent  -       Memory: No Deficits noted  -       Safety awareness/insight to disability: intact   -       Mood/Affect/Coping skills/emotional control: Pleasant    Physical Exam:  Upper Extremity Range of Motion:     -       Right Upper Extremity: WFL  -       Left Upper Extremity: WFL  Upper Extremity Strength:    -       Right Upper Extremity: WFL  -       Left Upper Extremity: WFL    AMPAC 6 Click ADL:  AMPAC Total Score: 24    Treatment & Education:  Pt educated on the following:  - role of OT and OT POC, including DC from OT. Pt verbalized understanding.  - importance of continued mobilization  - Safe transfer techniques and proper body mechanics for fall prevention and improved independence with functional transfers   - Importance of OOB activities to increase endurance and tolerance for increased participation in daily ADLs.    - All pt questions within OT scope of practice addressed, pt verbalized understanding.     Patient left up in chair with all lines intact, call button in reach, and RN notified    GOALS:   Multidisciplinary Problems       Occupational Therapy  Goals       Not on file              Multidisciplinary Problems (Resolved)          Problem: Occupational Therapy    Goal Priority Disciplines Outcome Interventions   Occupational Therapy Goal   (Resolved)     OT, PT/OT Met    Description: At this time, pt is to be discharged from skilled OT services due to no noted functional impairments regarding occupational performance. Pt exhibits appropriate activity tolerance, balance, and overall functional strength in regards to PLOF and does not require skilled assistance to perform meaningful occupations of choice.                        DME Justifications:  No DME recommended requiring DME justifications    History:     Past Medical History:   Diagnosis Date    Cataract     Deep vein thrombosis     Disorder of kidney and ureter     Elevated alkaline phosphatase level 01/29/2013    Gout, joint     H/O: stroke, left eye, transient blindness, no residual,  09/11/2012 09- Vision has returned. Vision is much better, can read.     High cholesterol     History of prediabetes 10/26/2017    HTN (hypertension) 09/25/2012    Hyperlipemia 09/11/2012    Hypertension     Interval gout 09/11/2012    Status post cataract extraction and insertion of intraocular lens 11/27/2012    sp Yag Cap OS      Stroke          Past Surgical History:   Procedure Laterality Date    ARTHROPLASTY, KNEE, TOTAL, USING COMPUTER-ASSISTED NAVIGATION Right 6/19/2024    Procedure: ARTHROPLASTY, KNEE, TOTAL, USING COMPUTER-ASSISTED NAVIGATION: VELYS: RIGHT: DEPUY - ATTUNE;  Surgeon: Ernie Rubio III, MD;  Location: [facility];  Service: Orthopedics;  Laterality: Right;    CATARACT EXTRACTION Bilateral     EYE SURGERY      HYSTERECTOMY      JOINT REPLACEMENT      Yag capsulotomy left eye  10/10/2012       Time Tracking:     OT Date of Treatment: 06/11/25  OT Start Time: 1112  OT Stop Time: 1127  OT Total Time (min): 15 min    Billable Minutes:Evaluation 15    6/11/2025

## 2025-06-11 NOTE — ASSESSMENT & PLAN NOTE
Reports hx of provoked DVT s/p knee replacement surgery - reports possibly being on blood thinners but reported repeat imaging that showed resolution of clot.

## 2025-06-11 NOTE — ASSESSMENT & PLAN NOTE
-admit to obs med tele unit  -orthostatic BP in ED negative.   -cardiac monitoring  -chest x-ray and BNP unremarkable  -Echo with normal EF and normal diastolic function; right ventricle systolic function reduced, mild aortic valve sclerosis.     Based on HPI and med history - suspect with multiple anti-hypertensive medications that acute hypotension led to her syncope - pt with increased creatinine and hyperkalemia.  She may need anti-hypertensives added back intermittently  or she may need to alter dosing times of home medications - taking some in AM and some in evening.   Dose reduction of hydralazine may be first step.  She presented with borderline low BP but has HTN noted now.   Plan for acute care home for continued monitoring and titration needs in addition if close PCP follow-up

## 2025-06-11 NOTE — ASSESSMENT & PLAN NOTE
Body mass index is 35.51 kg/m². Morbid obesity complicates all aspects of disease management from diagnostic modalities to treatment. Weight loss encouraged and health benefits explained to patient.        complains of pain/discomfort

## 2025-06-11 NOTE — ASSESSMENT & PLAN NOTE
-likely due to elevated creatinine and use of lisionpril/spironolactone.   -give lokelma  -EKG w/o peaked t-wave changes   -Trend potassium until resolution ; repeat lab pending

## 2025-06-11 NOTE — HOSPITAL COURSE
Admitted to Hospital Medicine for evaluation of syncopal episode.  Combination of dehydration and robust antihypertensive regimen leading to hypotension suspected as etiology.  Patient endorsing poor hydration day of with activity and travel during very hot and humid weather.  Orthostatics negative.  Echo with normal EF and normal diastolic function; right ventricle systolic function reduced, mild aortic valve sclerosis.  Patient hydrated via IV fluids and antihypertensives held outside of metoprolol.  Course otherwise notable for hyperkalemia; likely in setting of Aldactone/lisinopril; this resolved with lokelma.  She was instructed to hold all antihypertensives other than metoprolol as her syncope is likely related to overtreating her HTN.  She is to discuss resumption with her PCP.  Pt deemed appropriate for discharge; seen and examined prior to departure. Plan discussed with pt, who was agreeable and amenable; medications were discussed and reviewed, outpatient follow-up scheduled, ER precautions were given, all questions were answered to the pt's satisfaction, and was subsequently discharged.

## 2025-06-11 NOTE — PLAN OF CARE
Problem: Occupational Therapy  Goal: Occupational Therapy Goal  Description: At this time, pt is to be discharged from skilled OT services due to no noted functional impairments regarding occupational performance. Pt exhibits appropriate activity tolerance, balance, and overall functional strength in regards to PLOF and does not require skilled assistance to perform meaningful occupations of choice.   Outcome: Met

## 2025-06-11 NOTE — SUBJECTIVE & OBJECTIVE
Interval History:  Patient seen and examined at bedside.    No acute events overnight.  Patient has no acute complaints this morning.  Denies any chest pain, palpitations, diaphoresis, nausea around her syncopal episode.  However, per her friend who is sitting beside her on the bus, she did appear clammy.  Denies any symptoms at present.  Patient updated regarding care plan.      Review of Systems   Constitutional:  Negative for activity change, appetite change, chills and fever.   HENT:  Negative for congestion, sore throat and trouble swallowing.    Eyes:  Negative for visual disturbance.   Respiratory:  Negative for cough, shortness of breath and wheezing.    Cardiovascular:  Negative for chest pain, palpitations and leg swelling.   Gastrointestinal:  Negative for constipation, diarrhea and nausea.   Genitourinary:  Negative for difficulty urinating and dysuria.   Musculoskeletal:  Negative for arthralgias and myalgias.   Skin:  Negative for wound.   Neurological:  Negative for dizziness, light-headedness and headaches.   Psychiatric/Behavioral:  Negative for behavioral problems, confusion and decreased concentration.        Objective:    Temp: 97.9 °F (36.6 °C) (06/11/25 1214)  Pulse: 81 (06/11/25 1214)  Resp: 18 (06/11/25 1214)  BP: 134/71 (06/11/25 1214)  SpO2: 95 % (06/11/25 1214)    Weight: 99 kg (218 lb 4.1 oz) (06/11/25 0717)    Body mass index is 35.23 kg/m².      Intake/Output Summary (Last 24 hours) at 6/11/2025 1442  Last data filed at 6/11/2025 1115  Gross per 24 hour   Intake 500 ml   Output --   Net 500 ml       Physical Exam  Vitals and nursing note reviewed.   Constitutional:       General: She is not in acute distress.     Appearance: Normal appearance. She is not toxic-appearing or diaphoretic.   HENT:      Head: Normocephalic and atraumatic.      Mouth/Throat:      Pharynx: Oropharynx is clear.   Eyes:      General: No scleral icterus.  Cardiovascular:      Rate and Rhythm: Normal rate and  regular rhythm.      Pulses: Normal pulses.   Pulmonary:      Effort: Pulmonary effort is normal. No respiratory distress.      Breath sounds: No stridor. No wheezing or rales.   Abdominal:      General: Bowel sounds are normal. There is no distension.      Palpations: Abdomen is soft.      Tenderness: There is no abdominal tenderness. There is no guarding.   Musculoskeletal:      Cervical back: Normal range of motion and neck supple.      Right lower leg: No edema.      Left lower leg: No edema.   Skin:     General: Skin is warm and dry.   Neurological:      General: No focal deficit present.      Mental Status: She is alert and oriented to person, place, and time.   Psychiatric:         Behavior: Behavior normal.         Significant Labs: All pertinent labs within the past 24 hours have been reviewed.    Recent Results (from the past 24 hours)   Hepatitis C Antibody    Collection Time: 06/10/25  2:52 PM   Result Value Ref Range    Hep C Ab Interp Non-Reactive Non-Reactive   HCV Virus Hold Specimen    Collection Time: 06/10/25  2:52 PM   Result Value Ref Range    Extra Tube Hold for add-ons.    HIV 1/2 Ag/Ab (4th Gen)    Collection Time: 06/10/25  2:52 PM   Result Value Ref Range    HIV 1/2 Ag/Ab Non-Reactive Non-Reactive   Comprehensive metabolic panel    Collection Time: 06/10/25  2:52 PM   Result Value Ref Range    Sodium 137 136 - 145 mmol/L    Potassium 5.6 (H) 3.5 - 5.1 mmol/L    Chloride 111 (H) 95 - 110 mmol/L    CO2 17 (L) 23 - 29 mmol/L    Glucose 153 (H) 70 - 110 mg/dL    BUN 33 (H) 8 - 23 mg/dL    Creatinine 1.7 (H) 0.5 - 1.4 mg/dL    Calcium 9.1 8.7 - 10.5 mg/dL    Protein Total 7.6 6.0 - 8.4 gm/dL    Albumin 4.1 3.5 - 5.2 g/dL    Bilirubin Total 0.8 0.1 - 1.0 mg/dL     40 - 150 unit/L    AST 16 11 - 45 unit/L    ALT 14 10 - 44 unit/L    Anion Gap 9 8 - 16 mmol/L    eGFR 29 (L) >60 mL/min/1.73/m2   Troponin I High Sensitivity    Collection Time: 06/10/25  2:52 PM   Result Value Ref Range     Troponin High Sensitive 4 <=14 ng/L   Magnesium    Collection Time: 06/10/25  2:52 PM   Result Value Ref Range    Magnesium  2.1 1.6 - 2.6 mg/dL   CBC with Differential    Collection Time: 06/10/25  2:52 PM   Result Value Ref Range    WBC 11.35 3.90 - 12.70 K/uL    RBC 3.63 (L) 4.00 - 5.40 M/uL    HGB 10.7 (L) 12.0 - 16.0 gm/dL    HCT 33.2 (L) 37.0 - 48.5 %    MCV 92 82 - 98 fL    MCH 29.5 27.0 - 31.0 pg    MCHC 32.2 32.0 - 36.0 g/dL    RDW 15.1 (H) 11.5 - 14.5 %    Platelet Count 271 150 - 450 K/uL    MPV 10.3 9.2 - 12.9 fL    Nucleated RBC 0 <=0 /100 WBC    Neut % 81.1 (H) 38 - 73 %    Lymph % 13.0 (L) 18 - 48 %    Mono % 4.1 4 - 15 %    Eos % 0.2 <=8 %    Basophil % 0.3 <=1.9 %    Imm Grans % 1.3 (H) 0.0 - 0.5 %    Neut # 9.21 (H) 1.8 - 7.7 K/uL    Lymph # 1.47 1 - 4.8 K/uL    Mono # 0.47 0.3 - 1 K/uL    Eos # 0.02 <=0.5 K/uL    Baso # 0.03 <=0.2 K/uL    Imm Grans # 0.15 (H) 0.00 - 0.04 K/uL   Repeat EKG 12-lead    Collection Time: 06/10/25  2:58 PM   Result Value Ref Range    QRS Duration 112 ms    [facility] QTC Calculation 462 ms   Urinalysis, Reflex to Urine Culture Urine, Clean Catch    Collection Time: 06/10/25  3:24 PM    Specimen: Urine   Result Value Ref Range    Color, UA Yellow Straw, Mar[place], Yellow, Light-Orange    Appearance, UA Clear Clear    pH, UA 5.0 5.0 - 8.0    Spec Grav UA 1.015 1.005 - 1.030    Protein, UA Negative Negative    Glucose, UA Negative Negative    Ketones, UA Negative Negative    Bilirubin, UA Negative Negative    Blood, UA Negative Negative    Nitrites, UA Negative Negative    Urobilinogen, UA Negative <2.0 EU/dL    Leukocyte Esterase, UA Negative Negative   GRE[place] URINE HOLD    Collection Time: 06/10/25  3:24 PM   Result Value Ref Range    Extra Tube Hold for add-ons.    POCT Venous Blood Gas    Collection Time: 06/10/25  9:31 PM   Result Value Ref Range    POC PH 7.358 7.350 - 7.450    POC PCO2 35.2 35.0 - 45.0 mmHg    POC PO2 45.4 40.0 - 60.0 mmHg    POC Potassium 5.4 (H) 3.5 - 5.1  mmol/L    POC Sodium 140 136 - 145 mmol/L    POC Ionized Calcium 1.16 1.06 - 1.42 mmol/L    POC Hematocrit 34.5 36.0 - 54.0 %    Correct Temperature (PH) 7.358     Corrected Temperature (pCO2) 35.2 mmHg    Corrected Temperature (pO2) 45.4 mmHg    POC HCO3 19.9 24.0 - 28.0 mmol/l    Base Deficit -5.1 mmol/l    POC Temp 37.0 C    Specimen source Venous     Performed By: Cvo     FiO2 21.0 %    BIPAP 0    Creatinine, Random Urine    Collection Time: 06/10/25  9:40 PM   Result Value Ref Range    Urine Creatinine 86.0 15.0 - 325.0 mg/dL   Urea Nitrogen, Random Urine    Collection Time: 06/10/25  9:40 PM   Result Value Ref Range    Urine Urea Nitrogen 573 140 - 1,050 mg/dL   Chloride, Random Urine    Collection Time: 06/10/25  9:40 PM   Result Value Ref Range    Urine Chloride 84 25 - 200 mmol/L   Sodium, Random Urine    Collection Time: 06/10/25  9:40 PM   Result Value Ref Range    Urine Sodium 86 20 - 250 mmol/L   Potassium, Random Urine    Collection Time: 06/10/25  9:40 PM   Result Value Ref Range    Urine Potassium 41 15 - 95 mmol/L   CBC with Differential    Collection Time: 06/11/25  1:29 AM   Result Value Ref Range    WBC 9.55 3.90 - 12.70 K/uL    RBC 3.38 (L) 4.00 - 5.40 M/uL    HGB 9.9 (L) 12.0 - 16.0 gm/dL    HCT 30.6 (L) 37.0 - 48.5 %    MCV 91 82 - 98 fL    MCH 29.3 27.0 - 31.0 pg    MCHC 32.4 32.0 - 36.0 g/dL    RDW 15.2 (H) 11.5 - 14.5 %    Platelet Count 250 150 - 450 K/uL    MPV 10.2 9.2 - 12.9 fL    Nucleated RBC 0 <=0 /100 WBC    Neut % 62.5 38 - 73 %    Lymph % 30.4 18 - 48 %    Mono % 5.8 4 - 15 %    Eos % 0.8 <=8 %    Basophil % 0.3 <=1.9 %    Imm Grans % 0.2 0.0 - 0.5 %    Neut # 5.97 1.8 - 7.7 K/uL    Lymph # 2.90 1 - 4.8 K/uL    Mono # 0.55 0.3 - 1 K/uL    Eos # 0.08 <=0.5 K/uL    Baso # 0.03 <=0.2 K/uL    Imm Grans # 0.02 0.00 - 0.04 K/uL   Renal Function Panel    Collection Time: 06/11/25  1:45 AM   Result Value Ref Range    Sodium 137 136 - 145 mmol/L    Potassium 5.3 (H) 3.5 - 5.1 mmol/L     Chloride 113 (H) 95 - 110 mmol/L    CO2 16 (L) 23 - 29 mmol/L    Glucose 75 70 - 110 mg/dL    BUN 32 (H) 8 - 23 mg/dL    Creatinine 1.5 (H) 0.5 - 1.4 mg/dL    Calcium 8.5 (L) 8.7 - 10.5 mg/dL    Albumin 3.6 3.5 - 5.2 g/dL    Phosphorus Level 4.3 2.7 - 4.5 mg/dL    Anion Gap 8 8 - 16 mmol/L    eGFR 34 (L) >60 mL/min/1.73/m2   Magnesium    Collection Time: 06/11/25  1:45 AM   Result Value Ref Range    Magnesium  2.0 1.6 - 2.6 mg/dL   BNP    Collection Time: 06/11/25  5:03 AM   Result Value Ref Range    BNP 49 0 - 99 pg/mL   Magnesium    Collection Time: 06/11/25  5:24 AM   Result Value Ref Range    Magnesium  2.1 1.6 - 2.6 mg/dL   Renal Function Panel    Collection Time: 06/11/25  5:24 AM   Result Value Ref Range    Sodium 137 136 - 145 mmol/L    Potassium 5.5 (H) 3.5 - 5.1 mmol/L    Chloride 113 (H) 95 - 110 mmol/L    CO2 16 (L) 23 - 29 mmol/L    Glucose 75 70 - 110 mg/dL    BUN 31 (H) 8 - 23 mg/dL    Creatinine 1.5 (H) 0.5 - 1.4 mg/dL    Calcium 8.5 (L) 8.7 - 10.5 mg/dL    Albumin 3.6 3.5 - 5.2 g/dL    Phosphorus Level 3.9 2.7 - 4.5 mg/dL    Anion Gap 8 8 - 16 mmol/L    eGFR 34 (L) >60 mL/min/1.73/m2   CBC with Differential    Collection Time: 06/11/25  5:24 AM   Result Value Ref Range    WBC 8.74 3.90 - 12.70 K/uL    RBC 3.39 (L) 4.00 - 5.40 M/uL    HGB 10.0 (L) 12.0 - 16.0 gm/dL    HCT 30.9 (L) 37.0 - 48.5 %    MCV 91 82 - 98 fL    MCH 29.5 27.0 - 31.0 pg    MCHC 32.4 32.0 - 36.0 g/dL    RDW 15.2 (H) 11.5 - 14.5 %    Platelet Count 246 150 - 450 K/uL    MPV 9.8 9.2 - 12.9 fL    Nucleated RBC 0 <=0 /100 WBC    Neut % 56.4 38 - 73 %    Lymph % 36.2 18 - 48 %    Mono % 5.4 4 - 15 %    Eos % 1.5 <=8 %    Basophil % 0.3 <=1.9 %    Imm Grans % 0.2 0.0 - 0.5 %    Neut # 4.93 1.8 - 7.7 K/uL    Lymph # 3.16 1 - 4.8 K/uL    Mono # 0.47 0.3 - 1 K/uL    Eos # 0.13 <=0.5 K/uL    Baso # 0.03 <=0.2 K/uL    Imm Grans # 0.02 0.00 - 0.04 K/uL   Echo    Collection Time: 06/11/25  7:43 AM   Result Value Ref Range    LV Diastolic  Volume 80 mL    Echo EF Estimated 60 %    LV Systolic Volume 32 mL    IVS 0.9 0.6 - 1.1 cm    LVIDd 4.2 3.5 - 6.0 cm    LVIDs 2.9 2.1 - 4.0 cm    LVOT diameter 2.0 cm    PW 0.9 0.6 - 1.1 cm    AV LVOT peak gradient 4 mmHg    LVOT mn grad 2 mmHg    LVOT peak edgardo 1.0 m/s    LVOT peak VTI 23.5 cm    RV- leal basal diam 3.5 cm    LA size 3.9 cm    Left Atrium Major Axis 5.1 cm    Left Atrium Minor Axis 5.0 cm    LA Vol (MOD) 59 mL    RA Major [place] 4.68 cm    AV valve area 2.2 cm2    AV area by cont VTI 2.3 cm2    AV peak gradient 9 mmHg    AV mean gradient 5 mmHg    Ao peak edgardo 1.5 m/s    Ao VTI 34.2 cm    MV Peak A Edgardo 1.08 m/s    E wave deceleration time 169 ms    MV Peak E Edgardo 0.95 m/s    E/A ratio 0.88     LV LATERAL E/E' RATIO 11.9     LV SEPTAL E/E' RATIO 9.5     TDI LATERAL 0.08 m/s    TDI SEPTAL 0.10 m/s    TV peak gradient 26 mmHg    TR Max Edgardo 2.6 m/s    Ascending aorta 2.9 cm    STJ 2.3 cm    PV Peak S Edgardo 0.45 m/s    Sinus 3.01 cm    LA WIDTH 4.2 cm    RA Width 2.68 cm    TAPSE 1.6 cm    BSA 2.15 m2    LVOT stroke volume 73.8 cm3    LV Systolic Volume Index 15.4 mL/m2    LV Diastolic Volume Index 38.46 mL/m2    LVOT area 3.1 cm2    FS 31.0 28 - 44 %    Left Ventricle Relative Wall Thickness 0.43 cm    LV mass 118.7 g    LV Mass Index 57.1 g/m2    E/E' ratio 11 m/s    CINDY 34 mL/m2    LA Vol 70 cm3    RV/LV Ratio 0.83 cm    CINDY (MOD) 28 mL/m2    AV Velocity Ratio 0.67     AV index (prosthetic) 0.69     JONATHAN by Velocity Ratio 2.1 cm²    ASI 1.4 cm/m2    ASI 1.4 cm/m2    Mean e' 0.09 m/s    ZLVIDS -2.33     ZLVIDD -4.16     Lai's Biplane MOD Ejection Fraction 53 %    GLS 14.4 %    TV resting pulmonary artery pressure 35 mmHg    RV TB RVSP 11 mmHg    Est. RA pres 8 mmHg       Significant Imaging: I have reviewed all pertinent imaging results/findings within the past 24 hours.    Imaging Results              X-Ray Chest AP Portable (Final result)  Result time 06/11/25 12:50:16      Final result by Shirley  Jignesh ROBERTO MD (06/11/25 12:50:16)                   Impression:      See above      Electronically signed by: Jignesh Watson MD  Date:    06/11/2025  Time:    12:50               Narrative:    EXAMINATION:  XR CHEST AP PORTABLE    CLINICAL HISTORY:  dyspnea on exertion;    TECHNIQUE:  Single frontal view of the chest was performed.    COMPARISON:  None 09/16/2023    FINDINGS:  Heart size normal.  The lungs are clear.  No pleural effusion

## 2025-06-11 NOTE — SUBJECTIVE & OBJECTIVE
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 Vision is much better, can read.     High cholesterol     History of prediabetes 10/26/2017    HTN (hypertension) 09/25/2012    Hyperlipemia 09/11/2012    Hypertension     Interval gout 09/11/2012    Status post cataract extraction and insertion of intraocular lens 11/27/2012    sp Yag Cap OS      Stroke        Past Surgical History:   Procedure Laterality Date    ARTHROPLASTY, KNEE, TOTAL, USING COMPUTER-ASSISTED NAVIGATION Right 6/19/2024    Procedure: ARTHROPLASTY, KNEE, TOTAL, USING COMPUTER-ASSISTED NAVIGATION: VELYS: RIGHT: [employer];  Surgeon: Ernie Rubio III, MD;  Location: [facility] [address];  Service: Orthopedics;  Laterality: Right;    CATARACT EXTRACTION Bilateral     EYE SURGERY      HYSTERECTOMY      JOINT REPLACEMENT      Yag capsulotomy left eye  10/10/2012       Review of patient's allergies indicates:  No Known Allergies    No current facility-administered medications on file prior to encounter.     Current Outpatient Medications on File Prior to Encounter   Medication Sig    allopurinoL (ZYLOPRIM) 300 MG tablet Take 1 tablet (300 mg total) by mouth once daily.    amLODIPine-benazepriL (LOTREL) 10-40 mg per capsule Take 1 capsule by mouth every morning.    atorvastatin (LIPITOR) 80 MG tablet Take 1 tablet (80 mg total) by mouth once daily.    cetirizine (ZYRTEC) 10 MG tablet Take 10 mg by mouth once daily.    hydrALAZINE (APRESOLINE) 100 MG tablet Take 1 tablet (100 mg total) by mouth 2 (two) times daily.    metoprolol ta-hydrochlorothiaz (LOPRESSOR HCT) 100-25 mg per tablet Take 1 tablet by mouth once daily.    MULTIVIT-IRON-MIN-FOLIC ACID 3,500-18-0.4 UNIT-MG-MG ORAL CHEW Take by mouth once daily.     spironolactone (ALDACTONE) 25 MG tablet Take 1 tablet (25 mg total) by mouth once daily.     Family History       Problem Relation (Age of Onset)    Cancer Sister    Cataracts Father    Diabetes Father, Mother, Brother, Brother    Heart disease Brother    Hypertension Brother, Son, Son    No Known Problems Son, Daughter, Maternal Grandmother, Maternal Grandfather, Paternal Grandmother, Paternal Grandfather, Brother, Maternal Aunt, Maternal Uncle, Paternal Aunt, Paternal Uncle, Son, Son    Stroke Father          Tobacco Use    Smoking status: Never     Passive exposure: Never    Smokeless tobacco: Never   Substance and Sexual Activity    Alcohol use: No    Drug use: No    Sexual activity: Not Currently     Review of Systems   Constitutional:  Positive for diaphoresis. Negative for activity change and appetite change.   Cardiovascular:  Negative for chest pain and palpitations.   Gastrointestinal: Negative.  Negative for nausea and vomiting.   Genitourinary: Negative.    Musculoskeletal: Negative.    Neurological:  Positive for light-headedness.   Psychiatric/Behavioral: Negative.       Objective:     Vital Signs (Most Recent):  Temp: 98.6 °F (37 °C) (06/11/25 0035)  Pulse: 85 (06/11/25 0035)  Resp: 17 (06/10/25 2135)  BP: 117/69 (06/11/25 0035)  SpO2: 96 % (06/11/25 0035) Vital Signs (24h Range):  Temp:  [98.6 °F (37 °C)-98.9 °F (37.2 °C)] 98.6 °F (37 °C)  Pulse:  [] 85  Resp:  [16-18] 17  SpO2:  [94 %-100 %] 96 %  BP: (106-170)/(55-74) 117/69     Weight: 99.8 kg (220 lb)  Body mass index is 35.51 kg/m².     Physical Exam  Vitals and nursing note reviewed.   Constitutional:       General: She is not in acute distress.     Appearance: Normal appearance. She is obese.   HENT:      Head: Atraumatic.      Mouth/Throat:      Mouth: Mucous membranes are moist.      Pharynx: Oropharynx is clear.   Eyes:      General: No scleral icterus.     Extraocular Movements: Extraocular movements intact.      Pupils: Pupils are  "equal, round, and reactive to light.   Cardiovascular:      Rate and Rhythm: Normal rate and regular rhythm.      Pulses: Normal pulses.   Pulmonary:      Effort: Pulmonary effort is normal. No respiratory distress.      Breath sounds: No stridor. No wheezing or rales.   Abdominal:      General: Bowel sounds are normal. There is no distension.      Palpations: Abdomen is soft.      Tenderness: There is no abdominal tenderness. There is no guarding.   Musculoskeletal:      Cervical back: Neck supple.   Neurological:      Mental Status: She is alert.              CRANIAL NERVES     CN III, IV, VI   Pupils are equal, round, and reactive to light.       Significant Labs: All pertinent labs within the past 24 hours have been reviewed.  CBC:   Recent Labs   Lab 06/10/25  1452 06/10/25  2131 06/11/25  0129   WBC 11.35  --  9.55   HGB 10.7*  --  9.9*   HCT 33.2* 34.5 30.6*     --  250     CMP:   Recent Labs   Lab 06/10/25  1452 06/11/25  0145    137   K 5.6* 5.3*   * 113*   CO2 17* 16*   * 75   BUN 33* 32*   CREATININE 1.7* 1.5*   CALCIUM 9.1 8.5*   PROT 7.6  --    ALBUMIN 4.1 3.6   BILITOT 0.8  --    ALKPHOS 107  --    AST 16  --    ALT 14  --    ANIONGAP 9 8     Cardiac Markers: No results for input(s): "CKMB", "MYOGLOBIN", "BNP", "TROPISTAT" in the last 48 hours.  Coagulation: No results for input(s): "PT", "INR", "APTT" in the last 48 hours.  Lactic Acid: No results for input(s): "LACTATE" in the last 48 hours.  Urine Studies:   Recent Labs   Lab 06/10/25  1524   COLORU Yellow   APPEARANCEUA Clear   PHUR 5.0   SPECGRAV 1.015   PROTEINUA Negative   GLUCUA Negative   BILIRUBINUA Negative   OCCULTUA Negative   NITRITE Negative   UROBILINOGEN Negative   LEUKOCYTESUR Negative       Significant Imaging: I have reviewed all pertinent imaging results/findings within the past 24 hours.      "

## 2025-06-11 NOTE — ED NOTES
Report given to receiving floor nurse. Aware of needing to tube telemetry box for continuous cardiac monitoring. Care ongoing.

## 2025-06-11 NOTE — PLAN OF CARE
[facility] - [facility] Dept  Initial Discharge Assessment       Primary Care Provider: Bernardo Gongora II, MD    Admission Diagnosis: Syncope [R55]    Admission Date: 6/10/2025    Pt is independent with their ADL's and ambulation, does not require assistance. Post acute was discussed with pt. Pt d/c home with no needs at the moment. Pt adult children to provide transportation home.     Expected Discharge Date: 6/11/2025    Transition of Care Barriers: (P) None    Payor: [employer] [facility] / Plan: [employer] / Product Type: Medicare Advantage /     Extended Emergency Contact Information  Primary Emergency Contact: Yamil Escalante  Address: [address] [place] [address] [facility]  Home Phone: [phone]  Mobile Phone: [phone]  Relation: Son  Secondary Emergency Contact: Leydi Gutierrez  Mobile Phone: [phone]  Relation: Daughter  Preferred language: English   needed? No    Discharge Plan A: (P) Home, Home with family  Discharge Plan B: (P) Home, Home with family      [id number] [address] & [address] [address]  Phone: [phone] Fax: [phone]    [facility] [address] [address]  Phone: [phone] Fax: [phone]    [id number] [address] [facility]  [address]  [facility] [address]  Phone: [phone] Fax: [phone]      Initial Assessment (most recent)       Adult Discharge Assessment - 06/11/25 1052          Discharge Assessment    Assessment Type Discharge Planning Assessment (P)      Confirmed/corrected address, phone number and insurance Yes (P)      Confirmed Demographics Correct on Facesheet (P)      Source of Information patient (P)      People in Home child(giuseppe), adult;other  relative(s);grandchild(giuseppe) (P)      Do you expect to return to your current living situation? Yes (P)      Do you have help at home or someone to help you manage your care at home? No (P)      Prior to hospitilization cognitive status: Alert/Oriented (P)      Current cognitive status: Alert/Oriented (P)      Walking or Climbing Stairs Difficulty no (P)      Dressing/Bathing Difficulty no (P)      Home Accessibility stairs to enter home (P)      Number of Stairs, Main Entrance three (P)      Home Layout Able to live on 1st floor (P)      Equipment Currently Used at Home cane, straight (P)      Readmission within 30 days? No (P)      Patient currently being followed by outpatient case management? No (P)      Do you currently have service(s) that help you manage your care at home? No (P)      Do you take prescription medications? Yes (P)      Do you have prescription coverage? Yes (P)      Do you have any problems affording any of your prescribed medications? No (P)      Is the patient taking medications as prescribed? yes (P)      Who is going to help you get home at discharge? family/friend (P)      How do you get to doctors appointments? family or friend will provide (P)      Are you on dialysis? No (P)      Do you take coumadin? No (P)      Discharge Plan A Home;Home with family (P)      Discharge Plan B Home;Home with family (P)      DME Needed Upon Discharge  none (P)      Discharge Plan discussed with: Patient (P)      Transition of Care Barriers None (P)         Physical Activity    On average, how many days per week do you engage in moderate to strenuous exercise (like a brisk walk)? 0 days (P)      On average, how many minutes do you engage in exercise at this level? 0 min (P)         Financial Resource Strain    How hard is it for you to pay for the very basics like food, housing, medical care, and heating? Not very hard (P)         Housing Stability    In the last 12 months, was there a time when you were  not able to pay the mortgage or rent on time? No (P)      At any time in the past 12 months, were you homeless or living in a shelter (including now)? No (P)         Transportation Needs    In the past 12 months, has lack of transportation kept you from medical appointments or from getting medications? No (P)      In the past 12 months, has lack of transportation kept you from meetings, work, or from getting things needed for daily living? No (P)         Food Insecurity    Within the past 12 months, you worried that your food would run out before you got the money to buy more. Never true (P)      Within the past 12 months, the food you bought just didn't last and you didn't have money to get more. Never true (P)         Stress    Do you feel stress - tense, restless, nervous, or anxious, or unable to sleep at night because your mind is troubled all the time - these days? Only a little (P)         Social Isolation    How often do you feel lonely or isolated from those around you?  Never (P)         Alcohol Use    Q1: How often do you have a drink containing alcohol? Never (P)      Q2: How many drinks containing alcohol do you have on a typical day when you are drinking? Patient does not drink (P)      Q3: How often do you have six or more drinks on one occasion? Never (P)         Utilities    In the past 12 months has the electric, gas, oil, or water company threatened to shut off services in your home? No (P)         Health Literacy    How often do you need to have someone help you when you read instructions, pamphlets, or other written material from your doctor or pharmacy? Never (P)                  JONO Macedo, RADHAW.   Case Management  [facility]  Email: [email or web address]

## 2025-06-11 NOTE — HPI
84 y/o AAF w/ HTN, CKD, hx or provoked DVT s/p knee replacement, presents to the ED due to syncope.     She reports following normal routing, took anti-hypetensive medications in the morning, went to [facility] at approx 9am-12pm, breakfast served there @ 10:45 and she reports having ice cream/cake after social function around noon.  She was riding public bus home, accompanied by friend, she noted to her friend she felt dizzy getting on the bus and during ride, she felt overheated/sweaty and light headed, friend reported to her that patients eyes rolled to back of the head and she slumped over with LOC.  She was in seated position.   She denies any prodrome symptoms - such as chest pain, nausea/vomiting, palpitations, no vision changes.   She reports 2 prior bouts of syncope, last one was 8 months ago. She has not been hospitalized with prior bouts.     She does note on questioning that for recent weeks she has noted dyspnea on exertion, which now seems to be occurring after ambulating shorter distances, no LE edema, no orthopnea complaints.      She lives with her daughter, frequently goes to [facility] during the week, no reported tobacco/drug use, very rare alcohol use.   She is on multiple antihypertensives - amlodipine-benazepril combo, hydralazine, metoprolol-hctz combo, and spironolactone.  Denies any recent dose uptitration has been on this regimen for long time. PCP Dr. Bernardo Gongora.    She does not have a nephrologist.  ON lab w/u today she has increased creatining 1.7 and mild hyperkalemia to 5.6     ED treatment: none.

## 2025-06-12 LAB
ABSOLUTE EOSINOPHIL (OHS): 0.27 K/UL
ABSOLUTE MONOCYTE (OHS): 0.57 K/UL (ref 0.3–1)
ABSOLUTE NEUTROPHIL COUNT (OHS): 4.16 K/UL (ref 1.8–7.7)
ALBUMIN SERPL BCP-MCNC: 3.4 G/DL (ref 3.5–5.2)
ANION GAP (OHS): 7 MMOL/L (ref 8–16)
BASOPHILS # BLD AUTO: 0.05 K/UL
BASOPHILS NFR BLD AUTO: 0.6 %
BUN SERPL-MCNC: 27 MG/DL (ref 8–23)
CALCIUM SERPL-MCNC: 8.5 MG/DL (ref 8.7–10.5)
CHLORIDE SERPL-SCNC: 110 MMOL/L (ref 95–110)
CO2 SERPL-SCNC: 19 MMOL/L (ref 23–29)
CREAT SERPL-MCNC: 1.5 MG/DL (ref 0.5–1.4)
ERYTHROCYTE [DISTWIDTH] IN BLOOD BY AUTOMATED COUNT: 14.9 % (ref 11.5–14.5)
GFR SERPLBLD CREATININE-BSD FMLA CKD-EPI: 34 ML/MIN/1.73/M2
GLUCOSE SERPL-MCNC: 70 MG/DL (ref 70–110)
HCT VFR BLD AUTO: 31.5 % (ref 37–48.5)
HGB BLD-MCNC: 10.2 GM/DL (ref 12–16)
IMM GRANULOCYTES # BLD AUTO: 0.02 K/UL (ref 0–0.04)
IMM GRANULOCYTES NFR BLD AUTO: 0.2 % (ref 0–0.5)
LYMPHOCYTES # BLD AUTO: 3.37 K/UL (ref 1–4.8)
MAGNESIUM SERPL-MCNC: 1.8 MG/DL (ref 1.6–2.6)
MCH RBC QN AUTO: 29.8 PG (ref 27–31)
MCHC RBC AUTO-ENTMCNC: 32.4 G/DL (ref 32–36)
MCV RBC AUTO: 92 FL (ref 82–98)
NUCLEATED RBC (/100WBC) (OHS): 0 /100 WBC
PHOSPHATE SERPL-MCNC: 4 MG/DL (ref 2.7–4.5)
PLATELET # BLD AUTO: 255 K/UL (ref 150–450)
PMV BLD AUTO: 10.3 FL (ref 9.2–12.9)
POTASSIUM SERPL-SCNC: 5.4 MMOL/L (ref 3.5–5.1)
RBC # BLD AUTO: 3.42 M/UL (ref 4–5.4)
RELATIVE EOSINOPHIL (OHS): 3.2 %
RELATIVE LYMPHOCYTE (OHS): 39.9 % (ref 18–48)
RELATIVE MONOCYTE (OHS): 6.8 % (ref 4–15)
RELATIVE NEUTROPHIL (OHS): 49.3 % (ref 38–73)
SODIUM SERPL-SCNC: 136 MMOL/L (ref 136–145)
WBC # BLD AUTO: 8.44 K/UL (ref 3.9–12.7)

## 2025-06-12 PROCEDURE — 63600175 PHARM REV CODE 636 W HCPCS: Performed by: HOSPITALIST

## 2025-06-12 PROCEDURE — 27100171 HC OXYGEN HIGH FLOW UP TO 24 HOURS

## 2025-06-12 PROCEDURE — 83735 ASSAY OF MAGNESIUM: CPT | Performed by: HOSPITALIST

## 2025-06-12 PROCEDURE — 36415 COLL VENOUS BLD VENIPUNCTURE: CPT | Performed by: HOSPITALIST

## 2025-06-12 PROCEDURE — 80069 RENAL FUNCTION PANEL: CPT | Performed by: HOSPITALIST

## 2025-06-12 PROCEDURE — 85025 COMPLETE CBC W/AUTO DIFF WBC: CPT | Performed by: HOSPITALIST

## 2025-06-12 PROCEDURE — 21400001 HC TELEMETRY ROOM

## 2025-06-12 PROCEDURE — 99900035 HC TECH TIME PER 15 MIN (STAT)

## 2025-06-12 PROCEDURE — 25000003 PHARM REV CODE 250

## 2025-06-12 PROCEDURE — 94660 CPAP INITIATION&MGMT: CPT

## 2025-06-12 PROCEDURE — 25000003 PHARM REV CODE 250: Performed by: HOSPITALIST

## 2025-06-12 RX ADMIN — METOPROLOL TARTRATE 50 MG: 50 TABLET, FILM COATED ORAL at 08:06

## 2025-06-12 RX ADMIN — ATORVASTATIN CALCIUM 80 MG: 40 TABLET, FILM COATED ORAL at 08:06

## 2025-06-12 RX ADMIN — ASPIRIN 81 MG: 81 TABLET, COATED ORAL at 08:06

## 2025-06-12 RX ADMIN — CETIRIZINE HYDROCHLORIDE 10 MG: 10 TABLET, FILM COATED ORAL at 08:06

## 2025-06-12 RX ADMIN — ALLOPURINOL 300 MG: 100 TABLET ORAL at 08:06

## 2025-06-12 RX ADMIN — HEPARIN SODIUM 5000 UNITS: 5000 INJECTION INTRAVENOUS; SUBCUTANEOUS at 02:06

## 2025-06-12 RX ADMIN — HEPARIN SODIUM 5000 UNITS: 5000 INJECTION INTRAVENOUS; SUBCUTANEOUS at 06:06

## 2025-06-12 RX ADMIN — SODIUM ZIRCONIUM CYCLOSILICATE 5 G: 5 POWDER, FOR SUSPENSION ORAL at 02:06

## 2025-06-12 RX ADMIN — THERA TABS 1 TABLET: TAB at 08:06

## 2025-06-12 RX ADMIN — HEPARIN SODIUM 5000 UNITS: 5000 INJECTION INTRAVENOUS; SUBCUTANEOUS at 08:06

## 2025-06-12 NOTE — PROGRESS NOTES
[facility] Medicine  Progress Note    Patient Name: Nelia Littlejohn  MRN: [id number]  Patient Class: IP- Inpatient   Admission Date: 6/10/2025  Length of Stay: 1 days  Attending Physician: Alfonso Flaherty DO  Primary Care Provider: Bernardo Gongora II, MD        Subjective     Principal Problem:Syncope        HPI:  86 y/o AAF w/ HTN, CKD, hx or provoked DVT s/p knee replacement, presents to the ED due to syncope.     She reports following normal routing, took anti-hypetensive medications in the morning, went to [facility] at approx 9am-12pm, breakfast served there @ 10:45 and she reports having ice cream/cake after social function around noon.  She was riding public bus home, accompanied by friend, she noted to her friend she felt dizzy getting on the bus and during ride, she felt overheated/sweaty and light headed, friend reported to her that patients eyes rolled to back of the head and she slumped over with LOC.  She was in seated position.   She denies any prodrome symptoms - such as chest pain, nausea/vomiting, palpitations, no vision changes.   She reports 2 prior bouts of syncope, last one was 8 months ago. She has not been hospitalized with prior bouts.     She does note on questioning that for recent weeks she has noted dyspnea on exertion, which now seems to be occurring after ambulating shorter distances, no LE edema, no orthopnea complaints.      She lives with her daughter, frequently goes to [facility] during the week, no reported tobacco/drug use, very rare alcohol use.   She is on multiple antihypertensives - amlodipine-benazepril combo, hydralazine, metoprolol-hctz combo, and spironolactone.  Denies any recent dose uptitration has been on this regimen for long time. PCP Dr. Bernardo Gongora.    She does not have a nephrologist.  ON lab w/u today she has increased creatining 1.7 and mild hyperkalemia to 5.6     ED treatment: none.     Overview/Hospital Course:  Admitted to  Hospital Medicine for evaluation of syncopal episode.  Combination of dehydration and robust antihypertensive regimen leading to hypotension suspected as etiology.  Patient endorsing poor hydration day of with activity and travel during very hot and humid weather.  Orthostatics unremarkable. Echo with normal EF and normal diastolic function; right ventricle systolic function reduced, mild aortic valve sclerosis.  Patient hydrated via IV fluids and antihypertensive regimen adjusted.  Course otherwise notable for hyperkalemia; likely in setting of Aldactone/lisinopril.    Interval History:  Seen and evaluated on general medical floor  No acute events overnight    Clinical status stable, unchanged  No new complaints    Objective:     Vital Signs (Most Recent):  Temp: 98.2 °F (36.8 °C) (06/12/25 1519)  Pulse: 77 (06/12/25 1528)  Resp: 18 (06/12/25 1519)  BP: 130/60 (06/12/25 1519)  SpO2: 98 % (06/12/25 1519) Vital Signs (24h Range):  Temp:  [97.7 °F (36.5 °C)-98.3 °F (36.8 °C)] 98.2 °F (36.8 °C)  Pulse:  [68-94] 77  Resp:  [18-20] 18  SpO2:  [95 %-100 %] 98 %  BP: (125-151)/(60-72) 130/60     Weight: 99 kg (218 lb 4.1 oz)  Body mass index is 35.23 kg/m².  No intake or output data in the 24 hours ending 06/12/25 1549      Physical Exam  Vitals and nursing note reviewed.   Constitutional:       General: She is not in acute distress.     Appearance: Normal appearance. She is not toxic-appearing or diaphoretic.   HENT:      Head: Normocephalic and atraumatic.      Mouth/Throat:      Pharynx: Oropharynx is clear.   Eyes:      General: No scleral icterus.  Cardiovascular:      Rate and Rhythm: Normal rate and regular rhythm.      Pulses: Normal pulses.   Pulmonary:      Effort: Pulmonary effort is normal. No respiratory distress.      Breath sounds: No stridor. No wheezing or rales.   Abdominal:      General: Bowel sounds are normal. There is no distension.      Palpations: Abdomen is soft.      Tenderness: There is no  abdominal tenderness. There is no guarding.   Musculoskeletal:      Cervical back: Normal range of motion and neck supple.      Right lower leg: No edema.      Left lower leg: No edema.   Skin:     General: Skin is warm and dry.   Neurological:      General: No focal deficit present.      Mental Status: She is alert and oriented to person, place, and time.   Psychiatric:         Behavior: Behavior normal.               Significant Labs: All pertinent labs within the past 24 hours have been reviewed.    Significant Imaging: I have reviewed all pertinent imaging results/findings within the past 24 hours.      Assessment & Plan  Syncope  Admit to obs med tele unit  Orthostatic BP in ED negative  Cardiac monitoring  Chest x-ray and BNP unremarkable  Echo with normal EF and normal diastolic function; right ventricle systolic function reduced, mild aortic valve sclerosis    Suspect with multiple anti-hypertensive medications that acute hypotension led to her syncope  May need anti-hypertensives added back intermittently or she may need to alter dosing times of home medications  Plan for acute care home for continued monitoring and titration needs in addition if close PCP follow-up  Consider event monitor on discharge     Coronary artery disease involving native coronary artery of native heart without angina pectoris  Continue on aspirin and metoprolol    Essential hypertension  Patient's blood pressure range in the last 24 hours was: BP  Min: 125/70  Max: 151/72.The patient's inpatient anti-hypertensive regimen is listed below:  Current Antihypertensives  metoprolol tartrate (LOPRESSOR) tablet 50 mg, 2 times daily, Oral    Plan  Resume metoprolol  - see SYNCOPE problem for further comments    Hyperkalemia  Likely secondary to lisionpril/spironolactone  S/p lokelma x2  EKG w/o peaked t-wave changes  Trend potassium until resolution; repeat lab pending  This is her barrier to disposition    Stage 3b chronic kidney  disease  UA w/o acute findings - check urine electrolytes   No protein on UA    Obstructive sleep apnea syndrome  Uses CPAP believes setting is 6 cm h20    History of DVT (deep vein thrombosis)  Reports hx of provoked DVT s/p knee replacement surgery - reports possibly being on blood thinners but reported repeat imaging that showed resolution of clot    Class 2 severe obesity with body mass index (BMI) of 35 to 39.9 with serious comorbidity  Body mass index is 35.23 kg/m². Morbid obesity complicates all aspects of disease management from diagnostic modalities to treatment. Weight loss encouraged and health benefits explained to patient.    VTE Risk Mitigation (From admission, onward)           Ordered     heparin (porcine) injection 5,000 Units  Every 8 hours         06/10/25 2101     IP VTE HIGH RISK PATIENT  Once         06/10/25 2101     Place sequential compression device  Until discontinued         06/10/25 2101                    Discharge Planning   BAIRON: 6/13/2025     Code Status: Full Code   Medical Readiness for Discharge Date:   Discharge Plan A: Home, Home with family   Discharge Delays: None known at this time                    Alfonso Flaherty DO  Department of Hospital Medicine   [facility]

## 2025-06-12 NOTE — ASSESSMENT & PLAN NOTE
Reports hx of provoked DVT s/p knee replacement surgery - reports possibly being on blood thinners but reported repeat imaging that showed resolution of clot

## 2025-06-12 NOTE — SUBJECTIVE & OBJECTIVE
Interval History:  Seen and evaluated on general medical floor  No acute events overnight    Clinical status stable, unchanged  No new complaints    Objective:     Vital Signs (Most Recent):  Temp: 98.2 °F (36.8 °C) (06/12/25 1519)  Pulse: 77 (06/12/25 1528)  Resp: 18 (06/12/25 1519)  BP: 130/60 (06/12/25 1519)  SpO2: 98 % (06/12/25 1519) Vital Signs (24h Range):  Temp:  [97.7 °F (36.5 °C)-98.3 °F (36.8 °C)] 98.2 °F (36.8 °C)  Pulse:  [68-94] 77  Resp:  [18-20] 18  SpO2:  [95 %-100 %] 98 %  BP: (125-151)/(60-72) 130/60     Weight: 99 kg (218 lb 4.1 oz)  Body mass index is 35.23 kg/m².  No intake or output data in the 24 hours ending 06/12/25 1549      Physical Exam  Vitals and nursing note reviewed.   Constitutional:       General: She is not in acute distress.     Appearance: Normal appearance. She is not toxic-appearing or diaphoretic.   HENT:      Head: Normocephalic and atraumatic.      Mouth/Throat:      Pharynx: Oropharynx is clear.   Eyes:      General: No scleral icterus.  Cardiovascular:      Rate and Rhythm: Normal rate and regular rhythm.      Pulses: Normal pulses.   Pulmonary:      Effort: Pulmonary effort is normal. No respiratory distress.      Breath sounds: No stridor. No wheezing or rales.   Abdominal:      General: Bowel sounds are normal. There is no distension.      Palpations: Abdomen is soft.      Tenderness: There is no abdominal tenderness. There is no guarding.   Musculoskeletal:      Cervical back: Normal range of motion and neck supple.      Right lower leg: No edema.      Left lower leg: No edema.   Skin:     General: Skin is warm and dry.   Neurological:      General: No focal deficit present.      Mental Status: She is alert and oriented to person, place, and time.   Psychiatric:         Behavior: Behavior normal.               Significant Labs: All pertinent labs within the past 24 hours have been reviewed.    Significant Imaging: I have reviewed all pertinent imaging results/findings  within the past 24 hours.

## 2025-06-12 NOTE — ASSESSMENT & PLAN NOTE
Likely secondary to lisionpril/spironolactone  S/p lokelma x2  EKG w/o peaked t-wave changes  Trend potassium until resolution; repeat lab pending  This is her barrier to disposition

## 2025-06-12 NOTE — PLAN OF CARE
[facility]  Discharge Reassessment    Primary Care Provider: Bernardo Gongora II, MD    Expected Discharge Date: 6/13/2025    Reassessment (most recent)       Discharge Reassessment - 06/12/25 1437          Discharge Reassessment    Assessment Type Discharge Planning Reassessment (P)      Did the patient's condition or plan change since previous assessment? Yes (P)      Discharge Plan discussed with: Patient (P)      Communicated BAIRON with patient/caregiver Yes (P)      Discharge Plan A Home;Home with family (P)      Discharge Plan B Home;Home with family (P)      DME Needed Upon Discharge  none (P)      Transition of Care Barriers None (P)      Why the patient remains in the hospital Requires continued medical care (P)         Post-Acute Status    Hospital Resources/Appts/Education Provided Appointment suggestion unavailable (P)      Discharge Delays None known at this time (P)                      CM spoke with pt at bedside pt is not medically ready at this time due to elevated potassium.  Will discharge home with family.  Will cont to coordinate care until discharge.     Discharge Plan A and Plan B have been determined by review of patient's clinical status, future medical and therapeutic needs, and coverage/benefits for post-acute care in coordination with multidisciplinary team members.     Khushi Calix, MSN   [facility]  [phone]

## 2025-06-12 NOTE — ASSESSMENT & PLAN NOTE
Admit to obs med tele unit  Orthostatic BP in ED negative  Cardiac monitoring  Chest x-ray and BNP unremarkable  Echo with normal EF and normal diastolic function; right ventricle systolic function reduced, mild aortic valve sclerosis    Suspect with multiple anti-hypertensive medications that acute hypotension led to her syncope  May need anti-hypertensives added back intermittently or she may need to alter dosing times of home medications  Plan for acute care home for continued monitoring and titration needs in addition if close PCP follow-up  Consider event monitor on discharge

## 2025-06-12 NOTE — ASSESSMENT & PLAN NOTE
Patient's blood pressure range in the last 24 hours was: BP  Min: 125/70  Max: 151/72.The patient's inpatient anti-hypertensive regimen is listed below:  Current Antihypertensives  metoprolol tartrate (LOPRESSOR) tablet 50 mg, 2 times daily, Oral    Plan  Resume metoprolol  - see SYNCOPE problem for further comments

## 2025-06-12 NOTE — PLAN OF CARE
06/12/25 1027   Rounds   Attendance Provider;Nurse ;;Assigned nurse   Discharge Plan A Home;Home with family   Why the patient remains in the hospital Requires continued medical care   Transition of Care Barriers None     Pt may discharge this evening with follow up appts. No needs referral placed for [facility].    Khushi Calix, MSN   [facility]  [phone]

## 2025-06-12 NOTE — PLAN OF CARE
Problem: Adult Inpatient Plan of Care  Goal: Plan of Care Review  Outcome: Ongoing  Goal: Patient-Specific Goal (Individualized)  Outcome: Ongoing  Goal: Absence of Hospital-Acquired Illness or Injury  Outcome: Ongoing  Goal: Optimal Comfort and Wellbeing  Outcome: Ongoing  Goal: Readiness for Transition of Care  Outcome: Ongoing     Problem: Infection  Goal: Absence of Infection Signs and Symptoms  Outcome: Ongoing     Problem: Fall Injury Risk  Goal: Absence of Fall and Fall-Related Injury  Outcome: Ongoing

## 2025-06-13 VITALS
SYSTOLIC BLOOD PRESSURE: 126 MMHG | BODY MASS INDEX: 35.08 KG/M2 | OXYGEN SATURATION: 92 % | WEIGHT: 218.25 LBS | DIASTOLIC BLOOD PRESSURE: 59 MMHG | RESPIRATION RATE: 16 BRPM | HEIGHT: 66 IN | TEMPERATURE: 98 F | HEART RATE: 74 BPM

## 2025-06-13 LAB
ABSOLUTE EOSINOPHIL (OHS): 0.2 K/UL
ABSOLUTE MONOCYTE (OHS): 0.54 K/UL (ref 0.3–1)
ABSOLUTE NEUTROPHIL COUNT (OHS): 4 K/UL (ref 1.8–7.7)
ALBUMIN SERPL BCP-MCNC: 3.4 G/DL (ref 3.5–5.2)
ANION GAP (OHS): 9 MMOL/L (ref 8–16)
BASOPHILS # BLD AUTO: 0.04 K/UL
BASOPHILS NFR BLD AUTO: 0.5 %
BUN SERPL-MCNC: 30 MG/DL (ref 8–23)
CALCIUM SERPL-MCNC: 8.5 MG/DL (ref 8.7–10.5)
CHLORIDE SERPL-SCNC: 111 MMOL/L (ref 95–110)
CO2 SERPL-SCNC: 19 MMOL/L (ref 23–29)
CREAT SERPL-MCNC: 1.4 MG/DL (ref 0.5–1.4)
ERYTHROCYTE [DISTWIDTH] IN BLOOD BY AUTOMATED COUNT: 14.7 % (ref 11.5–14.5)
GFR SERPLBLD CREATININE-BSD FMLA CKD-EPI: 37 ML/MIN/1.73/M2
GLUCOSE SERPL-MCNC: 74 MG/DL (ref 70–110)
HCT VFR BLD AUTO: 31.1 % (ref 37–48.5)
HGB BLD-MCNC: 9.8 GM/DL (ref 12–16)
IMM GRANULOCYTES # BLD AUTO: 0.02 K/UL (ref 0–0.04)
IMM GRANULOCYTES NFR BLD AUTO: 0.3 % (ref 0–0.5)
LYMPHOCYTES # BLD AUTO: 2.83 K/UL (ref 1–4.8)
MAGNESIUM SERPL-MCNC: 1.8 MG/DL (ref 1.6–2.6)
MCH RBC QN AUTO: 28.6 PG (ref 27–31)
MCHC RBC AUTO-ENTMCNC: 31.5 G/DL (ref 32–36)
MCV RBC AUTO: 91 FL (ref 82–98)
NUCLEATED RBC (/100WBC) (OHS): 0 /100 WBC
PHOSPHATE SERPL-MCNC: 4.1 MG/DL (ref 2.7–4.5)
PLATELET # BLD AUTO: 253 K/UL (ref 150–450)
PMV BLD AUTO: 10 FL (ref 9.2–12.9)
POTASSIUM SERPL-SCNC: 4.7 MMOL/L (ref 3.5–5.1)
RBC # BLD AUTO: 3.43 M/UL (ref 4–5.4)
RELATIVE EOSINOPHIL (OHS): 2.6 %
RELATIVE LYMPHOCYTE (OHS): 37.1 % (ref 18–48)
RELATIVE MONOCYTE (OHS): 7.1 % (ref 4–15)
RELATIVE NEUTROPHIL (OHS): 52.4 % (ref 38–73)
SODIUM SERPL-SCNC: 139 MMOL/L (ref 136–145)
WBC # BLD AUTO: 7.63 K/UL (ref 3.9–12.7)

## 2025-06-13 PROCEDURE — 99900035 HC TECH TIME PER 15 MIN (STAT)

## 2025-06-13 PROCEDURE — 94660 CPAP INITIATION&MGMT: CPT

## 2025-06-13 PROCEDURE — 25000003 PHARM REV CODE 250: Performed by: HOSPITALIST

## 2025-06-13 PROCEDURE — 36415 COLL VENOUS BLD VENIPUNCTURE: CPT | Performed by: HOSPITALIST

## 2025-06-13 PROCEDURE — 80069 RENAL FUNCTION PANEL: CPT | Performed by: HOSPITALIST

## 2025-06-13 PROCEDURE — 63600175 PHARM REV CODE 636 W HCPCS: Performed by: HOSPITALIST

## 2025-06-13 PROCEDURE — 85025 COMPLETE CBC W/AUTO DIFF WBC: CPT | Performed by: HOSPITALIST

## 2025-06-13 PROCEDURE — 83735 ASSAY OF MAGNESIUM: CPT | Performed by: HOSPITALIST

## 2025-06-13 PROCEDURE — 27100171 HC OXYGEN HIGH FLOW UP TO 24 HOURS

## 2025-06-13 RX ORDER — METOPROLOL TARTRATE 50 MG/1
50 TABLET ORAL 2 TIMES DAILY
Qty: 120 TABLET | Refills: 0 | Status: SHIPPED | OUTPATIENT
Start: 2025-06-13

## 2025-06-13 RX ADMIN — ASPIRIN 81 MG: 81 TABLET, COATED ORAL at 08:06

## 2025-06-13 RX ADMIN — HEPARIN SODIUM 5000 UNITS: 5000 INJECTION INTRAVENOUS; SUBCUTANEOUS at 06:06

## 2025-06-13 RX ADMIN — ALLOPURINOL 300 MG: 100 TABLET ORAL at 08:06

## 2025-06-13 RX ADMIN — THERA TABS 1 TABLET: TAB at 08:06

## 2025-06-13 RX ADMIN — ATORVASTATIN CALCIUM 80 MG: 40 TABLET, FILM COATED ORAL at 08:06

## 2025-06-13 RX ADMIN — CETIRIZINE HYDROCHLORIDE 10 MG: 10 TABLET, FILM COATED ORAL at 08:06

## 2025-06-13 RX ADMIN — METOPROLOL TARTRATE 50 MG: 50 TABLET, FILM COATED ORAL at 08:06

## 2025-06-13 NOTE — PLAN OF CARE
06/13/25 0907   Post-Acute Status   Hospital Resources/Appts/Education Provided Appointments scheduled and added to AVS   Discharge Delays None known at this time   Discharge Plan   Discharge Plan A Home;Home with family   Discharge Plan B Home;Home with family     CM obtained follow up appt with PCP added to AVS.  Will cont to coordinate care until discharge.     Discharge Plan A and Plan B have been determined by review of patient's clinical status, future medical and therapeutic needs, and coverage/benefits for post-acute care in coordination with multidisciplinary team members.     Khushi Calix, MSN   [facility]  [phone]

## 2025-06-13 NOTE — PLAN OF CARE
06/13/25 1125   Medicare Message   Important Message from Medicare regarding Discharge Appeal Rights Given to patient/caregiver;Explained to patient/caregiver;Signed/date by patient/caregiver   Date IMM was signed 06/13/25   Time IMM was signed 1125     Khushi Calix, MSN   [facility]  [phone]

## 2025-06-13 NOTE — PLAN OF CARE
[facility] S[facility]  Discharge Final Note    Primary Care Provider: Bernardo Gongora II, MD    Expected Discharge Date: 6/13/2025    Final Discharge Note (most recent)       Final Note - 06/13/25 1555          Final Note    Assessment Type Final Discharge Note (P)      Anticipated Discharge Disposition Home or Self Care (P)      What phone number can be called within the next 1-3 days to see how you are doing after discharge? [phone] (P)      Hospital Resources/Appts/Education Provided Appointments scheduled and added to AVS (P)         Post-Acute Status    Discharge Delays None known at this time (P)                      Important Message from Medicare  Important Message from Medicare regarding Discharge Appeal Rights: Given to patient/caregiver, Explained to patient/caregiver, Signed/date by patient/caregiver     Date IMM was signed: 06/13/25  Time IMM was signed: 1125    Contact Info       [facility] Provider   Specialty: Internal Medicine    [address] [facility] [address]  [facility] [address]       Next Steps: Follow up    Bernardo Gongora II, MD   Specialty: Internal Medicine   Relationship: PCP - General    [address]  [facility] [address]   Phone: [phone]       Next Steps: Follow up            Pt discharged home with family.  Cm added follow up appts to AVS no further needs at this time.      Discharge Plan A and Plan B have been determined by review of patient's clinical status, future medical and therapeutic needs, and coverage/benefits for post-acute care in coordination with multidisciplinary team members.     Khushi Calix, MSN   [facility]  [phone]      Leukoplakia lesion of epiglottis.  Interarytenoid lesion seen in office was no longer present.

## 2025-06-13 NOTE — DISCHARGE INSTRUCTIONS
The likely cause of your fainting is the aggressive treatment of your blood pressure.  We stopped all of your blood pressure medications that you were on and your blood pressure has been within normal range here.  We did restart metoprolol alone (not the combo pill you were on).  This should be the only blood pressure medicine you take until you follow up with your PCP.  You have been set up with a cardiac event monitor that you will wear outpatient and then turn in for evaluation.  This would hopefully catch any abnormal heart rhythms that might cause your fainting episodes although as mentioned I do think that these are more closely related to episodes of low blood pressure.        Our goal at [facility] is to always give you outstanding care and exceptional service. You may receive a survey from [employer] by mail, text or e-mail in the next 24-48 hours asking about the care you received with us. The survey should only take 5-10 minutes to complete and is very important to us.     Your feedback provides us with a way to recognize our staff who work tirelessly to provide the best care! Also, your responses help us learn how to improve when your experience was below our aspiration of excellence. We are always looking for ways to improve your stay. We WILL use your feedback to continue making improvements to help us provide the highest quality care. We keep your personal information and feedback confidential. We appreciate your time completing this survey and can't wait to hear from you!!!    We look forward to your continued care with us! Thanks so much for choosing [facility] for your healthcare needs!

## 2025-06-14 NOTE — ASSESSMENT & PLAN NOTE
Patient's blood pressure range in the last 24 hours was: No data recorded.The patient's inpatient anti-hypertensive regimen is listed below:  Current Antihypertensives  metoprolol tartrate (LOPRESSOR) tablet, 2 times daily, Oral    Plan  Resume metoprolol  - see SYNCOPE problem for further comments

## 2025-06-14 NOTE — DISCHARGE SUMMARY
[facility] Medicine  Discharge Summary      Patient Name: Nelia Littlejohn  MRN: [id number]  GAMAL: [id number]  Patient Class: IP- Inpatient  Admission Date: 6/10/2025  Hospital Length of Stay: 2 days  Discharge Date and Time: 6/13/2025  1:00 PM  Attending Physician: Danya att. providers found   Discharging Provider: Alfonso Flaherty DO  Primary Care Provider: Bernardo Gongora II, MD  [facility] Medicine Team: [facility] R Alfonso Flaherty DO  Primary Care Team: [facility] R    HPI:   86 y/o AAF w/ HTN, CKD, hx or provoked DVT s/p knee replacement, presents to the ED due to syncope.     She reports following normal routing, took anti-hypetensive medications in the morning, went to [facility] at approx 9am-12pm, breakfast served there @ 10:45 and she reports having ice cream/cake after social function around noon.  She was riding public bus home, accompanied by friend, she noted to her friend she felt dizzy getting on the bus and during ride, she felt overheated/sweaty and light headed, friend reported to her that patients eyes rolled to back of the head and she slumped over with LOC.  She was in seated position.   She denies any prodrome symptoms - such as chest pain, nausea/vomiting, palpitations, no vision changes.   She reports 2 prior bouts of syncope, last one was 8 months ago. She has not been hospitalized with prior bouts.     She does note on questioning that for recent weeks she has noted dyspnea on exertion, which now seems to be occurring after ambulating shorter distances, no LE edema, no orthopnea complaints.      She lives with her daughter, frequently goes to [facility] during the week, no reported tobacco/drug use, very rare alcohol use.   She is on multiple antihypertensives - amlodipine-benazepril combo, hydralazine, metoprolol-hctz combo, and spironolactone.  Denies any recent dose uptitration has been on this regimen for long time. PCP Dr. Bernardo Gongora.    She does not have a  nephrologist.  ON lab w/u today she has increased creatining 1.7 and mild hyperkalemia to 5.6     ED treatment: none.     * No surgery found *      Hospital Course:   Admitted to Hospital Medicine for evaluation of syncopal episode.  Combination of dehydration and robust antihypertensive regimen leading to hypotension suspected as etiology.  Patient endorsing poor hydration day of with activity and travel during very hot and humid weather.  Orthostatics negative.  Echo with normal EF and normal diastolic function; right ventricle systolic function reduced, mild aortic valve sclerosis.  Patient hydrated via IV fluids and antihypertensives held outside of metoprolol.  Course otherwise notable for hyperkalemia; likely in setting of Aldactone/lisinopril; this resolved with lokelma.  She was instructed to hold all antihypertensives other than metoprolol as her syncope is likely related to overtreating her HTN.  She is to discuss resumption with her PCP.  Pt deemed appropriate for discharge; seen and examined prior to departure. Plan discussed with pt, who was agreeable and amenable; medications were discussed and reviewed, outpatient follow-up scheduled, ER precautions were given, all questions were answered to the pt's satisfaction, and was subsequently discharged.     Goals of Care Treatment Preferences:  Code Status: Full Code      SDOH Screening:  The patient was screened for utility difficulties, food insecurity, transport difficulties, housing insecurity, and interpersonal safety and there were no concerns identified this admission.     Consults:     Assessment & Plan  Syncope  Admit to [facility] tele unit  Orthostatic BP in ED negative  Cardiac monitoring  Chest x-ray and BNP unremarkable  Echo with normal EF and normal diastolic function; right ventricle systolic function reduced, mild aortic valve sclerosis    Suspect with multiple anti-hypertensive medications that acute hypotension led to her syncope  May need  anti-hypertensives added back intermittently or she may need to alter dosing times of home medications  Plan for acute care home for continued monitoring and titration needs in addition if close PCP follow-up  Consider event monitor on discharge     Coronary artery disease involving native coronary artery of native heart without angina pectoris  Continue on aspirin and metoprolol    Essential hypertension  Patient's blood pressure range in the last 24 hours was: No data recorded.The patient's inpatient anti-hypertensive regimen is listed below:  Current Antihypertensives  metoprolol tartrate (LOPRESSOR) tablet, 2 times daily, Oral    Plan  Resume metoprolol  - see SYNCOPE problem for further comments    Hyperkalemia  Likely secondary to lisionpril/spironolactone  S/p lokelma x2  EKG w/o peaked t-wave changes  Trend potassium until resolution; repeat lab pending  This is her barrier to disposition    Stage 3b chronic kidney disease  UA w/o acute findings - check urine electrolytes   No protein on UA    Obstructive sleep apnea syndrome  Uses CPAP believes setting is 6 cm h20    History of DVT (deep vein thrombosis)  Reports hx of provoked DVT s/p knee replacement surgery - reports possibly being on blood thinners but reported repeat imaging that showed resolution of clot    Class 2 severe obesity with body mass index (BMI) of 35 to 39.9 with serious comorbidity  Body mass index is 35.23 kg/m². Morbid obesity complicates all aspects of disease management from diagnostic modalities to treatment. Weight loss encouraged and health benefits explained to patient.    Final Active Diagnoses:    Diagnosis Date Noted POA    PRINCIPAL PROBLEM:  Syncope [R55] 06/10/2025 Yes    History of DVT (deep vein thrombosis) [Z86.718] 04/18/2024 Not Applicable    Coronary artery disease involving native coronary artery of native heart without angina pectoris [I25.10] 11/30/2023 Yes    Class 2 severe obesity with body mass index (BMI) of 35  to 39.9 with serious comorbidity [E66.812, E66.01] 01/15/2020 Yes    Stage 3b chronic kidney disease [N18.32] 01/12/2019 Yes    Hyperkalemia [E87.5] 01/11/2019 Yes    Obstructive sleep apnea syndrome [G47.33] 03/01/2016 Yes    Essential hypertension [I10] 09/25/2012 Yes      Problems Resolved During this Admission:       Discharged Condition: good    Disposition: Home or Self Care    Follow Up:   Follow-up Information       Provider, Jeanne .    Specialty: Internal Medicine  Contact information:  [address] [facility]  [address]  [facility] [id number]               Bernardo Gongora II, MD Follow up.    Specialty: Internal Medicine  Contact information:  [address]  [place] [address]  [phone]                           Patient Instructions:      Ambulatory referral/consult to [facility]   Standing Status: Future   Referral Priority: Routine Referral Type: Consultation   Referred to Provider: JEANNE PROVIDER Requested Specialty: Internal Medicine   Number of Visits Requested: 1     Diet diabetic     Notify your health care provider if you experience any of the following:  persistent dizziness, light-headedness, or visual disturbances     Notify your health care provider if you experience any of the following:  increased confusion or weakness     Cardiac Monitor - 3-15 Day Adult (Cupid Only)   Standing Status: Future Standing Exp. Date: 06/13/26     Order Specific Question Answer Comments   Duration: 14 days    Release to patient Immediate      Activity as tolerated       Significant Diagnostic Studies: N/A    Pending Diagnostic Studies:       None           Medications:  Reconciled Home Medications:      Medication List        PAUSE taking these medications      amLODIPine-benazepriL 10-40 mg per capsule  Wait to take this until your doctor or other care provider tells you to start again.  Commonly known as: LOTREL  Take 1 capsule by mouth every morning.     hydrALAZINE 100 MG  tablet  Wait to take this until your doctor or other care provider tells you to start again.  Commonly known as: APRESOLINE  Take 1 tablet (100 mg total) by mouth 2 (two) times daily.     metoprolol ta-hydrochlorothiaz 100-25 mg per tablet  Wait to take this until your doctor or other care provider tells you to start again.  Commonly known as: LOPRESSOR HCT  Take 1 tablet by mouth once daily.     spironolactone 25 MG tablet  Wait to take this until your doctor or other care provider tells you to start again.  Commonly known as: ALDACTONE  Take 1 tablet (25 mg total) by mouth once daily.            START taking these medications      metoprolol tartrate 50 MG tablet  Commonly known as: LOPRESSOR  Take 1 tablet (50 mg total) by mouth 2 (two) times daily.            CONTINUE taking these medications      acetaminophen 500 MG tablet  Commonly known as: TYLENOL  Take 500 mg by mouth nightly as needed for Pain.     allopurinoL 300 MG tablet  Commonly known as: ZYLOPRIM  Take 1 tablet (300 mg total) by mouth once daily.     aspirin 81 MG EC tablet  Commonly known as: ECOTRIN  Take 81 mg by mouth once daily.     atorvastatin 80 MG tablet  Commonly known as: LIPITOR  Take 1 tablet (80 mg total) by mouth once daily.     cetirizine 10 MG tablet  Commonly known as: ZYRTEC  Take 10 mg by mouth once daily.     multivit-iron-min-folic acid 3,500-18-0.4 unit-mg-mg Chew  Take by mouth once daily.              Indwelling Lines/Drains at time of discharge:   Lines/Drains/Airways       None                   Time spent on the discharge of patient: >35 minutes         Alfonso Flaherty DO  Department of Hospital Medicine  [facility]

## 2025-06-16 ENCOUNTER — PATIENT OUTREACH (OUTPATIENT)
Dept: ADMINISTRATIVE | Facility: CLINIC | Age: 85
End: 2025-06-16
Payer: MEDICARE

## 2025-06-16 NOTE — PROGRESS NOTES
C3 nurse attempted to contact Nelia Littlejohn for a TCC post hospital discharge follow up call. LVM . The patient has a scheduled HOSFU appointment with Bernardo Gongora II, MD on 06/17/25 @ 0900.

## 2025-06-17 ENCOUNTER — LAB VISIT (OUTPATIENT)
Dept: LAB | Facility: HOSPITAL | Age: 85
End: 2025-06-17
Attending: INTERNAL MEDICINE
Payer: MEDICARE

## 2025-06-17 ENCOUNTER — OFFICE VISIT (OUTPATIENT)
Dept: INTERNAL MEDICINE | Facility: CLINIC | Age: 85
End: 2025-06-17
Payer: MEDICARE

## 2025-06-17 VITALS
DIASTOLIC BLOOD PRESSURE: 58 MMHG | SYSTOLIC BLOOD PRESSURE: 128 MMHG | HEIGHT: 66 IN | BODY MASS INDEX: 36.35 KG/M2 | WEIGHT: 226.19 LBS | HEART RATE: 70 BPM

## 2025-06-17 DIAGNOSIS — D64.9 ANEMIA, UNSPECIFIED TYPE: ICD-10-CM

## 2025-06-17 DIAGNOSIS — N18.32 STAGE 3B CHRONIC KIDNEY DISEASE: ICD-10-CM

## 2025-06-17 DIAGNOSIS — E87.5 HYPERKALEMIA: ICD-10-CM

## 2025-06-17 DIAGNOSIS — R55 SYNCOPE, UNSPECIFIED SYNCOPE TYPE: Primary | ICD-10-CM

## 2025-06-17 DIAGNOSIS — E78.2 MIXED HYPERLIPIDEMIA: ICD-10-CM

## 2025-06-17 DIAGNOSIS — Z09 HOSPITAL DISCHARGE FOLLOW-UP: ICD-10-CM

## 2025-06-17 DIAGNOSIS — I10 ESSENTIAL HYPERTENSION: ICD-10-CM

## 2025-06-17 LAB
ABSOLUTE EOSINOPHIL (OHS): 0.15 K/UL
ABSOLUTE MONOCYTE (OHS): 0.62 K/UL (ref 0.3–1)
ABSOLUTE NEUTROPHIL COUNT (OHS): 5.76 K/UL (ref 1.8–7.7)
ALBUMIN SERPL BCP-MCNC: 4.2 G/DL (ref 3.5–5.2)
ALP SERPL-CCNC: 104 UNIT/L (ref 40–150)
ALT SERPL W/O P-5'-P-CCNC: 16 UNIT/L (ref 10–44)
ANION GAP (OHS): 10 MMOL/L (ref 8–16)
AST SERPL-CCNC: 19 UNIT/L (ref 11–45)
BASOPHILS # BLD AUTO: 0.02 K/UL
BASOPHILS NFR BLD AUTO: 0.2 %
BILIRUB SERPL-MCNC: 0.9 MG/DL (ref 0.1–1)
BUN SERPL-MCNC: 23 MG/DL (ref 8–23)
CALCIUM SERPL-MCNC: 9.8 MG/DL (ref 8.7–10.5)
CHLORIDE SERPL-SCNC: 107 MMOL/L (ref 95–110)
CO2 SERPL-SCNC: 22 MMOL/L (ref 23–29)
CREAT SERPL-MCNC: 1.3 MG/DL (ref 0.5–1.4)
ERYTHROCYTE [DISTWIDTH] IN BLOOD BY AUTOMATED COUNT: 14.8 % (ref 11.5–14.5)
GFR SERPLBLD CREATININE-BSD FMLA CKD-EPI: 40 ML/MIN/1.73/M2
GLUCOSE SERPL-MCNC: 91 MG/DL (ref 70–110)
HCT VFR BLD AUTO: 35.7 % (ref 37–48.5)
HGB BLD-MCNC: 11 GM/DL (ref 12–16)
IMM GRANULOCYTES # BLD AUTO: 0.04 K/UL (ref 0–0.04)
IMM GRANULOCYTES NFR BLD AUTO: 0.5 % (ref 0–0.5)
LYMPHOCYTES # BLD AUTO: 2.24 K/UL (ref 1–4.8)
MCH RBC QN AUTO: 28.6 PG (ref 27–31)
MCHC RBC AUTO-ENTMCNC: 30.8 G/DL (ref 32–36)
MCV RBC AUTO: 93 FL (ref 82–98)
NUCLEATED RBC (/100WBC) (OHS): 0 /100 WBC
PLATELET # BLD AUTO: 271 K/UL (ref 150–450)
PMV BLD AUTO: 9.9 FL (ref 9.2–12.9)
POTASSIUM SERPL-SCNC: 4.8 MMOL/L (ref 3.5–5.1)
PROT SERPL-MCNC: 7.5 GM/DL (ref 6–8.4)
RBC # BLD AUTO: 3.85 M/UL (ref 4–5.4)
RELATIVE EOSINOPHIL (OHS): 1.7 %
RELATIVE LYMPHOCYTE (OHS): 25.4 % (ref 18–48)
RELATIVE MONOCYTE (OHS): 7 % (ref 4–15)
RELATIVE NEUTROPHIL (OHS): 65.2 % (ref 38–73)
SODIUM SERPL-SCNC: 139 MMOL/L (ref 136–145)
WBC # BLD AUTO: 8.83 K/UL (ref 3.9–12.7)

## 2025-06-17 PROCEDURE — 85025 COMPLETE CBC W/AUTO DIFF WBC: CPT

## 2025-06-17 PROCEDURE — 36415 COLL VENOUS BLD VENIPUNCTURE: CPT

## 2025-06-17 PROCEDURE — 1126F AMNT PAIN NOTED NONE PRSNT: CPT | Mod: CPTII,S$GLB,, | Performed by: INTERNAL MEDICINE

## 2025-06-17 PROCEDURE — 3078F DIAST BP <80 MM HG: CPT | Mod: CPTII,S$GLB,, | Performed by: INTERNAL MEDICINE

## 2025-06-17 PROCEDURE — 1111F DSCHRG MED/CURRENT MED MERGE: CPT | Mod: CPTII,S$GLB,, | Performed by: INTERNAL MEDICINE

## 2025-06-17 PROCEDURE — 1160F RVW MEDS BY RX/DR IN RCRD: CPT | Mod: CPTII,S$GLB,, | Performed by: INTERNAL MEDICINE

## 2025-06-17 PROCEDURE — 99215 OFFICE O/P EST HI 40 MIN: CPT | Mod: S$GLB,,, | Performed by: INTERNAL MEDICINE

## 2025-06-17 PROCEDURE — 1159F MED LIST DOCD IN RCRD: CPT | Mod: CPTII,S$GLB,, | Performed by: INTERNAL MEDICINE

## 2025-06-17 PROCEDURE — 99999 PR PBB SHADOW E&M-EST. PATIENT-LVL III: CPT | Mod: PBBFAC,,, | Performed by: INTERNAL MEDICINE

## 2025-06-17 PROCEDURE — 3288F FALL RISK ASSESSMENT DOCD: CPT | Mod: CPTII,S$GLB,, | Performed by: INTERNAL MEDICINE

## 2025-06-17 PROCEDURE — 1101F PT FALLS ASSESS-DOCD LE1/YR: CPT | Mod: CPTII,S$GLB,, | Performed by: INTERNAL MEDICINE

## 2025-06-17 PROCEDURE — 3074F SYST BP LT 130 MM HG: CPT | Mod: CPTII,S$GLB,, | Performed by: INTERNAL MEDICINE

## 2025-06-17 PROCEDURE — 82040 ASSAY OF SERUM ALBUMIN: CPT

## 2025-06-17 RX ORDER — AMLODIPINE BESYLATE 10 MG/1
10 TABLET ORAL DAILY
Qty: 90 TABLET | Refills: 3 | Status: SHIPPED | OUTPATIENT
Start: 2025-06-17 | End: 2026-06-17

## 2025-06-17 NOTE — PROGRESS NOTES
Subjective:       Patient ID: Nelia Littlejohn is a 85 y.o. female.    Chief Complaint:   Hospital Follow Up    HPI - Ms Littlejohn had an episode of syncope on the bus returning home from the [facility].  She was admitted from June 10th through 13th for evaluation.  Final diagnostic thought was that she became dehydrated in the presence of multiple antihypertensives and had syncope and hyperkalemia.  I reviewed the labs during her admission, and she also had new mild anemia.  While her blood pressure in clinic today was okay, at home she has been running 150-165/70-97.  She has been holding all antihypertensives except for metoprolol 50 mg daily.  Today, she feels well.  She is accompanied by her daughter, as usual.  She is not a smoker    PMH:  History of resistant HTN requiring 5 meds - currently holding all but one  DVT Sept 2023  HLP  CVA 2021  OA, s/p L TKR  ANUM on cpap  Pre-DM  Left TKR 2016     Meds:  Reviewed and reconciled in EPIC with patient during visit today.     Review of Systems   Constitutional:  Negative for fever.   HENT:  Negative for congestion.    Respiratory:  Negative for shortness of breath.    Cardiovascular:  Negative for chest pain.   Gastrointestinal:  Negative for abdominal pain.   Genitourinary:  Negative for difficulty urinating.   Musculoskeletal:  Negative for arthralgias.   Skin:  Negative for rash.   Neurological:  Positive for dizziness. Negative for headaches.   Psychiatric/Behavioral:  Negative for sleep disturbance.        Objective:      Physical Exam  Vitals reviewed.   Constitutional:       Appearance: She is well-developed. She is obese.      Comments: Friendly elderly woman in no distress in   HENT:      Head: Normocephalic and atraumatic.   Cardiovascular:      Rate and Rhythm: Normal rate and regular rhythm.      Heart sounds: Normal heart sounds. No murmur heard.     No friction rub. No gallop.   Pulmonary:      Effort: Pulmonary effort is normal. No respiratory  distress.      Breath sounds: Normal breath sounds. No wheezing or rales.   Chest:      Chest wall: No tenderness.   Skin:     General: Skin is warm and dry.      Findings: No erythema.   Neurological:      General: No focal deficit present.      Mental Status: She is alert.   Psychiatric:         Mood and Affect: Mood normal.         Assessment:       1. Syncope, unspecified syncope type    2. Hospital discharge follow-up    3. Essential hypertension    4. Stage 3b chronic kidney disease    5. Hyperkalemia    6. Mixed hyperlipidemia    7. Anemia, unspecified type        Plan:       Nelia was seen today for hospital follow up.    Diagnoses and all orders for this visit:    Syncope, unspecified syncope type - no recurrence.  I agree with the hospital diagnosis; however, she is going to need more antihypertensive therapy than she currently has.    Hospital discharge follow-up - labs reviewed.  She is improved.  We need to evaluate the anemia and look at her kidney function and potassium today.    Essential hypertension - uncontrolled.  I will start one more antihypertensive back today.  Continue metoprolol and restart amlodipine  -     amLODIPine (NORVASC) 10 MG tablet; Take 1 tablet (10 mg total) by mouth once daily.    Stage 3b chronic kidney disease - was worsened in the hospital.  We need to re-evaluate with labs today  -     Comprehensive Metabolic Panel; Future    Hyperkalemia - seen during hospitalization.  Repeat labs today  -     Comprehensive Metabolic Panel; Future    Mixed hyperlipidemia - stable on a statin.  Continue to monitor    Anemia, unspecified type - a new problem.  Repeating labs today.  Workup after we see the numbers  -     CBC Auto Differential; Future    Rtc 4 weeks, BP check    G Diana Gongora MD MPH  Staff Internist

## 2025-07-15 ENCOUNTER — LAB VISIT (OUTPATIENT)
Dept: LAB | Facility: HOSPITAL | Age: 85
End: 2025-07-15
Payer: MEDICARE

## 2025-07-15 ENCOUNTER — OFFICE VISIT (OUTPATIENT)
Dept: INTERNAL MEDICINE | Facility: CLINIC | Age: 85
End: 2025-07-15
Payer: MEDICARE

## 2025-07-15 ENCOUNTER — RESULTS FOLLOW-UP (OUTPATIENT)
Dept: INTERNAL MEDICINE | Facility: CLINIC | Age: 85
End: 2025-07-15

## 2025-07-15 VITALS
OXYGEN SATURATION: 98 % | BODY MASS INDEX: 37.41 KG/M2 | WEIGHT: 232.81 LBS | HEIGHT: 66 IN | HEART RATE: 71 BPM | SYSTOLIC BLOOD PRESSURE: 142 MMHG | DIASTOLIC BLOOD PRESSURE: 70 MMHG

## 2025-07-15 DIAGNOSIS — N18.32 STAGE 3B CHRONIC KIDNEY DISEASE: ICD-10-CM

## 2025-07-15 DIAGNOSIS — I10 ESSENTIAL HYPERTENSION: Primary | ICD-10-CM

## 2025-07-15 DIAGNOSIS — E87.5 HYPERKALEMIA: ICD-10-CM

## 2025-07-15 LAB
ALBUMIN SERPL BCP-MCNC: 3.8 G/DL (ref 3.5–5.2)
ALP SERPL-CCNC: 106 UNIT/L (ref 40–150)
ALT SERPL W/O P-5'-P-CCNC: 15 UNIT/L (ref 10–44)
ANION GAP (OHS): 7 MMOL/L (ref 8–16)
AST SERPL-CCNC: 19 UNIT/L (ref 11–45)
BILIRUB SERPL-MCNC: 1.3 MG/DL (ref 0.1–1)
BUN SERPL-MCNC: 14 MG/DL (ref 8–23)
CALCIUM SERPL-MCNC: 9.5 MG/DL (ref 8.7–10.5)
CHLORIDE SERPL-SCNC: 112 MMOL/L (ref 95–110)
CO2 SERPL-SCNC: 25 MMOL/L (ref 23–29)
CREAT SERPL-MCNC: 1.2 MG/DL (ref 0.5–1.4)
GFR SERPLBLD CREATININE-BSD FMLA CKD-EPI: 44 ML/MIN/1.73/M2
GLUCOSE SERPL-MCNC: 99 MG/DL (ref 70–110)
POTASSIUM SERPL-SCNC: 4.3 MMOL/L (ref 3.5–5.1)
PROT SERPL-MCNC: 7.2 GM/DL (ref 6–8.4)
SODIUM SERPL-SCNC: 144 MMOL/L (ref 136–145)

## 2025-07-15 PROCEDURE — 1159F MED LIST DOCD IN RCRD: CPT | Mod: CPTII,S$GLB,, | Performed by: NURSE PRACTITIONER

## 2025-07-15 PROCEDURE — 1126F AMNT PAIN NOTED NONE PRSNT: CPT | Mod: CPTII,S$GLB,, | Performed by: NURSE PRACTITIONER

## 2025-07-15 PROCEDURE — 3288F FALL RISK ASSESSMENT DOCD: CPT | Mod: CPTII,S$GLB,, | Performed by: NURSE PRACTITIONER

## 2025-07-15 PROCEDURE — 1101F PT FALLS ASSESS-DOCD LE1/YR: CPT | Mod: CPTII,S$GLB,, | Performed by: NURSE PRACTITIONER

## 2025-07-15 PROCEDURE — 84075 ASSAY ALKALINE PHOSPHATASE: CPT

## 2025-07-15 PROCEDURE — 3077F SYST BP >= 140 MM HG: CPT | Mod: CPTII,S$GLB,, | Performed by: NURSE PRACTITIONER

## 2025-07-15 PROCEDURE — 99214 OFFICE O/P EST MOD 30 MIN: CPT | Mod: S$GLB,,, | Performed by: NURSE PRACTITIONER

## 2025-07-15 PROCEDURE — 99999 PR PBB SHADOW E&M-EST. PATIENT-LVL III: CPT | Mod: PBBFAC,,, | Performed by: NURSE PRACTITIONER

## 2025-07-15 PROCEDURE — 3078F DIAST BP <80 MM HG: CPT | Mod: CPTII,S$GLB,, | Performed by: NURSE PRACTITIONER

## 2025-07-15 PROCEDURE — 36415 COLL VENOUS BLD VENIPUNCTURE: CPT

## 2025-07-15 RX ORDER — METOPROLOL TARTRATE AND HYDROCHLOROTHIAZIDE 100; 50 MG/1; MG/1
1 TABLET ORAL DAILY
Qty: 30 TABLET | Refills: 11 | Status: SHIPPED | OUTPATIENT
Start: 2025-07-15 | End: 2026-07-15

## 2025-07-15 NOTE — PROGRESS NOTES
"Subjective     Patient ID: Nelia Littlejohn is a 85 y.o. female.  BP (!) 142/70 (BP Location: Right arm, Patient Position: Sitting)   Pulse 71   Ht 5' 6" (1.676 m)   Wt 105.6 kg (232 lb 12.9 oz)   SpO2 98%   BMI 37.58 kg/m²          History of Present Illness    CHIEF COMPLAINT:  Nelia presents today for blood pressure follow-up    RECENT HOSPITALIZATION:  She was hospitalized from June 10th to 13th due to a syncope episode while returning home from the [facility]. During admission, she was diagnosed with dehydration and hyperkalemia. A new mild anemia was noted on admission labs. She reported feeling well during follow-up visit on June 17th.    BLOOD PRESSURE MANAGEMENT:  Home blood pressure readings currently range in 140s/60-70s. She denies any symptoms related to blood pressure fluctuations and reports feeling well. At her last clinic visit, blood pressure was 128/58.    MEDICATIONS:  She currently takes metoprolol 50 mg twice daily and amlodipine 10 mg daily with good tolerance. Spironolactone, lopressor hct, and hydralazine were discontinued, with spironolactone specifically stopped due to elevated potassium levels.    MEDICAL CONDITIONS:  She has stage 3b chronic kidney disease with persistently elevated potassium levels, noted to be as high as 5.6.         Problem List[1]     Current Medications[2]     ROS: - chest pain, - shortness of breath, - syncope        Objective     Physical Exam  Constitutional:       General: She is not in acute distress.     Appearance: Normal appearance. She is obese. She is not ill-appearing, toxic-appearing or diaphoretic.   Cardiovascular:      Rate and Rhythm: Normal rate and regular rhythm.   Pulmonary:      Effort: Pulmonary effort is normal.      Breath sounds: Normal breath sounds.   Skin:     General: Skin is warm and dry.   Neurological:      Mental Status: She is alert and oriented to person, place, and time.   Psychiatric:         Mood and Affect: Mood normal. "         Behavior: Behavior normal.        Assessment and Plan     1. Essential hypertension; not at goal.Stop will change regiment to lopressor -50mg daily, and continue amlodipine 10mg daily, Enrolled in HDMP  -     metoprolol ta-hydrochlorothiaz (LOPRESSOR HCT) 100-50 mg per tablet; Take 1 tablet by mouth once daily.  Dispense: 30 tablet; Refill: 11  -     Hypertension Digital Medicine (HDMP) Enrollment Order    2. Stage 3b chronic kidney disease; labs today, and in 4 weeks to monitor renal function and potassium closely while changing BP medications   -     Comprehensive Metabolic Panel; Future; Expected date: 07/15/2025  -     Comprehensive Metabolic Panel; Future; Expected date: 08/15/2025    3. Hyperkalemia; labs today, and in 4 weeks to monitor renal function and potassium closely while changing BP medications   -     Comprehensive Metabolic Panel; Future; Expected date: 07/15/2025  -     Comprehensive Metabolic Panel; Future; Expected date: 08/15/2025          Garry Fitzpatrick NP   Internal Medicine           Follow up in about 4 weeks (around 8/12/2025) for BP.    This note was generated with the assistance of ambient listening technology. Verbal consent was obtained by the patient and accompanying visitor(s) for the recording of patient appointment to facilitate this note. I attest to having reviewed and edited the generated note for accuracy, though some syntax or spelling errors may persist. Please contact the author of this note for any clarification.             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    Hyperkalemia    Stage 3b chronic kidney disease    Insomnia     Class 2 severe obesity with body mass index (BMI) of 35 to 39.9 with serious comorbidity    TIA (transient ischemic attack), 6/29/2021    Aortic atherosclerosis (CT 2014)    Peripheral vascular disease    Coronary artery disease involving native coronary artery of native heart without angina pectoris    History of DVT (deep vein thrombosis)    Primary osteoarthritis of right knee    Syncope   [2]  Current Outpatient Medications   Medication Sig Dispense Refill    acetaminophen (TYLENOL) 500 MG tablet Take 500 mg by mouth nightly as needed for Pain.      allopurinoL (ZYLOPRIM) 300 MG tablet Take 1 tablet (300 mg total) by mouth once daily. 90 tablet 3    amLODIPine (NORVASC) 10 MG tablet Take 1 tablet (10 mg total) by mouth once daily. 90 tablet 3    aspirin (ECOTRIN) 81 MG EC tablet Take 81 mg by mouth once daily.      atorvastatin (LIPITOR) 80 MG tablet Take 1 tablet (80 mg total) by mouth once daily. 90 tablet 3    cetirizine (ZYRTEC) 10 MG tablet Take 10 mg by mouth once daily.      MULTIVIT-IRON-MIN-FOLIC ACID 3,500-18-0.4 UNIT-MG-MG ORAL CHEW Take by mouth once daily.      metoprolol ta-hydrochlorothiaz (LOPRESSOR HCT) 100-50 mg per tablet Take 1 tablet by mouth once daily. 30 tablet 11     No current facility-administered medications for this visit.

## 2025-08-11 DIAGNOSIS — E78.2 MIXED HYPERLIPIDEMIA: ICD-10-CM

## 2025-08-11 RX ORDER — ATORVASTATIN CALCIUM 80 MG/1
80 TABLET, FILM COATED ORAL
Qty: 90 TABLET | Refills: 1 | Status: SHIPPED | OUTPATIENT
Start: 2025-08-11

## 2025-08-18 ENCOUNTER — LAB VISIT (OUTPATIENT)
Dept: LAB | Facility: HOSPITAL | Age: 85
End: 2025-08-18
Payer: MEDICARE

## 2025-08-18 DIAGNOSIS — E87.5 HYPERKALEMIA: ICD-10-CM

## 2025-08-18 DIAGNOSIS — N18.32 STAGE 3B CHRONIC KIDNEY DISEASE: ICD-10-CM

## 2025-08-18 LAB
ALBUMIN SERPL BCP-MCNC: 4 G/DL (ref 3.5–5.2)
ALP SERPL-CCNC: 120 UNIT/L (ref 40–150)
ALT SERPL W/O P-5'-P-CCNC: 25 UNIT/L (ref 0–55)
ANION GAP (OHS): 11 MMOL/L (ref 8–16)
AST SERPL-CCNC: 30 UNIT/L (ref 0–50)
BILIRUB SERPL-MCNC: 1.5 MG/DL (ref 0.1–1)
BUN SERPL-MCNC: 19 MG/DL (ref 8–23)
CALCIUM SERPL-MCNC: 10 MG/DL (ref 8.7–10.5)
CHLORIDE SERPL-SCNC: 101 MMOL/L (ref 95–110)
CO2 SERPL-SCNC: 29 MMOL/L (ref 23–29)
CREAT SERPL-MCNC: 1.3 MG/DL (ref 0.5–1.4)
GFR SERPLBLD CREATININE-BSD FMLA CKD-EPI: 40 ML/MIN/1.73/M2
GLUCOSE SERPL-MCNC: 98 MG/DL (ref 70–110)
POTASSIUM SERPL-SCNC: 4 MMOL/L (ref 3.5–5.1)
PROT SERPL-MCNC: 7.8 GM/DL (ref 6–8.4)
SODIUM SERPL-SCNC: 141 MMOL/L (ref 136–145)

## 2025-08-18 PROCEDURE — 80053 COMPREHEN METABOLIC PANEL: CPT

## 2025-08-18 PROCEDURE — 36415 COLL VENOUS BLD VENIPUNCTURE: CPT | Mod: PN

## 2025-08-19 ENCOUNTER — TELEPHONE (OUTPATIENT)
Dept: INTERNAL MEDICINE | Facility: CLINIC | Age: 85
End: 2025-08-19
Payer: MEDICARE

## 2025-08-26 ENCOUNTER — TELEPHONE (OUTPATIENT)
Dept: INTERNAL MEDICINE | Facility: CLINIC | Age: 85
End: 2025-08-26
Payer: MEDICARE

## 2025-08-27 ENCOUNTER — OFFICE VISIT (OUTPATIENT)
Dept: INTERNAL MEDICINE | Facility: CLINIC | Age: 85
End: 2025-08-27
Payer: MEDICARE

## 2025-08-27 VITALS
DIASTOLIC BLOOD PRESSURE: 66 MMHG | SYSTOLIC BLOOD PRESSURE: 161 MMHG | OXYGEN SATURATION: 99 % | HEART RATE: 104 BPM | BODY MASS INDEX: 38.19 KG/M2 | HEIGHT: 66 IN | WEIGHT: 237.63 LBS

## 2025-08-27 DIAGNOSIS — N18.32 STAGE 3B CHRONIC KIDNEY DISEASE: ICD-10-CM

## 2025-08-27 DIAGNOSIS — I10 ESSENTIAL HYPERTENSION: Primary | ICD-10-CM

## 2025-08-27 PROCEDURE — 1101F PT FALLS ASSESS-DOCD LE1/YR: CPT | Mod: CPTII,S$GLB,, | Performed by: NURSE PRACTITIONER

## 2025-08-27 PROCEDURE — 1126F AMNT PAIN NOTED NONE PRSNT: CPT | Mod: CPTII,S$GLB,, | Performed by: NURSE PRACTITIONER

## 2025-08-27 PROCEDURE — 3078F DIAST BP <80 MM HG: CPT | Mod: CPTII,S$GLB,, | Performed by: NURSE PRACTITIONER

## 2025-08-27 PROCEDURE — 99999 PR PBB SHADOW E&M-EST. PATIENT-LVL III: CPT | Mod: PBBFAC,,, | Performed by: NURSE PRACTITIONER

## 2025-08-27 PROCEDURE — 3077F SYST BP >= 140 MM HG: CPT | Mod: CPTII,S$GLB,, | Performed by: NURSE PRACTITIONER

## 2025-08-27 PROCEDURE — 99214 OFFICE O/P EST MOD 30 MIN: CPT | Mod: S$GLB,,, | Performed by: NURSE PRACTITIONER

## 2025-08-27 PROCEDURE — 3288F FALL RISK ASSESSMENT DOCD: CPT | Mod: CPTII,S$GLB,, | Performed by: NURSE PRACTITIONER

## 2025-08-27 RX ORDER — OLMESARTAN MEDOXOMIL 20 MG/1
20 TABLET ORAL DAILY
Qty: 90 TABLET | Refills: 3 | Status: SHIPPED | OUTPATIENT
Start: 2025-08-27

## (undated) DEVICE — SPONGE COTTON TRAY 4X4IN

## (undated) DEVICE — SUT VICRYL 3-0 27 CT-1

## (undated) DEVICE — BRUSH SCRUB HIBICLENS 4%

## (undated) DEVICE — SUT VICRYL PLUS 2-0

## (undated) DEVICE — ATTUNE PINNING SYSTEM
Type: IMPLANTABLE DEVICE | Site: KNEE | Status: NON-FUNCTIONAL
Brand: ATTUNE
Removed: 2024-06-19

## (undated) DEVICE — PULSAVAC ZIMMER

## (undated) DEVICE — UNDERGLOVES BIOGEL PI SIZE 8.5

## (undated) DEVICE — DRAPE SURG W/TWL 17 5/8X23

## (undated) DEVICE — ALCOHOL 70% ANTISEPTIC ISO 4OZ

## (undated) DEVICE — DRAPE THREE-QTR REINF 53X77IN

## (undated) DEVICE — TAPE SILK 3IN

## (undated) DEVICE — DRAPE INCISE IOBAN 2 23X33IN

## (undated) DEVICE — SUT MCRYL PLUS 4-0 PS2 27IN

## (undated) DEVICE — SPONGE GAUZE 16PLY 4X4

## (undated) DEVICE — DRAPE TOP 53X102IN

## (undated) DEVICE — DRESSING TRANS 4X4 TEGADERM

## (undated) DEVICE — Device

## (undated) DEVICE — TOWEL OR XRAY WHITE 17X26IN

## (undated) DEVICE — SYS REVOLUTION CEMENT MIXING

## (undated) DEVICE — SYR 50CC LL

## (undated) DEVICE — TUBE SUCTION FRAZIER VENT 10FR

## (undated) DEVICE — GLOVE BIOGEL PI MICRO SZ 7

## (undated) DEVICE — UNDERGLOVES BIOGEL PI SIZE 7.5

## (undated) DEVICE — COVER LIGHT HANDLE 80/CA

## (undated) DEVICE — KIT TOTAL KNEE TKOFG OMC

## (undated) DEVICE — SOL NACL IRR 1000ML BTL

## (undated) DEVICE — GLOVE BIOGEL SKINSENSE PI 8.0

## (undated) DEVICE — HOOD T7 W/ PEEL AWAY LENS

## (undated) DEVICE — NDL HYPO STD REG BVL 18GX1.5IN

## (undated) DEVICE — SUT 2/0 36IN COATED VICRYL

## (undated) DEVICE — DRESSING AQUACEL AG RBBN 2X45

## (undated) DEVICE — BLADE DUAL CUT SAG 35X64X.89MM

## (undated) DEVICE — GOWN SMARTGOWN 3XL XLONG

## (undated) DEVICE — PAD KNEE POLAR XL

## (undated) DEVICE — DRESSING TELFA N ADH 3X8

## (undated) DEVICE — VELYS SATELLITE STATION DRAPE

## (undated) DEVICE — SOL BETADINE 5%

## (undated) DEVICE — VELYS OSCILLATING SAW BLADE

## (undated) DEVICE — DRAPE T EXTRM SURG 121X128X90

## (undated) DEVICE — SUT 1 36IN COATED VICRYL UN

## (undated) DEVICE — SOL NACL IRR 3000ML

## (undated) DEVICE — SUT STRATAFIX PDS 1 CTX 18IN

## (undated) DEVICE — ELECTRODE REM PLYHSV RETURN 9

## (undated) DEVICE — ADHESIVE DERMABOND ADVANCED

## (undated) DEVICE — VELYS DEVICE DRAPE

## (undated) DEVICE — MARKER SKIN RULER STERILE